# Patient Record
Sex: MALE | Race: WHITE | Employment: OTHER | URBAN - NONMETROPOLITAN AREA
[De-identification: names, ages, dates, MRNs, and addresses within clinical notes are randomized per-mention and may not be internally consistent; named-entity substitution may affect disease eponyms.]

---

## 2017-07-21 PROBLEM — S32.401A RIGHT ACETABULAR FRACTURE (HCC): Status: ACTIVE | Noted: 2017-07-21

## 2017-09-19 PROBLEM — D62 ACUTE BLOOD LOSS ANEMIA: Status: ACTIVE | Noted: 2017-09-19

## 2017-09-19 PROBLEM — K57.93 DIVERTICULITIS OF INTESTINE WITH HEMORRHAGE: Status: ACTIVE | Noted: 2017-09-19

## 2017-09-19 PROBLEM — S32.401A RIGHT ACETABULAR FRACTURE (HCC): Status: RESOLVED | Noted: 2017-07-21 | Resolved: 2017-09-19

## 2019-04-25 LAB
CHOLESTEROL, TOTAL: NORMAL MG/DL
CHOLESTEROL/HDL RATIO: NORMAL
CREATININE URINE: 119 MG/DL
HDLC SERPL-MCNC: NORMAL MG/DL (ref 35–70)
LDL CHOLESTEROL CALCULATED: NORMAL MG/DL (ref 0–160)
MICROALBUMIN/CREAT 24H UR: 1.1 MG/G{CREAT}
TRIGL SERPL-MCNC: NORMAL MG/DL
VLDLC SERPL CALC-MCNC: NORMAL MG/DL

## 2019-05-08 ENCOUNTER — TELEPHONE (OUTPATIENT)
Dept: FAMILY MEDICINE CLINIC | Age: 84
End: 2019-05-08

## 2019-05-08 RX ORDER — FUROSEMIDE 20 MG/1
TABLET ORAL
Refills: 0 | COMMUNITY
Start: 2019-04-13 | End: 2019-07-12 | Stop reason: SDUPTHER

## 2019-06-05 RX ORDER — POTASSIUM CHLORIDE 20 MEQ/1
TABLET, EXTENDED RELEASE ORAL
Qty: 30 TABLET | Refills: 1 | OUTPATIENT
Start: 2019-06-05

## 2019-06-06 RX ORDER — POTASSIUM CHLORIDE 20 MEQ/1
20 TABLET, EXTENDED RELEASE ORAL DAILY
Qty: 30 TABLET | Refills: 5 | Status: SHIPPED | OUTPATIENT
Start: 2019-06-06 | End: 2019-10-21 | Stop reason: SDUPTHER

## 2019-06-06 NOTE — TELEPHONE ENCOUNTER
Wife - Sim[place] calling to get a new script for K+ sent to [facility].

## 2019-07-12 RX ORDER — FUROSEMIDE 20 MG/1
20 TABLET ORAL DAILY
Qty: 30 TABLET | Refills: 0 | Status: SHIPPED | OUTPATIENT
Start: 2019-07-12 | End: 2019-08-13 | Stop reason: SDUPTHER

## 2019-08-13 RX ORDER — FUROSEMIDE 20 MG/1
20 TABLET ORAL DAILY
Qty: 30 TABLET | Refills: 2 | Status: SHIPPED | OUTPATIENT
Start: 2019-08-13 | End: 2019-10-21 | Stop reason: SDUPTHER

## 2019-08-14 DIAGNOSIS — I10 ESSENTIAL HYPERTENSION: Primary | ICD-10-CM

## 2019-08-14 DIAGNOSIS — G47.00 INSOMNIA, UNSPECIFIED TYPE: ICD-10-CM

## 2019-08-14 RX ORDER — LISINOPRIL AND HYDROCHLOROTHIAZIDE 25; 20 MG/1; MG/1
1 TABLET ORAL DAILY
Qty: 30 TABLET | Refills: 5 | Status: SHIPPED | OUTPATIENT
Start: 2019-08-14 | End: 2019-08-19

## 2019-08-14 RX ORDER — TRAZODONE HYDROCHLORIDE 50 MG/1
50 TABLET ORAL NIGHTLY
Qty: 30 TABLET | Refills: 5 | Status: SHIPPED
Start: 2019-08-14 | End: 2020-04-06 | Stop reason: SDUPTHER

## 2019-08-15 ENCOUNTER — HOSPITAL ENCOUNTER (OUTPATIENT)
Age: 84
Discharge: HOME | End: 2019-08-17
Payer: MEDICARE

## 2019-08-15 LAB
ALBUMIN SERPL-MCNC: 4.3 G/DL (ref 3.5–5.2)
ALP BLD-CCNC: 93 U/L (ref 40–129)
ALT SERPL-CCNC: 6 U/L (ref 0–40)
ANION GAP SERPL CALCULATED.3IONS-SCNC: 15 MMOL/L (ref 7–16)
AST SERPL-CCNC: 16 U/L (ref 0–39)
BASOPHILS ABSOLUTE: 0.08 E9/L (ref 0–0.2)
BASOPHILS RELATIVE PERCENT: 1 % (ref 0–2)
BILIRUB SERPL-MCNC: 0.4 MG/DL (ref 0–1.2)
BUN BLDV-MCNC: 32 MG/DL (ref 8–23)
CALCIUM SERPL-MCNC: 9.1 MG/DL (ref 8.6–10.2)
CHLORIDE BLD-SCNC: 103 MMOL/L (ref 98–107)
CO2: 23 MMOL/L (ref 22–29)
CREAT SERPL-MCNC: 1.3 MG/DL (ref 0.7–1.2)
EOSINOPHILS ABSOLUTE: 0.34 E9/L (ref 0.05–0.5)
EOSINOPHILS RELATIVE PERCENT: 4.1 % (ref 0–6)
GFR AFRICAN AMERICAN: >60
GFR NON-AFRICAN AMERICAN: 52 ML/MIN/1.73
GLUCOSE BLD-MCNC: 92 MG/DL (ref 74–99)
HCT VFR BLD CALC: 42.1 % (ref 37–54)
HEMOGLOBIN: 13.5 G/DL (ref 12.5–16.5)
IMMATURE GRANULOCYTES #: 0.03 E9/L
IMMATURE GRANULOCYTES %: 0.4 % (ref 0–5)
LYMPHOCYTES ABSOLUTE: 1.5 E9/L (ref 1.5–4)
LYMPHOCYTES RELATIVE PERCENT: 18.3 % (ref 20–42)
MAGNESIUM: 2.2 MG/DL (ref 1.6–2.6)
MCH RBC QN AUTO: 33.8 PG (ref 26–35)
MCHC RBC AUTO-ENTMCNC: 32.1 % (ref 32–34.5)
MCV RBC AUTO: 105.5 FL (ref 80–99.9)
MONOCYTES ABSOLUTE: 0.77 E9/L (ref 0.1–0.95)
MONOCYTES RELATIVE PERCENT: 9.4 % (ref 2–12)
NEUTROPHILS ABSOLUTE: 5.48 E9/L (ref 1.8–7.3)
NEUTROPHILS RELATIVE PERCENT: 66.8 % (ref 43–80)
PDW BLD-RTO: 14.5 FL (ref 11.5–15)
PHOSPHORUS: 2.9 MG/DL (ref 2.5–4.5)
PLATELET # BLD: 232 E9/L (ref 130–450)
PMV BLD AUTO: 11.3 FL (ref 7–12)
POTASSIUM SERPL-SCNC: 4.2 MMOL/L (ref 3.5–5)
RBC # BLD: 3.99 E12/L (ref 3.8–5.8)
SODIUM BLD-SCNC: 141 MMOL/L (ref 132–146)
TOTAL PROTEIN: 7.6 G/DL (ref 6.4–8.3)
URIC ACID, SERUM: 9 MG/DL (ref 3.4–7)
WBC # BLD: 8.2 E9/L (ref 4.5–11.5)

## 2019-08-15 PROCEDURE — 83735 ASSAY OF MAGNESIUM: CPT

## 2019-08-15 PROCEDURE — 36415 COLL VENOUS BLD VENIPUNCTURE: CPT

## 2019-08-15 PROCEDURE — 84550 ASSAY OF BLOOD/URIC ACID: CPT

## 2019-08-15 PROCEDURE — 85025 COMPLETE CBC W/AUTO DIFF WBC: CPT

## 2019-08-15 PROCEDURE — 80053 COMPREHEN METABOLIC PANEL: CPT

## 2019-08-15 PROCEDURE — 84100 ASSAY OF PHOSPHORUS: CPT

## 2019-08-19 ENCOUNTER — TELEPHONE (OUTPATIENT)
Dept: FAMILY MEDICINE CLINIC | Age: 84
End: 2019-08-19

## 2019-08-19 DIAGNOSIS — I10 ESSENTIAL HYPERTENSION: ICD-10-CM

## 2019-08-19 LAB
BILIRUBIN URINE: NEGATIVE
BLOOD, URINE: NEGATIVE
CLARITY: CLEAR
COLOR: YELLOW
CREATININE URINE: 90 MG/DL (ref 40–278)
GLUCOSE URINE: NEGATIVE MG/DL
KETONES, URINE: NEGATIVE MG/DL
LEUKOCYTE ESTERASE, URINE: NEGATIVE
NITRITE, URINE: NEGATIVE
PH UA: 6.5 (ref 5–9)
PROTEIN PROTEIN: 10 MG/DL (ref 0–12)
PROTEIN UA: NEGATIVE MG/DL
PROTEIN/CREAT RATIO: 0.1
PROTEIN/CREAT RATIO: 0.1 (ref 0–0.2)
SPECIFIC GRAVITY UA: 1.01 (ref 1–1.03)
UROBILINOGEN, URINE: 1 E.U./DL

## 2019-08-19 RX ORDER — LISINOPRIL AND HYDROCHLOROTHIAZIDE 12.5; 1 MG/1; MG/1
TABLET ORAL
COMMUNITY
Start: 2018-11-12 | End: 2020-01-06

## 2019-09-18 RX ORDER — TRIAMCINOLONE ACETONIDE 1 MG/G
CREAM TOPICAL 2 TIMES DAILY
COMMUNITY
End: 2019-10-21 | Stop reason: SDUPTHER

## 2019-10-18 DIAGNOSIS — E03.9 HYPOTHYROIDISM, UNSPECIFIED TYPE: Primary | ICD-10-CM

## 2019-10-18 DIAGNOSIS — E78.2 MIXED HYPERLIPIDEMIA: ICD-10-CM

## 2019-10-18 DIAGNOSIS — I10 ESSENTIAL HYPERTENSION: ICD-10-CM

## 2019-10-21 ENCOUNTER — OFFICE VISIT (OUTPATIENT)
Dept: FAMILY MEDICINE CLINIC | Age: 84
End: 2019-10-21
Payer: MEDICARE

## 2019-10-21 ENCOUNTER — HOSPITAL ENCOUNTER (OUTPATIENT)
Age: 84
Discharge: HOME | End: 2019-10-23
Payer: MEDICARE

## 2019-10-21 VITALS
DIASTOLIC BLOOD PRESSURE: 62 MMHG | OXYGEN SATURATION: 93 % | HEART RATE: 69 BPM | TEMPERATURE: 97.8 F | HEIGHT: 68 IN | SYSTOLIC BLOOD PRESSURE: 118 MMHG | BODY MASS INDEX: 27.89 KG/M2 | WEIGHT: 184 LBS

## 2019-10-21 DIAGNOSIS — E03.9 HYPOTHYROIDISM, UNSPECIFIED TYPE: ICD-10-CM

## 2019-10-21 DIAGNOSIS — I87.2 VENOUS STASIS DERMATITIS OF BOTH LOWER EXTREMITIES: ICD-10-CM

## 2019-10-21 DIAGNOSIS — G20 PARKINSON DISEASE (HCC): ICD-10-CM

## 2019-10-21 DIAGNOSIS — E78.2 MIXED HYPERLIPIDEMIA: ICD-10-CM

## 2019-10-21 DIAGNOSIS — N18.30 STAGE 3 CHRONIC KIDNEY DISEASE (HCC): ICD-10-CM

## 2019-10-21 DIAGNOSIS — I10 ESSENTIAL HYPERTENSION: ICD-10-CM

## 2019-10-21 DIAGNOSIS — R60.0 LOCALIZED EDEMA: ICD-10-CM

## 2019-10-21 DIAGNOSIS — I10 ESSENTIAL HYPERTENSION: Primary | ICD-10-CM

## 2019-10-21 LAB
ALBUMIN SERPL-MCNC: 4.1 G/DL (ref 3.5–5.2)
ALP BLD-CCNC: 100 U/L (ref 40–129)
ALT SERPL-CCNC: 5 U/L (ref 0–40)
ANION GAP SERPL CALCULATED.3IONS-SCNC: 16 MMOL/L (ref 7–16)
AST SERPL-CCNC: 17 U/L (ref 0–39)
BASOPHILS ABSOLUTE: 0.08 E9/L (ref 0–0.2)
BASOPHILS RELATIVE PERCENT: 1 % (ref 0–2)
BILIRUB SERPL-MCNC: 0.5 MG/DL (ref 0–1.2)
BUN BLDV-MCNC: 30 MG/DL (ref 8–23)
CALCIUM SERPL-MCNC: 9.2 MG/DL (ref 8.6–10.2)
CHLORIDE BLD-SCNC: 102 MMOL/L (ref 98–107)
CHOLESTEROL, TOTAL: 150 MG/DL (ref 0–199)
CO2: 22 MMOL/L (ref 22–29)
CREAT SERPL-MCNC: 1.3 MG/DL (ref 0.7–1.2)
EOSINOPHILS ABSOLUTE: 0.43 E9/L (ref 0.05–0.5)
EOSINOPHILS RELATIVE PERCENT: 5.6 % (ref 0–6)
GFR AFRICAN AMERICAN: >60
GFR NON-AFRICAN AMERICAN: 52 ML/MIN/1.73
GLUCOSE BLD-MCNC: 99 MG/DL (ref 74–99)
HCT VFR BLD CALC: 40.6 % (ref 37–54)
HDLC SERPL-MCNC: 39 MG/DL
HEMOGLOBIN: 12.8 G/DL (ref 12.5–16.5)
IMMATURE GRANULOCYTES #: 0.04 E9/L
IMMATURE GRANULOCYTES %: 0.5 % (ref 0–5)
LDL CHOLESTEROL CALCULATED: 88 MG/DL (ref 0–99)
LYMPHOCYTES ABSOLUTE: 1.69 E9/L (ref 1.5–4)
LYMPHOCYTES RELATIVE PERCENT: 21.9 % (ref 20–42)
MCH RBC QN AUTO: 33.3 PG (ref 26–35)
MCHC RBC AUTO-ENTMCNC: 31.5 % (ref 32–34.5)
MCV RBC AUTO: 105.7 FL (ref 80–99.9)
MONOCYTES ABSOLUTE: 0.91 E9/L (ref 0.1–0.95)
MONOCYTES RELATIVE PERCENT: 11.8 % (ref 2–12)
NEUTROPHILS ABSOLUTE: 4.57 E9/L (ref 1.8–7.3)
NEUTROPHILS RELATIVE PERCENT: 59.2 % (ref 43–80)
PDW BLD-RTO: 13.8 FL (ref 11.5–15)
PLATELET # BLD: 218 E9/L (ref 130–450)
PMV BLD AUTO: 11.3 FL (ref 7–12)
POTASSIUM SERPL-SCNC: 4 MMOL/L (ref 3.5–5)
RBC # BLD: 3.84 E12/L (ref 3.8–5.8)
SODIUM BLD-SCNC: 140 MMOL/L (ref 132–146)
T4 FREE: 1.53 NG/DL (ref 0.93–1.7)
TOTAL PROTEIN: 7.9 G/DL (ref 6.4–8.3)
TRIGL SERPL-MCNC: 113 MG/DL (ref 0–149)
TSH SERPL DL<=0.05 MIU/L-ACNC: 2.92 UIU/ML (ref 0.27–4.2)
VLDLC SERPL CALC-MCNC: 23 MG/DL
WBC # BLD: 7.7 E9/L (ref 4.5–11.5)

## 2019-10-21 PROCEDURE — 80053 COMPREHEN METABOLIC PANEL: CPT

## 2019-10-21 PROCEDURE — 84443 ASSAY THYROID STIM HORMONE: CPT

## 2019-10-21 PROCEDURE — 84439 ASSAY OF FREE THYROXINE: CPT

## 2019-10-21 PROCEDURE — 80061 LIPID PANEL: CPT

## 2019-10-21 PROCEDURE — 99214 OFFICE O/P EST MOD 30 MIN: CPT | Performed by: FAMILY MEDICINE

## 2019-10-21 PROCEDURE — 36415 COLL VENOUS BLD VENIPUNCTURE: CPT

## 2019-10-21 PROCEDURE — 85025 COMPLETE CBC W/AUTO DIFF WBC: CPT

## 2019-10-21 RX ORDER — FUROSEMIDE 20 MG/1
20 TABLET ORAL DAILY
Qty: 90 TABLET | Refills: 1 | Status: SHIPPED | OUTPATIENT
Start: 2019-10-21 | End: 2020-01-06

## 2019-10-21 RX ORDER — LEVOTHYROXINE SODIUM 0.03 MG/1
TABLET ORAL
Qty: 240 TABLET | Refills: 3 | Status: SHIPPED | OUTPATIENT
Start: 2019-10-21 | End: 2019-10-27 | Stop reason: SDUPTHER

## 2019-10-21 RX ORDER — TRIAMCINOLONE ACETONIDE 1 MG/G
CREAM TOPICAL 2 TIMES DAILY
Qty: 45 G | Refills: 3 | Status: ON HOLD | OUTPATIENT
Start: 2019-10-21 | End: 2021-02-01 | Stop reason: HOSPADM

## 2019-10-21 RX ORDER — POTASSIUM CHLORIDE 20 MEQ/1
20 TABLET, EXTENDED RELEASE ORAL DAILY
Qty: 90 TABLET | Refills: 1 | Status: SHIPPED
Start: 2019-10-21 | End: 2020-05-18 | Stop reason: SDUPTHER

## 2019-10-21 RX ORDER — DOXAZOSIN MESYLATE 4 MG/1
4 TABLET ORAL NIGHTLY
Qty: 90 TABLET | Refills: 1 | Status: SHIPPED
Start: 2019-10-21 | End: 2020-04-06 | Stop reason: SDUPTHER

## 2019-10-21 ASSESSMENT — ENCOUNTER SYMPTOMS
BLOOD IN STOOL: 0
SORE THROAT: 0
CHEST TIGHTNESS: 0
CONSTIPATION: 0
SHORTNESS OF BREATH: 0
BACK PAIN: 0
COLOR CHANGE: 0
SINUS PRESSURE: 0
SINUS PAIN: 0
COUGH: 0
EYE PAIN: 0
NAUSEA: 0
VOMITING: 0
PHOTOPHOBIA: 0
DIARRHEA: 0
ABDOMINAL PAIN: 0
WHEEZING: 0
EYE ITCHING: 0
EYE REDNESS: 0
ABDOMINAL DISTENTION: 0

## 2019-10-21 ASSESSMENT — PATIENT HEALTH QUESTIONNAIRE - PHQ9
2. FEELING DOWN, DEPRESSED OR HOPELESS: 0
SUM OF ALL RESPONSES TO PHQ QUESTIONS 1-9: 0
1. LITTLE INTEREST OR PLEASURE IN DOING THINGS: 0
SUM OF ALL RESPONSES TO PHQ QUESTIONS 1-9: 0
SUM OF ALL RESPONSES TO PHQ9 QUESTIONS 1 & 2: 0

## 2019-10-22 DIAGNOSIS — D75.89 MACROCYTOSIS: Primary | ICD-10-CM

## 2019-10-25 ENCOUNTER — HOSPITAL ENCOUNTER (OUTPATIENT)
Age: 84
Discharge: HOME | End: 2019-10-27
Payer: MEDICARE

## 2019-10-25 DIAGNOSIS — D75.89 MACROCYTOSIS: ICD-10-CM

## 2019-10-25 LAB
BILIRUBIN URINE: NEGATIVE
BLOOD, URINE: NEGATIVE
CLARITY: CLEAR
COLOR: YELLOW
FOLATE: 11.8 NG/ML (ref 4.8–24.2)
GLUCOSE URINE: NEGATIVE MG/DL
KETONES, URINE: NEGATIVE MG/DL
LEUKOCYTE ESTERASE, URINE: NEGATIVE
NITRITE, URINE: NEGATIVE
PH UA: 5.5 (ref 5–9)
PROTEIN UA: NEGATIVE MG/DL
SPECIFIC GRAVITY UA: 1.02 (ref 1–1.03)
UROBILINOGEN, URINE: 0.2 E.U./DL
VITAMIN B-12: 1410 PG/ML (ref 211–946)

## 2019-10-25 PROCEDURE — 36415 COLL VENOUS BLD VENIPUNCTURE: CPT

## 2019-10-25 PROCEDURE — 82746 ASSAY OF FOLIC ACID SERUM: CPT

## 2019-10-25 PROCEDURE — 82607 VITAMIN B-12: CPT

## 2019-10-27 DIAGNOSIS — E03.9 HYPOTHYROIDISM, UNSPECIFIED TYPE: ICD-10-CM

## 2019-10-28 RX ORDER — LEVOTHYROXINE SODIUM 0.03 MG/1
TABLET ORAL
Qty: 225 TABLET | Refills: 1 | Status: SHIPPED | OUTPATIENT
Start: 2019-10-28 | End: 2020-01-24

## 2019-11-23 ENCOUNTER — OFFICE VISIT (OUTPATIENT)
Dept: FAMILY MEDICINE CLINIC | Age: 84
End: 2019-11-23
Payer: MEDICARE

## 2019-11-23 VITALS
OXYGEN SATURATION: 95 % | DIASTOLIC BLOOD PRESSURE: 70 MMHG | SYSTOLIC BLOOD PRESSURE: 110 MMHG | BODY MASS INDEX: 28.36 KG/M2 | WEIGHT: 187.13 LBS | HEART RATE: 76 BPM | TEMPERATURE: 97.6 F | HEIGHT: 68 IN

## 2019-11-23 DIAGNOSIS — S51.811A SKIN TEAR OF RIGHT FOREARM WITHOUT COMPLICATION, INITIAL ENCOUNTER: Primary | ICD-10-CM

## 2019-11-23 PROCEDURE — 99214 OFFICE O/P EST MOD 30 MIN: CPT | Performed by: FAMILY MEDICINE

## 2019-11-23 ASSESSMENT — ENCOUNTER SYMPTOMS
WHEEZING: 0
ALLERGIC/IMMUNOLOGIC NEGATIVE: 1
TROUBLE SWALLOWING: 0
CHEST TIGHTNESS: 0
EYE PAIN: 0
BACK PAIN: 0
SINUS PAIN: 0
NAUSEA: 0
COUGH: 0
ABDOMINAL PAIN: 0
COLOR CHANGE: 0
SORE THROAT: 0
CONSTIPATION: 0
EYE DISCHARGE: 0
DIARRHEA: 0
SHORTNESS OF BREATH: 0
VOMITING: 0

## 2019-11-25 ENCOUNTER — OFFICE VISIT (OUTPATIENT)
Dept: FAMILY MEDICINE CLINIC | Age: 84
End: 2019-11-25
Payer: MEDICARE

## 2019-11-25 VITALS
SYSTOLIC BLOOD PRESSURE: 110 MMHG | HEIGHT: 68 IN | BODY MASS INDEX: 28.45 KG/M2 | HEART RATE: 68 BPM | TEMPERATURE: 98.2 F | DIASTOLIC BLOOD PRESSURE: 70 MMHG | OXYGEN SATURATION: 96 %

## 2019-11-25 DIAGNOSIS — S51.811A SKIN TEAR OF RIGHT FOREARM WITHOUT COMPLICATION, INITIAL ENCOUNTER: Primary | ICD-10-CM

## 2019-11-25 DIAGNOSIS — I10 ESSENTIAL HYPERTENSION: ICD-10-CM

## 2019-11-25 PROCEDURE — 99213 OFFICE O/P EST LOW 20 MIN: CPT | Performed by: FAMILY MEDICINE

## 2019-11-25 ASSESSMENT — ENCOUNTER SYMPTOMS
BACK PAIN: 0
SINUS PAIN: 0
COUGH: 0
TROUBLE SWALLOWING: 0
VOMITING: 0
CHEST TIGHTNESS: 0
ALLERGIC/IMMUNOLOGIC NEGATIVE: 1
WHEEZING: 0
EYE PAIN: 0
ABDOMINAL PAIN: 0
SHORTNESS OF BREATH: 0
DIARRHEA: 0
NAUSEA: 0
SORE THROAT: 0
COLOR CHANGE: 0
EYE DISCHARGE: 0
CONSTIPATION: 0

## 2019-11-27 ENCOUNTER — OFFICE VISIT (OUTPATIENT)
Dept: FAMILY MEDICINE CLINIC | Age: 84
End: 2019-11-27
Payer: MEDICARE

## 2019-11-27 ENCOUNTER — TELEPHONE (OUTPATIENT)
Dept: FAMILY MEDICINE CLINIC | Age: 84
End: 2019-11-27

## 2019-11-27 VITALS
DIASTOLIC BLOOD PRESSURE: 82 MMHG | HEART RATE: 61 BPM | WEIGHT: 187.3 LBS | OXYGEN SATURATION: 97 % | BODY MASS INDEX: 28.39 KG/M2 | TEMPERATURE: 98 F | HEIGHT: 68 IN | SYSTOLIC BLOOD PRESSURE: 126 MMHG

## 2019-11-27 DIAGNOSIS — S51.811D SKIN TEAR OF RIGHT FOREARM WITHOUT COMPLICATION, SUBSEQUENT ENCOUNTER: Primary | ICD-10-CM

## 2019-11-27 DIAGNOSIS — I10 ESSENTIAL HYPERTENSION: ICD-10-CM

## 2019-11-27 PROCEDURE — 99213 OFFICE O/P EST LOW 20 MIN: CPT | Performed by: FAMILY MEDICINE

## 2019-11-27 RX ORDER — MUPIROCIN CALCIUM 20 MG/G
CREAM TOPICAL
Qty: 30 G | Refills: 1 | Status: SHIPPED | OUTPATIENT
Start: 2019-11-27 | End: 2019-12-27

## 2019-11-27 ASSESSMENT — ENCOUNTER SYMPTOMS
DIARRHEA: 0
WHEEZING: 0
EYE DISCHARGE: 0
EYE PAIN: 0
SORE THROAT: 0
COUGH: 0
SINUS PAIN: 0
ABDOMINAL PAIN: 0
SHORTNESS OF BREATH: 0
CHEST TIGHTNESS: 0
COLOR CHANGE: 0
TROUBLE SWALLOWING: 0
VOMITING: 0
NAUSEA: 0
BACK PAIN: 0
ALLERGIC/IMMUNOLOGIC NEGATIVE: 1
CONSTIPATION: 0

## 2019-12-04 ENCOUNTER — OFFICE VISIT (OUTPATIENT)
Dept: FAMILY MEDICINE CLINIC | Age: 84
End: 2019-12-04
Payer: MEDICARE

## 2019-12-04 VITALS
DIASTOLIC BLOOD PRESSURE: 84 MMHG | WEIGHT: 189 LBS | HEIGHT: 68 IN | HEART RATE: 62 BPM | BODY MASS INDEX: 28.64 KG/M2 | TEMPERATURE: 97.7 F | SYSTOLIC BLOOD PRESSURE: 122 MMHG | OXYGEN SATURATION: 97 %

## 2019-12-04 DIAGNOSIS — S51.811D SKIN TEAR OF RIGHT FOREARM WITHOUT COMPLICATION, SUBSEQUENT ENCOUNTER: Primary | ICD-10-CM

## 2019-12-04 PROCEDURE — 99213 OFFICE O/P EST LOW 20 MIN: CPT | Performed by: FAMILY MEDICINE

## 2019-12-04 ASSESSMENT — ENCOUNTER SYMPTOMS
ABDOMINAL PAIN: 0
BACK PAIN: 0
WHEEZING: 0
SORE THROAT: 0
DIARRHEA: 0
CHEST TIGHTNESS: 0
ALLERGIC/IMMUNOLOGIC NEGATIVE: 1
NAUSEA: 0
SHORTNESS OF BREATH: 0
COUGH: 0
EYE PAIN: 0
TROUBLE SWALLOWING: 0
EYE DISCHARGE: 0
VOMITING: 0
SINUS PAIN: 0
COLOR CHANGE: 0
CONSTIPATION: 0

## 2019-12-30 ENCOUNTER — HOSPITAL ENCOUNTER (OUTPATIENT)
Age: 84
Discharge: HOME | End: 2020-01-01
Payer: MEDICARE

## 2019-12-30 ENCOUNTER — OFFICE VISIT (OUTPATIENT)
Dept: FAMILY MEDICINE CLINIC | Age: 84
End: 2019-12-30
Payer: MEDICARE

## 2019-12-30 VITALS
DIASTOLIC BLOOD PRESSURE: 70 MMHG | OXYGEN SATURATION: 91 % | HEIGHT: 68 IN | BODY MASS INDEX: 28.98 KG/M2 | WEIGHT: 191.2 LBS | TEMPERATURE: 98.4 F | SYSTOLIC BLOOD PRESSURE: 122 MMHG | HEART RATE: 80 BPM

## 2019-12-30 DIAGNOSIS — I87.2 VENOUS STASIS DERMATITIS OF BOTH LOWER EXTREMITIES: ICD-10-CM

## 2019-12-30 DIAGNOSIS — I10 ESSENTIAL HYPERTENSION: ICD-10-CM

## 2019-12-30 DIAGNOSIS — N18.30 STAGE 3 CHRONIC KIDNEY DISEASE (HCC): ICD-10-CM

## 2019-12-30 DIAGNOSIS — R60.0 LOCALIZED EDEMA: Primary | ICD-10-CM

## 2019-12-30 LAB
ANION GAP SERPL CALCULATED.3IONS-SCNC: 15 MMOL/L (ref 7–16)
BASOPHILS ABSOLUTE: 0.06 E9/L (ref 0–0.2)
BASOPHILS RELATIVE PERCENT: 0.8 % (ref 0–2)
BUN BLDV-MCNC: 30 MG/DL (ref 8–23)
CALCIUM SERPL-MCNC: 8.8 MG/DL (ref 8.6–10.2)
CHLORIDE BLD-SCNC: 100 MMOL/L (ref 98–107)
CO2: 25 MMOL/L (ref 22–29)
CREAT SERPL-MCNC: 1.3 MG/DL (ref 0.7–1.2)
EOSINOPHILS ABSOLUTE: 0.4 E9/L (ref 0.05–0.5)
EOSINOPHILS RELATIVE PERCENT: 5.2 % (ref 0–6)
GFR AFRICAN AMERICAN: >60
GFR NON-AFRICAN AMERICAN: 52 ML/MIN/1.73
GLUCOSE BLD-MCNC: 79 MG/DL (ref 74–99)
HCT VFR BLD CALC: 39.2 % (ref 37–54)
HEMOGLOBIN: 11.9 G/DL (ref 12.5–16.5)
IMMATURE GRANULOCYTES #: 0.03 E9/L
IMMATURE GRANULOCYTES %: 0.4 % (ref 0–5)
LYMPHOCYTES ABSOLUTE: 1.27 E9/L (ref 1.5–4)
LYMPHOCYTES RELATIVE PERCENT: 16.5 % (ref 20–42)
MCH RBC QN AUTO: 32.8 PG (ref 26–35)
MCHC RBC AUTO-ENTMCNC: 30.4 % (ref 32–34.5)
MCV RBC AUTO: 108 FL (ref 80–99.9)
MONOCYTES ABSOLUTE: 0.8 E9/L (ref 0.1–0.95)
MONOCYTES RELATIVE PERCENT: 10.4 % (ref 2–12)
NEUTROPHILS ABSOLUTE: 5.15 E9/L (ref 1.8–7.3)
NEUTROPHILS RELATIVE PERCENT: 66.7 % (ref 43–80)
PDW BLD-RTO: 14.2 FL (ref 11.5–15)
PLATELET # BLD: 212 E9/L (ref 130–450)
PMV BLD AUTO: 11.7 FL (ref 7–12)
POTASSIUM SERPL-SCNC: 3.9 MMOL/L (ref 3.5–5)
PRO-BNP: 976 PG/ML (ref 0–450)
RBC # BLD: 3.63 E12/L (ref 3.8–5.8)
SODIUM BLD-SCNC: 140 MMOL/L (ref 132–146)
WBC # BLD: 7.7 E9/L (ref 4.5–11.5)

## 2019-12-30 PROCEDURE — 93000 ELECTROCARDIOGRAM COMPLETE: CPT | Performed by: NURSE PRACTITIONER

## 2019-12-30 PROCEDURE — 83880 ASSAY OF NATRIURETIC PEPTIDE: CPT

## 2019-12-30 PROCEDURE — 36415 COLL VENOUS BLD VENIPUNCTURE: CPT

## 2019-12-30 PROCEDURE — 85025 COMPLETE CBC W/AUTO DIFF WBC: CPT

## 2019-12-30 PROCEDURE — 99214 OFFICE O/P EST MOD 30 MIN: CPT | Performed by: NURSE PRACTITIONER

## 2019-12-30 PROCEDURE — 80048 BASIC METABOLIC PNL TOTAL CA: CPT

## 2019-12-30 RX ORDER — LISINOPRIL 10 MG/1
10 TABLET ORAL DAILY
Qty: 30 TABLET | Refills: 0 | Status: SHIPPED | OUTPATIENT
Start: 2019-12-30 | End: 2020-01-06 | Stop reason: SDUPTHER

## 2019-12-30 RX ORDER — FUROSEMIDE 40 MG/1
40 TABLET ORAL DAILY
Qty: 30 TABLET | Refills: 0 | Status: SHIPPED | OUTPATIENT
Start: 2019-12-30 | End: 2020-01-06 | Stop reason: SDUPTHER

## 2019-12-30 RX ORDER — CEPHALEXIN 500 MG/1
500 CAPSULE ORAL 2 TIMES DAILY
Qty: 14 CAPSULE | Refills: 0 | Status: SHIPPED | OUTPATIENT
Start: 2019-12-30 | End: 2020-01-06 | Stop reason: SDUPTHER

## 2019-12-30 ASSESSMENT — ENCOUNTER SYMPTOMS
WHEEZING: 0
COLOR CHANGE: 0
EYE PAIN: 0
CHEST TIGHTNESS: 0
SINUS PRESSURE: 0
SINUS PAIN: 0
PHOTOPHOBIA: 0
EYE ITCHING: 0
SHORTNESS OF BREATH: 0
NAUSEA: 0
ABDOMINAL DISTENTION: 0
BLOOD IN STOOL: 0
BACK PAIN: 0
COUGH: 0
VOMITING: 0
EYE REDNESS: 0
ABDOMINAL PAIN: 0
CONSTIPATION: 0
DIARRHEA: 0
SORE THROAT: 0

## 2020-01-06 RX ORDER — LISINOPRIL 10 MG/1
10 TABLET ORAL DAILY
Qty: 30 TABLET | Refills: 5 | Status: SHIPPED
Start: 2020-01-06 | End: 2020-04-01 | Stop reason: SDUPTHER

## 2020-01-06 RX ORDER — FUROSEMIDE 40 MG/1
40 TABLET ORAL DAILY
Qty: 30 TABLET | Refills: 5 | Status: SHIPPED
Start: 2020-01-06 | End: 2020-04-06 | Stop reason: SDUPTHER

## 2020-01-06 RX ORDER — CEPHALEXIN 500 MG/1
500 CAPSULE ORAL 2 TIMES DAILY
Qty: 14 CAPSULE | Refills: 0 | Status: SHIPPED
Start: 2020-01-06 | End: 2020-04-20 | Stop reason: ALTCHOICE

## 2020-01-09 ENCOUNTER — HOSPITAL ENCOUNTER (OUTPATIENT)
Age: 85
Discharge: HOME | End: 2020-01-11
Payer: MEDICARE

## 2020-01-09 LAB
ALBUMIN SERPL-MCNC: 4.2 G/DL (ref 3.5–5.2)
ALP BLD-CCNC: 90 U/L (ref 40–129)
ALT SERPL-CCNC: 6 U/L (ref 0–40)
ANION GAP SERPL CALCULATED.3IONS-SCNC: 19 MMOL/L (ref 7–16)
AST SERPL-CCNC: 21 U/L (ref 0–39)
BASOPHILS ABSOLUTE: 0.07 E9/L (ref 0–0.2)
BASOPHILS RELATIVE PERCENT: 1 % (ref 0–2)
BILIRUB SERPL-MCNC: 0.7 MG/DL (ref 0–1.2)
BUN BLDV-MCNC: 24 MG/DL (ref 8–23)
CALCIUM SERPL-MCNC: 9.1 MG/DL (ref 8.6–10.2)
CHLORIDE BLD-SCNC: 100 MMOL/L (ref 98–107)
CO2: 21 MMOL/L (ref 22–29)
CREAT SERPL-MCNC: 1.1 MG/DL (ref 0.7–1.2)
EOSINOPHILS ABSOLUTE: 0.35 E9/L (ref 0.05–0.5)
EOSINOPHILS RELATIVE PERCENT: 4.8 % (ref 0–6)
GFR AFRICAN AMERICAN: >60
GFR NON-AFRICAN AMERICAN: >60 ML/MIN/1.73
GLUCOSE BLD-MCNC: 79 MG/DL (ref 74–99)
HCT VFR BLD CALC: 39.8 % (ref 37–54)
HEMOGLOBIN: 12.3 G/DL (ref 12.5–16.5)
IMMATURE GRANULOCYTES #: 0.02 E9/L
IMMATURE GRANULOCYTES %: 0.3 % (ref 0–5)
LYMPHOCYTES ABSOLUTE: 1.37 E9/L (ref 1.5–4)
LYMPHOCYTES RELATIVE PERCENT: 18.9 % (ref 20–42)
MAGNESIUM: 2.3 MG/DL (ref 1.6–2.6)
MCH RBC QN AUTO: 33 PG (ref 26–35)
MCHC RBC AUTO-ENTMCNC: 30.9 % (ref 32–34.5)
MCV RBC AUTO: 106.7 FL (ref 80–99.9)
MONOCYTES ABSOLUTE: 0.81 E9/L (ref 0.1–0.95)
MONOCYTES RELATIVE PERCENT: 11.2 % (ref 2–12)
NEUTROPHILS ABSOLUTE: 4.62 E9/L (ref 1.8–7.3)
NEUTROPHILS RELATIVE PERCENT: 63.8 % (ref 43–80)
PDW BLD-RTO: 14.5 FL (ref 11.5–15)
PHOSPHORUS: 2.6 MG/DL (ref 2.5–4.5)
PLATELET # BLD: 213 E9/L (ref 130–450)
PMV BLD AUTO: 11.5 FL (ref 7–12)
POTASSIUM SERPL-SCNC: 4.1 MMOL/L (ref 3.5–5)
RBC # BLD: 3.73 E12/L (ref 3.8–5.8)
SODIUM BLD-SCNC: 140 MMOL/L (ref 132–146)
TOTAL PROTEIN: 7.9 G/DL (ref 6.4–8.3)
URIC ACID, SERUM: 8.1 MG/DL (ref 3.4–7)
WBC # BLD: 7.2 E9/L (ref 4.5–11.5)

## 2020-01-09 PROCEDURE — 84550 ASSAY OF BLOOD/URIC ACID: CPT

## 2020-01-09 PROCEDURE — 84100 ASSAY OF PHOSPHORUS: CPT

## 2020-01-09 PROCEDURE — 85025 COMPLETE CBC W/AUTO DIFF WBC: CPT

## 2020-01-09 PROCEDURE — 36415 COLL VENOUS BLD VENIPUNCTURE: CPT

## 2020-01-09 PROCEDURE — 83735 ASSAY OF MAGNESIUM: CPT

## 2020-01-09 PROCEDURE — 80053 COMPREHEN METABOLIC PANEL: CPT

## 2020-01-13 LAB
BILIRUBIN URINE: NEGATIVE
BLOOD, URINE: NEGATIVE
CLARITY: CLEAR
COLOR: YELLOW
CREATININE URINE: 77 MG/DL (ref 40–278)
GLUCOSE URINE: NEGATIVE MG/DL
KETONES, URINE: NEGATIVE MG/DL
LEUKOCYTE ESTERASE, URINE: NEGATIVE
NITRITE, URINE: NEGATIVE
PH UA: 5.5 (ref 5–9)
PROTEIN PROTEIN: 14 MG/DL (ref 0–12)
PROTEIN UA: NEGATIVE MG/DL
PROTEIN/CREAT RATIO: 0.2
PROTEIN/CREAT RATIO: 0.2 (ref 0–0.2)
SPECIFIC GRAVITY UA: 1.02 (ref 1–1.03)
UROBILINOGEN, URINE: 0.2 E.U./DL

## 2020-01-24 RX ORDER — LEVOTHYROXINE SODIUM 0.03 MG/1
TABLET ORAL
Qty: 225 TABLET | Refills: 0 | Status: SHIPPED
Start: 2020-01-24 | End: 2020-04-06 | Stop reason: SDUPTHER

## 2020-02-04 RX ORDER — LISINOPRIL AND HYDROCHLOROTHIAZIDE 12.5; 1 MG/1; MG/1
1 TABLET ORAL DAILY
Qty: 30 TABLET | Refills: 5 | Status: SHIPPED
Start: 2020-02-04 | End: 2020-04-01 | Stop reason: SDUPTHER

## 2020-02-04 RX ORDER — LISINOPRIL AND HYDROCHLOROTHIAZIDE 12.5; 1 MG/1; MG/1
1 TABLET ORAL DAILY
COMMUNITY
Start: 2020-01-07 | End: 2020-02-04 | Stop reason: SDUPTHER

## 2020-02-19 ENCOUNTER — HOSPITAL ENCOUNTER (OUTPATIENT)
Age: 85
Discharge: HOME | End: 2020-02-21
Payer: MEDICARE

## 2020-02-19 LAB
ALBUMIN SERPL-MCNC: 4.1 G/DL (ref 3.5–5.2)
ALP BLD-CCNC: 108 U/L (ref 40–129)
ALT SERPL-CCNC: 5 U/L (ref 0–40)
ANION GAP SERPL CALCULATED.3IONS-SCNC: 16 MMOL/L (ref 7–16)
AST SERPL-CCNC: 17 U/L (ref 0–39)
BASOPHILS ABSOLUTE: 0.09 E9/L (ref 0–0.2)
BASOPHILS RELATIVE PERCENT: 1.1 % (ref 0–2)
BILIRUB SERPL-MCNC: 0.5 MG/DL (ref 0–1.2)
BUN BLDV-MCNC: 29 MG/DL (ref 8–23)
CALCIUM SERPL-MCNC: 9.1 MG/DL (ref 8.6–10.2)
CHLORIDE BLD-SCNC: 100 MMOL/L (ref 98–107)
CO2: 24 MMOL/L (ref 22–29)
CREAT SERPL-MCNC: 1.3 MG/DL (ref 0.7–1.2)
EOSINOPHILS ABSOLUTE: 0.36 E9/L (ref 0.05–0.5)
EOSINOPHILS RELATIVE PERCENT: 4.4 % (ref 0–6)
GFR AFRICAN AMERICAN: >60
GFR NON-AFRICAN AMERICAN: 52 ML/MIN/1.73
GLUCOSE BLD-MCNC: 106 MG/DL (ref 74–99)
HCT VFR BLD CALC: 39.9 % (ref 37–54)
HEMOGLOBIN: 12.2 G/DL (ref 12.5–16.5)
IMMATURE GRANULOCYTES #: 0.03 E9/L
IMMATURE GRANULOCYTES %: 0.4 % (ref 0–5)
LYMPHOCYTES ABSOLUTE: 1.45 E9/L (ref 1.5–4)
LYMPHOCYTES RELATIVE PERCENT: 17.9 % (ref 20–42)
MAGNESIUM: 2.1 MG/DL (ref 1.6–2.6)
MCH RBC QN AUTO: 32.4 PG (ref 26–35)
MCHC RBC AUTO-ENTMCNC: 30.6 % (ref 32–34.5)
MCV RBC AUTO: 106.1 FL (ref 80–99.9)
MONOCYTES ABSOLUTE: 0.68 E9/L (ref 0.1–0.95)
MONOCYTES RELATIVE PERCENT: 8.4 % (ref 2–12)
NEUTROPHILS ABSOLUTE: 5.5 E9/L (ref 1.8–7.3)
NEUTROPHILS RELATIVE PERCENT: 67.8 % (ref 43–80)
PDW BLD-RTO: 13.7 FL (ref 11.5–15)
PHOSPHORUS: 3.5 MG/DL (ref 2.5–4.5)
PLATELET # BLD: 214 E9/L (ref 130–450)
PMV BLD AUTO: 11.6 FL (ref 7–12)
POTASSIUM SERPL-SCNC: 4 MMOL/L (ref 3.5–5)
RBC # BLD: 3.76 E12/L (ref 3.8–5.8)
SODIUM BLD-SCNC: 140 MMOL/L (ref 132–146)
TOTAL PROTEIN: 7.7 G/DL (ref 6.4–8.3)
URIC ACID, SERUM: 9.1 MG/DL (ref 3.4–7)
WBC # BLD: 8.1 E9/L (ref 4.5–11.5)

## 2020-02-19 PROCEDURE — 83735 ASSAY OF MAGNESIUM: CPT

## 2020-02-19 PROCEDURE — 36415 COLL VENOUS BLD VENIPUNCTURE: CPT

## 2020-02-19 PROCEDURE — 84100 ASSAY OF PHOSPHORUS: CPT

## 2020-02-19 PROCEDURE — 84550 ASSAY OF BLOOD/URIC ACID: CPT

## 2020-02-19 PROCEDURE — 85025 COMPLETE CBC W/AUTO DIFF WBC: CPT

## 2020-02-19 PROCEDURE — 80053 COMPREHEN METABOLIC PANEL: CPT

## 2020-04-01 RX ORDER — LISINOPRIL AND HYDROCHLOROTHIAZIDE 12.5; 1 MG/1; MG/1
1 TABLET ORAL DAILY
Qty: 90 TABLET | Refills: 3 | Status: ON HOLD | OUTPATIENT
Start: 2020-04-01 | End: 2021-02-01 | Stop reason: HOSPADM

## 2020-04-01 RX ORDER — LISINOPRIL 10 MG/1
10 TABLET ORAL DAILY
Qty: 90 TABLET | Refills: 3 | Status: SHIPPED | OUTPATIENT
Start: 2020-04-01 | End: 2021-08-30 | Stop reason: ALTCHOICE

## 2020-04-01 NOTE — TELEPHONE ENCOUNTER
Last Appointment:  10/21/2019  Future Appointments   Date Time Provider Perry Plascencia   4/13/2020 11:30 AM Nano Archibald  [facility]      Please advise. Getting requests for both?

## 2020-04-06 RX ORDER — TRAZODONE HYDROCHLORIDE 50 MG/1
50 TABLET ORAL NIGHTLY
Qty: 30 TABLET | Refills: 5 | Status: SHIPPED | OUTPATIENT
Start: 2020-04-06 | End: 2021-08-30 | Stop reason: ALTCHOICE

## 2020-04-06 RX ORDER — LEVOTHYROXINE SODIUM 0.03 MG/1
25 TABLET ORAL DAILY
Qty: 30 TABLET | Refills: 5 | Status: SHIPPED
Start: 2020-04-06 | End: 2020-04-23

## 2020-04-06 RX ORDER — FUROSEMIDE 40 MG/1
40 TABLET ORAL DAILY
Qty: 30 TABLET | Refills: 5 | Status: SHIPPED
Start: 2020-04-06 | End: 2020-07-06 | Stop reason: SDUPTHER

## 2020-04-06 RX ORDER — DOXAZOSIN MESYLATE 4 MG/1
4 TABLET ORAL NIGHTLY
Qty: 90 TABLET | Refills: 1 | Status: SHIPPED | OUTPATIENT
Start: 2020-04-06 | End: 2021-08-30 | Stop reason: ALTCHOICE

## 2020-04-10 ENCOUNTER — TELEPHONE (OUTPATIENT)
Dept: FAMILY MEDICINE CLINIC | Age: 85
End: 2020-04-10

## 2020-04-10 NOTE — TELEPHONE ENCOUNTER
Pharmacy called in that there is a new manufacture for the levothyroxine and they need approval to have it filled - [employer]

## 2020-04-20 ENCOUNTER — VIRTUAL VISIT (OUTPATIENT)
Dept: PRIMARY CARE CLINIC | Age: 85
End: 2020-04-20
Payer: MEDICARE

## 2020-04-20 PROCEDURE — G2012 BRIEF CHECK IN BY MD/QHP: HCPCS | Performed by: NURSE PRACTITIONER

## 2020-04-20 ASSESSMENT — PATIENT HEALTH QUESTIONNAIRE - PHQ9
SUM OF ALL RESPONSES TO PHQ QUESTIONS 1-9: 0
2. FEELING DOWN, DEPRESSED OR HOPELESS: 0
1. LITTLE INTEREST OR PLEASURE IN DOING THINGS: 0
SUM OF ALL RESPONSES TO PHQ QUESTIONS 1-9: 0
SUM OF ALL RESPONSES TO PHQ9 QUESTIONS 1 & 2: 0

## 2020-04-20 NOTE — PROGRESS NOTES
within the next 24 hours.     Total Time: minutes: 5-10 minutes    Note: not billable if this call serves to triage the patient into an appointment for the relevant concern      Jeff Hoff

## 2020-04-23 RX ORDER — LEVOTHYROXINE SODIUM 0.03 MG/1
TABLET ORAL
Qty: 216 TABLET | Refills: 1 | Status: SHIPPED
Start: 2020-04-23 | End: 2021-08-30 | Stop reason: ALTCHOICE

## 2020-04-23 NOTE — TELEPHONE ENCOUNTER
Patient and wife called Levothyroxine 25 mg was sent to pharmacy for 1 tab PO daily. They report patient is to take 3 tabs PO daily Mon-Fri and 2 tabs PO on Sat - Sun. They also want a 90 day supply so they don't have to drive to pharmacy every month. Pended for 90 day supply with one refill.       Electronically signed by Uriel Cooper LPN on 6/00/9122 at 6:71 AM

## 2020-05-18 RX ORDER — POTASSIUM CHLORIDE 20 MEQ/1
20 TABLET, EXTENDED RELEASE ORAL DAILY
Qty: 90 TABLET | Refills: 1 | Status: ON HOLD | OUTPATIENT
Start: 2020-05-18 | End: 2021-02-01 | Stop reason: HOSPADM

## 2020-05-18 NOTE — TELEPHONE ENCOUNTER
Last Appointment:  10/21/2019  Future Appointments   Date Time Provider Perry Thais   6/16/2020 11:00 AM Nora Larose MD [facility]      Patient needs pended med refilled. Also asked about labs before next appointment.     Electronically signed by Eloise James LPN on 4/59/8421 at 51:48 AM

## 2020-06-03 ENCOUNTER — TELEPHONE (OUTPATIENT)
Dept: ADMINISTRATIVE | Age: 85
End: 2020-06-03

## 2020-06-03 NOTE — TELEPHONE ENCOUNTER
He has seen mati edgar in the past in [place]  If he needs seen in the interim she can probably take care of him and any issues he may have

## 2020-06-07 ENCOUNTER — APPOINTMENT (OUTPATIENT)
Dept: GENERAL RADIOLOGY | Age: 85
End: 2020-06-07
Payer: MEDICARE

## 2020-06-07 ENCOUNTER — HOSPITAL ENCOUNTER (EMERGENCY)
Age: 85
Discharge: HOME | End: 2020-06-07
Payer: MEDICARE

## 2020-06-07 VITALS
WEIGHT: 180 LBS | HEART RATE: 82 BPM | OXYGEN SATURATION: 98 % | SYSTOLIC BLOOD PRESSURE: 173 MMHG | HEIGHT: 69 IN | RESPIRATION RATE: 20 BRPM | TEMPERATURE: 98.4 F | BODY MASS INDEX: 26.66 KG/M2 | DIASTOLIC BLOOD PRESSURE: 73 MMHG

## 2020-06-07 PROCEDURE — 73090 X-RAY EXAM OF FOREARM: CPT

## 2020-06-07 PROCEDURE — 12001 RPR S/N/AX/GEN/TRNK 2.5CM/<: CPT

## 2020-06-07 PROCEDURE — 2500000003 HC RX 250 WO HCPCS

## 2020-06-07 PROCEDURE — 99283 EMERGENCY DEPT VISIT LOW MDM: CPT

## 2020-06-07 RX ORDER — LIDOCAINE HYDROCHLORIDE AND EPINEPHRINE 10; 10 MG/ML; UG/ML
20 INJECTION, SOLUTION INFILTRATION; PERINEURAL ONCE
Status: COMPLETED | OUTPATIENT
Start: 2020-06-07 | End: 2020-06-07

## 2020-06-07 RX ORDER — LIDOCAINE HYDROCHLORIDE AND EPINEPHRINE 10; 10 MG/ML; UG/ML
INJECTION, SOLUTION INFILTRATION; PERINEURAL
Status: COMPLETED
Start: 2020-06-07 | End: 2020-06-07

## 2020-06-07 RX ORDER — HYDROCODONE BITARTRATE AND ACETAMINOPHEN 5; 325 MG/1; MG/1
1 TABLET ORAL EVERY 6 HOURS PRN
Qty: 12 TABLET | Refills: 0 | Status: SHIPPED | OUTPATIENT
Start: 2020-06-07 | End: 2020-06-09

## 2020-06-07 RX ADMIN — LIDOCAINE HYDROCHLORIDE AND EPINEPHRINE 20 ML: 10; 10 INJECTION, SOLUTION INFILTRATION; PERINEURAL at 14:58

## 2020-06-07 RX ADMIN — LIDOCAINE HYDROCHLORIDE,EPINEPHRINE BITARTRATE 20 ML: 10; .01 INJECTION, SOLUTION INFILTRATION; PERINEURAL at 14:58

## 2020-06-07 ASSESSMENT — PAIN SCALES - GENERAL: PAINLEVEL_OUTOF10: 0

## 2020-06-07 NOTE — ED PROVIDER NOTES
[facility]     Department of Emergency Medicine   ED  Provider Note  Admit Date/RoomTime: 6/7/2020  1:44 PM  ED Room: 08/08     Chief Complaint   Fall (mechanical fall skin tear lt arm. ) and Laceration    History of Present Illness      SHEILA Harmon is a 80 y.o. old male presenting to the emergency department for a laceration to the left forearm caused by a fall. Patient states he tripped over the rug and fell, hitting his left forearm on the edge of a table. He states he did not fall completely to the ground and did not hit his head. He did not have any loss of consciousness. Patient is denying any headache, dizziness, vision changes, nausea, vomiting, neck pain, back pain, chest pain, shortness of breath, pain with breathing, or numbness/tingling. He is not on anticoagulation. Patient does take aspirin daily. He has two large skin tears with mild active bleeding to his left forearm. He has full range of motion of wrist and all fingers of affected extremity. Patient has limited range of motion of his left elbow and shoulder due to a past injury. He denies any worsening pain. He is alert and oriented x3 and in no apparent distress at this exam.  Patient is nontoxic-appearing. The patients tetanus status is up to date. ROS   Pertinent positives and negatives are stated within HPI, all other systems reviewed and are negative. Past Medical History:  has a past medical history of High cholesterol, Hypertension, Hypothyroidism, Parkinson disease ([facility] [address] and RBBB. Past Surgical History:  has a past surgical history that includes Foot surgery (1935); shoulder surgery (Right); hernia repair (Left); Tonsillectomy and Adenoidectomy; and Foot surgery. Social History:  reports that he quit smoking about 49 years ago. He quit after 10.00 years of use. He has never used smokeless tobacco. He reports that he does not drink alcohol or use drugs.     Family History: family history includes Breast Cancer in his sister; Cancer in his father; Heart Disease in his mother. Allergies: Sulfa antibiotics  Allergies reviewed with patient     Physical Exam   VS:  BP (!) 173/73   Pulse 82   Temp 98.4 °F (36.9 °C) (Oral)   Resp 20   Ht 5' 9\" (1.753 m)   Wt 180 lb (81.6 kg)   SpO2 98%   BMI 26.58 kg/m²      Oxygen Saturation Interpretation: Normal.    Constitutional:  Alert and oriented x3, development consistent with age. NAD  HEENT:  NC/NT. Airway patent. PERRLA, EOMI, no blood in nares or ears    Neck:  Normal ROM. Supple. Non-tender. CVS: Regular rate for age, normal rhythm  Resp: Clear and equal bilaterally with good airflow, no respiratory distress  Abdomen: Soft, non-tender, non-distended  Back: No tenderness on this exam   Extremities: Two large skin tears to left forearm with mild active bleeding, limited ROM of arm due to past shoulder injury, intact sensations distally, full ROM of all fingers of affected extremity   Lymphatics: No lymphangitis or adenopathy noted. Neurological: CN II-XII grossly intact, Motor functions intact. Lab / Imaging Results   (All laboratory and radiology results have been personally reviewed by myself)  Labs:  No results found for this visit on 06/07/20. Imaging: All Radiology results interpreted by Radiologist unless otherwise noted. XR RADIUS ULNA LEFT (2 VIEWS)   Final Result   1. No evidence of fracture. 2.  Soft tissue defect of the forearm. Patient was offered CAT scan of his head and neck to rule out any fracture or other findings. He politely refused this. He does agree to get an x-ray of his forearm. ED Course / Medical Decision Making     Medications   lidocaine-EPINEPHrine 1 percent-[id number] injection 20 mL (20 mLs Intradermal Given by Other 6/7/20 0516)     Consult(s):  None    Procedure(s):  PROCEDURE NOTE      LACERATION REPAIR  Risks, benefits and alternatives (for applicable procedures below) described.    Performed By: [facility]

## 2020-06-08 ENCOUNTER — TELEPHONE (OUTPATIENT)
Dept: ADMINISTRATIVE | Age: 85
End: 2020-06-08

## 2020-06-09 ENCOUNTER — OFFICE VISIT (OUTPATIENT)
Dept: FAMILY MEDICINE CLINIC | Age: 85
End: 2020-06-09
Payer: MEDICARE

## 2020-06-09 VITALS
WEIGHT: 186.6 LBS | TEMPERATURE: 98.4 F | DIASTOLIC BLOOD PRESSURE: 80 MMHG | OXYGEN SATURATION: 95 % | HEIGHT: 68 IN | HEART RATE: 82 BPM | BODY MASS INDEX: 28.28 KG/M2 | SYSTOLIC BLOOD PRESSURE: 122 MMHG

## 2020-06-09 PROCEDURE — 99213 OFFICE O/P EST LOW 20 MIN: CPT | Performed by: FAMILY MEDICINE

## 2020-06-09 RX ORDER — CEPHALEXIN 500 MG/1
500 CAPSULE ORAL 3 TIMES DAILY
Qty: 21 CAPSULE | Refills: 0 | Status: SHIPPED | OUTPATIENT
Start: 2020-06-09 | End: 2020-06-16

## 2020-06-09 ASSESSMENT — ENCOUNTER SYMPTOMS
SINUS PAIN: 0
EYE PAIN: 0
WHEEZING: 0
SHORTNESS OF BREATH: 0
BACK PAIN: 0
SORE THROAT: 0
ABDOMINAL PAIN: 0
VOMITING: 0
NAUSEA: 0
DIARRHEA: 0
COUGH: 0
CONSTIPATION: 0

## 2020-06-09 NOTE — PROGRESS NOTES
6/9/20    Name: Bola Cullen  AKK:[id number]   Sex:male   Age:89 y.o. Chief Complaint   Patient presents with   Kisha Morton Doctor    Wound Check    ED Follow-up     Patient presents to office for wound check. Patient fell at home Sunday 6/7/2020 and went to ED for skin tear laceration. 29 sutures total were placed in left lower forearm and wrapped with non stick dressings, cling gauze wrap, and then ACE wrap. Patient's wife was afraid to remove dressing to check wound. Left hand is swollen and purple. The dressing was very difficult to remove from the wound, normal saline was needed as dressings were soaked in dry blood and purulent drainage. The dressing does have an odor. The wound did start bleed after removal of dressing. Wife would like a referral to podiatry. Patient walks with slow shuffling gait using a cane. Wound dressing soaked in saline and gauze removed  Wound oozing as a result  Skin tears that were pretty deep on left forearm  Lateral side at elbow and midway on forearm  Both areas are sutured together  Oozing blood significantly right now  Dressed with silvadene and compression dressing  Wife to change dressing this evening  Left forearm and hand swollen  He has not been elevating it    Also trouble with lower legs  The get dry peel and flake and thenthe aquaphor makes them all slimy when she uses it so she really does not know what to do with them  Will place referral for podiatry    Review of Systems   Constitutional: Negative for appetite change, fatigue and fever. HENT: Negative for congestion, ear pain, hearing loss, sinus pain and sore throat. Eyes: Negative for pain. Respiratory: Negative for cough, shortness of breath and wheezing. Cardiovascular: Negative for chest pain and leg swelling. Gastrointestinal: Negative for abdominal pain, constipation, diarrhea, nausea and vomiting. Endocrine: Negative for cold intolerance and heat intolerance.    Genitourinary:

## 2020-06-12 ENCOUNTER — OFFICE VISIT (OUTPATIENT)
Dept: FAMILY MEDICINE CLINIC | Age: 85
End: 2020-06-12
Payer: MEDICARE

## 2020-06-12 VITALS — BODY MASS INDEX: 28.19 KG/M2 | HEIGHT: 68 IN | WEIGHT: 186 LBS

## 2020-06-12 PROCEDURE — 99213 OFFICE O/P EST LOW 20 MIN: CPT | Performed by: FAMILY MEDICINE

## 2020-06-12 ASSESSMENT — ENCOUNTER SYMPTOMS
WHEEZING: 0
SINUS PAIN: 0
VOMITING: 0
SORE THROAT: 0
SHORTNESS OF BREATH: 0
DIARRHEA: 0
BACK PAIN: 0
ABDOMINAL PAIN: 0
NAUSEA: 0
EYE PAIN: 0
CONSTIPATION: 0
COUGH: 0

## 2020-06-12 NOTE — PROGRESS NOTES
Hypothyroidism     Parkinson disease ([facility] [address]     Diagnosed in 2008 by Dr. Jose Elias Gorman due to resting tremor    RBBB      Patient Active Problem List    Diagnosis Date Noted    Diverticulitis of intestine with hemorrhage 09/19/2017     Priority: High    Acute blood loss anemia 09/19/2017     Priority: Medium    Stage 3 chronic kidney disease ([facility] [address] 10/21/2019    Localized edema 10/21/2019    Venous stasis dermatitis of both lower extremities 10/21/2019    Rectal bleeding     Hypertension     Parkinson disease ([facility]     Hypothyroidism       Past Surgical History:   Procedure Laterality Date    FOOT SURGERY  1935    FOOT SURGERY      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 Constitutional:       Appearance: He is well-developed. Comments: Chronically ill appearing   HENT:      Head: Normocephalic and atraumatic. Eyes:      Pupils: Pupils are equal, round, and reactive to light. Neck:      Musculoskeletal: Normal range of motion. Cardiovascular:      Rate and Rhythm: Normal rate and regular rhythm. Pulmonary:      Effort: Pulmonary effort is normal.      Breath sounds: Normal breath sounds. Skin:     General: Skin is warm and dry.       Comments: Left forearm 2 lacerations repaired with sutures and they do appear much better  Dressed as described above with silvadene non adherant gauze, ace wrap for some compression   Neurological:

## 2020-06-17 ENCOUNTER — TELEPHONE (OUTPATIENT)
Dept: ADMINISTRATIVE | Age: 85
End: 2020-06-17

## 2020-06-17 ENCOUNTER — OFFICE VISIT (OUTPATIENT)
Dept: FAMILY MEDICINE CLINIC | Age: 85
End: 2020-06-17
Payer: MEDICARE

## 2020-06-17 VITALS
OXYGEN SATURATION: 98 % | DIASTOLIC BLOOD PRESSURE: 70 MMHG | HEART RATE: 72 BPM | TEMPERATURE: 97.7 F | SYSTOLIC BLOOD PRESSURE: 110 MMHG

## 2020-06-17 PROCEDURE — 99213 OFFICE O/P EST LOW 20 MIN: CPT | Performed by: FAMILY MEDICINE

## 2020-06-17 ASSESSMENT — ENCOUNTER SYMPTOMS
ABDOMINAL PAIN: 0
SINUS PAIN: 0
SORE THROAT: 0
DIARRHEA: 0
WHEEZING: 0
BACK PAIN: 0
SHORTNESS OF BREATH: 0
EYE PAIN: 0
COUGH: 0
CONSTIPATION: 0
NAUSEA: 0
VOMITING: 0

## 2020-06-17 NOTE — TELEPHONE ENCOUNTER
Pt's wife called and stated she has not received a call for referral to Wound Care for Manjit Escobar. Liss stated this was to be made for tomorrow and was to be called this afternoon. She wants him to be seen in [facility] .  Please advise

## 2020-06-17 NOTE — PROGRESS NOTES
6/9/20    Name: Anson Souza  EKV:[birth year]   Sex:male   Age:89 y.o. Chief Complaint   Patient presents with    Wound Check    Suture / Staple Removal     Patient presents to office for wound check. Patient fell at home Sunday 6/7/2020 and went to ED for skin tear laceration. 29 sutures total were placed in left lower forearm and wrapped with non stick dressings, cling gauze wrap, and then ACE wrap. Patient's wife was afraid to remove dressing to check wound. Left hand is swollen and purple. The dressing was very difficult to remove from the wound, normal saline was needed as dressings were soaked in dry blood and purulent drainage. The dressing does have an odor. The wound did start bleed after removal of dressing. Wife would like a referral to podiatry. Patient walks with slow shuffling gait using a cane. Wound appears much better today  Oozing finally stopped   Wife has  Not washed it yet though    Sutures removed today at least 34 of them if not more  There is a part of the wound/skin tear on the mid forearm that is  Not healing though  There is a superficial hematoma  This needs debrided but wound care would be a more appropriate place for that to occur due to possible re bleeding    She has been doing silvadene dressings with gauze  Will now have her do dry dressings till they see wound care    The swelling in the hand and fore arm are much better today  He says it is a little sore but  Not like it was the first time I saw him              Wound Check     Suture / Staple Removal       Review of Systems   Constitutional: Negative for appetite change, fatigue and fever. HENT: Negative for congestion, ear pain, hearing loss, sinus pain and sore throat. Eyes: Negative for pain. Respiratory: Negative for cough, shortness of breath and wheezing. Cardiovascular: Negative for chest pain and leg swelling. Gastrointestinal: Negative for abdominal pain, constipation, diarrhea, nausea and vomiting. by mouth 2 times daily , Disp: , Rfl:   Allergies   Allergen Reactions    Sulfa Antibiotics      Hyperactivity       Past Medical History:   Diagnosis Date    High cholesterol     Hypertension     Hypothyroidism     Parkinson disease ([facility] [address]     Diagnosed in 2008 by Dr. Rei Santana due to resting tremor    RBBB      Patient Active Problem List    Diagnosis Date Noted    Diverticulitis of intestine with hemorrhage 09/19/2017     Priority: High    Acute blood loss anemia 09/19/2017     Priority: Medium    Stage 3 chronic kidney disease ([facility] [address] 10/21/2019    Localized edema 10/21/2019    Venous stasis dermatitis of both lower extremities 10/21/2019    Rectal bleeding     Hypertension     Parkinson disease ([facility] [address]     Hypothyroidism       Past Surgical History:   Procedure Laterality Date    FOOT SURGERY  1935    FOOT SURGERY      club foot repair-age 6     HERNIA REPAIR Left     groin repair     SHOULDER SURGERY Right     5-10 years ago    TONSILLECTOMY AND ADENOIDECTOMY        Social History     Tobacco History     Smoking Status  Former Smoker Quit date  12/31/1970 Smoking Frequency  For 10 years    Smokeless Tobacco Use  Never Used          Alcohol History     Alcohol Use Status  No Comment  Social          Drug Use     Drug Use Status  No          Sexual Activity     Sexually Active  Never            /70   Pulse 72   Temp 97.7 °F (36.5 °C)   SpO2 98%     EXAM:   Physical Exam  Vitals signs and nursing note reviewed. Constitutional:       Appearance: He is well-developed. Comments: Chronically ill appearing   HENT:      Head: Normocephalic and atraumatic. Eyes:      Pupils: Pupils are equal, round, and reactive to light. Neck:      Musculoskeletal: Normal range of motion. Cardiovascular:      Rate and Rhythm: Normal rate and regular rhythm. Pulmonary:      Effort: Pulmonary effort is normal.      Breath sounds: Normal breath sounds. Skin:     General: Skin is warm and dry.

## 2020-07-01 ENCOUNTER — OFFICE VISIT (OUTPATIENT)
Dept: FAMILY MEDICINE CLINIC | Age: 85
End: 2020-07-01
Payer: MEDICARE

## 2020-07-01 ENCOUNTER — OFFICE VISIT (OUTPATIENT)
Dept: PODIATRY | Age: 85
End: 2020-07-01
Payer: MEDICARE

## 2020-07-01 ENCOUNTER — HOSPITAL ENCOUNTER (OUTPATIENT)
Age: 85
Discharge: HOME | End: 2020-07-03
Payer: MEDICARE

## 2020-07-01 VITALS
DIASTOLIC BLOOD PRESSURE: 68 MMHG | TEMPERATURE: 98.3 F | HEIGHT: 67 IN | BODY MASS INDEX: 28.25 KG/M2 | WEIGHT: 180 LBS | SYSTOLIC BLOOD PRESSURE: 104 MMHG

## 2020-07-01 VITALS
OXYGEN SATURATION: 99 % | HEART RATE: 76 BPM | WEIGHT: 185.2 LBS | BODY MASS INDEX: 28.07 KG/M2 | DIASTOLIC BLOOD PRESSURE: 68 MMHG | SYSTOLIC BLOOD PRESSURE: 104 MMHG | TEMPERATURE: 98.3 F | HEIGHT: 68 IN

## 2020-07-01 VITALS
OXYGEN SATURATION: 98 % | HEIGHT: 68 IN | DIASTOLIC BLOOD PRESSURE: 68 MMHG | TEMPERATURE: 98.3 F | SYSTOLIC BLOOD PRESSURE: 104 MMHG | BODY MASS INDEX: 28.07 KG/M2 | HEART RATE: 76 BPM | WEIGHT: 185.2 LBS

## 2020-07-01 PROCEDURE — 29580 STRAPPING UNNA BOOT: CPT | Performed by: PODIATRIST

## 2020-07-01 PROCEDURE — 99213 OFFICE O/P EST LOW 20 MIN: CPT | Performed by: FAMILY MEDICINE

## 2020-07-01 PROCEDURE — 99203 OFFICE O/P NEW LOW 30 MIN: CPT | Performed by: PODIATRIST

## 2020-07-01 PROCEDURE — G0439 PPPS, SUBSEQ VISIT: HCPCS | Performed by: FAMILY MEDICINE

## 2020-07-01 PROCEDURE — 87186 SC STD MICRODIL/AGAR DIL: CPT

## 2020-07-01 PROCEDURE — 87070 CULTURE OTHR SPECIMN AEROBIC: CPT

## 2020-07-01 RX ORDER — CEPHALEXIN 500 MG/1
500 CAPSULE ORAL 2 TIMES DAILY
Qty: 28 CAPSULE | Refills: 0 | Status: SHIPPED
Start: 2020-07-01 | End: 2020-07-13

## 2020-07-01 ASSESSMENT — LIFESTYLE VARIABLES
AUDIT TOTAL SCORE: 1
HAS A RELATIVE, FRIEND, DOCTOR, OR ANOTHER HEALTH PROFESSIONAL EXPRESSED CONCERN ABOUT YOUR DRINKING OR SUGGESTED YOU CUT DOWN: 0
AUDIT-C TOTAL SCORE: 1
HOW OFTEN DURING THE LAST YEAR HAVE YOU NEEDED AN ALCOHOLIC DRINK FIRST THING IN THE MORNING TO GET YOURSELF GOING AFTER A NIGHT OF HEAVY DRINKING: 0
HOW OFTEN DURING THE LAST YEAR HAVE YOU HAD A FEELING OF GUILT OR REMORSE AFTER DRINKING: 0
HOW OFTEN DURING THE LAST YEAR HAVE YOU BEEN UNABLE TO REMEMBER WHAT HAPPENED THE NIGHT BEFORE BECAUSE YOU HAD BEEN DRINKING: 0
HOW OFTEN DO YOU HAVE A DRINK CONTAINING ALCOHOL: 1
HAVE YOU OR SOMEONE ELSE BEEN INJURED AS A RESULT OF YOUR DRINKING: 0
HOW OFTEN DURING THE LAST YEAR HAVE YOU FAILED TO DO WHAT WAS NORMALLY EXPECTED FROM YOU BECAUSE OF DRINKING: 0
HOW OFTEN DO YOU HAVE SIX OR MORE DRINKS ON ONE OCCASION: 0
HOW OFTEN DURING THE LAST YEAR HAVE YOU FOUND THAT YOU WERE NOT ABLE TO STOP DRINKING ONCE YOU HAD STARTED: 0
HOW MANY STANDARD DRINKS CONTAINING ALCOHOL DO YOU HAVE ON A TYPICAL DAY: 0

## 2020-07-01 ASSESSMENT — PATIENT HEALTH QUESTIONNAIRE - PHQ9
2. FEELING DOWN, DEPRESSED OR HOPELESS: 0
1. LITTLE INTEREST OR PLEASURE IN DOING THINGS: 2
SUM OF ALL RESPONSES TO PHQ9 QUESTIONS 1 & 2: 2
SUM OF ALL RESPONSES TO PHQ QUESTIONS 1-9: 2
SUM OF ALL RESPONSES TO PHQ QUESTIONS 1-9: 2

## 2020-07-01 ASSESSMENT — ENCOUNTER SYMPTOMS
SHORTNESS OF BREATH: 0
VOMITING: 0
SORE THROAT: 0
DIARRHEA: 0
WHEEZING: 0
BACK PAIN: 0
SINUS PAIN: 0
COUGH: 0
NAUSEA: 0
CONSTIPATION: 0
EYE PAIN: 0
CHEST TIGHTNESS: 0
ABDOMINAL PAIN: 0

## 2020-07-01 NOTE — PROGRESS NOTES
2 tablets by mouth 4 times daily, Disp: , Rfl:     silver sulfADIAZINE (SILVADENE) 1 % cream, Apply topically daily. , Disp: 400 g, Rfl: 1    potassium chloride (KLOR-CON M) 20 MEQ extended release tablet, Take 1 tablet by mouth daily, Disp: 90 tablet, Rfl: 1    levothyroxine (SYNTHROID) 25 MCG tablet, Take 3 tabs PO Monday - Friday and 2 tabs PO Saturday - Sunday, Disp: 216 tablet, Rfl: 1    furosemide (LASIX) 40 MG tablet, Take 1 tablet by mouth daily, Disp: 30 tablet, Rfl: 5    traZODone (DESYREL) 50 MG tablet, Take 1 tablet by mouth nightly, Disp: 30 tablet, Rfl: 5    doxazosin (CARDURA) 4 MG tablet, Take 1 tablet by mouth nightly, Disp: 90 tablet, Rfl: 1    lisinopril-hydroCHLOROthiazide (PRINZIDE;ZESTORETIC) 10-12.5 MG per tablet, Take 1 tablet by mouth daily, Disp: 90 tablet, Rfl: 3    lisinopril (PRINIVIL;ZESTRIL) 10 MG tablet, Take 1 tablet by mouth daily, Disp: 90 tablet, Rfl: 3    triamcinolone (KENALOG) 0.1 % cream, Apply topically 2 times daily Apply topically 2 times daily. , Disp: 45 g, Rfl: 3    aspirin 81 MG tablet, Take 81 mg by mouth daily, Disp: , Rfl:     Misc.  Devices MISC, Dispense #2 compression stalkings Dx: leg edema, Disp: 1 Device, Rfl: 0    Red Yeast Rice 600 MG TABS, Take by mouth 2 times daily , Disp: , Rfl:   Allergies   Allergen Reactions    Sulfa Antibiotics      Hyperactivity       Past Medical History:   Diagnosis Date    High cholesterol     Hypertension     Hypothyroidism     Parkinson disease ([facility] [address]     Diagnosed in 2008 by Dr. Sophia Villavicencio due to resting tremor    RBBB      Patient Active Problem List    Diagnosis Date Noted    Diverticulitis of intestine with hemorrhage 09/19/2017     Priority: High    Acute blood loss anemia 09/19/2017     Priority: Medium    Stage 3 chronic kidney disease ([facility] [address] 10/21/2019    Localized edema 10/21/2019    Venous stasis dermatitis of both lower extremities 10/21/2019    Rectal bleeding     Hypertension     Parkinson disease

## 2020-07-01 NOTE — LETTER
[address] [address]  Phone: [phone]  Fax: Mynor Buchanan DPM        July 1, 2020     Georgette Record, DO  [address] [facility]    Patient: Tyler Richard  MR Number: [id number]  YOB: 1930  Date of Visit: 7/1/2020    Dear Dr. Palomino Record:    Thank you for the request for consultation for Kayode Antonio to me for the evaluation of wounds lower leg. Below are the relevant portions of my assessment and plan of care. If you have questions, please do not hesitate to call me. I look forward to following M along with you.     Sincerely,        Jaymes Rinne, DPM

## 2020-07-01 NOTE — PATIENT INSTRUCTIONS
Personalized Preventive Plan for Arcelia Kirkpatrick - 7/1/2020  Medicare offers a range of preventive health benefits. Some of the tests and screenings are paid in full while other may be subject to a deductible, co-insurance, and/or copay. Some of these benefits include a comprehensive review of your medical history including lifestyle, illnesses that may run in your family, and various assessments and screenings as appropriate. After reviewing your medical record and screening and assessments performed today your provider may have ordered immunizations, labs, imaging, and/or referrals for you. A list of these orders (if applicable) as well as your Preventive Care list are included within your After Visit Summary for your review. Other Preventive Recommendations:    · A preventive eye exam performed by an eye specialist is recommended every 1-2 years to screen for glaucoma; cataracts, macular degeneration, and other eye disorders. · A preventive dental visit is recommended every 6 months. · Try to get at least 150 minutes of exercise per week or 10,000 steps per day on a pedometer . · Order or download the FREE \"Exercise & Physical Activity: Your Everyday Guide\" from [employer]. Call 9-810.542.6943 or search [employer] online. · You need 2193-8828 mg of calcium and 5280-9472 IU of vitamin D per day. It is possible to meet your calcium requirement with diet alone, but a vitamin D supplement is usually necessary to meet this goal.  · When exposed to the sun, use a sunscreen that protects against both UVA and UVB radiation with an SPF of 30 or greater. Reapply every 2 to 3 hours or after sweating, drying off with a towel, or swimming. · Always wear a seat belt when traveling in a car. Always wear a helmet when riding a bicycle or motorcycle.

## 2020-07-01 NOTE — PROGRESS NOTES
Medicare Annual Wellness Visit  Name: London Ours Date: 2020   MRN: [id number] Sex: Male   Age: 80 y.o. Ethnicity: Non-/Non    : 1930 Race: Derrick Reason is here for Medicare AWV    Screenings for behavioral, psychosocial and functional/safety risks, and cognitive dysfunction are all negative except as indicated below. These results, as well as other patient data from the [address] form, are documented in Flowsheets linked to this Encounter. Allergies   Allergen Reactions    Sulfa Antibiotics      Hyperactivity          Prior to Visit Medications    Medication Sig Taking? Authorizing Provider   cephALEXin (KEFLEX) 500 MG capsule Take 1 capsule by mouth 2 times daily for 14 days  Chantel Prado DPM   carbidopa-levodopa (SINEMET)  MG per tablet Take 2 tablets by mouth 4 times daily  Historical Provider, MD   silver sulfADIAZINE (SILVADENE) 1 % cream Apply topically daily. Bryn Merrill DO   potassium chloride (KLOR-CON M) 20 MEQ extended release tablet Take 1 tablet by mouth daily  Judge Momo MD   levothyroxine (SYNTHROID) 25 MCG tablet Take 3 tabs PO Monday - Friday and 2 tabs PO Saturday -   LORIE Burton - CNP   furosemide (LASIX) 40 MG tablet Take 1 tablet by mouth daily  LORIE Ernandez - CNP   traZODone (DESYREL) 50 MG tablet Take 1 tablet by mouth nightly  LORIE Ernandez - CNP   doxazosin (CARDURA) 4 MG tablet Take 1 tablet by mouth nightly  LORIE Ernandez - CNP   lisinopril-hydroCHLOROthiazide (PRINZIDE;ZESTORETIC) 10-12.5 MG per tablet Take 1 tablet by mouth daily  Judge Momo MD   lisinopril (PRINIVIL;ZESTRIL) 10 MG tablet Take 1 tablet by mouth daily  Judge Momo MD   triamcinolone (KENALOG) 0.1 % cream Apply topically 2 times daily Apply topically 2 times daily. Judge Momo MD   aspirin 81 MG tablet Take 81 mg by mouth daily  Historical Provider, MD   Misc.  Devices MISC Dispense #2 compression stalkings  Dx: leg edema  Eitan Fleming DO   Red Yeast Rice 600 MG TABS Take by mouth 2 times daily   Historical Provider, MD         Past Medical History:   Diagnosis Date    High cholesterol     Hypertension     Hypothyroidism     Parkinson disease ([address]     Diagnosed in 2008 by Dr. Sophia Villavicencio due to resting tremor    RBBB        Past Surgical History:   Procedure Laterality Date    FOOT SURGERY  1935    FOOT SURGERY      club foot repair-age 6     HERNIA REPAIR Left     groin repair     SHOULDER SURGERY Right     5-10 years ago    TONSILLECTOMY AND ADENOIDECTOMY           Family History   Problem Relation Age of Onset    Heart Disease Mother     Cancer Father         lung    Breast Cancer Sister        CareTeam (Including outside providers/suppliers regularly involved in providing care):   Patient Care Team:  Rio Alvarez DO as PCP - General (Family Medicine)  Rio Alvarez DO as PCP - [facility] Empaneled Provider    Wt Readings from Last 3 Encounters:   07/01/20 185 lb 3.2 oz (84 kg)   07/01/20 185 lb 3.2 oz (84 kg)   07/01/20 180 lb (81.6 kg)     Vitals:    07/01/20 1136   BP: 104/68   Pulse: 76   Temp: 98.3 °F (36.8 °C)   SpO2: 98%   Weight: 185 lb 3.2 oz (84 kg)   Height: 5' 8\" (1.727 m)     Body mass index is 28.16 kg/m². Based upon direct observation of the patient, evaluation of cognition reveals remote memory intact, recent memory impaired.     General Appearance: alert and oriented to person, place, well developed and well- nourished, in no acute distress,  Skin: warm and dry, no rash or erythema  Head: normocephalic and atraumatic  Eyes: pupils equal, round, and reactive to light, extraocular eye movements intact, conjunctivae normal  ENT: tympanic membrane, external ear and ear canal normal bilaterally, nose without deformity, nasal mucosa and turbinates normal without polyps  Neck: supple and non-tender without mass, no thyromegaly or thyroid nodules, no cervical lymphadenopathy  Pulmonary/Chest: clear to auscultation bilaterally- no wheezes, rales or rhonchi, normal air movement, no respiratory distress  Cardiovascular: normal rate, regular rhythm, normal S1 and S2, no murmurs, rubs, clicks, or gallops, distal pulses intact, no carotid bruits  Abdomen: soft, non-tender, non-distended, normal bowel sounds, no masses or organomegaly  Extremities: edema both lower extremities, left forearm healing skin tears  Musculoskeletal: walks with walker, arthritis changes in hands and knees, gait wide based  Neurologic:  speech normal,    Patient's complete Health Risk Assessment and screening values have been reviewed and are found in Flowsheets. The following problems were reviewed today and where indicated follow up appointments were made and/or referrals ordered. Positive Risk Factor Screenings with Interventions:     Fall Risk:  2 or more falls in past year?: (!) yes  Fall with injury in past year?: (!) yes  Fall Risk Interventions:    · Home safety tips provided    Health Habits/Nutrition:  Health Habits/Nutrition  Do you exercise for at least 20 minutes 2-3 times per week?: (!) No  Have you lost any weight without trying in the past 3 months?: No  Do you eat fewer than 2 meals per day?: No  Have you seen a dentist within the past year?: Yes  Body mass index is 28.16 kg/m².   Health Habits/Nutrition Interventions:  · Inadequate physical activity:  educational materials provided to promote increased physical activity    Hearing/Vision:  No exam data present  Hearing/Vision  Do you or your family notice any trouble with your hearing?: (!) Yes  Do you have difficulty driving, watching TV, or doing any of your daily activities because of your eyesight?: No  Have you had an eye exam within the past year?: Yes  Hearing/Vision Interventions:  · Hearing concerns:  patient declines any further evaluation/treatment for hearing issues    ADL:  ADLs  In the past 7 days, did you need help from others to perform any of the following everyday activities? Eating, dressing, grooming, bathing, toileting, or walking/balance?: (!) Dressing, Grooming, Bathing  In the past 7 days, did you need help from others to take care of any of the following? Laundry, housekeeping, banking/finances, shopping, telephone use, food preparation, transportation, or taking medications?: (!) Laundry, Housekeeping, [employer], Shopping, Food Preparation, Transportation  ADL Interventions:  · Patient declines any further evaluation/treatment for this issue    Personalized Preventive Plan   Current Health Maintenance Status  Immunization History   Administered Date(s) Administered    Influenza Virus Vaccine 10/19/2010, 11/14/2011, 10/29/2012, 10/17/2013, 09/18/2014, 10/09/2015, 09/23/2016    Influenza, MDCK Quadv, IM, PF (Flucelvax 4 yrs and older) 09/20/2017    Influenza, Quadv, IM, PF (6 mo and older Fluzone, Flulaval, Fluarix, and 3 yrs and older Afluria) 10/24/2018    Influenza, Triv, inactivated, subunit, adjuvanted, IM (Fluad 65 yrs and older) 09/30/2019    Pneumococcal Conjugate 13-valent ([id number]) 06/19/2015    Pneumococcal Polysaccharide ([id number]) 10/29/2012    Zoster Live (Zostavax) 11/14/2014    Zoster Recombinant (Shingrix) 08/15/2019, 02/06/2020        Health Maintenance   Topic Date Due    DTaP/Tdap/Td vaccine (1 - Tdap) [birth year]/1949    Annual Wellness Visit (AWV)  06/20/2019    Flu vaccine (1) 09/01/2020    TSH testing  10/21/2020    Potassium monitoring  02/19/2021    Creatinine monitoring  02/19/2021    Shingles Vaccine  Completed    Pneumococcal 65+ years Vaccine  Completed    Hepatitis A vaccine  Aged Out    Hepatitis B vaccine  Aged Out    Hib vaccine  Aged Out    Meningococcal (ACWY) vaccine  Aged Out     Recommendations for [employer] Due: see orders and patient instructions/AVS.  .   Recommended screening schedule for the next 5-10 years is provided to the patient in written form: see Patient Instructions/AVS.    M was seen today for medicare awv.     Diagnoses and all orders for this visit:    Encounter for well adult exam without abnormal findings  Comments:  f/u 1 year for [facility]    Parkinson disease ([facility] [address]  Comments:  stable with meds  refuses PT  gait unsteady even with walker    Essential hypertension  Comments:  stable no chnages

## 2020-07-01 NOTE — PROGRESS NOTES
Apply topically daily. 20  Yes Carol Torres,    potassium chloride (KLOR-CON M) 20 MEQ extended release tablet Take 1 tablet by mouth daily 20  Yes Sanjiv Lucas MD   levothyroxine (SYNTHROID) 25 MCG tablet Take 3 tabs PO Monday - Friday and 2 tabs PO Saturday - 20 Yes LORIE Persaud CNP   furosemide (LASIX) 40 MG tablet Take 1 tablet by mouth daily 20  Yes LORIE Rodriguez CNP   doxazosin (CARDURA) 4 MG tablet Take 1 tablet by mouth nightly 20  Yes LORIE Rodriguez CNP   lisinopril-hydroCHLOROthiazide (PRINZIDE;ZESTORETIC) 10-12.5 MG per tablet Take 1 tablet by mouth daily 4/1/20 3/27/21 Yes Sanjiv Lucas MD   lisinopril (PRINIVIL;ZESTRIL) 10 MG tablet Take 1 tablet by mouth daily 20  Yes Sanjiv Lucas MD   triamcinolone (KENALOG) 0.1 % cream Apply topically 2 times daily Apply topically 2 times daily. 10/21/19  Yes Sanjiv Lucas MD   aspirin 81 MG tablet Take 81 mg by mouth daily   Yes Historical Provider, MD   Misc.  Devices MISC Dispense #2 compression stalkings  Dx: leg edema 17  Yes Eitan Fleming,    Red Yeast Rice 600 MG TABS Take by mouth 2 times daily    Yes Historical Provider, MD   traZODone (DESYREL) 50 MG tablet Take 1 tablet by mouth nightly 20  LORIE Rodriguez CNP       Past Surgical History:   Procedure Laterality Date    FOOT SURGERY  1935    FOOT SURGERY      club foot repair-age 6     HERNIA REPAIR Left     groin repair     SHOULDER SURGERY Right     5-10 years ago    TONSILLECTOMY AND ADENOIDECTOMY         Family History   Problem Relation Age of Onset    Heart Disease Mother     Cancer Father         lung    Breast Cancer Sister        Social History     Tobacco Use    Smoking status: Former Smoker     Years: 10.00     Last attempt to quit: 1970     Years since quittin.5    Smokeless tobacco: Never Used   Substance Use Topics    Alcohol use: No     Comment: Social Review of Systems    Review of Systems:   History obtained from chart review and the patient  General ROS: negative for - chills, fatigue, fever, night sweats or weight gain  Constitutional: Negative for chills, diaphoresis, fatigue, fever and unexpected weight change. Musculoskeletal: Positive for . Neurological ROS: negative for - behavioral changes, confusion, headaches or seizures. Negative for weakness and numbness. Dermatological ROS: negative for - mole changes, rash  Cardiovascular: Negative for leg swelling. Gastrointestinal: Negative for constipation, diarrhea, nausea and vomiting.                Lower Extremity Physical Examination:   Vitals:   Vitals:    07/01/20 1042   BP: 104/68   Temp: 98.3 °F (36.8 °C)        Foot Exam    General  General Appearance: appears stated age and healthy   Orientation: alert and oriented to person, place, and time   Assistance: walker use       Right Foot/Ankle     Inspection and Palpation  Ecchymosis: none  Swelling: (Swelling noted distal to the tibial crest bilateral)  Skin Exam: cellulitis, skin changes, abnormal color, ulcer and erythema;     Neurovascular  Posterior tibial: absent  Saphenous nerve sensation: normal  Tibial nerve sensation: normal  Superficial peroneal nerve sensation: normal  Deep peroneal nerve sensation: normal  Sural nerve sensation: normal  Achilles reflex: 2+  Babinski reflex: 2+    Muscle Strength  Ankle dorsiflexion: 5  Ankle plantar flexion: 5  Ankle inversion: 5  Ankle eversion: 5  Great toe extension: 5  Great toe flexion: 5      Left Foot/Ankle      Inspection and Palpation  Ecchymosis: none  Skin Exam: cellulitis, skin changes, abnormal color and erythema;     Neurovascular  Dorsalis pedis: 1+  Posterior tibial: absent  Saphenous nerve sensation: normal  Tibial nerve sensation: normal  Superficial peroneal nerve sensation: normal  Deep peroneal nerve sensation: normal  Sural nerve sensation: normal  Achilles reflex: 2+  Babinski reflex: 2+    Muscle Strength  Ankle dorsiflexion: 5  Ankle plantar flexion: 5  Ankle inversion: 5  Ankle eversion: 5  Great toe extension: 5  Great toe flexion: 5          Right Ankle Exam     Muscle Strength   Dorsiflexion:  5/5  Plantar flexion:  5/5      Left Ankle Exam     Muscle Strength   The patient has normal left ankle strength. Dorsiflexion:  5/5   Plantar flexion:  5/5             General: AAO x 3 in NAD. Dermatologic Exam:  Skin lesion/ulceration Present. Skin lichenification - lower legs. Musculoskeletal: Examination of the lower extremity revealed edematous lower extremity including anterior posterior medial and lateral aspects of the tibia fibula. Area the lower extremity had multiple scaly tissue that was erythematous mild drainage was noted from multiple areas. Edematous noted ankle and foot bilaterally. Negative pain to the ankle. Mild pain with palpation to the posterior calves bilaterally. Radiographs:  3 views Xr Radius Ulna Left (2 Views)    Result Date: 2020  Patient MRN:  [id number] : 1930 Age: 80 years Gender: Male Order Date:  2020 3:00 PM EXAM: XR RADIUS ULNA LEFT (2 VIEWS) NUMBER OF IMAGES:  2 views INDICATION:  fall, two large skin tears to left forearm fall, two large skin tears to left forearm COMPARISON: None . The bones appear to be in anatomic alignment. No fracture is identified. Degenerative changes of the wrist. Punctate radiodensity overlying the mid forearm may represent a small foreign body. Soft tissue defect of the forearm. 1.  No evidence of fracture. 2.  Soft tissue defect of the forearm. \ foot/ankle:     Asessment: Patient is a 80 y.o. male with:    Diagnosis Orders   1. Cellulitis of left leg  Culture, Wound   2. Chronic deep vein thrombosis (DVT) of other vein of both lower extremities (HCC)  US DUP LOWER EXTREMITIES BILATERAL VENOUS   3.  Pain in both lower legs  US DUP LOWER EXTREMITIES BILATERAL VENOUS   4. Localized edema  US DUP LOWER EXTREMITIES BILATERAL VENOUS   5. Cellulitis of lower extremity, unspecified laterality         Plan: Patient examined and evaluated. Current treatment plan. Today I sent the patient for a stat ultrasound bilateral to rule out any DVT or blood clot. This was negative. I did a bilateral Unna boot dressing on the left and right leg starting at the tibial crest distal to the digits 1 through 5 metatarsal joint. Followed by an Ace bandage bilaterally this patient is to go home elevate stay off for 5 days. I will change the dressing on Monday. I did do a culture of the wounds. Placed the patient back on antibiotic Keflex 500 mg twice daily for 21 days current condition and treatment options discussed in detail. Discussed conservative and surgical options with the patient. Treatment options today consisted of verbal and written instructions given to patient. Contact office with any questions/problems/concerns. RTC in 1week(s). 7/1/2020    Electronically signed by Buster Medina DPM on 7/1/2020 at 5:54 PM  7/1/2020  An unna boot  compressive wrap was applied to the bilateral lower extremity. It's purpose is to  decrease  the amount of edema present, and to allow proper healing of the soft tissues. The patient was instructed to keep the unna boot clean, dry and intact remove after 3 daysThe patient was instructed to look for signs of redness, irritation, blistering and pain. If these or any other symptoms were to develop, they were advised to contact the office immediately for reevaluation.

## 2020-07-03 RX ORDER — CLINDAMYCIN HYDROCHLORIDE 300 MG/1
300 CAPSULE ORAL 2 TIMES DAILY
Qty: 28 CAPSULE | Refills: 0 | Status: SHIPPED | OUTPATIENT
Start: 2020-07-03 | End: 2020-07-17

## 2020-07-04 LAB
ORGANISM: ABNORMAL
WOUND/ABSCESS: ABNORMAL

## 2020-07-06 ENCOUNTER — OFFICE VISIT (OUTPATIENT)
Dept: PODIATRY | Age: 85
End: 2020-07-06
Payer: MEDICARE

## 2020-07-06 VITALS — DIASTOLIC BLOOD PRESSURE: 78 MMHG | SYSTOLIC BLOOD PRESSURE: 123 MMHG | TEMPERATURE: 98.2 F

## 2020-07-06 PROCEDURE — 99213 OFFICE O/P EST LOW 20 MIN: CPT | Performed by: PODIATRIST

## 2020-07-06 PROCEDURE — 29580 STRAPPING UNNA BOOT: CPT | Performed by: PODIATRIST

## 2020-07-06 RX ORDER — CLOBETASOL PROPIONATE 0.5 MG/G
OINTMENT TOPICAL
Qty: 60 G | Refills: 1 | Status: SHIPPED
Start: 2020-07-06 | End: 2021-08-30 | Stop reason: ALTCHOICE

## 2020-07-06 RX ORDER — FUROSEMIDE 40 MG/1
40 TABLET ORAL DAILY
Qty: 90 TABLET | Refills: 0 | Status: SHIPPED
Start: 2020-07-06 | End: 2021-01-11

## 2020-07-06 NOTE — PROGRESS NOTES
81 mg by mouth daily, Disp: , Rfl:     Misc.  Devices MISC, Dispense #2 compression stalkings Dx: leg edema, Disp: 1 Device, Rfl: 0    Red Yeast Rice 600 MG TABS, Take by mouth 2 times daily , Disp: , Rfl:     traZODone (DESYREL) 50 MG tablet, Take 1 tablet by mouth nightly, Disp: 30 tablet, Rfl: 5  Allergies   Allergen Reactions    Sulfa Antibiotics      Hyperactivity        Past Medical History:   Diagnosis Date    High cholesterol     Hypertension     Hypothyroidism     Parkinson [address] ([facility] [address]     Diagnosed in  by Dr. Jose Sifuentes due to resting tremor    RBBB      Past Surgical History:   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 6     HERNIA REPAIR Left     groin repair     SHOULDER SURGERY Right     5-10 years ago    TONSILLECTOMY AND ADENOIDECTOMY       Family History   Problem Relation Age of Onset    Heart Disease Mother     Cancer Father         lung    Breast Cancer Sister      Social History     Socioeconomic History    Marital status:      Spouse name: Not on file    Number of children: Not on file    Years of education: Not on file    Highest education level: Not on file   Occupational History    Not on file   Social Needs    Financial resource strain: Not on file    Food insecurity     Worry: Not on file     Inability: Not on file    Transportation needs     Medical: Not on file     Non-medical: Not on file   Tobacco Use    Smoking status: Former Smoker     Years: 10.00     Last attempt to quit: 1970     Years since quittin.5    Smokeless tobacco: Never Used   Substance and Sexual Activity    Alcohol use: No     Comment: Social    Drug use: No    Sexual activity: Never     Partners: Female   Lifestyle    Physical activity     Days per week: Not on file     Minutes per session: Not on file    Stress: Not on file   Relationships    Social connections     Talks on phone: Not on file     Gets together: Not on file     Attends [employer] service: Not on file     Active member of club or organization: Not on file     Attends meetings of clubs or organizations: Not on file     Relationship status: Not on file    Intimate partner violence     Fear of current or ex partner: Not on file     Emotionally abused: Not on file     Physically abused: Not on file     Forced sexual activity: Not on file   Other Topics Concern    Not on file   Social History Narrative    Not on file       Vitals:    07/06/20 1211   BP: 123/78   Temp: 98.2 °F (36.8 °C)   TempSrc: Temporal       Focused Lower Extremity Physical Exam:  Vitals:    07/06/20 1211   BP: 123/78   Temp: 98.2 °F (36.8 °C)        Foot Exam    General  General Appearance: appears stated age and healthy   Orientation: alert and oriented to person, place, and time   Assistance: walker use       Right Foot/Ankle     Inspection and Palpation  Skin Exam: blister, fissure, skin changes, abnormal color and erythema; Left Foot/Ankle      Inspection and Palpation  Skin Exam: drainage, skin changes, abnormal color and erythema;              Ortho Exam    Vascular: pulses  dp  pt fully palpable  Capillary Refill Time:   Hair growth  Skin:    Edema:    Neurologic:      Musculoskeletal/ Orthopedic examination:    NAIL   Web space   Derm: Bilateral lower extremity removal of the Unna boot revealed edema is reduced erythematous reduced mild scaly plaque tissue still noted no open sores noted. Assessment and Plan:An unna boot  compressive wrap was applied to the lateral lower extremity. It's purpose is to  decrease  the amount of edema present, and to allow proper healing of the soft tissues. The patient was instructed to keep the unna boot clean, dry and intact remove after 5 daysThe patient was instructed to look for signs of redness, irritation, blistering and pain.   If these or any other symptoms were to develop, they were advised to contact the office immediately for reevaluation. They I reviewed the cultures with the patient I switched his antibiotics we are going with clindamycin twice a day at this time new Unna boots were applied the patient is to remove these Unna boots in 5 days at that time we are going to start clobetasol cream with a stockinette  M was seen today for cellulitis. Diagnoses and all orders for this visit:    Venous stasis dermatitis of both lower extremities  -     furosemide (LASIX) 40 MG tablet; Take 1 tablet by mouth daily    Localized edema  -     furosemide (LASIX) 40 MG tablet; Take 1 tablet by mouth daily    Other orders  -     clobetasol (TEMOVATE) 0.05 % ointment; Apply topically 2 times daily. Return in about 1 week (around 7/13/2020). Seen By:  Mika Ren DPM      Document was created using voice recognition software. Note was reviewed, however may contain grammatical errors.

## 2020-07-13 ENCOUNTER — OFFICE VISIT (OUTPATIENT)
Dept: PODIATRY | Age: 85
End: 2020-07-13
Payer: MEDICARE

## 2020-07-13 VITALS — DIASTOLIC BLOOD PRESSURE: 78 MMHG | SYSTOLIC BLOOD PRESSURE: 132 MMHG | TEMPERATURE: 97.8 F

## 2020-07-13 PROCEDURE — 99213 OFFICE O/P EST LOW 20 MIN: CPT | Performed by: PODIATRIST

## 2020-07-13 NOTE — PROGRESS NOTES
20   Cesar Quigley : 1930 Sex: male  Age: 80 y.o. Patient was referred by Yudith Mullins DO    Chief Complaint   Patient presents with    Cellulitis     b/l legs got cream it was over $40.00 still on Clindamyacin for another week or so       SUBJECTIVE patient is seen for follow-up of cellulitis and edema bilateral extremities patient is doing very well today denies fever chills or night sweats. HPI  Review of Systems  Const: Denies constitutional symptoms  Musculo: Denies symptoms other than stated above  Skin: Denies symptoms other than stated above       Current Outpatient Medications:     furosemide (LASIX) 40 MG tablet, Take 1 tablet by mouth daily, Disp: 90 tablet, Rfl: 0    clobetasol (TEMOVATE) 0.05 % ointment, Apply topically 2 times daily. , Disp: 60 g, Rfl: 1    clindamycin (CLEOCIN) 300 MG capsule, Take 1 capsule by mouth 2 times daily for 14 days, Disp: 28 capsule, Rfl: 0    carbidopa-levodopa (SINEMET)  MG per tablet, Take 2 tablets by mouth 4 times daily, Disp: , Rfl:     silver sulfADIAZINE (SILVADENE) 1 % cream, Apply topically daily. , Disp: 400 g, Rfl: 1    potassium chloride (KLOR-CON M) 20 MEQ extended release tablet, Take 1 tablet by mouth daily, Disp: 90 tablet, Rfl: 1    levothyroxine (SYNTHROID) 25 MCG tablet, Take 3 tabs PO Monday - Friday and 2 tabs PO Saturday - , Disp: 216 tablet, Rfl: 1    doxazosin (CARDURA) 4 MG tablet, Take 1 tablet by mouth nightly, Disp: 90 tablet, Rfl: 1    lisinopril-hydroCHLOROthiazide (PRINZIDE;ZESTORETIC) 10-12.5 MG per tablet, Take 1 tablet by mouth daily, Disp: 90 tablet, Rfl: 3    lisinopril (PRINIVIL;ZESTRIL) 10 MG tablet, Take 1 tablet by mouth daily, Disp: 90 tablet, Rfl: 3    triamcinolone (KENALOG) 0.1 % cream, Apply topically 2 times daily Apply topically 2 times daily. , Disp: 45 g, Rfl: 3    aspirin 81 MG tablet, Take 81 mg by mouth daily, Disp: , Rfl:     Misc.  Devices MISC, Dispense #2 compression organization: Not on file     Attends meetings of clubs or organizations: Not on file     Relationship status: Not on file    Intimate partner violence     Fear of current or ex partner: Not on file     Emotionally abused: Not on file     Physically abused: Not on file     Forced sexual activity: Not on file   Other Topics Concern    Not on file   Social History Narrative    Not on file       Vitals:    07/13/20 1328   BP: 132/78   Temp: 97.8 °F (36.6 °C)   TempSrc: Temporal       Focused Lower Extremity Physical Exam:  Vitals:    07/13/20 1328   BP: 132/78   Temp: 97.8 °F (36.6 °C)        Foot Exam    General  General Appearance: appears stated age and healthy   Orientation: alert and oriented to person, place, and time   Assistance: walker use       Right Foot/Ankle     Inspection and Palpation  Skin Exam: no blister, no fissure, no skin changes, no abnormal color and no erythema     Neurovascular  Dorsalis pedis: 1+  Posterior tibial: absent      Left Foot/Ankle      Inspection and Palpation  Skin Exam: no drainage, no skin changes, no abnormal color and no erythema     Neurovascular  Dorsalis pedis: 1+  Posterior tibial: absent             Ortho Exam    Vascular: pulses  dp  pt fully palpable  Capillary Refill Time:   Hair growth  Skin:    Edema:    Neurologic:      Musculoskeletal/ Orthopedic examination:    NAIL   Web space   Derm:     Assessment and Plan: Patient is doing excellent work in the key continue with clobetasol for another month then Aquaphor. M was seen today for cellulitis. Diagnoses and all orders for this visit:    Venous stasis dermatitis of both lower extremities    Localized edema        Return if symptoms worsen or fail to improve. Seen By:  Alli Tripp DPM      Document was created using voice recognition software. Note was reviewed, however may contain grammatical errors.

## 2020-09-16 ENCOUNTER — OFFICE VISIT (OUTPATIENT)
Dept: PODIATRY | Age: 85
End: 2020-09-16
Payer: MEDICARE

## 2020-09-16 VITALS — SYSTOLIC BLOOD PRESSURE: 123 MMHG | TEMPERATURE: 97.9 F | DIASTOLIC BLOOD PRESSURE: 82 MMHG

## 2020-09-16 PROCEDURE — 99213 OFFICE O/P EST LOW 20 MIN: CPT | Performed by: PODIATRIST

## 2020-09-16 PROCEDURE — 29580 STRAPPING UNNA BOOT: CPT | Performed by: PODIATRIST

## 2020-09-16 RX ORDER — CEPHALEXIN 500 MG/1
500 CAPSULE ORAL 2 TIMES DAILY
Qty: 28 CAPSULE | Refills: 0 | Status: SHIPPED | OUTPATIENT
Start: 2020-09-16 | End: 2020-09-30

## 2020-09-16 NOTE — PROGRESS NOTES
Disp: 90 tablet, Rfl: 1    lisinopril-hydroCHLOROthiazide (PRINZIDE;ZESTORETIC) 10-12.5 MG per tablet, Take 1 tablet by mouth daily, Disp: 90 tablet, Rfl: 3    lisinopril (PRINIVIL;ZESTRIL) 10 MG tablet, Take 1 tablet by mouth daily, Disp: 90 tablet, Rfl: 3    triamcinolone (KENALOG) 0.1 % cream, Apply topically 2 times daily Apply topically 2 times daily. , Disp: 45 g, Rfl: 3    aspirin 81 MG tablet, Take 81 mg by mouth daily, Disp: , Rfl:     Misc. Devices MISC, Dispense #2 compression stalkings Dx: leg edema, Disp: 1 Device, Rfl: 0    Red Yeast Rice 600 MG TABS, Take by mouth 2 times daily , Disp: , Rfl:     traZODone (DESYREL) 50 MG tablet, Take 1 tablet by mouth nightly, Disp: 30 tablet, Rfl: 5    Review of Systems  Review of Systems - History obtained from chart review and the patient  General ROS: negative for - chills, fatigue, fever, night sweats or weight gain  Constitutional: Negative for chills, diaphoresis, fatigue, fever and unexpected weight change. Musculoskeletal: Positive for arthralgias, gait problem and joint swelling. Neurological ROS: negative for - behavioral changes, confusion, headaches or seizures. Negative for weakness and numbness. Dermatological ROS: negative for - mole changes, rash  Cardiovascular: Negative for leg swelling. Gastrointestinal: Negative for constipation, diarrhea, nausea and vomiting. Objective:       Physical Exam:  /82   Temp 97.9 °F (36.6 °C) (Temporal)     Pulses absent      Wound #:     Other: Venous stasis ulcers   leg left, right and leg    Wound measurements: Wounds are on the posterior aspect of bilateral legs non-measurable seeping clear fluid     Wound Depth: subcutaneous.      Drainage:    clear Type: clear    Wound Bed status:   Granulation Tissue: 100%   Necrotic 0%  Type:   Epithelialization 100%   Hypergranular 100%   Periwound hyperkeratosis:  absent  Erythema present  Odor:no  Maceration:no  Undermining/tract/tunneling:no  Culture taken:   no             Assessment:     Assessment: Patient is a 80 y.o. male with:  1. Venous stasis dermatitis of both lower extremities    2. Localized edema    3. Cellulitis of left leg        Overall Wound Assessment  Wound is is unchanged. Size has unchanged  Appearance has not changed      Plan:     Pt examined and evaluated. Current condition and treatment options discussed in detail. Arcelia Kirkpatrick Age:  80 y.o. Gender:  male   :  1930  Today's Date:  2020      Procedure: An unna boot  compressive wrap was applied to the bilateral lower extremity lower extremity. It's purpose is to  decrease  the amount of edema present, and to allow proper healing of the soft tissues. The patient was instructed to keep the unna boot clean, dry and intact remove after 3 daysThe patient was instructed to look for signs of redness, irritation, blistering and pain. If these or any other symptoms were to develop, they were advised to contact the office immediately for reevaluation.   Patient was prescribed Keflex 1 tab twice daily for 10 days patient will be seen in 4 days for dressing change            Electronically signed by Mika Ren DPM on 2020 at 10:42 AM  2020

## 2020-09-19 ENCOUNTER — OFFICE VISIT (OUTPATIENT)
Dept: PODIATRY | Age: 85
End: 2020-09-19
Payer: MEDICARE

## 2020-09-19 VITALS — SYSTOLIC BLOOD PRESSURE: 126 MMHG | DIASTOLIC BLOOD PRESSURE: 78 MMHG | TEMPERATURE: 98 F

## 2020-09-19 PROCEDURE — 99213 OFFICE O/P EST LOW 20 MIN: CPT | Performed by: PODIATRIST

## 2020-09-19 NOTE — PROGRESS NOTES
20   Lissette Mccollum : 1930 Sex: male  Age: 80 y.o. Patient was referred by Karen Romero DO    Chief Complaint   Patient presents with    Cellulitis     b/l lower ext we are doing unna boots        SUBJECTIVE patient is seen for follow-up of cellulitis and swelling bilateral lower extremity. Patient is doing excellent today Unna boots are still intact. There is no new issues. Patient denies fever chills or night sweats  HPI  Review of Systems  Const: Denies constitutional symptoms  Musculo: Denies symptoms other than stated above  Skin: Denies symptoms other than stated above       Current Outpatient Medications:     cephALEXin (KEFLEX) 500 MG capsule, Take 1 capsule by mouth 2 times daily for 14 days, Disp: 28 capsule, Rfl: 0    furosemide (LASIX) 40 MG tablet, Take 1 tablet by mouth daily, Disp: 90 tablet, Rfl: 0    clobetasol (TEMOVATE) 0.05 % ointment, Apply topically 2 times daily. , Disp: 60 g, Rfl: 1    carbidopa-levodopa (SINEMET)  MG per tablet, Take 2 tablets by mouth 4 times daily, Disp: , Rfl:     silver sulfADIAZINE (SILVADENE) 1 % cream, Apply topically daily. , Disp: 400 g, Rfl: 1    potassium chloride (KLOR-CON M) 20 MEQ extended release tablet, Take 1 tablet by mouth daily, Disp: 90 tablet, Rfl: 1    levothyroxine (SYNTHROID) 25 MCG tablet, Take 3 tabs PO Monday - Friday and 2 tabs PO Saturday - , Disp: 216 tablet, Rfl: 1    doxazosin (CARDURA) 4 MG tablet, Take 1 tablet by mouth nightly, Disp: 90 tablet, Rfl: 1    lisinopril-hydroCHLOROthiazide (PRINZIDE;ZESTORETIC) 10-12.5 MG per tablet, Take 1 tablet by mouth daily, Disp: 90 tablet, Rfl: 3    lisinopril (PRINIVIL;ZESTRIL) 10 MG tablet, Take 1 tablet by mouth daily, Disp: 90 tablet, Rfl: 3    triamcinolone (KENALOG) 0.1 % cream, Apply topically 2 times daily Apply topically 2 times daily. , Disp: 45 g, Rfl: 3    aspirin 81 MG tablet, Take 81 mg by mouth daily, Disp: , Rfl:     Misc.  Devices MISC, Dispense #2 compression stalkings Dx: leg edema, Disp: 1 Device, Rfl: 0    Red Yeast Rice 600 MG TABS, Take by mouth 2 times daily , Disp: , Rfl:     traZODone (DESYREL) 50 MG tablet, Take 1 tablet by mouth nightly, Disp: 30 tablet, Rfl: 5  Allergies   Allergen Reactions    Sulfa Antibiotics      Hyperactivity        Past Medical History:   Diagnosis Date    High cholesterol     Hypertension     Hypothyroidism     Parkinson disease ([facility] [address]     Diagnosed in  by Dr. Marlen Chavez due to resting tremor    RBBB      Past Surgical History:   Procedure Laterality Date    FOOT SURGERY  89    FOOT SURGERY      club foot repair-age 6     HERNIA REPAIR Left     groin repair     SHOULDER SURGERY Right     5-10 years ago    TONSILLECTOMY AND ADENOIDECTOMY       Family History   Problem Relation Age of Onset    Heart Disease Mother     Cancer Father         lung    Breast Cancer Sister      Social History     Socioeconomic History    Marital status:      Spouse name: Not on file    Number of children: Not on file    Years of education: Not on file    Highest education level: Not on file   Occupational History    Not on file   Social Needs    Financial resource strain: Not on file    Food insecurity     Worry: Not on file     Inability: Not on file    Transportation needs     Medical: Not on file     Non-medical: Not on file   Tobacco Use    Smoking status: Former Smoker     Years: 10.00     Last attempt to quit: 1970     Years since quittin.7    Smokeless tobacco: Never Used   Substance and Sexual Activity    Alcohol use: No     Comment: Social    Drug use: No    Sexual activity: Never     Partners: Female   Lifestyle    Physical activity     Days per week: Not on file     Minutes per session: Not on file    Stress: Not on file   Relationships    Social connections     Talks on phone: Not on file     Gets together: Not on file     Attends [employer] service: Not on file     Active member of club or organization: Not on file     Attends meetings of clubs or organizations: Not on file     Relationship status: Not on file    Intimate partner violence     Fear of current or ex partner: Not on file     Emotionally abused: Not on file     Physically abused: Not on file     Forced sexual activity: Not on file   Other Topics Concern    Not on file   Social History Narrative    Not on file       Vitals:    09/19/20 1157   BP: 126/78   Temp: 98 °F (36.7 °C)   TempSrc: Temporal       Focused Lower Extremity Physical Exam:  Vitals:    09/19/20 1157   BP: 126/78   Temp: 98 °F (36.7 °C)        Foot Exam    General  General Appearance: appears stated age and healthy   Orientation: alert and oriented to person, place, and time       Right Foot/Ankle     Inspection and Palpation  Ecchymosis: none  Swelling: dorsum   Skin Exam: dry skin, skin changes and abnormal color; Neurovascular  Dorsalis pedis: absent      Left Foot/Ankle      Inspection and Palpation  Swelling: dorsum   Skin Exam: dry skin, skin changes, abnormal color and erythema;     Neurovascular  Dorsalis pedis: absent  Posterior tibial: absent             Ortho Exam    Vascular: pulses  dp  pt nonpalpable pulses DP and PT bilateral  Capillary Refill Time:   Hair growth  Skin:    Edema:    Neurologic:      Musculoskeletal/ Orthopedic examination:    NAIL   Web space   Derm: Bilateral lower legs edema is decreased the erythematous is resolving there is mild plaquing of tissue from the tibial crest to the digits 1 through 5 bilaterally. There are no new ulcers no new drainage no new blisters noted        Assessment and Plan: Today we are going to DC the Unna boots and start a Aquaphor Curlex and an Ace bandage change every other day bilaterally patient will be seen in 3 weeks for follow-up  M was seen today for cellulitis.     Diagnoses and all orders for this visit:    Venous stasis dermatitis of both lower extremities    Localized edema    Cellulitis of left leg        Return in about 3 weeks (around 10/10/2020). Seen By:  Keri Joe DPM      Document was created using voice recognition software. Note was reviewed, however may contain grammatical errors.

## 2020-09-24 ENCOUNTER — HOSPITAL ENCOUNTER (OUTPATIENT)
Age: 85
Discharge: HOME | End: 2020-09-26
Payer: MEDICARE

## 2020-09-24 LAB
ALBUMIN SERPL-MCNC: 3.9 G/DL (ref 3.5–5.2)
ALP BLD-CCNC: 92 U/L (ref 40–129)
ALT SERPL-CCNC: <5 U/L (ref 0–40)
ANION GAP SERPL CALCULATED.3IONS-SCNC: 16 MMOL/L (ref 7–16)
AST SERPL-CCNC: 16 U/L (ref 0–39)
BASOPHILS ABSOLUTE: 0.08 E9/L (ref 0–0.2)
BASOPHILS RELATIVE PERCENT: 1 % (ref 0–2)
BILIRUB SERPL-MCNC: 0.8 MG/DL (ref 0–1.2)
BILIRUBIN URINE: NEGATIVE
BLOOD, URINE: NEGATIVE
BUN BLDV-MCNC: 28 MG/DL (ref 8–23)
CALCIUM SERPL-MCNC: 9.2 MG/DL (ref 8.6–10.2)
CHLORIDE BLD-SCNC: 105 MMOL/L (ref 98–107)
CHOLESTEROL, TOTAL: 134 MG/DL (ref 0–199)
CLARITY: CLEAR
CO2: 22 MMOL/L (ref 22–29)
COLOR: YELLOW
CREAT SERPL-MCNC: 1.2 MG/DL (ref 0.7–1.2)
EOSINOPHILS ABSOLUTE: 0.48 E9/L (ref 0.05–0.5)
EOSINOPHILS RELATIVE PERCENT: 5.8 % (ref 0–6)
GFR AFRICAN AMERICAN: >60
GFR NON-AFRICAN AMERICAN: 57 ML/MIN/1.73
GLUCOSE BLD-MCNC: 92 MG/DL (ref 74–99)
GLUCOSE URINE: NEGATIVE MG/DL
HCT VFR BLD CALC: 40.4 % (ref 37–54)
HDLC SERPL-MCNC: 36 MG/DL
HEMOGLOBIN: 12.8 G/DL (ref 12.5–16.5)
IMMATURE GRANULOCYTES #: 0.04 E9/L
IMMATURE GRANULOCYTES %: 0.5 % (ref 0–5)
KETONES, URINE: NEGATIVE MG/DL
LDL CHOLESTEROL CALCULATED: 84 MG/DL (ref 0–99)
LEUKOCYTE ESTERASE, URINE: NEGATIVE
LYMPHOCYTES ABSOLUTE: 1.26 E9/L (ref 1.5–4)
LYMPHOCYTES RELATIVE PERCENT: 15.3 % (ref 20–42)
MCH RBC QN AUTO: 32.8 PG (ref 26–35)
MCHC RBC AUTO-ENTMCNC: 31.7 % (ref 32–34.5)
MCV RBC AUTO: 103.6 FL (ref 80–99.9)
MONOCYTES ABSOLUTE: 0.65 E9/L (ref 0.1–0.95)
MONOCYTES RELATIVE PERCENT: 7.9 % (ref 2–12)
NEUTROPHILS ABSOLUTE: 5.7 E9/L (ref 1.8–7.3)
NEUTROPHILS RELATIVE PERCENT: 69.5 % (ref 43–80)
NITRITE, URINE: NEGATIVE
PDW BLD-RTO: 14.2 FL (ref 11.5–15)
PH UA: 6 (ref 5–9)
PLATELET # BLD: 197 E9/L (ref 130–450)
PMV BLD AUTO: 11.8 FL (ref 7–12)
POTASSIUM SERPL-SCNC: 4.2 MMOL/L (ref 3.5–5)
PROTEIN UA: NEGATIVE MG/DL
RBC # BLD: 3.9 E12/L (ref 3.8–5.8)
SODIUM BLD-SCNC: 143 MMOL/L (ref 132–146)
SPECIFIC GRAVITY UA: 1.02 (ref 1–1.03)
T4 TOTAL: 7.2 MCG/DL (ref 4.5–11.7)
TOTAL PROTEIN: 7.4 G/DL (ref 6.4–8.3)
TRIGL SERPL-MCNC: 72 MG/DL (ref 0–149)
TSH SERPL DL<=0.05 MIU/L-ACNC: 5.81 UIU/ML (ref 0.27–4.2)
UROBILINOGEN, URINE: 0.2 E.U./DL
VLDLC SERPL CALC-MCNC: 14 MG/DL
WBC # BLD: 8.2 E9/L (ref 4.5–11.5)

## 2020-09-24 PROCEDURE — 81003 URINALYSIS AUTO W/O SCOPE: CPT

## 2020-09-24 PROCEDURE — 85025 COMPLETE CBC W/AUTO DIFF WBC: CPT

## 2020-09-24 PROCEDURE — 80053 COMPREHEN METABOLIC PANEL: CPT

## 2020-09-24 PROCEDURE — 84436 ASSAY OF TOTAL THYROXINE: CPT

## 2020-09-24 PROCEDURE — 84443 ASSAY THYROID STIM HORMONE: CPT

## 2020-09-24 PROCEDURE — 80061 LIPID PANEL: CPT

## 2020-09-24 PROCEDURE — 36415 COLL VENOUS BLD VENIPUNCTURE: CPT

## 2020-10-12 ENCOUNTER — OFFICE VISIT (OUTPATIENT)
Dept: PODIATRY | Age: 85
End: 2020-10-12
Payer: MEDICARE

## 2020-10-12 VITALS — TEMPERATURE: 97.2 F | SYSTOLIC BLOOD PRESSURE: 123 MMHG | DIASTOLIC BLOOD PRESSURE: 79 MMHG

## 2020-10-12 PROCEDURE — 99213 OFFICE O/P EST LOW 20 MIN: CPT | Performed by: PODIATRIST

## 2020-10-12 NOTE — PROGRESS NOTES
20  M Matteo Said : 1930 Sex: male  Age: 80 y.o. Patient was referred by Carie Main MD    Chief Complaint   Patient presents with    Cellulitis     b/l legs       SUBJECTIVE patient is seen for follow-up of cellulitis and swelling bilateral lower extremity. There is no new issues. Patient denies fever chills or night sweats  HPI  Review of Systems  Const: Denies constitutional symptoms  Musculo: Denies symptoms other than stated above  Skin: Denies symptoms other than stated above       Current Outpatient Medications:     furosemide (LASIX) 40 MG tablet, Take 1 tablet by mouth daily, Disp: 90 tablet, Rfl: 0    clobetasol (TEMOVATE) 0.05 % ointment, Apply topically 2 times daily. , Disp: 60 g, Rfl: 1    carbidopa-levodopa (SINEMET)  MG per tablet, Take 2 tablets by mouth 4 times daily, Disp: , Rfl:     silver sulfADIAZINE (SILVADENE) 1 % cream, Apply topically daily. , Disp: 400 g, Rfl: 1    potassium chloride (KLOR-CON M) 20 MEQ extended release tablet, Take 1 tablet by mouth daily, Disp: 90 tablet, Rfl: 1    levothyroxine (SYNTHROID) 25 MCG tablet, Take 3 tabs PO Monday - Friday and 2 tabs PO Saturday - , Disp: 216 tablet, Rfl: 1    doxazosin (CARDURA) 4 MG tablet, Take 1 tablet by mouth nightly, Disp: 90 tablet, Rfl: 1    lisinopril-hydroCHLOROthiazide (PRINZIDE;ZESTORETIC) 10-12.5 MG per tablet, Take 1 tablet by mouth daily, Disp: 90 tablet, Rfl: 3    lisinopril (PRINIVIL;ZESTRIL) 10 MG tablet, Take 1 tablet by mouth daily, Disp: 90 tablet, Rfl: 3    triamcinolone (KENALOG) 0.1 % cream, Apply topically 2 times daily Apply topically 2 times daily. , Disp: 45 g, Rfl: 3    aspirin 81 MG tablet, Take 81 mg by mouth daily, Disp: , Rfl:     Misc.  Devices MISC, Dispense #2 compression stalkings Dx: leg edema, Disp: 1 Device, Rfl: 0    Red Yeast Rice 600 MG TABS, Take by mouth 2 times daily , Disp: , Rfl:     traZODone (DESYREL) 50 MG tablet, Take 1 tablet by mouth on file     Emotionally abused: Not on file     Physically abused: Not on file     Forced sexual activity: Not on file   Other Topics Concern    Not on file   Social History Narrative    Not on file       Vitals:    10/12/20 1224   BP: 123/79   Temp: 97.2 °F (36.2 °C)   TempSrc: Temporal       Focused Lower Extremity Physical Exam:  Vitals:    10/12/20 1224   BP: 123/79   Temp: 97.2 °F (36.2 °C)        Foot Exam    General  General Appearance: appears stated age and healthy   Orientation: alert and oriented to person, place, and time       Right Foot/Ankle     Inspection and Palpation  Ecchymosis: none  Swelling: dorsum   Skin Exam: dry skin, skin changes and abnormal color; Neurovascular  Dorsalis pedis: absent      Left Foot/Ankle      Inspection and Palpation  Swelling: dorsum   Skin Exam: dry skin, skin changes, abnormal color and erythema;     Neurovascular  Dorsalis pedis: absent  Posterior tibial: absent             Ortho Exam    Vascular: pulses  dp  pt nonpalpable pulses DP and PT bilateral  Capillary Refill Time:   Hair growth  Skin:    Edema:    Neurologic:      Musculoskeletal/ Orthopedic examination:    NAIL   Web space   Derm: Bilateral lower legs edema is decreased the erythematous is resolving there is mild plaquing of tissue from the tibial crest to the digits 1 through 5 bilaterally. There are no new ulcers no new drainage no new blisters noted        Assessment and Plan: Continue treatment plan we can hold off on Aquaphor for now we will get a do a stockinette and Ace bandage change daily leave open to air at night elevate M was seen today for cellulitis. Diagnoses and all orders for this visit:    Venous stasis dermatitis of both lower extremities    Localized edema        Return in about 1 month (around 11/12/2020). Seen By:  Helen Douglas DPM      Document was created using voice recognition software. Note was reviewed, however may contain grammatical errors.

## 2020-11-11 ENCOUNTER — OFFICE VISIT (OUTPATIENT)
Dept: PODIATRY | Age: 85
End: 2020-11-11
Payer: MEDICARE

## 2020-11-11 VITALS — TEMPERATURE: 97.5 F | DIASTOLIC BLOOD PRESSURE: 60 MMHG | SYSTOLIC BLOOD PRESSURE: 120 MMHG

## 2020-11-11 PROCEDURE — 29580 STRAPPING UNNA BOOT: CPT | Performed by: PODIATRIST

## 2020-11-11 PROCEDURE — 99213 OFFICE O/P EST LOW 20 MIN: CPT | Performed by: PODIATRIST

## 2020-11-11 RX ORDER — CEPHALEXIN 500 MG/1
500 CAPSULE ORAL 2 TIMES DAILY
Qty: 28 CAPSULE | Refills: 0 | Status: SHIPPED | OUTPATIENT
Start: 2020-11-11 | End: 2020-11-25

## 2020-11-11 RX ORDER — DOXYCYCLINE HYCLATE 100 MG
100 TABLET ORAL 2 TIMES DAILY
Qty: 28 TABLET | Refills: 0 | Status: SHIPPED | OUTPATIENT
Start: 2020-11-11 | End: 2020-11-25

## 2020-11-11 NOTE — PROGRESS NOTES
20   Maurizio Cunha : 1930 Sex: male  Age: 80 y.o. Patient was referred by Rebel Garcia MD    Chief Complaint   Patient presents with    Cellulitis     b/l legs stopped doing all tx a couple days ago nothing was helping him even double up on lasix        SUBJECTIVE patient is seen for follow-up of cellulitis and swelling bilateral lower extremity. Patient denies fever chills or night sweats patient and wife states that he is getting the swelling back in the legs they have tried several different creams clobetasol was too expensive 1 creams makes the legs moist the other dries it out too much. HPI  Review of Systems  Const: Denies constitutional symptoms  Musculo: Denies symptoms other than stated above  Skin: Denies symptoms other than stated above       Current Outpatient Medications:     doxycycline hyclate (VIBRA-TABS) 100 MG tablet, Take 1 tablet by mouth 2 times daily for 14 days, Disp: 28 tablet, Rfl: 0    furosemide (LASIX) 40 MG tablet, Take 1 tablet by mouth daily, Disp: 90 tablet, Rfl: 0    clobetasol (TEMOVATE) 0.05 % ointment, Apply topically 2 times daily. , Disp: 60 g, Rfl: 1    carbidopa-levodopa (SINEMET)  MG per tablet, Take 2 tablets by mouth 4 times daily, Disp: , Rfl:     silver sulfADIAZINE (SILVADENE) 1 % cream, Apply topically daily. , Disp: 400 g, Rfl: 1    potassium chloride (KLOR-CON M) 20 MEQ extended release tablet, Take 1 tablet by mouth daily, Disp: 90 tablet, Rfl: 1    levothyroxine (SYNTHROID) 25 MCG tablet, Take 3 tabs PO Monday - Friday and 2 tabs PO Saturday - , Disp: 216 tablet, Rfl: 1    doxazosin (CARDURA) 4 MG tablet, Take 1 tablet by mouth nightly, Disp: 90 tablet, Rfl: 1    lisinopril-hydroCHLOROthiazide (PRINZIDE;ZESTORETIC) 10-12.5 MG per tablet, Take 1 tablet by mouth daily, Disp: 90 tablet, Rfl: 3    lisinopril (PRINIVIL;ZESTRIL) 10 MG tablet, Take 1 tablet by mouth daily, Disp: 90 tablet, Rfl: 3    triamcinolone (KENALOG) 0.1  Stress: Not on file   Relationships    Social connections     Talks on phone: Not on file     Gets together: Not on file     Attends Hol[employer] service: Not on file     Active member of club or organization: Not on file     Attends meetings of clubs or organizations: Not on file     Relationship status: Not on file    Intimate partner violence     Fear of current or ex partner: Not on file     Emotionally abused: Not on file     Physically abused: Not on file     Forced sexual activity: Not on file   Other Topics Concern    Not on file   Social History Narrative    Not on file       Vitals:    11/11/20 1149   BP: 120/60   Temp: 97.5 °F (36.4 °C)   TempSrc: Temporal   Weight: Comment: wheelchair       Focused Lower Extremity Physical Exam:  Vitals:    11/11/20 1149   BP: 120/60   Temp: 97.5 °F (36.4 °C)        Foot Exam    General  General Appearance: appears stated age and healthy   Orientation: alert and oriented to person, place, and time       Right Foot/Ankle     Inspection and Palpation  Ecchymosis: none  Swelling: dorsum   Skin Exam: dry skin, skin changes and abnormal color; Neurovascular  Dorsalis pedis: absent      Left Foot/Ankle      Inspection and Palpation  Swelling: dorsum   Skin Exam: dry skin, skin changes, abnormal color and erythema;     Neurovascular  Dorsalis pedis: absent  Posterior tibial: absent             Ortho Exam    Vascular: pulses  dp  pt nonpalpable pulses DP and PT bilateral  Capillary Refill Time:   Hair growth  Skin:    Edema:    Neurologic:      Musculoskeletal/ Orthopedic examination:    NAIL   Web space   Derm: Bilateral lower legs edema there is erythematous with plaque scaly tissue lower extremity bilateral.  There are no new ulcers but tissue is sensitive with palpation. Assessment and Plan today we are again to start back with Unna boot Curlex and an Ace bandage compression dressing changed every other day we will have visiting nurses start on Monday.   I am going to start him on doxycycline again for 2 weeks  An unna boot  compressive wrap was applied to the bilateral lower extremity lower extremity. It's purpose is to  decrease  the amount of edema present, and to allow proper healing of the soft tissues. The patient was instructed to keep the unna boot clean, dry and intact remove after 3 daysThe patient was instructed to look for signs of redness, irritation, blistering and pain. If these or any other symptoms were to develop, they were advised to contact the office immediately for reevaluation.               :M was seen today for cellulitis. Diagnoses and all orders for this visit:    Venous stasis dermatitis of both lower extremities  -     External Referral To Home Health    Localized edema  -     External Referral To Home Health    Cellulitis of lower extremity, unspecified laterality  -     External Referral To Home Health    Other orders  -     doxycycline hyclate (VIBRA-TABS) 100 MG tablet; Take 1 tablet by mouth 2 times daily for 14 days        Return in about 2 weeks (around 11/25/2020). Seen By:  Sharon Canela DPM      Document was created using voice recognition software. Note was reviewed, however may contain grammatical errors.

## 2020-11-25 ENCOUNTER — OFFICE VISIT (OUTPATIENT)
Dept: PODIATRY | Age: 85
End: 2020-11-25
Payer: MEDICARE

## 2020-11-25 VITALS — SYSTOLIC BLOOD PRESSURE: 118 MMHG | DIASTOLIC BLOOD PRESSURE: 70 MMHG | TEMPERATURE: 97.1 F

## 2020-11-25 PROCEDURE — 99213 OFFICE O/P EST LOW 20 MIN: CPT | Performed by: PODIATRIST

## 2020-11-25 PROCEDURE — 29580 STRAPPING UNNA BOOT: CPT | Performed by: PODIATRIST

## 2020-11-25 NOTE — PROGRESS NOTES
20  M Riya Gray : 1930 Sex: male  Age: 80 y.o. Patient was referred by Raina Zavala MD    Chief Complaint   Patient presents with    Cellulitis     [facility] coming [facility] house they stopped the unna boots but today they are draining again        SUBJECTIVE patient is seen for follow-up of cellulitis and swelling bilateral lower extremity. Patient denies fever chills or night sweats patient and wife states that he is getting the swelling back in the legs they have tried several different creams clobetasol was too expensive 1 creams makes the legs moist the other dries it out too much. HPI  Review of Systems  Const: Denies constitutional symptoms  Musculo: Denies symptoms other than stated above  Skin: Denies symptoms other than stated above       Current Outpatient Medications:     doxycycline hyclate (VIBRA-TABS) 100 MG tablet, Take 1 tablet by mouth 2 times daily for 14 days, Disp: 28 tablet, Rfl: 0    cephALEXin (KEFLEX) 500 MG capsule, Take 1 capsule by mouth 2 times daily for 14 days, Disp: 28 capsule, Rfl: 0    clobetasol (TEMOVATE) 0.05 % ointment, Apply topically 2 times daily. , Disp: 60 g, Rfl: 1    carbidopa-levodopa (SINEMET)  MG per tablet, Take 2 tablets by mouth 4 times daily, Disp: , Rfl:     silver sulfADIAZINE (SILVADENE) 1 % cream, Apply topically daily. , Disp: 400 g, Rfl: 1    potassium chloride (KLOR-CON M) 20 MEQ extended release tablet, Take 1 tablet by mouth daily, Disp: 90 tablet, Rfl: 1    doxazosin (CARDURA) 4 MG tablet, Take 1 tablet by mouth nightly, Disp: 90 tablet, Rfl: 1    lisinopril-hydroCHLOROthiazide (PRINZIDE;ZESTORETIC) 10-12.5 MG per tablet, Take 1 tablet by mouth daily, Disp: 90 tablet, Rfl: 3    lisinopril (PRINIVIL;ZESTRIL) 10 MG tablet, Take 1 tablet by mouth daily, Disp: 90 tablet, Rfl: 3    triamcinolone (KENALOG) 0.1 % cream, Apply topically 2 times daily Apply topically 2 times daily. , Disp: 45 g, Rfl: 3   aspirin 81 MG tablet, Take 81 mg by mouth daily, Disp: , Rfl:     Misc.  Devices MISC, Dispense #2 compression stalkings Dx: leg edema, Disp: 1 Device, Rfl: 0    Red Yeast Rice 600 MG TABS, Take by mouth 2 times daily , Disp: , Rfl:     furosemide (LASIX) 40 MG tablet, Take 1 tablet by mouth daily, Disp: 90 tablet, Rfl: 0    levothyroxine (SYNTHROID) 25 MCG tablet, Take 3 tabs PO Monday - Friday and 2 tabs PO Saturday - , Disp: 216 tablet, Rfl: 1    traZODone (DESYREL) 50 MG tablet, Take 1 tablet by mouth nightly, Disp: 30 tablet, Rfl: 5  Allergies   Allergen Reactions    Sulfa Antibiotics      Hyperactivity        Past Medical History:   Diagnosis Date    High cholesterol     Hypertension     Hypothyroidism     Parkinson disease ([facility] [address]     Diagnosed in  by Dr. Andriy Camacho due to resting tremor    RBBB      Past Surgical History:   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 6     HERNIA REPAIR Left     groin repair     SHOULDER SURGERY Right     5-10 years ago    TONSILLECTOMY AND ADENOIDECTOMY       Family History   Problem Relation Age of Onset    Heart Disease Mother     Cancer Father         lung    Breast Cancer Sister      Social History     Socioeconomic History    Marital status:      Spouse name: Not on file    Number of children: Not on file    Years of education: Not on file    Highest education level: Not on file   Occupational History    Not on file   Social Needs    Financial resource strain: Not on file    Food insecurity     Worry: Not on file     Inability: Not on file    Transportation needs     Medical: Not on file     Non-medical: Not on file   Tobacco Use    Smoking status: Former Smoker     Years: 10.00     Last attempt to quit: 1970     Years since quittin.9    Smokeless tobacco: Never Used   Substance and Sexual Activity    Alcohol use: No     Comment: Social    Drug use: No    Sexual activity: Never     Partners: Female   Lifestyle    Physical activity     Days per week: Not on file     Minutes per session: Not on file    Stress: Not on file   Relationships    Social connections     Talks on phone: Not on file     Gets together: Not on file     Attends Roman Catholic service: Not on file     Active member of club or organization: Not on file     Attends meetings of clubs or organizations: Not on file     Relationship status: Not on file    Intimate partner violence     Fear of current or ex partner: Not on file     Emotionally abused: Not on file     Physically abused: Not on file     Forced sexual activity: Not on file   Other Topics Concern    Not on file   Social History Narrative    Not on file       Vitals:    11/25/20 1024   BP: 118/70   Temp: 97.1 °F (36.2 °C)   TempSrc: Temporal       Focused Lower Extremity Physical Exam:  Vitals:    11/25/20 1024   BP: 118/70   Temp: 97.1 °F (36.2 °C)        Foot Exam    General  General Appearance: appears stated age and healthy   Orientation: alert and oriented to person, place, and time       Right Foot/Ankle     Inspection and Palpation  Ecchymosis: none  Swelling: dorsum   Skin Exam: dry skin, skin changes and abnormal color; Neurovascular  Dorsalis pedis: absent      Left Foot/Ankle      Inspection and Palpation  Swelling: dorsum   Skin Exam: dry skin, skin changes, abnormal color and erythema;     Neurovascular  Dorsalis pedis: absent  Posterior tibial: absent             Ortho Exam    Vascular: pulses  dp  pt nonpalpable pulses DP and PT bilateral  Capillary Refill Time:   Hair growth  Skin:    Edema:    Neurologic:      Musculoskeletal/ Orthopedic examination:    NAIL   Web space   Derm: Bilateral lower legs edema there is erythematous with plaque scaly tissue lower extremity bilateral.  There are no new ulcers but tissue is sensitive with palpation.     Assessment and Plan today we are again to start back with Unna boot Curlex and an Ace bandage compression dressing changed every other day we will have visiting nurses start on Monday. I am going to start him on doxycycline again for 2 weeks  An unna boot  compressive wrap was applied to the bilateral lower extremity lower extremity. It's purpose is to  decrease  the amount of edema present, and to allow proper healing of the soft tissues. The patient was instructed to keep the unna boot clean, dry and intact remove after 3 daysThe patient was instructed to look for signs of redness, irritation, blistering and pain. If these or any other symptoms were to develop, they were advised to contact the office immediately for reevaluation.               :M was seen today for cellulitis. Diagnoses and all orders for this visit:    Venous stasis dermatitis of both lower extremities    Localized edema    Cellulitis of lower extremity, unspecified laterality    Cellulitis of left leg        Return in about 3 weeks (around 12/16/2020). Seen By:  Juan Parkinson DPM      Document was created using voice recognition software. Note was reviewed, however may contain grammatical errors.

## 2020-12-07 ENCOUNTER — OFFICE VISIT (OUTPATIENT)
Dept: PODIATRY | Age: 85
End: 2020-12-07
Payer: MEDICARE

## 2020-12-07 VITALS — TEMPERATURE: 97.8 F | DIASTOLIC BLOOD PRESSURE: 78 MMHG | SYSTOLIC BLOOD PRESSURE: 123 MMHG

## 2020-12-07 PROCEDURE — 99213 OFFICE O/P EST LOW 20 MIN: CPT | Performed by: PODIATRIST

## 2020-12-07 NOTE — PROGRESS NOTES
20  M Hugo Valle : 1930 Sex: male  Age: 80 y.o. Patient was referred by Starling Ormond, MD    Chief Complaint   Patient presents with    Cellulitis     [facility] he developed a rash all over his body saw Dr. Edil Salas she put him on Prednisone he is retaining alot of water she Nguyen changed his water        SUBJECTIVE patient is seen for follow-up of cellulitis and swelling bilateral lower extremity. Patient denies fever chills or night sweats patient and wife states that he is getting the swelling back in the legs they have tried several different creams clobetasol was too expensive 1 creams makes the legs moist the other dries it out too much. HPI  Review of Systems  Const: Denies constitutional symptoms  Musculo: Denies symptoms other than stated above  Skin: Denies symptoms other than stated above       Current Outpatient Medications:     clobetasol (TEMOVATE) 0.05 % ointment, Apply topically 2 times daily. , Disp: 60 g, Rfl: 1    carbidopa-levodopa (SINEMET)  MG per tablet, Take 2 tablets by mouth 4 times daily, Disp: , Rfl:     silver sulfADIAZINE (SILVADENE) 1 % cream, Apply topically daily. , Disp: 400 g, Rfl: 1    potassium chloride (KLOR-CON M) 20 MEQ extended release tablet, Take 1 tablet by mouth daily, Disp: 90 tablet, Rfl: 1    levothyroxine (SYNTHROID) 25 MCG tablet, Take 3 tabs PO Monday - Friday and 2 tabs PO Saturday - , Disp: 216 tablet, Rfl: 1    doxazosin (CARDURA) 4 MG tablet, Take 1 tablet by mouth nightly, Disp: 90 tablet, Rfl: 1    lisinopril-hydroCHLOROthiazide (PRINZIDE;ZESTORETIC) 10-12.5 MG per tablet, Take 1 tablet by mouth daily, Disp: 90 tablet, Rfl: 3    lisinopril (PRINIVIL;ZESTRIL) 10 MG tablet, Take 1 tablet by mouth daily, Disp: 90 tablet, Rfl: 3    triamcinolone (KENALOG) 0.1 % cream, Apply topically 2 times daily Apply topically 2 times daily. , Disp: 45 g, Rfl: 3    aspirin 81 MG tablet, Take 81 mg by mouth daily, Disp: , Rfl:     Misc.  Devices MISC, Dispense #2 compression stalkings Dx: leg edema, Disp: 1 Device, Rfl: 0    Red Yeast Rice 600 MG TABS, Take by mouth 2 times daily , Disp: , Rfl:     furosemide (LASIX) 40 MG tablet, Take 1 tablet by mouth daily (Patient not taking: Reported on 2020), Disp: 90 tablet, Rfl: 0    traZODone (DESYREL) 50 MG tablet, Take 1 tablet by mouth nightly, Disp: 30 tablet, Rfl: 5  Allergies   Allergen Reactions    Sulfa Antibiotics      Hyperactivity        Past Medical History:   Diagnosis Date    High cholesterol     Hypertension     Hypothyroidism     Parkinson disease ([facility] [address]     Diagnosed in  by Dr. Antwon Hackett due to resting tremor    RBBB      Past Surgical History:   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 6     HERNIA REPAIR Left     groin repair     SHOULDER SURGERY Right     5-10 years ago    TONSILLECTOMY AND ADENOIDECTOMY       Family History   Problem Relation Age of Onset    Heart Disease Mother     Cancer Father         lung    Breast Cancer Sister      Social History     Socioeconomic History    Marital status:      Spouse name: Not on file    Number of children: Not on file    Years of education: Not on file    Highest education level: Not on file   Occupational History    Not on file   Social Needs    Financial resource strain: Not on file    Food insecurity     Worry: Not on file     Inability: Not on file    Transportation needs     Medical: Not on file     Non-medical: Not on file   Tobacco Use    Smoking status: Former Smoker     Years: 10.00     Last attempt to quit: 1970     Years since quittin.9    Smokeless tobacco: Never Used   Substance and Sexual Activity    Alcohol use: No     Comment: Social    Drug use: No    Sexual activity: Never     Partners: Female   Lifestyle    Physical activity     Days per week: Not on file     Minutes per session: Not on file    Stress: Not on file   Relationships    Social connections     Talks on phone: Not on file     Gets together: Not on file     Attends Scientology service: Not on file     Active member of club or organization: Not on file     Attends meetings of clubs or organizations: Not on file     Relationship status: Not on file    Intimate partner violence     Fear of current or ex partner: Not on file     Emotionally abused: Not on file     Physically abused: Not on file     Forced sexual activity: Not on file   Other Topics Concern    Not on file   Social History Narrative    Not on file       Vitals:    12/07/20 1206   BP: 123/78   Temp: 97.8 °F (36.6 °C)   TempSrc: Temporal       Focused Lower Extremity Physical Exam:  Vitals:    12/07/20 1206   BP: 123/78   Temp: 97.8 °F (36.6 °C)        Foot Exam    General  General Appearance: appears stated age and healthy   Orientation: alert and oriented to person, place, and time       Right Foot/Ankle     Inspection and Palpation  Ecchymosis: none  Swelling: dorsum   Skin Exam: dry skin, skin changes and abnormal color; Neurovascular  Dorsalis pedis: absent      Left Foot/Ankle      Inspection and Palpation  Swelling: dorsum   Skin Exam: dry skin, skin changes, abnormal color and erythema;     Neurovascular  Dorsalis pedis: absent  Posterior tibial: absent             Ortho Exam    Vascular: pulses  dp  pt nonpalpable pulses DP and PT bilateral  Capillary Refill Time:   Hair growth  Skin:    Edema:    Neurologic:      Musculoskeletal/ Orthopedic examination:    NAIL   Web space   Derm: Bilateral lower legs edema there is erythematous with plaque scaly tissue lower extremity bilateral.  There are no new ulcers but tissue is sensitive with palpation.     Assessment and Plan today we are changing the dressing changes were going to do a stocking that followed by a Coban and an Ace bandage change every other day clobetasol or Aquaphor is to be applied every

## 2020-12-18 ENCOUNTER — TELEPHONE (OUTPATIENT)
Dept: PODIATRY | Age: 85
End: 2020-12-18

## 2020-12-18 NOTE — TELEPHONE ENCOUNTER
Myla Pendleton from [facility] called and left a message in regards to pt. She stated pts legs are swelling and seeping. She wanted the office to know that they are reapplying Unna Wraps to both legs at the facility today. She states they will wait for additional orders to be given at his f/u appt on 12/22/20. If any questions or issues; Myla Pendleton can be reached at [phone].

## 2020-12-22 ENCOUNTER — OFFICE VISIT (OUTPATIENT)
Dept: PODIATRY | Age: 85
End: 2020-12-22
Payer: MEDICARE

## 2020-12-22 VITALS — TEMPERATURE: 97.6 F | DIASTOLIC BLOOD PRESSURE: 78 MMHG | SYSTOLIC BLOOD PRESSURE: 123 MMHG

## 2020-12-22 PROCEDURE — 11721 DEBRIDE NAIL 6 OR MORE: CPT | Performed by: PODIATRIST

## 2020-12-22 PROCEDURE — 99213 OFFICE O/P EST LOW 20 MIN: CPT | Performed by: PODIATRIST

## 2020-12-22 NOTE — PROGRESS NOTES
20  M Martin Martin : 1930 Sex: male  Age: 80 y.o. Patient was referred by Pepe Connor MD    Chief Complaint   Patient presents with    Cellulitis     [facility] is coming out twice a week doing unna boot dressing one week on on week off        SUBJECTIVE patient is seen for follow-up of cellulitis and swelling bilateral lower extremity. Patient denies fever chills or night sweats patient and wife states that he is getting the swelling back in the legs they have tried several different creams clobetasol was too expensive 1 creams makes the legs moist the other dries it out too much. HPI  Review of Systems  Const: Denies constitutional symptoms  Musculo: Denies symptoms other than stated above  Skin: Denies symptoms other than stated above       Current Outpatient Medications:     clobetasol (TEMOVATE) 0.05 % ointment, Apply topically 2 times daily. , Disp: 60 g, Rfl: 1    carbidopa-levodopa (SINEMET)  MG per tablet, Take 2 tablets by mouth 4 times daily, Disp: , Rfl:     silver sulfADIAZINE (SILVADENE) 1 % cream, Apply topically daily. , Disp: 400 g, Rfl: 1    potassium chloride (KLOR-CON M) 20 MEQ extended release tablet, Take 1 tablet by mouth daily, Disp: 90 tablet, Rfl: 1    doxazosin (CARDURA) 4 MG tablet, Take 1 tablet by mouth nightly, Disp: 90 tablet, Rfl: 1    lisinopril-hydroCHLOROthiazide (PRINZIDE;ZESTORETIC) 10-12.5 MG per tablet, Take 1 tablet by mouth daily, Disp: 90 tablet, Rfl: 3    lisinopril (PRINIVIL;ZESTRIL) 10 MG tablet, Take 1 tablet by mouth daily, Disp: 90 tablet, Rfl: 3    triamcinolone (KENALOG) 0.1 % cream, Apply topically 2 times daily Apply topically 2 times daily. , Disp: 45 g, Rfl: 3    aspirin 81 MG tablet, Take 81 mg by mouth daily, Disp: , Rfl:     Misc.  Devices MISC, Dispense #2 compression stalkings Dx: leg edema, Disp: 1 Device, Rfl: 0    Red Yeast Rice 600 MG TABS, Take by mouth 2 times daily , Disp: , Rfl:   furosemide (LASIX) 40 MG tablet, Take 1 tablet by mouth daily (Patient not taking: Reported on 2020), Disp: 90 tablet, Rfl: 0    levothyroxine (SYNTHROID) 25 MCG tablet, Take 3 tabs PO Monday - Friday and 2 tabs PO Saturday - , Disp: 216 tablet, Rfl: 1    traZODone (DESYREL) 50 MG tablet, Take 1 tablet by mouth nightly, Disp: 30 tablet, Rfl: 5  Allergies   Allergen Reactions    Sulfa Antibiotics      Hyperactivity        Past Medical History:   Diagnosis Date    High cholesterol     Hypertension     Hypothyroidism     Parkinson disease ([facility] [address]     Diagnosed in  by Dr. Antwon Hackett due to resting tremor    RBBB      Past Surgical History:   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 6     HERNIA REPAIR Left     groin repair     SHOULDER SURGERY Right     5-10 years ago    TONSILLECTOMY AND ADENOIDECTOMY       Family History   Problem Relation Age of Onset    Heart Disease Mother     Cancer Father         lung    Breast Cancer Sister      Social History     Socioeconomic History    Marital status:      Spouse name: Not on file    Number of children: Not on file    Years of education: Not on file    Highest education level: Not on file   Occupational History    Not on file   Social Needs    Financial resource strain: Not on file    Food insecurity     Worry: Not on file     Inability: Not on file    Transportation needs     Medical: Not on file     Non-medical: Not on file   Tobacco Use    Smoking status: Former Smoker     Years: 10.00     Quit date: 1970     Years since quittin.0    Smokeless tobacco: Never Used   Substance and Sexual Activity    Alcohol use: No     Comment: Social    Drug use: No    Sexual activity: Never     Partners: Female   Lifestyle    Physical activity     Days per week: Not on file     Minutes per session: Not on file    Stress: Not on file   Relationships    Social connections Talks on phone: Not on file     Gets together: Not on file     Attends Episcopal service: Not on file     Active member of club or organization: Not on file     Attends meetings of clubs or organizations: Not on file     Relationship status: Not on file    Intimate partner violence     Fear of current or ex partner: Not on file     Emotionally abused: Not on file     Physically abused: Not on file     Forced sexual activity: Not on file   Other Topics Concern    Not on file   Social History Narrative    Not on file       Vitals:    12/22/20 1612   BP: 123/78   Temp: 97.6 °F (36.4 °C)   TempSrc: Temporal   Weight: Comment: wheelchair       Focused Lower Extremity Physical Exam:  Vitals:    12/22/20 1612   BP: 123/78   Temp: 97.6 °F (36.4 °C)        Foot Exam    General  General Appearance: appears stated age and healthy   Orientation: alert and oriented to person, place, and time       Right Foot/Ankle     Inspection and Palpation  Ecchymosis: none  Swelling: dorsum   Skin Exam: dry skin, skin changes and abnormal color;      Neurovascular  Dorsalis pedis: absent  Posterior tibial: absent      Left Foot/Ankle      Inspection and Palpation  Swelling: dorsum   Skin Exam: dry skin, skin changes, abnormal color and erythema;     Neurovascular  Dorsalis pedis: absent  Posterior tibial: absent           Toenail Description  Sites of Onychomycosis Involvement (Check affected area)  [x] [x] [x] [x] [x] [x] [x] [x] [x] [x]  5 4 3 2 1 1 2 3 4 5                          Right                                        Left    Thickness  [x] [x] [x] [x] [x] [x] [x] [x] [x] [x]  5 4 3 2 1 1 2 3 4 5                         Right                                        Left    Dystrophic Changes   [x] [x] [x] [x] [x] [x] [x] [x] [x] [x]  5 4 3 2 1 1 2 3 4 5                         Right                                        Left    discolored   [x] [x] [x] [x] [x] [x] [x] [x] [x] [x]  5 4 3 2 1 1 2 3 4 5 Right                                        Left    Incurvation/Ingrowin   [] [] [] [] [x] [x] [] [] [] []  5 4 3 2 1 1 2 3 4 5                         Right                                        Left    Inflammation/Pain   [x] [x] [x] [x] [x] [x] [x] [x] [x] [x]  5 4 3 2 1 1 2 3 4 5                         Right                                        Left    Ortho Exam    Vascular: pulses  dp  pt nonpalpable pulses DP and PT bilateral  Capillary Refill Time:   Hair growth  Skin:    Edema:    Neurologic:      Musculoskeletal/ Orthopedic examination:    NAIL   Web space   Derm: Bilateral lower legs edema there is erythematous with plaque scaly tissue lower extremity bilateral.  There are no new ulcers but tissue is sensitive with palpation. Assessment and Plan today we are changing the dressing changes were going to do a stocking that followed by a Coban and an Ace bandage change every other day clobetasol or Aquaphor is to be applied every other day by nursing visiting nurses will change dressing every other day. Debridement of all painful nails was performed with both manual and electrical debridement to prevent infection and/or ulceration. Patient tolerated the debridement well, without any complaints. Patient was asymptomatic after debridement    :M was seen today for cellulitis. Diagnoses and all orders for this visit:    Venous stasis dermatitis of both lower extremities    Localized edema    Cellulitis of lower extremity, unspecified laterality    Cellulitis of left leg    Chronic deep vein thrombosis (DVT) of other vein of both lower extremities (HCC)    Pain in both lower legs    Tinea unguium    Pain in left toe(s)    Pain in toe of right foot    Peripheral vascular disease, unspecified (HCC)    Difficulty walking        Return in about 4 weeks (around 1/19/2021).       Seen By:  Tor Wiley DPM Document was created using voice recognition software. Note was reviewed, however may contain grammatical errors.

## 2021-01-01 DIAGNOSIS — E78.5 HYPERLIPIDEMIA, UNSPECIFIED HYPERLIPIDEMIA TYPE: ICD-10-CM

## 2021-01-01 DIAGNOSIS — D64.9 ANEMIA, UNSPECIFIED TYPE: ICD-10-CM

## 2021-01-01 DIAGNOSIS — E03.9 HYPOTHYROIDISM, UNSPECIFIED TYPE: ICD-10-CM

## 2021-01-01 DIAGNOSIS — N18.30 STAGE 3 CHRONIC KIDNEY DISEASE, UNSPECIFIED WHETHER STAGE 3A OR 3B CKD (HCC): ICD-10-CM

## 2021-01-01 DIAGNOSIS — G20.A1 PARKINSON DISEASE: ICD-10-CM

## 2021-01-01 LAB
ALBUMIN SERPL-MCNC: 4 G/DL (ref 3.5–5.2)
ALP BLD-CCNC: 139 U/L (ref 40–129)
ALT SERPL-CCNC: 6 U/L (ref 0–40)
ANION GAP SERPL CALCULATED.3IONS-SCNC: 20 MMOL/L (ref 7–16)
AST SERPL-CCNC: 20 U/L (ref 0–39)
BASOPHILS ABSOLUTE: 0.1 E9/L (ref 0–0.2)
BASOPHILS RELATIVE PERCENT: 0.9 % (ref 0–2)
BILIRUB SERPL-MCNC: 0.5 MG/DL (ref 0–1.2)
BUN BLDV-MCNC: 23 MG/DL (ref 6–23)
CALCIUM SERPL-MCNC: 9.8 MG/DL (ref 8.6–10.2)
CHLORIDE BLD-SCNC: 102 MMOL/L (ref 98–107)
CHOLESTEROL, TOTAL: 176 MG/DL (ref 0–199)
CO2: 23 MMOL/L (ref 22–29)
CREAT SERPL-MCNC: 1.5 MG/DL (ref 0.7–1.2)
EOSINOPHILS ABSOLUTE: 0.45 E9/L (ref 0.05–0.5)
EOSINOPHILS RELATIVE PERCENT: 4.1 % (ref 0–6)
GFR AFRICAN AMERICAN: 53
GFR NON-AFRICAN AMERICAN: 44 ML/MIN/1.73
GLUCOSE BLD-MCNC: 81 MG/DL (ref 74–99)
HCT VFR BLD CALC: 36.5 % (ref 37–54)
HDLC SERPL-MCNC: 48 MG/DL
HEMOGLOBIN: 11.7 G/DL (ref 12.5–16.5)
IMMATURE GRANULOCYTES #: 0.04 E9/L
IMMATURE GRANULOCYTES %: 0.4 % (ref 0–5)
LDL CHOLESTEROL CALCULATED: 111 MG/DL (ref 0–99)
LYMPHOCYTES ABSOLUTE: 1.45 E9/L (ref 1.5–4)
LYMPHOCYTES RELATIVE PERCENT: 13.3 % (ref 20–42)
MCH RBC QN AUTO: 32.6 PG (ref 26–35)
MCHC RBC AUTO-ENTMCNC: 32.1 % (ref 32–34.5)
MCV RBC AUTO: 101.7 FL (ref 80–99.9)
MONOCYTES ABSOLUTE: 0.88 E9/L (ref 0.1–0.95)
MONOCYTES RELATIVE PERCENT: 8.1 % (ref 2–12)
NEUTROPHILS ABSOLUTE: 7.99 E9/L (ref 1.8–7.3)
NEUTROPHILS RELATIVE PERCENT: 73.2 % (ref 43–80)
PDW BLD-RTO: 13.5 FL (ref 11.5–15)
PLATELET # BLD: 297 E9/L (ref 130–450)
PMV BLD AUTO: 10.7 FL (ref 7–12)
POTASSIUM SERPL-SCNC: 4.1 MMOL/L (ref 3.5–5)
RBC # BLD: 3.59 E12/L (ref 3.8–5.8)
SODIUM BLD-SCNC: 145 MMOL/L (ref 132–146)
TOTAL PROTEIN: 7.9 G/DL (ref 6.4–8.3)
TRIGL SERPL-MCNC: 83 MG/DL (ref 0–149)
TSH SERPL DL<=0.05 MIU/L-ACNC: 12.06 UIU/ML (ref 0.27–4.2)
VLDLC SERPL CALC-MCNC: 17 MG/DL
WBC # BLD: 10.9 E9/L (ref 4.5–11.5)

## 2021-01-04 ENCOUNTER — TELEPHONE (OUTPATIENT)
Dept: PODIATRY | Age: 86
End: 2021-01-04

## 2021-01-04 NOTE — TELEPHONE ENCOUNTER
Kelsie Le from [place] called with an update on Ranjanatymatthew Potter  He is retaining a lot of fluid he gained weight his legs are draining again they went back to the unna boots until they dry up  He is seeing Dr. Pineda Dario they believe he is in CHF she is working with him on that  Lanie Rochesterpancho Best

## 2021-01-11 ENCOUNTER — OFFICE VISIT (OUTPATIENT)
Dept: PODIATRY | Age: 86
End: 2021-01-11
Payer: MEDICARE

## 2021-01-11 VITALS — DIASTOLIC BLOOD PRESSURE: 70 MMHG | TEMPERATURE: 97.4 F | SYSTOLIC BLOOD PRESSURE: 110 MMHG

## 2021-01-11 DIAGNOSIS — L03.116 CELLULITIS OF LEFT LEG: ICD-10-CM

## 2021-01-11 DIAGNOSIS — I87.2 VENOUS STASIS DERMATITIS OF BOTH LOWER EXTREMITIES: Primary | ICD-10-CM

## 2021-01-11 DIAGNOSIS — L03.119 CELLULITIS OF LOWER EXTREMITY, UNSPECIFIED LATERALITY: ICD-10-CM

## 2021-01-11 DIAGNOSIS — R60.0 LOCALIZED EDEMA: ICD-10-CM

## 2021-01-11 PROCEDURE — 99213 OFFICE O/P EST LOW 20 MIN: CPT | Performed by: PODIATRIST

## 2021-01-11 RX ORDER — BUMETANIDE 1 MG/1
1 TABLET ORAL DAILY
COMMUNITY

## 2021-01-11 NOTE — PROGRESS NOTES
20  M Brenden Lat : 1930 Sex: male  Age: 80 y.o. Patient was referred by Marsha Tilley MD    Chief Complaint   Patient presents with    Cellulitis     b/l legs [address]  home care is coming out twice a week he is gaining weight with water he is in CHF his PCP changed his water pill        SUBJECTIVE patient is seen for follow-up of cellulitis and swelling bilateral lower extremity. Patient denies fever chills or night sweats patient and wife states that he is getting the swelling back in the legs they have tried several different creams clobetasol was too expensive 1 creams makes the legs moist the other dries it out too much. HPI  Review of Systems  Const: Denies constitutional symptoms  Musculo: Denies symptoms other than stated above  Skin: Denies symptoms other than stated above       Current Outpatient Medications:     bumetanide (BUMEX) 1 MG tablet, Take 1 mg by mouth daily, Disp: , Rfl:     clobetasol (TEMOVATE) 0.05 % ointment, Apply topically 2 times daily. , Disp: 60 g, Rfl: 1    carbidopa-levodopa (SINEMET)  MG per tablet, Take 2 tablets by mouth 4 times daily, Disp: , Rfl:     silver sulfADIAZINE (SILVADENE) 1 % cream, Apply topically daily. , Disp: 400 g, Rfl: 1    potassium chloride (KLOR-CON M) 20 MEQ extended release tablet, Take 1 tablet by mouth daily, Disp: 90 tablet, Rfl: 1    levothyroxine (SYNTHROID) 25 MCG tablet, Take 3 tabs PO Monday - Friday and 2 tabs PO Saturday - , Disp: 216 tablet, Rfl: 1    traZODone (DESYREL) 50 MG tablet, Take 1 tablet by mouth nightly, Disp: 30 tablet, Rfl: 5    doxazosin (CARDURA) 4 MG tablet, Take 1 tablet by mouth nightly, Disp: 90 tablet, Rfl: 1    lisinopril-hydroCHLOROthiazide (PRINZIDE;ZESTORETIC) 10-12.5 MG per tablet, Take 1 tablet by mouth daily, Disp: 90 tablet, Rfl: 3    lisinopril (PRINIVIL;ZESTRIL) 10 MG tablet, Take 1 tablet by mouth daily, Disp: 90 tablet, Rfl: 3    triamcinolone (KENALOG) 0.1 % cream, Apply topically 2 times daily Apply topically 2 times daily. , Disp: 45 g, Rfl: 3    aspirin 81 MG tablet, Take 81 mg by mouth daily, Disp: , Rfl:     Misc.  Devices MISC, Dispense #2 compression stalkings Dx: leg edema, Disp: 1 Device, Rfl: 0    Red Yeast Rice 600 MG TABS, Take by mouth 2 times daily , Disp: , Rfl:   Allergies   Allergen Reactions    Sulfa Antibiotics      Hyperactivity        Past Medical History:   Diagnosis Date    High cholesterol     Hypertension     Hypothyroidism     Parkinson disease ([facility] [address]     Diagnosed in  by Dr. Torie Sarkar due to resting tremor    RBBB      Past Surgical History:   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 6     HERNIA REPAIR Left     groin repair     SHOULDER SURGERY Right     5-10 years ago    TONSILLECTOMY AND ADENOIDECTOMY       Family History   Problem Relation Age of Onset    Heart Disease Mother     Cancer Father         lung    Breast Cancer Sister      Social History     Socioeconomic History    Marital status:      Spouse name: Not on file    Number of children: Not on file    Years of education: Not on file    Highest education level: Not on file   Occupational History    Not on file   Social Needs    Financial resource strain: Not on file    Food insecurity     Worry: Not on file     Inability: Not on file    Transportation needs     Medical: Not on file     Non-medical: Not on file   Tobacco Use    Smoking status: Former Smoker     Years: 10.00     Quit date: 1970     Years since quittin.0    Smokeless tobacco: Never Used   Substance and Sexual Activity    Alcohol use: No     Comment: Social    Drug use: No    Sexual activity: Never     Partners: Female   Lifestyle    Physical activity     Days per week: Not on file     Minutes per session: Not on file    Stress: Not on file   Relationships    Social connections     Talks on phone: Not on file     Gets together: Not on file     Attends Sikh service: Not on file     Active member of club or organization: Not on file     Attends meetings of clubs or organizations: Not on file     Relationship status: Not on file    Intimate partner violence     Fear of current or ex partner: Not on file     Emotionally abused: Not on file     Physically abused: Not on file     Forced sexual activity: Not on file   Other Topics Concern    Not on file   Social History Narrative    Not on file       Vitals:    01/11/21 1210   BP: 110/70   Temp: 97.4 °F (36.3 °C)   TempSrc: Temporal   Weight: Comment: refused       Focused Lower Extremity Physical Exam:  Vitals:    01/11/21 1210   BP: 110/70   Temp: 97.4 °F (36.3 °C)        Foot Exam    General  General Appearance: appears stated age and healthy   Orientation: alert and oriented to person, place, and time       Right Foot/Ankle     Inspection and Palpation  Ecchymosis: none  Swelling: dorsum   Skin Exam: dry skin, skin changes and abnormal color; Neurovascular  Dorsalis pedis: absent      Left Foot/Ankle      Inspection and Palpation  Swelling: dorsum   Skin Exam: dry skin, skin changes, abnormal color and erythema;     Neurovascular  Dorsalis pedis: absent  Posterior tibial: absent             Ortho Exam    Vascular: pulses  dp  pt nonpalpable pulses DP and PT bilateral  Capillary Refill Time:   Hair growth  Skin:    Edema:    Neurologic:      Musculoskeletal/ Orthopedic examination:    NAIL   Web space   Derm: Bilateral lower legs edema there is erythematous with plaque scaly tissue lower extremity bilateral.  There are no new ulcers but tissue is sensitive with palpation. Assessment and Plan today we are changing the dressing changes were going to do a stocking that followed by a Coban and an Ace bandage change every other day clobetasol or Aquaphor is to be applied every other day by nursing visiting nurses will change dressing every other day.  Patient did see his family doctor she is put her on a stronger Lasix as well as prednisone for rash.           :M was seen today for cellulitis. Diagnoses and all orders for this visit:    Venous stasis dermatitis of both lower extremities    Localized edema    Cellulitis of lower extremity, unspecified laterality    Cellulitis of left leg    Other orders  -     Gradual Compression Stockings (SAMPSON)        Return in about 1 month (around 2/11/2021). Seen By:  Trini De Jesus DPM      Document was created using voice recognition software. Note was reviewed, however may contain grammatical errors.

## 2021-01-14 LAB
BUN BLDV-MCNC: 36 MG/DL (ref 7–24)
CALCIUM SERPL-MCNC: 8.8 MG/DL (ref 8.5–10.5)
CHLORIDE BLD-SCNC: 110 MMOL/L (ref 98–107)
CO2: 29 MMOL/L (ref 21–32)
CREAT SERPL-MCNC: 1.65 MG/DL (ref 0.7–1.3)
GFR AFRICAN AMERICAN: 47 ML/MIN
GFR SERPL CREATININE-BSD FRML MDRD: 39 ML/MIN/
GLUCOSE: 123 MG/DL (ref 65–99)
POTASSIUM SERPL-SCNC: 4.2 MMOL/L (ref 3.5–5.1)
SODIUM BLD-SCNC: 144 MMOL/L (ref 136–145)

## 2021-01-25 LAB
ANION GAP SERPL CALCULATED.3IONS-SCNC: 11 MEQ/L (ref 3–11)
BUN BLDV-MCNC: 35 MG/DL (ref 6–20)
CALCIUM SERPL-MCNC: 8.9 MG/DL (ref 8.5–10.5)
CHLORIDE BLD-SCNC: 101 MEQ/L (ref 98–107)
CO2: 26 MEQ/L (ref 21–31)
CREAT SERPL-MCNC: 1.4 MG/DL (ref 0.6–1.3)
CREATININE + EGFR PANEL: 58 ML/MIN
GFR NON-AFRICAN AMERICAN: 48 ML/MIN
GLUCOSE BLD-MCNC: 85 MG/DL (ref 70–99)
POTASSIUM SERPL-SCNC: 4.1 MEQ/L (ref 3.6–5)
SODIUM BLD-SCNC: 138 MEQ/L (ref 135–145)

## 2021-01-27 ENCOUNTER — APPOINTMENT (OUTPATIENT)
Dept: CT IMAGING | Age: 86
DRG: 521 | End: 2021-01-27
Payer: MEDICARE

## 2021-01-27 ENCOUNTER — APPOINTMENT (OUTPATIENT)
Dept: GENERAL RADIOLOGY | Age: 86
DRG: 521 | End: 2021-01-27
Payer: MEDICARE

## 2021-01-27 ENCOUNTER — HOSPITAL ENCOUNTER (INPATIENT)
Age: 86
LOS: 5 days | Discharge: NURSING, REHAB OR LONG-TERM CARE | DRG: 521 | End: 2021-02-01
Attending: EMERGENCY MEDICINE | Admitting: INTERNAL MEDICINE
Payer: MEDICARE
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
## 2021-01-27 DIAGNOSIS — S72.002A CLOSED FRACTURE OF NECK OF LEFT FEMUR, INITIAL ENCOUNTER (HCC): Primary | ICD-10-CM

## 2021-01-27 DIAGNOSIS — J96.01 ACUTE RESPIRATORY FAILURE WITH HYPOXIA (HCC): ICD-10-CM

## 2021-01-27 DIAGNOSIS — J18.9 PNEUMONIA DUE TO ORGANISM: ICD-10-CM

## 2021-01-27 PROBLEM — R60.0 LOCALIZED EDEMA: Status: RESOLVED | Noted: 2019-10-21 | Resolved: 2021-01-27

## 2021-01-27 PROBLEM — R09.02 HYPOXIA: Status: ACTIVE | Noted: 2021-01-27

## 2021-01-27 PROBLEM — K57.93 DIVERTICULITIS OF INTESTINE WITH HEMORRHAGE: Status: RESOLVED | Noted: 2017-09-19 | Resolved: 2021-01-27

## 2021-01-27 PROBLEM — D62 ACUTE BLOOD LOSS ANEMIA: Status: RESOLVED | Noted: 2017-09-19 | Resolved: 2021-01-27

## 2021-01-27 LAB
ALBUMIN SERPL-MCNC: 3.9 G/DL (ref 3.5–5.2)
ALP BLD-CCNC: 122 U/L (ref 40–129)
ALT SERPL-CCNC: <5 U/L (ref 0–40)
ANION GAP SERPL CALCULATED.3IONS-SCNC: 9 MMOL/L (ref 7–16)
AST SERPL-CCNC: 21 U/L (ref 0–39)
BASOPHILS ABSOLUTE: 0.04 E9/L (ref 0–0.2)
BASOPHILS RELATIVE PERCENT: 0.3 % (ref 0–2)
BILIRUB SERPL-MCNC: 0.8 MG/DL (ref 0–1.2)
BUN BLDV-MCNC: 33 MG/DL (ref 8–23)
CALCIUM SERPL-MCNC: 8.2 MG/DL (ref 8.6–10.2)
CHLORIDE BLD-SCNC: 108 MMOL/L (ref 98–107)
CO2: 25 MMOL/L (ref 22–29)
CREAT SERPL-MCNC: 1.5 MG/DL (ref 0.7–1.2)
EKG ATRIAL RATE: 79 BPM
EKG P AXIS: 74 DEGREES
EKG P-R INTERVAL: 238 MS
EKG Q-T INTERVAL: 432 MS
EKG QRS DURATION: 144 MS
EKG QTC CALCULATION (BAZETT): 495 MS
EKG R AXIS: -83 DEGREES
EKG T AXIS: 35 DEGREES
EKG VENTRICULAR RATE: 79 BPM
EOSINOPHILS ABSOLUTE: 0.44 E9/L (ref 0.05–0.5)
EOSINOPHILS RELATIVE PERCENT: 3.8 % (ref 0–6)
GFR AFRICAN AMERICAN: 53
GFR NON-AFRICAN AMERICAN: 44 ML/MIN/1.73
GLUCOSE BLD-MCNC: 101 MG/DL (ref 74–99)
HCT VFR BLD CALC: 35.3 % (ref 37–54)
HEMOGLOBIN: 11.6 G/DL (ref 12.5–16.5)
IMMATURE GRANULOCYTES #: 0.14 E9/L
IMMATURE GRANULOCYTES %: 1.2 % (ref 0–5)
LV EF: 60 %
LVEF MODALITY: NORMAL
LYMPHOCYTES ABSOLUTE: 0.94 E9/L (ref 1.5–4)
LYMPHOCYTES RELATIVE PERCENT: 8.2 % (ref 20–42)
MCH RBC QN AUTO: 34.4 PG (ref 26–35)
MCHC RBC AUTO-ENTMCNC: 32.9 % (ref 32–34.5)
MCV RBC AUTO: 104.7 FL (ref 80–99.9)
MONOCYTES ABSOLUTE: 0.61 E9/L (ref 0.1–0.95)
MONOCYTES RELATIVE PERCENT: 5.3 % (ref 2–12)
NEUTROPHILS ABSOLUTE: 9.27 E9/L (ref 1.8–7.3)
NEUTROPHILS RELATIVE PERCENT: 81.2 % (ref 43–80)
PDW BLD-RTO: 15.3 FL (ref 11.5–15)
PLATELET # BLD: 140 E9/L (ref 130–450)
PMV BLD AUTO: 11.7 FL (ref 7–12)
POTASSIUM REFLEX MAGNESIUM: 3.8 MMOL/L (ref 3.5–5)
PRO-BNP: 1344 PG/ML (ref 0–450)
PROCALCITONIN: 0.04 NG/ML (ref 0–0.08)
RBC # BLD: 3.37 E12/L (ref 3.8–5.8)
SARS-COV-2, NAAT: NOT DETECTED
SODIUM BLD-SCNC: 142 MMOL/L (ref 132–146)
TOTAL PROTEIN: 6.9 G/DL (ref 6.4–8.3)
TROPONIN: 0.02 NG/ML (ref 0–0.03)
WBC # BLD: 11.4 E9/L (ref 4.5–11.5)

## 2021-01-27 PROCEDURE — 71045 X-RAY EXAM CHEST 1 VIEW: CPT

## 2021-01-27 PROCEDURE — 96374 THER/PROPH/DIAG INJ IV PUSH: CPT

## 2021-01-27 PROCEDURE — 93005 ELECTROCARDIOGRAM TRACING: CPT | Performed by: STUDENT IN AN ORGANIZED HEALTH CARE EDUCATION/TRAINING PROGRAM

## 2021-01-27 PROCEDURE — 6360000002 HC RX W HCPCS: Performed by: INTERNAL MEDICINE

## 2021-01-27 PROCEDURE — 94664 DEMO&/EVAL PT USE INHALER: CPT

## 2021-01-27 PROCEDURE — 73502 X-RAY EXAM HIP UNI 2-3 VIEWS: CPT

## 2021-01-27 PROCEDURE — 6370000000 HC RX 637 (ALT 250 FOR IP): Performed by: INTERNAL MEDICINE

## 2021-01-27 PROCEDURE — 2500000003 HC RX 250 WO HCPCS: Performed by: INTERNAL MEDICINE

## 2021-01-27 PROCEDURE — 99284 EMERGENCY DEPT VISIT MOD MDM: CPT

## 2021-01-27 PROCEDURE — 71275 CT ANGIOGRAPHY CHEST: CPT

## 2021-01-27 PROCEDURE — 6360000004 HC RX CONTRAST MEDICATION: Performed by: RADIOLOGY

## 2021-01-27 PROCEDURE — 6360000002 HC RX W HCPCS: Performed by: STUDENT IN AN ORGANIZED HEALTH CARE EDUCATION/TRAINING PROGRAM

## 2021-01-27 PROCEDURE — 83880 ASSAY OF NATRIURETIC PEPTIDE: CPT

## 2021-01-27 PROCEDURE — 93306 TTE W/DOPPLER COMPLETE: CPT

## 2021-01-27 PROCEDURE — 2500000003 HC RX 250 WO HCPCS: Performed by: STUDENT IN AN ORGANIZED HEALTH CARE EDUCATION/TRAINING PROGRAM

## 2021-01-27 PROCEDURE — 93010 ELECTROCARDIOGRAM REPORT: CPT | Performed by: INTERNAL MEDICINE

## 2021-01-27 PROCEDURE — 87449 NOS EACH ORGANISM AG IA: CPT

## 2021-01-27 PROCEDURE — 1200000000 HC SEMI PRIVATE

## 2021-01-27 PROCEDURE — 6360000002 HC RX W HCPCS: Performed by: EMERGENCY MEDICINE

## 2021-01-27 PROCEDURE — 2580000003 HC RX 258: Performed by: INTERNAL MEDICINE

## 2021-01-27 PROCEDURE — 2580000003 HC RX 258: Performed by: STUDENT IN AN ORGANIZED HEALTH CARE EDUCATION/TRAINING PROGRAM

## 2021-01-27 PROCEDURE — 94640 AIRWAY INHALATION TREATMENT: CPT

## 2021-01-27 PROCEDURE — 80053 COMPREHEN METABOLIC PANEL: CPT

## 2021-01-27 PROCEDURE — 84484 ASSAY OF TROPONIN QUANT: CPT

## 2021-01-27 PROCEDURE — U0002 COVID-19 LAB TEST NON-CDC: HCPCS

## 2021-01-27 PROCEDURE — 2580000003 HC RX 258: Performed by: EMERGENCY MEDICINE

## 2021-01-27 PROCEDURE — 85025 COMPLETE CBC W/AUTO DIFF WBC: CPT

## 2021-01-27 PROCEDURE — 84145 PROCALCITONIN (PCT): CPT

## 2021-01-27 RX ORDER — POTASSIUM CHLORIDE 7.45 MG/ML
10 INJECTION INTRAVENOUS PRN
Status: DISCONTINUED | OUTPATIENT
Start: 2021-01-27 | End: 2021-02-01 | Stop reason: HOSPADM

## 2021-01-27 RX ORDER — CLOBETASOL PROPIONATE 0.5 MG/G
OINTMENT TOPICAL 2 TIMES DAILY
Status: DISCONTINUED | OUTPATIENT
Start: 2021-01-27 | End: 2021-02-01 | Stop reason: HOSPADM

## 2021-01-27 RX ORDER — DOXAZOSIN MESYLATE 4 MG/1
4 TABLET ORAL NIGHTLY
Status: DISCONTINUED | OUTPATIENT
Start: 2021-01-27 | End: 2021-02-01 | Stop reason: HOSPADM

## 2021-01-27 RX ORDER — TRAZODONE HYDROCHLORIDE 50 MG/1
50 TABLET ORAL NIGHTLY
Status: DISCONTINUED | OUTPATIENT
Start: 2021-01-27 | End: 2021-02-01 | Stop reason: HOSPADM

## 2021-01-27 RX ORDER — MONTELUKAST SODIUM 10 MG/1
10 TABLET ORAL NIGHTLY
Status: DISCONTINUED | OUTPATIENT
Start: 2021-01-27 | End: 2021-02-01 | Stop reason: HOSPADM

## 2021-01-27 RX ORDER — POTASSIUM CHLORIDE 20 MEQ/1
40 TABLET, EXTENDED RELEASE ORAL PRN
Status: DISCONTINUED | OUTPATIENT
Start: 2021-01-27 | End: 2021-02-01 | Stop reason: HOSPADM

## 2021-01-27 RX ORDER — MORPHINE SULFATE 2 MG/ML
2 INJECTION, SOLUTION INTRAMUSCULAR; INTRAVENOUS EVERY 4 HOURS PRN
Status: DISCONTINUED | OUTPATIENT
Start: 2021-01-27 | End: 2021-01-29

## 2021-01-27 RX ORDER — SODIUM CHLORIDE 0.9 % (FLUSH) 0.9 %
10 SYRINGE (ML) INJECTION PRN
Status: DISCONTINUED | OUTPATIENT
Start: 2021-01-27 | End: 2021-01-29

## 2021-01-27 RX ORDER — MORPHINE SULFATE 4 MG/ML
4 INJECTION, SOLUTION INTRAMUSCULAR; INTRAVENOUS ONCE
Status: COMPLETED | OUTPATIENT
Start: 2021-01-27 | End: 2021-01-27

## 2021-01-27 RX ORDER — ACETAMINOPHEN 650 MG/1
650 SUPPOSITORY RECTAL EVERY 6 HOURS PRN
Status: DISCONTINUED | OUTPATIENT
Start: 2021-01-27 | End: 2021-01-29

## 2021-01-27 RX ORDER — ONDANSETRON 2 MG/ML
4 INJECTION INTRAMUSCULAR; INTRAVENOUS EVERY 6 HOURS PRN
Status: DISCONTINUED | OUTPATIENT
Start: 2021-01-27 | End: 2021-01-27

## 2021-01-27 RX ORDER — METHYLPREDNISOLONE SODIUM SUCCINATE 40 MG/ML
40 INJECTION, POWDER, LYOPHILIZED, FOR SOLUTION INTRAMUSCULAR; INTRAVENOUS EVERY 8 HOURS
Status: DISCONTINUED | OUTPATIENT
Start: 2021-01-27 | End: 2021-01-28

## 2021-01-27 RX ORDER — SODIUM CHLORIDE 0.9 % (FLUSH) 0.9 %
10 SYRINGE (ML) INJECTION EVERY 12 HOURS SCHEDULED
Status: DISCONTINUED | OUTPATIENT
Start: 2021-01-27 | End: 2021-01-29

## 2021-01-27 RX ORDER — BUMETANIDE 1 MG/1
1 TABLET ORAL DAILY
Status: DISCONTINUED | OUTPATIENT
Start: 2021-01-27 | End: 2021-02-01 | Stop reason: HOSPADM

## 2021-01-27 RX ORDER — 0.9 % SODIUM CHLORIDE 0.9 %
500 INTRAVENOUS SOLUTION INTRAVENOUS ONCE
Status: COMPLETED | OUTPATIENT
Start: 2021-01-27 | End: 2021-01-27

## 2021-01-27 RX ORDER — SENNA PLUS 8.6 MG/1
1 TABLET ORAL DAILY PRN
Status: DISCONTINUED | OUTPATIENT
Start: 2021-01-27 | End: 2021-01-29

## 2021-01-27 RX ORDER — PROMETHAZINE HYDROCHLORIDE 25 MG/1
12.5 TABLET ORAL EVERY 6 HOURS PRN
Status: DISCONTINUED | OUTPATIENT
Start: 2021-01-27 | End: 2021-01-27

## 2021-01-27 RX ORDER — ACETAMINOPHEN 325 MG/1
650 TABLET ORAL EVERY 6 HOURS PRN
Status: DISCONTINUED | OUTPATIENT
Start: 2021-01-27 | End: 2021-01-29

## 2021-01-27 RX ORDER — IPRATROPIUM BROMIDE AND ALBUTEROL SULFATE 2.5; .5 MG/3ML; MG/3ML
1 SOLUTION RESPIRATORY (INHALATION)
Status: DISCONTINUED | OUTPATIENT
Start: 2021-01-27 | End: 2021-02-01 | Stop reason: HOSPADM

## 2021-01-27 RX ORDER — FLUTICASONE PROPIONATE 50 MCG
2 SPRAY, SUSPENSION (ML) NASAL DAILY
Status: DISCONTINUED | OUTPATIENT
Start: 2021-01-27 | End: 2021-02-01 | Stop reason: HOSPADM

## 2021-01-27 RX ORDER — FENTANYL CITRATE 50 UG/ML
25 INJECTION, SOLUTION INTRAMUSCULAR; INTRAVENOUS ONCE
Status: COMPLETED | OUTPATIENT
Start: 2021-01-27 | End: 2021-01-27

## 2021-01-27 RX ADMIN — IOPAMIDOL 75 ML: 755 INJECTION, SOLUTION INTRAVENOUS at 05:33

## 2021-01-27 RX ADMIN — CLOBETASOL PROPIONATE: 0.5 OINTMENT TOPICAL at 08:55

## 2021-01-27 RX ADMIN — DOXYCYCLINE 100 MG: 100 INJECTION, POWDER, LYOPHILIZED, FOR SOLUTION INTRAVENOUS at 18:41

## 2021-01-27 RX ADMIN — MORPHINE SULFATE 2 MG: 2 INJECTION, SOLUTION INTRAMUSCULAR; INTRAVENOUS at 22:36

## 2021-01-27 RX ADMIN — CLOBETASOL PROPIONATE: 0.5 OINTMENT TOPICAL at 22:38

## 2021-01-27 RX ADMIN — SODIUM CHLORIDE, PRESERVATIVE FREE 10 ML: 5 INJECTION INTRAVENOUS at 22:36

## 2021-01-27 RX ADMIN — SODIUM CHLORIDE 500 ML: 9 INJECTION, SOLUTION INTRAVENOUS at 06:07

## 2021-01-27 RX ADMIN — METHYLPREDNISOLONE SODIUM SUCCINATE 40 MG: 40 INJECTION, POWDER, LYOPHILIZED, FOR SOLUTION INTRAMUSCULAR; INTRAVENOUS at 14:10

## 2021-01-27 RX ADMIN — CARBIDOPA AND LEVODOPA 2 TABLET: 25; 100 TABLET ORAL at 08:55

## 2021-01-27 RX ADMIN — CEFTRIAXONE 1000 MG: 1 INJECTION, POWDER, FOR SOLUTION INTRAMUSCULAR; INTRAVENOUS at 06:08

## 2021-01-27 RX ADMIN — IPRATROPIUM BROMIDE AND ALBUTEROL SULFATE 1 AMPULE: .5; 3 SOLUTION RESPIRATORY (INHALATION) at 19:40

## 2021-01-27 RX ADMIN — CARBIDOPA AND LEVODOPA 2 TABLET: 25; 100 TABLET ORAL at 18:37

## 2021-01-27 RX ADMIN — BUMETANIDE 1 MG: 1 TABLET ORAL at 08:55

## 2021-01-27 RX ADMIN — TRAZODONE HYDROCHLORIDE 50 MG: 50 TABLET ORAL at 22:35

## 2021-01-27 RX ADMIN — FLUTICASONE PROPIONATE 2 SPRAY: 50 SPRAY, METERED NASAL at 18:36

## 2021-01-27 RX ADMIN — MORPHINE SULFATE 2 MG: 2 INJECTION, SOLUTION INTRAMUSCULAR; INTRAVENOUS at 16:42

## 2021-01-27 RX ADMIN — CARBIDOPA AND LEVODOPA 2 TABLET: 25; 100 TABLET ORAL at 22:35

## 2021-01-27 RX ADMIN — IPRATROPIUM BROMIDE AND ALBUTEROL SULFATE 1 AMPULE: .5; 3 SOLUTION RESPIRATORY (INHALATION) at 14:30

## 2021-01-27 RX ADMIN — DOXYCYCLINE 100 MG: 100 INJECTION, POWDER, LYOPHILIZED, FOR SOLUTION INTRAVENOUS at 06:08

## 2021-01-27 RX ADMIN — DOXAZOSIN 4 MG: 4 TABLET ORAL at 22:35

## 2021-01-27 RX ADMIN — SODIUM CHLORIDE, PRESERVATIVE FREE 10 ML: 5 INJECTION INTRAVENOUS at 08:59

## 2021-01-27 RX ADMIN — FENTANYL CITRATE 25 MCG: 50 INJECTION, SOLUTION INTRAMUSCULAR; INTRAVENOUS at 04:08

## 2021-01-27 RX ADMIN — ACETAMINOPHEN 650 MG: 325 TABLET ORAL at 14:16

## 2021-01-27 RX ADMIN — ENOXAPARIN SODIUM 40 MG: 40 INJECTION SUBCUTANEOUS at 08:55

## 2021-01-27 RX ADMIN — METHYLPREDNISOLONE SODIUM SUCCINATE 40 MG: 40 INJECTION, POWDER, LYOPHILIZED, FOR SOLUTION INTRAMUSCULAR; INTRAVENOUS at 22:36

## 2021-01-27 RX ADMIN — CARBIDOPA AND LEVODOPA 2 TABLET: 25; 100 TABLET ORAL at 14:10

## 2021-01-27 RX ADMIN — MORPHINE SULFATE 4 MG: 4 INJECTION, SOLUTION INTRAMUSCULAR; INTRAVENOUS at 06:07

## 2021-01-27 RX ADMIN — MONTELUKAST SODIUM 10 MG: 10 TABLET, FILM COATED ORAL at 22:36

## 2021-01-27 ASSESSMENT — PAIN SCALES - GENERAL
PAINLEVEL_OUTOF10: 8
PAINLEVEL_OUTOF10: 8
PAINLEVEL_OUTOF10: 5
PAINLEVEL_OUTOF10: 7
PAINLEVEL_OUTOF10: 3
PAINLEVEL_OUTOF10: 8

## 2021-01-27 ASSESSMENT — ENCOUNTER SYMPTOMS
VOMITING: 0
DIARRHEA: 0
ABDOMINAL PAIN: 0
WHEEZING: 1
PHOTOPHOBIA: 0
SHORTNESS OF BREATH: 1
COUGH: 1
SHORTNESS OF BREATH: 0
NAUSEA: 0
COUGH: 0
EYES NEGATIVE: 1
BACK PAIN: 0
CHEST TIGHTNESS: 0
COLOR CHANGE: 0
ABDOMINAL DISTENTION: 0

## 2021-01-27 ASSESSMENT — PAIN DESCRIPTION - PROGRESSION: CLINICAL_PROGRESSION: GRADUALLY WORSENING

## 2021-01-27 ASSESSMENT — PAIN DESCRIPTION - PAIN TYPE: TYPE: ACUTE PAIN

## 2021-01-27 ASSESSMENT — PAIN DESCRIPTION - ONSET: ONSET: ON-GOING

## 2021-01-27 ASSESSMENT — PAIN DESCRIPTION - DESCRIPTORS: DESCRIPTORS: ACHING;DISCOMFORT;DULL

## 2021-01-27 NOTE — ED NOTES
Bed: 20  Expected date:   Expected time:   Means of arrival:   Comments:  ben Rashid RN  01/27/21 0341

## 2021-01-27 NOTE — H&P
Internal Medicine History & Physical     Name: Paloma Quintana  : 1930  Chief Complaint: Hip Pain (left hip pain after falling onto left side tonight) and Shortness of Breath (onset after fall - CP)  Primary Care Physician: Carlos Euceda MD  Admission date: 2021  Date of service: 2021     History of Present Illness  M is a 80y.o. year old male. Past medical history of Parkinson's, hypertension, hyperlipidemia. Presents the ED due to complaint of a fall this morning around 2 AM.  Patient is seated up to go to the bathroom and felt uneasy on his feet, he usually uses a walker to walk around the house and did not use a walker tonight. Due to this patient fell onto his left side and had to call EMS to have brought into the emergency department. Patient otherwise also complained of recent shortness of breath for approximately the past 2 to 3 days as well. Here in the department imaging did show that patient have a left femoral neck fracture along with possible pneumonia on CT scan. Patient was started on Rocephin and doxycycline here in the emergency department for the pneumonia. Patient's pain is been well controlled here in the department. Otherwise patient has no other complaints at this time. The patient presents with left hip pain after a fall that has been going on for 1 day. These symptoms are moderate in severity. Symptoms are made better by nothing. Symptoms are made worse by movement. Associated symptoms include shortness of breath. There is his wife and daughter at bedside. The history is provided by the patient. He is felt to be a good historian. ED course:   Initial blood work and imaging studies performed. Admission recommended by ED physician. Case discussed with ED provider.  Meds in ED consisted of the following:  Medications   0.9 % sodium chloride bolus (has no administration in time range)   morphine sulfate (PF) injection 4 mg (has no administration in time range) W CONTRAST   Final Result   No evidence of pulmonary embolism. Dependent airspace disease in right upper and left lower lobes likely   represents atelectasis. Coronary artery calcification. XR HIP 2-3 VW W PELVIS LEFT   Final Result   Displaced left femoral neck fracture. XR CHEST PORTABLE   Final Result   There may be some subtle infiltrate in right upper lobe and left lower lobe. Assessment  Active Hospital Problems    Diagnosis    Left displaced femoral neck fracture ([facility] [address] [S72.002A]     Priority: High    Hypoxia [R09.02]     Priority: Medium    CAP (community acquired pneumonia) [J18.9]     Priority: Medium    Venous stasis dermatitis of both lower extremities [I87.2]    Stage 3 chronic kidney disease [N18.30]    Parkinson disease ([facility] [address] [G20]    Hypothyroidism [E03.9]    Hypertension [I10]       Patient Active Problem List    Diagnosis Date Noted    Left displaced femoral neck fracture ([facility] [address] 01/27/2021     Priority: High    Hypoxia 01/27/2021     Priority: Medium    CAP (community acquired pneumonia) 01/27/2021     Priority: Medium    Stage 3 chronic kidney disease 10/21/2019    Venous stasis dermatitis of both lower extremities 10/21/2019    Hypertension     [facility]     Hypothyroidism        Plan  Left femoral neck fracture: Ortho consultation. DVT prophylaxis per ortho. IV Morphine for pain- post-op pain control per ortho. PT/OT eventually. Hold ASA/ACEi. Cardio consult for vilma-op risk assessment. ? CAP vs CHF vs asthma w/ hypoxia: CXR and CTA chest noted. ATB's for now--pro-arline low. Pneumococcal/legionella urine ag's. COVID-19 neg in ED. Pulmonology consult for vilma-op risk assessment. O2 as needed. Bronchodilators. IV steroids. Check echo-- cardiac asthma? · Continue home medications  · Follow labs  · DVT prophylaxis. · Please see orders for further management and care.   ·  for discharge planning  · Discharge plan: TBD pending clinical improvement     He had the COVID-19 vaccine last week     The patient was seen and evaluated by myself and Arsenio King MD PGY-1  1/27/2021 7:35 AM    NOTE:  This report was transcribed using voice recognition software. Every effort was made to ensure accuracy; however, inadvertent computerized transcription errors may be present. Addendum: I have personally participated in a face-to-face history and physical exam on the date of service with the patient. I have discussed the case with the resident. I also participated in medical decision making with the resident on the date of service and I agree with all of the pertinent clinical information unless indicated in my editing of the note. I have reviewed and edited the note above based on my findings during my history, exam, and decision making on the same day of service. My additional thoughts:    Wife and daughter present   O2 sat 72% on RA when I went into room-- 6L O2 placed   Pulm and cardio consulted   He is not stable enough for surgery currently-- wheezing noted   Ortho consult pending   Check echo    Electronically signed by Sri Holley DO on 1/27/2021 at 3:02 PM    I can be reached through [employer].

## 2021-01-27 NOTE — CONSULTS
Shahana Valle M.D.,[facility]  Kourtney Hadley D.O., F.A.C.O.I., Sherif Harris M.D. Ashvin Calhoun M.D., Paolo Teresa M.D. Tarun Newby D.O. Patient:  Megan Feldman 80 y.o. male MRN: [id number]     Date of Service: 1/27/2021      PULMONARY CONSULTATION    Reason for Consultation: Preop clearance  Referring Physician: Rochelle Callejas MD    Chief Complaint   Patient presents with    Hip Pain     left hip pain after falling onto left side tonight    Shortness of Breath     onset after fall - CP         Code Status: Full Code        SUBJECTIVE:    HPI:  This is a 80-year-old male with history of Parkinson's, HTN, HLD that presents to emergency department after having a mechanical fall and falling onto his left hip. During transfer with EMS patient was hypoxic and placed on 3 L nasal cannula. Work-up in the ED showed that he has a displaced left femoral neck fracture. Pulmonary consulted for preop clearance and hypoxia. Patient had a CT angiogram of the chest did not show any PE, there was airspace/atelectasis of right upper lobe, and left lower lobe. He has been on 4 L with saturation of 98%. Spouse is present to help give further history. Reports having occasional cough for over a week. Cough is dry nonproductive. Does report having occasional wheeze from time to time, but not overly bothersome to him.     Past Medical History:   Diagnosis Date    High cholesterol     Hypertension     Hypothyroidism     Parkinson disease ([facility] [address]     Diagnosed in 2008 by Dr. Cherylene Press due to resting tremor    RBBB      Past Surgical History:   Procedure Laterality Date    FOOT SURGERY  1935    FOOT SURGERY      club foot repair-age 6     HERNIA REPAIR Left     groin repair     SHOULDER SURGERY Right     5-10 years ago    TONSILLECTOMY AND ADENOIDECTOMY       Family History   Problem Relation Age of Onset    Heart Disease Mother     Cancer Father         lung    Breast Cancer Sister          Social History:   Social History     Socioeconomic History    Marital status:      Spouse name: Not on file    Number of children: Not on file    Years of education: Not on file    Highest education level: Not on file   Occupational History    Not on file   Social Needs    Financial resource strain: Not on file    Food insecurity     Worry: Not on file     Inability: Not on file    Transportation needs     Medical: Not on file     Non-medical: Not on file   Tobacco Use    Smoking status: Former Smoker     Years: 10.00     Quit date: 1970     Years since quittin.1    Smokeless tobacco: Never Used   Substance and Sexual Activity    Alcohol use: No     Comment: Social    Drug use: No    Sexual activity: Never     Partners: Female   Lifestyle    Physical activity     Days per week: Not on file     Minutes per session: Not on file    Stress: Not on file   Relationships    Social connections     Talks on phone: Not on file     Gets together: Not on file     Attends Advent service: Not on file     Active member of club or organization: Not on file     Attends meetings of clubs or organizations: Not on file     Relationship status: Not on file    Intimate partner violence     Fear of current or ex partner: Not on file     Emotionally abused: Not on file     Physically abused: Not on file     Forced sexual activity: Not on file   Other Topics Concern    Not on file   Social History Narrative    Not on file       Vaccines:    Immunization History   Administered Date(s) Administered    Influenza Virus Vaccine 10/19/2010, 2011, 10/29/2012, 10/17/2013, 2014, 10/09/2015, 2016    Influenza, MDCK Quadv, IM, PF (Flucelvax 4 yrs and older) 2017    Influenza, Quadv, IM, PF (6 mo and older Fluzone, Flulaval, Fluarix, and 3 yrs and older Afluria) 10/24/2018    Influenza, Triv, inactivated, subunit, adjuvanted, IM (Fluad 72 yrs and older) 2019    I/O:    Intake/Output Summary (Last 24 hours) at 1/27/2021 1646  Last data filed at 1/27/2021 0837  Gross per 24 hour   Intake 500 ml   Output    Net 500 ml     Vent Information  SpO2: 97 %                PHYSICAL EXAM:  Physical Exam  Constitutional:       General: He is not in acute distress. HENT:      Head: Normocephalic and atraumatic. Mouth/Throat:      Pharynx: No oropharyngeal exudate. Eyes:      General: No scleral icterus. Conjunctiva/sclera: Conjunctivae normal.   Neck:      Musculoskeletal: Neck supple. Trachea: No tracheal deviation. Cardiovascular:      Rate and Rhythm: Normal rate. Heart sounds: Normal heart sounds. Pulmonary:      Effort: Pulmonary effort is normal.      Breath sounds: Wheezing (few scattered exp wheeze) present. Abdominal:      Palpations: Abdomen is soft. Tenderness: There is no abdominal tenderness. Musculoskeletal:         General: No swelling or deformity. Lymphadenopathy:      Cervical: No cervical adenopathy. Skin:     General: Skin is warm. Findings: No rash. Neurological:      General: No focal deficit present. Mental Status: He is alert and oriented to person, place, and time. Psychiatric:         Behavior: Behavior normal.         Pulmonary Function Testing personally reviewed and interpreted. PERTINENT LAB RESULTS: Labs reviewed. Pro-Abdiel: 0.04    DIAGNOSTICS: Pertinent imaging reviewed. ASSESSMENT:     1. Acute hypoxic respiratory failure secondary to atelectasis, may have component of aspiration given Parkinson's  2. Mechanical fall with left hip femoral neck fracture  3. Wheezing - mild asthma exacerbation  4. Chronic rhinitis  5.   Parkinson's disease        PLAN:      Duo nebs every 4 while awake and as needed   Incentive spirometry   Aspiration precautions   Start Flonase, Singulair   Wean IV steroids as durga   Repeat chest x-ray    There are no contraindications to proceeding with surgery. Patient is intermediate risk. In regards to his risk of pulmonary complications, according to the ARISCAT Risk index the patient has a moderte (13.2%) chance for pulmonary complications of any severity. Their chances of respiratory failure as predicted by the Arozullah respiratory failure index the patient has a 4.2 chance of respiratory failure perioperatively. Risk can be minimized by DVT prophylaxis (if not contraindicated), early mobilization, and incentive spirometry. Thank you for this interesting consult. Thank you for allowing me to participate in the care of Augusta Sr. Please feel free to call with questions.        Electronically signed by Holly Vanegas DO on 1/27/2021 at 4:46 PM

## 2021-01-27 NOTE — ED NOTES
Joellen Canada (grandson)- [phone], Julita Cr daughter [phone], requesting updates     Laura Martinez RN  01/27/21 3962

## 2021-01-27 NOTE — ED NOTES
Assumed care of pt, introduced self in room. Pt states still in pain after tylenol. Dr Amanda Martins contacted through [employer] to request further medication, message left, awaiting return call.      Sam Gaming RN  01/27/21 8496

## 2021-01-27 NOTE — ED NOTES
gen diet dinner tray ordered at this time. Pharmacy contacted to deliver missing doses to ED.      Adeline Sterling RN  01/27/21 9543

## 2021-01-27 NOTE — ED PROVIDER NOTES
Paul Phillips is a 80-year-old male who presented emergency department following a mechanical fall that occurred while at home around 2 AM this morning. Patient was ambulated to the bathroom when he tripped and fell and landed on his left hip. Patient was not able to stand or ambulate following the fall. EMS was called. Patient denies any back pain. Patient did not hit his head. Patient not lose consciousness. Patient did not fall on his chest. Patient does not have any other injuries. Patient does have a history of edema and has been on diuretics. Patient states his edema is improving. Patient was hypoxic for EMS and placed on 3 L nasal cannula. Patient denies chest pain, shortness of breath, nausea, vomiting, fever, chills. Patient has not had any sick contacts. Patient lives at home with his wife. Patient does have a visiting nurse once a week. The history is provided by the patient, medical records and the EMS personnel. Fall  The accident occurred 1 to 2 hours ago. The fall occurred while walking. Distance fallen: from standing. He landed on a hard floor. There was no blood loss. The point of impact was the left hip. The pain is at a severity of 5/10. The pain is moderate. He was not ambulatory at the scene. There was no entrapment after the fall. There was no drug use involved in the accident. There was no alcohol use involved in the accident. Pertinent negatives include no fever, no abdominal pain, no nausea, no vomiting, no headaches and no loss of consciousness. The symptoms are aggravated by activity. He has tried nothing for the symptoms. The treatment provided no relief. Review of Systems   Constitutional: Negative for chills, diaphoresis, fatigue and fever. Eyes: Negative for photophobia and visual disturbance. Respiratory: Negative for cough, chest tightness and shortness of breath. Cardiovascular: Negative for chest pain.    Gastrointestinal: Negative for abdominal distention, comments: William Can is a 80year old male who presented to ED following a mechanical fall. Patient was found to have a left femoral neck fracture. Patient not have any other tenderness other reported injuries or any pain on exam.  Patient denies any back pain and did not have any midline tenderness CT or L-spine. Patient denies any head injuries. Remainder of exam was unremarkable. Patient had good distal pulses. [facility] was consulted as patient previously followed with them for his right hip replacement that he reports was done possibly over 10 years ago. Patient was incidentally found to be hypoxic. Patient was 85% on room air placed on 3 L nasal cannula. Patient's Covid test was negative. CTA was concerning for possible pneumonia. Patient had a mildly elevated B NP and reported that his lower extremity edema had significantly improved. Patient does have a leukocytosis and findings concerning for possible pneumonia. Patient was started on Rocephin and doxy. Discussed results and plan with patient he is agreeable to admission. Patient given morphine and fentanyl for pain control with improvement of symptoms. Patient is afebrile with stable vitals and well appearing. Patient denies chest pain or shortness of breath. Ortho consult placed. Dr. Yovanny Valdez will admit patient. Amount and/or Complexity of Data Reviewed  Decide to obtain previous medical records or to obtain history from someone other than the patient: yes              ED Course as of Jan 27 0622 Wed Jan 27, 2021   0500 EKG: This EKG is signed and interpreted by me.     Rate: 79  Rhythm: Sinus  Interpretation: Sinus rhythm, 1st degree heart block, right bundle branch block, left anterior fascicular block, normal QT interval, no acute ST or T wave changes  Comparison: stable as compared to patient's most recent EKG        [JA]   0554 Patient accepted for admission by Dr. Yovanny Valdez    [SS]      ED Course User Index  [JA] Jodi Wyatt MD  [SS] Kristina Adkins MD       --------------------------------------------- PAST HISTORY ---------------------------------------------  Past Medical History:  has a past medical history of High cholesterol, Hypertension, Hypothyroidism, Parkinson disease ([facility] [address] and RBBB. Past Surgical History:  has a past surgical history that includes Foot surgery (1935); shoulder surgery (Right); hernia repair (Left); Tonsillectomy and Adenoidectomy; and Foot surgery. Social History:  reports that he quit smoking about 50 years ago. He quit after 10.00 years of use. He has never used smokeless tobacco. He reports that he does not drink alcohol or use drugs. Family History: family history includes Breast Cancer in his sister; Cancer in his father; Heart Disease in his mother. The patients home medications have been reviewed.     Allergies: Sulfa antibiotics    -------------------------------------------------- RESULTS -------------------------------------------------    LABS:  Results for orders placed or performed during the hospital encounter of 01/27/21   CBC auto differential   Result Value Ref Range    WBC 11.4 4.5 - 11.5 E9/L    RBC 3.37 (L) 3.80 - 5.80 E12/L    Hemoglobin 11.6 (L) 12.5 - 16.5 g/dL    Hematocrit 35.3 (L) 37.0 - 54.0 %    .7 (H) 80.0 - 99.9 fL    MCH 34.4 26.0 - 35.0 pg    MCHC 32.9 32.0 - 34.5 %    RDW 15.3 (H) 11.5 - 15.0 fL    Platelets 310 996 - 831 E9/L    MPV 11.7 7.0 - 12.0 fL    Neutrophils % 81.2 (H) 43.0 - 80.0 %    Immature Granulocytes % 1.2 0.0 - 5.0 %    Lymphocytes % 8.2 (L) 20.0 - 42.0 %    Monocytes % 5.3 2.0 - 12.0 %    Eosinophils % 3.8 0.0 - 6.0 %    Basophils % 0.3 0.0 - 2.0 %    Neutrophils Absolute 9.27 (H) 1.80 - 7.30 E9/L    Immature Granulocytes # 0.14 E9/L    Lymphocytes Absolute 0.94 (L) 1.50 - 4.00 E9/L    Monocytes Absolute 0.61 0.10 - 0.95 E9/L    Eosinophils Absolute 0.44 0.05 - 0.50 E9/L    Basophils Absolute 0.04 0.00 - 0.20 E9/L   Comprehensive Metabolic Panel w/ Reflex to MG   Result Value Ref Range    Sodium 142 132 - 146 mmol/L    Potassium reflex Magnesium 3.8 3.5 - 5.0 mmol/L    Chloride 108 (H) 98 - 107 mmol/L    CO2 25 22 - 29 mmol/L    Anion Gap 9 7 - 16 mmol/L    Glucose 101 (H) 74 - 99 mg/dL    BUN 33 (H) 8 - 23 mg/dL    CREATININE 1.5 (H) 0.7 - 1.2 mg/dL    GFR Non-African American 44 >=60 mL/min/1.73    GFR African American 53     Calcium 8.2 (L) 8.6 - 10.2 mg/dL    Total Protein 6.9 6.4 - 8.3 g/dL    Albumin 3.9 3.5 - 5.2 g/dL    Total Bilirubin 0.8 0.0 - 1.2 mg/dL    Alkaline Phosphatase 122 40 - 129 U/L    ALT <5 0 - 40 U/L    AST 21 0 - 39 U/L   Troponin   Result Value Ref Range    Troponin 0.02 0.00 - 0.03 ng/mL   COVID-19   Result Value Ref Range    SARS-CoV-2, NAAT Not Detected Not Detected   Brain Natriuretic Peptide   Result Value Ref Range    Pro-BNP 1,344 (H) 0 - 450 pg/mL   EKG 12 Lead   Result Value Ref Range    Ventricular Rate 79 BPM    Atrial Rate 79 BPM    P-R Interval 238 ms    QRS Duration 144 ms    Q-T Interval 432 ms    QTc Calculation (Bazett) 495 ms    P Axis 74 degrees    R Axis -83 degrees    T Axis 35 degrees       RADIOLOGY:  CTA CHEST W CONTRAST   Final Result   No evidence of pulmonary embolism. Dependent airspace disease in right upper and left lower lobes likely   represents atelectasis. Coronary artery calcification. XR HIP 2-3 VW W PELVIS LEFT   Final Result   Displaced left femoral neck fracture. XR CHEST PORTABLE   Final Result   There may be some subtle infiltrate in right upper lobe and left lower lobe.            ------------------------- NURSING NOTES AND VITALS REVIEWED ---------------------------  Date / Time Roomed:  1/27/2021  3:45 AM  ED Bed Assignment:  20/20    The nursing notes within the ED encounter and vital signs as below have been reviewed.      Patient Vitals for the past 24 hrs:   BP Temp Temp src Pulse Resp SpO2 Height Weight   01/27/21 0619 (!) 147/59   77 18 96 %     01/27/21 0507 (!) 161/66   79 16 98 %     01/27/21 0400 (!) 165/75   81  99 %     01/27/21 0359 (!) 165/75 97.7 °F (36.5 °C) Oral 85 18 (!) 85 % 5' 6\" (1.676 m) 190 lb (86.2 kg)       Oxygen Saturation Interpretation: Abnormal    ------------------------------------------ PROGRESS NOTES ------------------------------------------  Re-evaluation(s):  Time: 5am  Patients symptoms show no change  Repeat physical examination is not changed    Counseling:  I have spoken with the patient and discussed todays results, in addition to providing specific details for the plan of care and counseling regarding the diagnosis and prognosis. Their questions are answered at this time and they are agreeable with the plan of admission.    --------------------------------- ADDITIONAL PROVIDER NOTES ---------------------------------  Consultations:  Spoke with Dr. Jewel Persaud. Discussed case. They will admit the patient. This patient's ED course included: a personal history and physicial examination, re-evaluation prior to disposition, multiple bedside re-evaluations and IV medications    This patient has remained hemodynamically stable during their ED course. Diagnosis:  1. Closed fracture of neck of left femur, initial encounter ([facility] [address]    2. Acute respiratory failure with hypoxia (HCC)    3. Pneumonia due to organism    4. Fall, initial encounter        Disposition:  Patient's disposition: Admit to telemetry  Patient's condition is stable.             Sumeet Fountain MD  Resident  01/27/21 5667

## 2021-01-27 NOTE — CONSULTS
[facility] cardiology the [facility] of [address]    Name: Chandrakant Harrington    Age: 80 y.o. Date of Admission: 1/27/2021  3:45 AM    Date of Service: 1/27/2021    Reason for Consultation: Preoperative cardiovascular assessment prior to left femur surgery     Referring Physician:     History of Present Illness: Patient is evaluated in the emergency room in [facility]   The patient is a 80y.o. year old male who denies any known history of cardiovascular disease specifically no history of myocardial infarction, coronary artery disease, stroke or indication of congestive heart failure. He got up early this morning at 2 AM lost his balance and fell. There was no loss of consciousness, no exertional chest pain, shortness of breath or palpitations no indication of arrhythmia. He does have an underlying right bundle branch block on his EKG. Past Medical History: Chronic hypertension, hyperlipidemia and Parkinson's. Past surgical history, tonsillectomy, right shoulder surgery 10 years ago left groin hernia surgery. Review of Systems:     Constitutional: No fever, chills, sweats  Cardiac: As per HPI  Pulmonary: No cough, wheeze, hemoptysis  HEENT: No visual disturbances or difficult swallowing  GI: No nausea, vomiting, diarrhea, abdominal pain, rectal bleeding  : No dysuria or hematuria  Endocrine: No excessive thirst, heat or cold intolerance. Musculoskeletal: No joint pain or muscle aches.  No claudication  Skin: No skin breakdown or rashes  Neuro: No headache, confusion, or seizures  Psych: No depression, anxiety      Family History:  Family History   Problem Relation Age of Onset    Heart Disease Mother     Cancer Father         lung    Breast Cancer Sister        Social History:  Social History     Socioeconomic History    Marital status:      Spouse name: Not on file    Number of children: Not on file    Years of education: Not on file    Highest education level: Not on file   Occupational History    Not on file   Social Needs    Financial resource strain: Not on file    Food insecurity     Worry: Not on file     Inability: Not on file    Transportation needs     Medical: Not on file     Non-medical: Not on file   Tobacco Use    Smoking status: Former Smoker     Years: 10.00     Quit date: 1970     Years since quittin.1    Smokeless tobacco: Never Used   Substance and Sexual Activity    Alcohol use: No     Comment: Social    Drug use: No    Sexual activity: Never     Partners: Female   Lifestyle    Physical activity     Days per week: Not on file     Minutes per session: Not on file    Stress: Not on file   Relationships    Social connections     Talks on phone: Not on file     Gets together: Not on file     Attends Evangelical service: Not on file     Active member of club or organization: Not on file     Attends meetings of clubs or organizations: Not on file     Relationship status: Not on file    Intimate partner violence     Fear of current or ex partner: Not on file     Emotionally abused: Not on file     Physically abused: Not on file     Forced sexual activity: Not on file   Other Topics Concern    Not on file   Social History Narrative    Not on file       Allergies:   Allergies   Allergen Reactions    Sulfa Antibiotics      Hyperactivity        Current Medications:  Current Facility-Administered Medications   Medication Dose Route Frequency Provider Last Rate Last Admin    [START ON 2021] cefTRIAXone (ROCEPHIN) 1,000 mg in sterile water 10 mL IV syringe  1,000 mg Intravenous Q24H Hunter Quan DO        doxycycline (VIBRAMYCIN) 100 mg in dextrose 5 % 100 mL IVPB  100 mg Intravenous Q12H Hunter Quan DO        morphine (PF) injection 2 mg  2 mg Intravenous Q4H PRN Hunter Quan DO        sodium chloride flush 0.9 % injection 10 mL  10 mL Intravenous 2 times per day Hunter Quan DO   10 mL at 21 0859    sodium chloride flush 0.9 % injection 10 mL  10 mL Intravenous PRN Hunter E Volino, DO        potassium chloride (KLOR-CON M) extended release tablet 40 mEq  40 mEq Oral PRN Hunter E Volino, DO        Or    potassium bicarb-citric acid (EFFER-K) effervescent tablet 40 mEq  40 mEq Oral PRN Hunter E Volino, DO        Or    potassium chloride 10 mEq/100 mL IVPB (Peripheral Line)  10 mEq Intravenous PRN Hunter E Rodino, DO        enoxaparin (LOVENOX) injection 40 mg  40 mg Subcutaneous Daily Hunter E Volino, DO   40 mg at 01/27/21 0855    senna (SENOKOT) tablet 8.6 mg  1 tablet Oral Daily PRN Hunter Velascoino, DO        acetaminophen (TYLENOL) tablet 650 mg  650 mg Oral Q6H PRN Hunter Quan, DO        Or    acetaminophen (TYLENOL) suppository 650 mg  650 mg Rectal Q6H PRN Hunter Velascoino, DO        carbidopa-levodopa (SINEMET)  MG per tablet 2 tablet  2 tablet Oral 4x Daily Hunter E Volino, DO   2 tablet at 01/27/21 0855    clobetasol (TEMOVATE) 0.05 % ointment   Topical BID Hunter E Volino, DO   Given at 01/27/21 0855    doxazosin (CARDURA) tablet 4 mg  4 mg Oral Nightly Hunter Velascoino, DO        traZODone (DESYREL) tablet 50 mg  50 mg Oral Nightly Hunter Velascoino, DO        bumetanide (BUMEX) tablet 1 mg  1 mg Oral Daily Hunter E Volino, DO   1 mg at 01/27/21 9546     Current Outpatient Medications   Medication Sig Dispense Refill    bumetanide (BUMEX) 1 MG tablet Take 1 mg by mouth daily      clobetasol (TEMOVATE) 0.05 % ointment Apply topically 2 times daily. 60 g 1    carbidopa-levodopa (SINEMET)  MG per tablet Take 2 tablets by mouth 4 times daily      silver sulfADIAZINE (SILVADENE) 1 % cream Apply topically daily.  400 g 1    potassium chloride (KLOR-CON M) 20 MEQ extended release tablet Take 1 tablet by mouth daily 90 tablet 1    levothyroxine (SYNTHROID) 25 MCG tablet Take 3 tabs PO Monday - Friday and 2 tabs PO Saturday - [place] 216 tablet 1    traZODone (DESYREL) 50 MG tablet Take 1 tablet by mouth nightly 30 tablet 5    doxazosin (CARDURA) 4 MG tablet Take 1 tablet by mouth nightly 90 tablet 1    lisinopril-hydroCHLOROthiazide (PRINZIDE;ZESTORETIC) 10-12.5 MG per tablet Take 1 tablet by mouth daily 90 tablet 3    lisinopril (PRINIVIL;ZESTRIL) 10 MG tablet Take 1 tablet by mouth daily 90 tablet 3    triamcinolone (KENALOG) 0.1 % cream Apply topically 2 times daily Apply topically 2 times daily. 45 g 3    aspirin 81 MG tablet Take 81 mg by mouth daily      Misc. Devices MISC Dispense #2 compression stalkings  Dx: leg edema 1 Device 0    Red Yeast Rice 600 MG TABS Take by mouth 2 times daily            Physical Exam:  BP (!) 151/72   Pulse 75   Temp 97.4 °F (36.3 °C) (Oral)   Resp 16   Ht 5' 6\" (1.676 m)   Wt 190 lb (86.2 kg)   SpO2 95%   BMI 30.67 kg/m²   Weight change: Wt Readings from Last 3 Encounters:   01/27/21 190 lb (86.2 kg)   07/01/20 185 lb 3.2 oz (84 kg)   07/01/20 185 lb 3.2 oz (84 kg)         General: Awake, alert, oriented x3, no acute distress  HEENT: Unremarkable  Neck: No JVD or bruits. Cardiac: Regular rate and rhythm, normal S1 and S2, no extra heart sounds, murmurs, heaves, thrills  Resp: Lungs clear without wheezing or crackles. No accessory muscle use or retraction  Abdomen: soft, nontender, nondistended, no gross organomegaly or mass  Skin: Warm and dry, no cyanosis. Musculoskeletal: normal tone and strength in the upper and lower extremities bilaterally  Neuro: Grossly unremarkable  Psych: Cooperative, and normal affect    Intake/Output:    Intake/Output Summary (Last 24 hours) at 1/27/2021 1132  Last data filed at 1/27/2021 0837  Gross per 24 hour   Intake 500 ml   Output    Net 500 ml     I/O this shift:   In: 500 [IV Piggyback:500]  Out: -     Laboratory Tests:  Lab Results   Component Value Date    CREATININE 1.5 (H) 01/27/2021    BUN 33 (H) 01/27/2021     01/27/2021    K 3.8 01/27/2021     (H) 01/27/2021    CO2 25 01/27/2021     No results for input(s): CKTOTAL, CKMB in the last 72 hours. Invalid input(s): TROPONONI  No results found for: BNP  Lab Results   Component Value Date    WBC 11.4 01/27/2021    RBC 3.37 01/27/2021    HGB 11.6 01/27/2021    HCT 35.3 01/27/2021    .7 01/27/2021    MCH 34.4 01/27/2021    MCHC 32.9 01/27/2021    RDW 15.3 01/27/2021     01/27/2021    MPV 11.7 01/27/2021     Recent Labs     01/27/21  0405   ALKPHOS 122   ALT <5   AST 21   PROT 6.9   BILITOT 0.8   LABALBU 3.9     Lab Results   Component Value Date    MG 2.1 02/19/2020     Lab Results   Component Value Date    PROTIME 14.0 12/24/2017    INR 1.3 12/24/2017     Lab Results   Component Value Date    TSH 5.810 09/24/2020     No components found for: CHLPL  Lab Results   Component Value Date    TRIG 72 09/24/2020    TRIG 113 10/21/2019     Lab Results   Component Value Date    HDL 36 09/24/2020    HDL 39 10/21/2019     Lab Results   Component Value Date    LDLCALC 84 09/24/2020    [address] 88 10/21/2019     Lab Results   Component Value Date    INR 1.3 12/24/2017       Admission ECG done January 27, 2021 at 4:54 AM personally reviewed by me revealed normal sinus rhythm heart rate 79, , QTc 495, right bundle branch block without ischemic change. I personally reviewed his telemetry in the emergency room heart rate in the 80s. I thoroughly reviewed his old chart and echocardiogram November 2018 LVEF 60% with mild aortic valve and tricuspid valve regurgitation. ASSESSMENT / PLAN:    1. Preoperative cardiovascular assessment prior to left femur surgery and he lives at home and walks up to get the newspaper without  with his wife exertional limitation. He usually uses a cane to walk. No reported angina or exertional chest pain, no CHF. I would suggest he is low to intermediate risk for planned hip surgery based on his age, right bundle branch block. Would avoid giving him any AV blocking agents. No beta-blocker or diltiazem.     #2 abnormal EKG with right bundle branch block and first-degree AV block. Potentially increased risk of bradycardia perioperatively and would follow heart rate during the case on telemetry and if any indication of profound bradycardia or AV block with put him on telemetry floor postoperative. #3 hypertension and at home on diuretic. #4 Parkinson's and unsteady gait at times by his own admission. Of cautioned him about future fall risk.           Electronically signed by Starr Carlton MD on 1/27/2021 at 11:32 AM

## 2021-01-28 ENCOUNTER — ANESTHESIA EVENT (OUTPATIENT)
Dept: OPERATING ROOM | Age: 86
DRG: 521 | End: 2021-01-28
Payer: MEDICARE

## 2021-01-28 LAB
ALBUMIN SERPL-MCNC: 4.1 G/DL (ref 3.5–5.2)
ALP BLD-CCNC: 101 U/L (ref 40–129)
ALT SERPL-CCNC: <5 U/L (ref 0–40)
ANION GAP SERPL CALCULATED.3IONS-SCNC: 9 MMOL/L (ref 7–16)
ANISOCYTOSIS: ABNORMAL
AST SERPL-CCNC: 20 U/L (ref 0–39)
BASOPHILS ABSOLUTE: 0.02 E9/L (ref 0–0.2)
BASOPHILS RELATIVE PERCENT: 0.1 % (ref 0–2)
BILIRUB SERPL-MCNC: 0.9 MG/DL (ref 0–1.2)
BUN BLDV-MCNC: 38 MG/DL (ref 8–23)
CALCIUM SERPL-MCNC: 9.2 MG/DL (ref 8.6–10.2)
CHLORIDE BLD-SCNC: 102 MMOL/L (ref 98–107)
CO2: 27 MMOL/L (ref 22–29)
CREAT SERPL-MCNC: 1.3 MG/DL (ref 0.7–1.2)
EOSINOPHILS ABSOLUTE: 0 E9/L (ref 0.05–0.5)
EOSINOPHILS RELATIVE PERCENT: 0 % (ref 0–6)
FOLATE: 8.7 NG/ML (ref 4.8–24.2)
GFR AFRICAN AMERICAN: >60
GFR NON-AFRICAN AMERICAN: 52 ML/MIN/1.73
GLUCOSE BLD-MCNC: 152 MG/DL (ref 74–99)
HCT VFR BLD CALC: 36.1 % (ref 37–54)
HEMOGLOBIN: 11.7 G/DL (ref 12.5–16.5)
IMMATURE GRANULOCYTES #: 0.09 E9/L
IMMATURE GRANULOCYTES %: 0.7 % (ref 0–5)
L. PNEUMOPHILA SEROGP 1 UR AG: NORMAL
LYMPHOCYTES ABSOLUTE: 0.5 E9/L (ref 1.5–4)
LYMPHOCYTES RELATIVE PERCENT: 3.6 % (ref 20–42)
MCH RBC QN AUTO: 33.7 PG (ref 26–35)
MCHC RBC AUTO-ENTMCNC: 32.4 % (ref 32–34.5)
MCV RBC AUTO: 104 FL (ref 80–99.9)
MONOCYTES ABSOLUTE: 0.13 E9/L (ref 0.1–0.95)
MONOCYTES RELATIVE PERCENT: 0.9 % (ref 2–12)
NEUTROPHILS ABSOLUTE: 13.04 E9/L (ref 1.8–7.3)
NEUTROPHILS RELATIVE PERCENT: 94.7 % (ref 43–80)
OVALOCYTES: ABNORMAL
PDW BLD-RTO: 15.4 FL (ref 11.5–15)
PLATELET # BLD: 153 E9/L (ref 130–450)
PMV BLD AUTO: 12.1 FL (ref 7–12)
POIKILOCYTES: ABNORMAL
POTASSIUM REFLEX MAGNESIUM: 4.5 MMOL/L (ref 3.5–5)
RBC # BLD: 3.47 E12/L (ref 3.8–5.8)
SODIUM BLD-SCNC: 138 MMOL/L (ref 132–146)
STREP PNEUMONIAE ANTIGEN, URINE: NORMAL
TOTAL PROTEIN: 7.5 G/DL (ref 6.4–8.3)
VITAMIN B-12: 869 PG/ML (ref 211–946)
WBC # BLD: 13.8 E9/L (ref 4.5–11.5)

## 2021-01-28 PROCEDURE — 6360000002 HC RX W HCPCS: Performed by: INTERNAL MEDICINE

## 2021-01-28 PROCEDURE — 82607 VITAMIN B-12: CPT

## 2021-01-28 PROCEDURE — 1200000000 HC SEMI PRIVATE

## 2021-01-28 PROCEDURE — 36415 COLL VENOUS BLD VENIPUNCTURE: CPT

## 2021-01-28 PROCEDURE — 85025 COMPLETE CBC W/AUTO DIFF WBC: CPT

## 2021-01-28 PROCEDURE — 6370000000 HC RX 637 (ALT 250 FOR IP): Performed by: INTERNAL MEDICINE

## 2021-01-28 PROCEDURE — 92526 ORAL FUNCTION THERAPY: CPT | Performed by: SPEECH-LANGUAGE PATHOLOGIST

## 2021-01-28 PROCEDURE — 2580000003 HC RX 258: Performed by: INTERNAL MEDICINE

## 2021-01-28 PROCEDURE — 2500000003 HC RX 250 WO HCPCS: Performed by: INTERNAL MEDICINE

## 2021-01-28 PROCEDURE — 80053 COMPREHEN METABOLIC PANEL: CPT

## 2021-01-28 PROCEDURE — 92610 EVALUATE SWALLOWING FUNCTION: CPT | Performed by: SPEECH-LANGUAGE PATHOLOGIST

## 2021-01-28 PROCEDURE — 94640 AIRWAY INHALATION TREATMENT: CPT

## 2021-01-28 PROCEDURE — 82746 ASSAY OF FOLIC ACID SERUM: CPT

## 2021-01-28 PROCEDURE — 2700000000 HC OXYGEN THERAPY PER DAY

## 2021-01-28 RX ORDER — METHYLPREDNISOLONE SODIUM SUCCINATE 40 MG/ML
40 INJECTION, POWDER, LYOPHILIZED, FOR SOLUTION INTRAMUSCULAR; INTRAVENOUS EVERY 12 HOURS
Status: DISCONTINUED | OUTPATIENT
Start: 2021-01-28 | End: 2021-01-30

## 2021-01-28 RX ORDER — SODIUM CHLORIDE 9 MG/ML
INJECTION, SOLUTION INTRAVENOUS CONTINUOUS
Status: DISCONTINUED | OUTPATIENT
Start: 2021-01-28 | End: 2021-01-29

## 2021-01-28 RX ADMIN — BUMETANIDE 1 MG: 1 TABLET ORAL at 09:10

## 2021-01-28 RX ADMIN — IPRATROPIUM BROMIDE AND ALBUTEROL SULFATE 1 AMPULE: .5; 3 SOLUTION RESPIRATORY (INHALATION) at 20:15

## 2021-01-28 RX ADMIN — IPRATROPIUM BROMIDE AND ALBUTEROL SULFATE 1 AMPULE: .5; 3 SOLUTION RESPIRATORY (INHALATION) at 12:30

## 2021-01-28 RX ADMIN — METHYLPREDNISOLONE SODIUM SUCCINATE 40 MG: 40 INJECTION, POWDER, LYOPHILIZED, FOR SOLUTION INTRAMUSCULAR; INTRAVENOUS at 05:24

## 2021-01-28 RX ADMIN — IPRATROPIUM BROMIDE AND ALBUTEROL SULFATE 1 AMPULE: .5; 3 SOLUTION RESPIRATORY (INHALATION) at 09:03

## 2021-01-28 RX ADMIN — FLUTICASONE PROPIONATE 2 SPRAY: 50 SPRAY, METERED NASAL at 09:12

## 2021-01-28 RX ADMIN — TRAZODONE HYDROCHLORIDE 50 MG: 50 TABLET ORAL at 22:18

## 2021-01-28 RX ADMIN — CARBIDOPA AND LEVODOPA 2 TABLET: 25; 100 TABLET ORAL at 17:19

## 2021-01-28 RX ADMIN — MORPHINE SULFATE 2 MG: 2 INJECTION, SOLUTION INTRAMUSCULAR; INTRAVENOUS at 04:07

## 2021-01-28 RX ADMIN — CEFTRIAXONE 1000 MG: 1 INJECTION, POWDER, FOR SOLUTION INTRAMUSCULAR; INTRAVENOUS at 06:15

## 2021-01-28 RX ADMIN — SODIUM CHLORIDE, PRESERVATIVE FREE 10 ML: 5 INJECTION INTRAVENOUS at 09:13

## 2021-01-28 RX ADMIN — CARBIDOPA AND LEVODOPA 2 TABLET: 25; 100 TABLET ORAL at 09:11

## 2021-01-28 RX ADMIN — DOXYCYCLINE 100 MG: 100 INJECTION, POWDER, LYOPHILIZED, FOR SOLUTION INTRAVENOUS at 18:42

## 2021-01-28 RX ADMIN — METHYLPREDNISOLONE SODIUM SUCCINATE 40 MG: 40 INJECTION, POWDER, LYOPHILIZED, FOR SOLUTION INTRAMUSCULAR; INTRAVENOUS at 17:19

## 2021-01-28 RX ADMIN — CARBIDOPA AND LEVODOPA 2 TABLET: 25; 100 TABLET ORAL at 22:18

## 2021-01-28 RX ADMIN — DOXYCYCLINE 100 MG: 100 INJECTION, POWDER, LYOPHILIZED, FOR SOLUTION INTRAVENOUS at 06:15

## 2021-01-28 RX ADMIN — IPRATROPIUM BROMIDE AND ALBUTEROL SULFATE 1 AMPULE: .5; 3 SOLUTION RESPIRATORY (INHALATION) at 16:30

## 2021-01-28 RX ADMIN — CARBIDOPA AND LEVODOPA 2 TABLET: 25; 100 TABLET ORAL at 14:32

## 2021-01-28 RX ADMIN — CLOBETASOL PROPIONATE: 0.5 OINTMENT TOPICAL at 22:21

## 2021-01-28 RX ADMIN — CLOBETASOL PROPIONATE: 0.5 OINTMENT TOPICAL at 09:45

## 2021-01-28 RX ADMIN — Medication 10 ML: at 08:48

## 2021-01-28 ASSESSMENT — ENCOUNTER SYMPTOMS: SHORTNESS OF BREATH: 1

## 2021-01-28 ASSESSMENT — PAIN SCALES - GENERAL
PAINLEVEL_OUTOF10: 0
PAINLEVEL_OUTOF10: 0
PAINLEVEL_OUTOF10: 6

## 2021-01-28 ASSESSMENT — COPD QUESTIONNAIRES: CAT_SEVERITY: MODERATE

## 2021-01-28 NOTE — PROGRESS NOTES
SPEECH/LANGUAGE PATHOLOGY  CLINICAL ASSESSMENT OF SWALLOWING FUNCTION    PATIENT NAME:  Katherine Espana      :  1930      TODAY'S DATE:  2021  ROOM:  [address] [place] [address]    SUMMARY OF EVALUATION  Chart reviewed and discussed with RN. Video swallow study received and appreciated, however  cancelled by RN due to current fracture of left femur and pt unable to tolerate being transported and placed upright for completion. Clinical swallow eval completed bedside. DYSPHAGIA DIAGNOSIS:  No clinical s/s of aspiration, however silent aspiration cannot be ruled out during a bedside eval. If silent aspiration is suspected, a video swallow study is recommended when pt is able to participate. DIET RECOMMENDATIONS:  Regular consistency solids with  thin liquids as tolerated     FEEDING RECOMMENDATIONS:     Assistance level:  No assistance needed      Compensatory strategies recommended: Small bites/sips and Alternate solids and liquids    THERAPY RECOMMENDATIONS:      Meal endurance analysis 1-2 sessions to provide diet modification and compensatory strategy implementation due to need to ensure proper implementation of compensatory strategies during PO intake                    PROCEDURE     Consistencies Administered During the Evaluation   Liquids: thin liquid   Solids:  pureed foods and solid foods      Method of Intake:   cup, straw, spoon  Self fed, Fed by clinician      Position:   Seated, upright                  RESULTS     Oral Stage: The oral stage of swallowing was within functional limits      Pharyngeal Stage:      No signs of aspiration were noted during this evaluation however, silent aspiration cannot be ruled out at bedside. If silent aspiration is suspected, a Videofluoroscopic Study of Swallowing (MBS) is recommended and requires a physician order. The Speech Language Pathologist (SLP) completed education with the patient regarding results of evaluation.    Explained that Speech Pathology intervention is not warranted  at this time   Prognosis for improvements is fair +     This plan will be re-evaluated and revised in 1 week  if warranted. Patient stated goals: Agreed with above,   Treatment goals discussed with Patient   The Patient understand(s) the diagnosis, prognosis and plan of care         INTERVENTION/EDUCATION    Pt educated on above results and plan of care. Pt trained on compensatory strategies for safe swallow with good outcome. Pt encouraged to engage in question/answer session. All questions answered and pt verbalized understanding of above. CPT code:  21857  bedside swallow eval, 44127 dysphagia therapy 15 mins      [x]The admitting diagnosis and active problem list, as listed below have been reviewed prior to initiation of this evaluation.      ADMITTING DIAGNOSIS: Left displaced femoral neck fracture ([facility] [address] [S72.002A]     ACTIVE PROBLEM LIST:   Patient Active Problem List   Diagnosis    Hypertension    Parkinson disease ([address]    Hypothyroidism    Stage 3 chronic kidney disease    Venous stasis dermatitis of both lower extremities    Left displaced femoral neck fracture ([facility] [address]    Hypoxia    CAP (community acquired pneumonia)     Kye Noel   SLP Student

## 2021-01-28 NOTE — ED NOTES
Spoke to brandy at ext [phone], confirmed receipt of sbar. Pt prepared for transport via cart and monitor at this time.      Perry Hoover RN  01/27/21 2023

## 2021-01-28 NOTE — PLAN OF CARE
Problem: Skin Integrity:  Goal: Will show no infection signs and symptoms  Description: Will show no infection signs and symptoms  Outcome: Met This Shift     Problem: Skin Integrity:  Goal: Absence of new skin breakdown  Description: Absence of new skin breakdown  Outcome: Met This Shift     Problem: Falls - Risk of:  Goal: Will remain free from falls  Description: Will remain free from falls  Outcome: Met This Shift     Problem: Falls - Risk of:  Goal: Absence of physical injury  Description: Absence of physical injury  Outcome: Met This Shift     Problem: Pain:  Goal: Pain level will decrease  Description: Pain level will decrease  Outcome: Met This Shift     Problem: Pain:  Goal: Control of acute pain  Description: Control of acute pain  Outcome: Met This Shift

## 2021-01-28 NOTE — PROGRESS NOTES
Antonietta Walter M.D.,[facility]  Alma Levine D.O., F.A.C.O.I., Olive Ontiveros M.D. Woody Griggs M.D., Popeye Ruby M.D. Winston Pereira D.O. Daily Pulmonary Progress Note    Patient:  Dusty Marcano 80 y.o. male MRN: [id number]     Date of Service: 1/28/2021        Subjective      Patient was seen and examined. No shortness of breath or wheezing today. Objective   Vitals: BP (!) 144/61   Pulse 79   Temp 97.6 °F (36.4 °C) (Oral)   Resp 18   Ht 5' 7\" (1.702 m)   Wt 190 lb 8 oz (86.4 kg)   SpO2 95%   BMI 29.84 kg/m²     I/O:    Intake/Output Summary (Last 24 hours) at 1/28/2021 1126  Last data filed at 1/28/2021 0419  Gross per 24 hour   Intake    Output 650 ml   Net -650 ml       CURRENT MEDS :  Scheduled Meds:   cefTRIAXone (ROCEPHIN) IV  1,000 mg Intravenous Q24H    doxycycline (VIBRAMYCIN) IV  100 mg Intravenous Q12H    sodium chloride flush  10 mL Intravenous 2 times per day    enoxaparin  40 mg Subcutaneous Daily    carbidopa-levodopa  2 tablet Oral 4x Daily    clobetasol   Topical BID    doxazosin  4 mg Oral Nightly    traZODone  50 mg Oral Nightly    bumetanide  1 mg Oral Daily    methylPREDNISolone  40 mg Intravenous Q8H    ipratropium-albuterol  1 ampule Inhalation Q4H WA    fluticasone  2 spray Each Nostril Daily    montelukast  10 mg Oral Nightly       Continuous Infusions:      PRN Meds:  morphine, sodium chloride flush, potassium chloride **OR** potassium alternative oral replacement **OR** potassium chloride, senna, acetaminophen **OR** acetaminophen, perflutren lipid microspheres      Physical Exam:  Physical Exam  Constitutional:       General: He is not in acute distress. HENT:      Head: Normocephalic and atraumatic. Mouth/Throat:      Pharynx: No oropharyngeal exudate. Eyes:      General: No scleral icterus. Conjunctiva/sclera: Conjunctivae normal.   Neck:      Musculoskeletal: Neck supple. Trachea: No tracheal deviation. Cardiovascular:      Rate and Rhythm: Normal rate. Heart sounds: Normal heart sounds. Pulmonary:      Effort: Pulmonary effort is normal.      Breath sounds: No wheezing. Abdominal:      Palpations: Abdomen is soft. Tenderness: There is no abdominal tenderness. Musculoskeletal:         General: No swelling or deformity. Lymphadenopathy:      Cervical: No cervical adenopathy. Skin:     General: Skin is warm. Findings: No rash. Neurological:      General: No focal deficit present. Mental Status: He is alert and oriented to person, place, and time. Psychiatric:         Behavior: Behavior normal.         Pertinent/ New Labs and Imaging Studies     Pulmonary Function Testing personally reviewed and interpreted. PERTINENT LAB RESULTS: Labs reviewed. DIAGNOSTICS: Pertinent imaging reviewed. Assessment:      1. Acute hypoxic respiratory failure secondary to atelectasis, may have component of aspiration given Parkinson's  2. Mechanical fall with left hip femoral neck fracture  3. Wheezing - mild asthma exacerbation -improved  4. Chronic rhinitis  5. Parkinson's disease      Plan:     · Duo nebs every 4 while awake and as needed  · Incentive spirometry  · Aspiration precautions  · Start Flonase, Singulair  · Wean IV steroids as durga, decrease to 40 mg every 12 today      Patient's wheezing has significantly improved and he is optimized from a pulmonary standpoint. There are no contraindications to proceeding with surgery. Patient is low to intermediate risk for any pulmonary complications.     Risk can be minimized by DVT prophylaxis (if not contraindicated), early mobilization, and incentive spirometry. Thank you for allowing me to participate in the care of Katherine Espana. Please feel free to call with questions.      Electronically signed by Ana Laura Colbert DO on 1/28/2021 at 11:26 AM

## 2021-01-28 NOTE — CONSULTS
Consult Note    Chief complaint: Pain left hip    History: Patient is a 57-year-old male, living at home, ambulatory with a walker. Patient reportedly got up to go the bathroom early morning on 1/27/2021, without using his walker. He felt unstable and fell, resulting in pain at the left hip and inability to stand and walk. He came to the emergency room at [facility] around 4 AM on 1/27/2021 where he was evaluated and found to have evidence of a displaced left femoral neck fracture. He was seen by internal medicine, pulmonology and cardiology. For some reason, no effective communication was made with orthopedic surgery, and the consult was called in to my office this morning around 10 AM.    Patient currently complains of minimal pain at the left hip. The patient spouse is with him at the bedside. She states that they have had home health over the last several weeks because of his chronic lower extremity edema which has been managed intermittently with antibiotics though he is not currently on antibiotics for that. He is also been treated with Unna boots for the swelling. She states his legs currently look better than they have in a while. Past medical history: Hypertension, hypothyroidism, Parkinson's disease, stage III chronic kidney disease, venous stasis dermatitis bilateral lower extremities    Exam: Patient is awake, alert and oriented. He appears to have a bit of an anxious tremor in his hands. Left lower extremity is slightly shortened and externally rotated. Full range of motion left ankle and toes. Pain with any attempted motion at the left hip. Sensation intact to light touch. Significant lower extremity brawny changes. There is wrinkling of the skin in his legs though the skin is very thickened. Feet are warm to touch. Could not palpate pulses at posterior tib. X-ray: Displaced left femoral neck fracture    Impression: Displaced left femoral neck fracture.     Plan: Treatment for this problem is a left hip hemiarthroplasty. I reviewed with the patient and his spouse the indications for the surgery. We also reviewed risks and benefits of surgery which include risk of infection, blood loss, damage to nerves, arteries and veins, DVT/PE and death. Is been evaluated by pulmonology and cardiology as well as internal medicine. All feel that he is at some intermediate but acceptable risk for this procedure. Currently planning for surgery tomorrow at noon. Given his history of cellulitis in his legs fairly recently I believe we need to use an implant that could be removed if problems with infection were encountered. We will therefore use a noncemented implant. At the family's request I did speak with her grandson who is an internal medicine resident currently in his fourth year. I explained all the issues. He seems familiar with the situation. All questions were answered for all parties. We will plan to go to surgery tomorrow at noon as scheduled.

## 2021-01-28 NOTE — PROGRESS NOTES
Internal Medicine Progress Note    Patient's name: Toyin Arce  : 1930  Admission date: 2021  Date of service: 2021   Room: [facility] 7W ORTHO SURGERY  Primary care physician: Mukund Reed MD  Reason for visit: Follow-up for hip fx    Subjective  SHEILA was seen and examined at bedside. His wife as at the bedside. His son who is a resident physician was also on the phone and case was discussed with him. The patient and his wife are upset this morning as he will likely not been seen by ortho surgery until this evening. He has been allowed to eat throughout today, surgery likely to be tomorrow if warranted per ortho. Respiratory status is stable and slightly improved this morning. He is on 4L O2 with minimal shortness of breath. Admits to some sputum production, thick. Review of Systems  There are no new complaints of chest pain, shortness of breath, abdominal pain, nausea, vomiting, diarrhea, constipation.     Hospital Medications  Current Facility-Administered Medications   Medication Dose Route Frequency Provider Last Rate Last Admin    cefTRIAXone (ROCEPHIN) 1,000 mg in sterile water 10 mL IV syringe  1,000 mg Intravenous Q24H Hunter E Volino, DO   1,000 mg at 21 0615    doxycycline (VIBRAMYCIN) 100 mg in dextrose 5 % 100 mL IVPB  100 mg Intravenous Q12H Hunter E Volino,  mL/hr at 21 0615 100 mg at 21 0615    morphine (PF) injection 2 mg  2 mg Intravenous Q4H PRN Hunter E Volino, DO   2 mg at 21 0407    sodium chloride flush 0.9 % injection 10 mL  10 mL Intravenous 2 times per day Hunter E Volino, DO   10 mL at 21 2236    sodium chloride flush 0.9 % injection 10 mL  10 mL Intravenous PRN Hunter E Volino, DO        potassium chloride (KLOR-CON M) extended release tablet 40 mEq  40 mEq Oral PRN Hunter E Volino, DO        Or    potassium bicarb-citric acid (EFFER-K) effervescent tablet 40 mEq  40 mEq Oral PRN Hunter E Volino, DO        Or    potassium chloride 10 mEq/100 mL IVPB (Peripheral Line)  10 mEq Intravenous PRN Hunter Velascoino, DO        enoxaparin (LOVENOX) injection 40 mg  40 mg Subcutaneous Daily Hunter Quan, DO   40 mg at 01/27/21 0855    senna (SENOKOT) tablet 8.6 mg  1 tablet Oral Daily PRN Hunter Velascoino, DO        acetaminophen (TYLENOL) tablet 650 mg  650 mg Oral Q6H PRN Hunter Quan, DO   650 mg at 01/27/21 1416    Or    acetaminophen (TYLENOL) suppository 650 mg  650 mg Rectal Q6H PRN Hunter Velascoino, DO        carbidopa-levodopa (SINEMET)  MG per tablet 2 tablet  2 tablet Oral 4x Daily Hunter Quan, DO   2 tablet at 01/27/21 2235    clobetasol (TEMOVATE) 0.05 % ointment   Topical BID Hunter Quan, DO   Given at 01/27/21 2238    doxazosin (CARDURA) tablet 4 mg  4 mg Oral Nightly Hunter Quan, DO   4 mg at 01/27/21 2235    traZODone (DESYREL) tablet 50 mg  50 mg Oral Nightly Hunter Quan, DO   50 mg at 01/27/21 2235    bumetanide (BUMEX) tablet 1 mg  1 mg Oral Daily Hunter Quan, DO   1 mg at 01/27/21 0855    methylPREDNISolone sodium (SOLU-MEDROL) injection 40 mg  40 mg Intravenous Q8H Hunter Quan, DO   40 mg at 01/28/21 0524    ipratropium-albuterol (DUONEB) nebulizer solution 1 ampule  1 ampule Inhalation Q4H WA Hunter Quan, DO   1 ampule at 01/27/21 1940    perflutren lipid microspheres (DEFINITY) injection 1.65 mg  1.5 mL Intravenous ONCE PRN Hunter Velascoino, DO        fluticasone (FLONASE) 50 MCG/ACT nasal spray 2 spray  2 spray Each Nostril Daily Denis Gonzalez, DO   2 spray at 01/27/21 1836    montelukast (SINGULAIR) tablet 10 mg  10 mg Oral Nightly Josue Gonzalez, DO   10 mg at 01/27/21 2236       PRN Medications  morphine, sodium chloride flush, potassium chloride **OR** potassium alternative oral replacement **OR** potassium chloride, senna, acetaminophen **OR** acetaminophen, perflutren lipid microspheres    Objective  Most Recent Recorded Vitals  /69   Pulse 74   Temp 97.6 °F (36.4 °C) (Oral) Resp 18   Ht 5' 7\" (1.702 m)   Wt 190 lb 8 oz (86.4 kg)   SpO2 95%   BMI 29.84 kg/m²   I/O last 3 completed shifts: In: 500 [IV Piggyback:500]  Out: 650 [Urine:650]  No intake/output data recorded. Physical Exam:  General: AAO to person/place/time/purpose, NAD, no labored breathing, [place]  Eyes: conjunctivae/corneas clear, sclera non icteric  Skin: color/texture/turgor normal, no rashes or lesions  Lungs: overall diminished, 4LO2NC, no retractions/use of accessory muscles, no vocal fremitus, no rhonchi, no crackle, no rales  Heart: regular rate, regular rhythm, no murmur  Abdomen: soft, NT; bowel sounds normal; no masses,  no organomegaly  Extremities: atraumatic, no cyanosis, no edema  Neurologic: cranial nerves 2-12 grossly intact, no slurred speech. Most Recent Labs  Lab Results   Component Value Date    WBC 13.8 (H) 01/28/2021    HGB 11.7 (L) 01/28/2021    HCT 36.1 (L) 01/28/2021     01/28/2021     01/28/2021    K 4.5 01/28/2021     01/28/2021    CREATININE 1.3 (H) 01/28/2021    BUN 38 (H) 01/28/2021    CO2 27 01/28/2021    GLUCOSE 152 (H) 01/28/2021    ALT <5 01/28/2021    AST 20 01/28/2021    INR 1.3 12/24/2017    TSH 5.810 (H) 09/24/2020       CTA CHEST W CONTRAST   Final Result   No evidence of pulmonary embolism. Dependent airspace disease in right upper and left lower lobes likely   represents atelectasis. Coronary artery calcification. XR HIP 2-3 VW W PELVIS LEFT   Final Result   Displaced left femoral neck fracture. XR CHEST PORTABLE   Final Result   There may be some subtle infiltrate in right upper lobe and left lower lobe.           Last Echocardiogram 1/28/2021    The aortic valve appears mildly sclerotic.    Physiologic and/or trace aortic regurgitation is noted.    No hemodynamically significant aortic stenosis is present.    Mild tricuspid regurgitation.    The left atrium is mildly dilated.    Mild left ventricular concentric hypertrophy noted.    LV Ejection fraction is visually estimated at 60%. Assessment   Active Hospital Problems    Diagnosis    Left displaced femoral [facility] fracture ([facility] [S72.002A]     Priority: High    Hypoxia [R09.02]     Priority: Medium    CAP (community acquired pneumonia) [J18.9]     Priority: Medium    Venous stasis dermatitis of both lower extremities [I87.2]    Stage 3 chronic kidney disease [N18.30]    Parkinson disease ([facility] [G20]    Hypothyroidism [E03.9]    Hypertension [I10]         Plan  · Left femoral neck fracture: Ortho consultation - Dr. Emily Yao to see this evening. DVT prophylaxis per ortho. IV Morphine for pain- post-op pain control per ortho. PT/OT eventually. Hold ASA/ACEi. Cardio with low-intermediate risk. · ? CAP vs CHF vs asthma w/ hypoxia vs aspiration: CXR and CTA chest noted. ATB's for now--pro-arline low--stop soon. Pneumococcal/legionella urine ag's. COVID-19 neg in ED. Pulmonology reporting low-intermediate risk. O2 as needed. Bronchodilators. IV steroids per pulm. MBS/SLP eval.  · Follow labs  · DVT prophylaxis. · Please see orders for further management and care. ·  for discharge planning  · Discharge plan: TBD pending clinical improvement     I have seen and examined this patient independently. The case has been discussed and the patient will be physically seen by Dr. Darren Graves as well. All interventions have been agreed upon. LORIE Alaniz, FNP-C  1/28/2021  11:30 AM    I can be reached through [employer]. Addendum: I have personally participated in a face-to-face history and physical exam on the date of service with the patient. I have discussed the case with the nurse practitioner. I also participated in medical decision making on the date of service and I agree with all of the pertinent clinical information unless indicated in my editing of the note.  I have reviewed and edited the note above based on my findings during my history, exam, and decision making on the same day of service. My additional thoughts:    As above    For hip replacement tomorrow    Electronically signed by Alyssa Aguilar DO on 1/28/2021 at 6:17 PM    I can be reached through [employer].

## 2021-01-28 NOTE — CARE COORDINATION
Social Work:    Social service met with Mr. Yudelka Burns and spoke with his wife via phone. Mr. Yudelka Burns fell and fractured his left hip at home and is expected to go into surgery tomorrow. Mr. Dena Carvalho PCP is Dr. Krista Sneed and he uses [facility] in [place] or [facility] in [facility]. He resides with his wife in a ranch home and used a cane in the day and wheeled walker at night. Social service advised Mr. & Mrs. Yudelka Burns about post operative PT/OT evaluations to assist with discharge planning. Social work advised the couple about skilled rehab, [facility] [place] [facility], and DME. Mrs. Yudelka Burns advised that they would only consider skilled rehab if absolutely required and the preference is to return home with [facility] whom they have been active with. Social service to follow-up and confirm plans after therapy tomorrow.     Electronically signed by GERMÁN Carballo on 1/28/2021 at 3:13 PM

## 2021-01-28 NOTE — PROGRESS NOTES
[facility] Quality Flow/Interdisciplinary Rounds Progress Note        Quality Flow Rounds held on January 28, 2021    Disciplines Attending:  Bedside Nurse, ,  and Nursing Unit Leadership    Annette Powell was admitted on 1/27/2021  3:45 AM    Anticipated Discharge Date:  Expected Discharge Date: 01/30/21    Disposition:    Sriram Score:  Sriram Scale Score: 18    Readmission Risk              Risk of Unplanned Readmission:        14           Discussed patient goal for the day, patient clinical progression, and barriers to discharge.   The following Goal(s) of the Day/Commitment(s) have been identified:  Comfort and safety      Oleg Mccarty  January 28, 2021

## 2021-01-29 ENCOUNTER — APPOINTMENT (OUTPATIENT)
Dept: GENERAL RADIOLOGY | Age: 86
DRG: 521 | End: 2021-01-29
Payer: MEDICARE

## 2021-01-29 ENCOUNTER — ANESTHESIA (OUTPATIENT)
Dept: OPERATING ROOM | Age: 86
DRG: 521 | End: 2021-01-29
Payer: MEDICARE

## 2021-01-29 VITALS — SYSTOLIC BLOOD PRESSURE: 133 MMHG | DIASTOLIC BLOOD PRESSURE: 69 MMHG | OXYGEN SATURATION: 95 %

## 2021-01-29 PROCEDURE — 3600000014 HC SURGERY LEVEL 4 ADDTL 15MIN: Performed by: ORTHOPAEDIC SURGERY

## 2021-01-29 PROCEDURE — 94640 AIRWAY INHALATION TREATMENT: CPT

## 2021-01-29 PROCEDURE — 6360000002 HC RX W HCPCS: Performed by: ORTHOPAEDIC SURGERY

## 2021-01-29 PROCEDURE — 0SRS0JA REPLACEMENT OF LEFT HIP JOINT, FEMORAL SURFACE WITH SYNTHETIC SUBSTITUTE, UNCEMENTED, OPEN APPROACH: ICD-10-PCS | Performed by: ORTHOPAEDIC SURGERY

## 2021-01-29 PROCEDURE — 73501 X-RAY EXAM HIP UNI 1 VIEW: CPT

## 2021-01-29 PROCEDURE — 3700000000 HC ANESTHESIA ATTENDED CARE: Performed by: ORTHOPAEDIC SURGERY

## 2021-01-29 PROCEDURE — 88311 DECALCIFY TISSUE: CPT

## 2021-01-29 PROCEDURE — 6360000002 HC RX W HCPCS: Performed by: INTERNAL MEDICINE

## 2021-01-29 PROCEDURE — 1200000000 HC SEMI PRIVATE

## 2021-01-29 PROCEDURE — 7100000000 HC PACU RECOVERY - FIRST 15 MIN: Performed by: ORTHOPAEDIC SURGERY

## 2021-01-29 PROCEDURE — 6360000002 HC RX W HCPCS

## 2021-01-29 PROCEDURE — 2500000003 HC RX 250 WO HCPCS: Performed by: ORTHOPAEDIC SURGERY

## 2021-01-29 PROCEDURE — 2500000003 HC RX 250 WO HCPCS

## 2021-01-29 PROCEDURE — 2580000003 HC RX 258: Performed by: ORTHOPAEDIC SURGERY

## 2021-01-29 PROCEDURE — 3600000004 HC SURGERY LEVEL 4 BASE: Performed by: ORTHOPAEDIC SURGERY

## 2021-01-29 PROCEDURE — 2500000003 HC RX 250 WO HCPCS: Performed by: INTERNAL MEDICINE

## 2021-01-29 PROCEDURE — 6370000000 HC RX 637 (ALT 250 FOR IP): Performed by: ORTHOPAEDIC SURGERY

## 2021-01-29 PROCEDURE — 88305 TISSUE EXAM BY PATHOLOGIST: CPT

## 2021-01-29 PROCEDURE — 3700000001 HC ADD 15 MINUTES (ANESTHESIA): Performed by: ORTHOPAEDIC SURGERY

## 2021-01-29 PROCEDURE — 2700000000 HC OXYGEN THERAPY PER DAY

## 2021-01-29 PROCEDURE — 2709999900 HC NON-CHARGEABLE SUPPLY: Performed by: ORTHOPAEDIC SURGERY

## 2021-01-29 PROCEDURE — 6370000000 HC RX 637 (ALT 250 FOR IP): Performed by: INTERNAL MEDICINE

## 2021-01-29 PROCEDURE — 2580000003 HC RX 258

## 2021-01-29 PROCEDURE — 87081 CULTURE SCREEN ONLY: CPT

## 2021-01-29 PROCEDURE — 7100000001 HC PACU RECOVERY - ADDTL 15 MIN: Performed by: ORTHOPAEDIC SURGERY

## 2021-01-29 PROCEDURE — C1776 JOINT DEVICE (IMPLANTABLE): HCPCS | Performed by: ORTHOPAEDIC SURGERY

## 2021-01-29 PROCEDURE — 2580000003 HC RX 258: Performed by: INTERNAL MEDICINE

## 2021-01-29 DEVICE — HEAD FEM OD47MM ID26MM HIP CO CHROM POLYETH BPLR CEMENTLESS: Type: IMPLANTABLE DEVICE | Site: HIP | Status: FUNCTIONAL

## 2021-01-29 DEVICE — HEAD FEM DIA26MM +0MM OFFSET HIP CO CHROM V40 TAPR LO FRIC: Type: IMPLANTABLE DEVICE | Site: HIP | Status: FUNCTIONAL

## 2021-01-29 DEVICE — STEM FEM SZ 5 L130MM NK L37MM +48MM OFFSET 127DEG HIP CO: Type: IMPLANTABLE DEVICE | Site: HIP | Status: FUNCTIONAL

## 2021-01-29 RX ORDER — PROMETHAZINE HYDROCHLORIDE 25 MG/1
12.5 TABLET ORAL EVERY 6 HOURS PRN
Status: DISCONTINUED | OUTPATIENT
Start: 2021-01-29 | End: 2021-02-01 | Stop reason: HOSPADM

## 2021-01-29 RX ORDER — ONDANSETRON 2 MG/ML
INJECTION INTRAMUSCULAR; INTRAVENOUS PRN
Status: DISCONTINUED | OUTPATIENT
Start: 2021-01-29 | End: 2021-01-29 | Stop reason: SDUPTHER

## 2021-01-29 RX ORDER — PROMETHAZINE HYDROCHLORIDE 25 MG/ML
6.25 INJECTION, SOLUTION INTRAMUSCULAR; INTRAVENOUS
Status: DISCONTINUED | OUTPATIENT
Start: 2021-01-29 | End: 2021-01-29 | Stop reason: HOSPADM

## 2021-01-29 RX ORDER — SODIUM CHLORIDE 9 MG/ML
INJECTION, SOLUTION INTRAVENOUS CONTINUOUS
Status: ACTIVE | OUTPATIENT
Start: 2021-01-29 | End: 2021-01-30

## 2021-01-29 RX ORDER — PROPOFOL 10 MG/ML
INJECTION, EMULSION INTRAVENOUS CONTINUOUS PRN
Status: DISCONTINUED | OUTPATIENT
Start: 2021-01-29 | End: 2021-01-29 | Stop reason: SDUPTHER

## 2021-01-29 RX ORDER — ONDANSETRON 2 MG/ML
4 INJECTION INTRAMUSCULAR; INTRAVENOUS EVERY 6 HOURS PRN
Status: DISCONTINUED | OUTPATIENT
Start: 2021-01-29 | End: 2021-02-01 | Stop reason: HOSPADM

## 2021-01-29 RX ORDER — FENTANYL CITRATE 50 UG/ML
INJECTION, SOLUTION INTRAMUSCULAR; INTRAVENOUS PRN
Status: DISCONTINUED | OUTPATIENT
Start: 2021-01-29 | End: 2021-01-29 | Stop reason: SDUPTHER

## 2021-01-29 RX ORDER — TRAMADOL HYDROCHLORIDE 50 MG/1
50 TABLET ORAL EVERY 6 HOURS PRN
Status: DISCONTINUED | OUTPATIENT
Start: 2021-01-29 | End: 2021-02-01 | Stop reason: HOSPADM

## 2021-01-29 RX ORDER — SODIUM CHLORIDE 0.9 % (FLUSH) 0.9 %
10 SYRINGE (ML) INJECTION PRN
Status: DISCONTINUED | OUTPATIENT
Start: 2021-01-29 | End: 2021-02-01 | Stop reason: HOSPADM

## 2021-01-29 RX ORDER — DIPHENHYDRAMINE HYDROCHLORIDE 50 MG/ML
12.5 INJECTION INTRAMUSCULAR; INTRAVENOUS
Status: DISCONTINUED | OUTPATIENT
Start: 2021-01-29 | End: 2021-01-29 | Stop reason: HOSPADM

## 2021-01-29 RX ORDER — ACETAMINOPHEN 500 MG
1000 TABLET ORAL EVERY 6 HOURS
Status: DISCONTINUED | OUTPATIENT
Start: 2021-01-29 | End: 2021-02-01 | Stop reason: HOSPADM

## 2021-01-29 RX ORDER — SENNA AND DOCUSATE SODIUM 50; 8.6 MG/1; MG/1
1 TABLET, FILM COATED ORAL 2 TIMES DAILY
Status: DISCONTINUED | OUTPATIENT
Start: 2021-01-29 | End: 2021-02-01 | Stop reason: HOSPADM

## 2021-01-29 RX ORDER — TRANEXAMIC ACID 100 MG/ML
15 INJECTION, SOLUTION INTRAVENOUS ONCE
Status: DISCONTINUED | OUTPATIENT
Start: 2021-01-29 | End: 2021-01-29 | Stop reason: SDUPTHER

## 2021-01-29 RX ORDER — SODIUM CHLORIDE 0.9 % (FLUSH) 0.9 %
10 SYRINGE (ML) INJECTION EVERY 12 HOURS SCHEDULED
Status: DISCONTINUED | OUTPATIENT
Start: 2021-01-29 | End: 2021-02-01 | Stop reason: HOSPADM

## 2021-01-29 RX ORDER — BUPIVACAINE HYDROCHLORIDE 7.5 MG/ML
INJECTION, SOLUTION INTRASPINAL PRN
Status: DISCONTINUED | OUTPATIENT
Start: 2021-01-29 | End: 2021-01-29 | Stop reason: SDUPTHER

## 2021-01-29 RX ORDER — SODIUM CHLORIDE 9 MG/ML
INJECTION, SOLUTION INTRAVENOUS CONTINUOUS PRN
Status: DISCONTINUED | OUTPATIENT
Start: 2021-01-29 | End: 2021-01-29 | Stop reason: SDUPTHER

## 2021-01-29 RX ORDER — MEPERIDINE HYDROCHLORIDE 25 MG/ML
12.5 INJECTION INTRAMUSCULAR; INTRAVENOUS; SUBCUTANEOUS EVERY 10 MIN PRN
Status: DISCONTINUED | OUTPATIENT
Start: 2021-01-29 | End: 2021-01-29 | Stop reason: HOSPADM

## 2021-01-29 RX ORDER — HYDROCODONE BITARTRATE AND ACETAMINOPHEN 5; 325 MG/1; MG/1
1 TABLET ORAL
Status: DISCONTINUED | OUTPATIENT
Start: 2021-01-29 | End: 2021-01-29 | Stop reason: HOSPADM

## 2021-01-29 RX ORDER — FENTANYL CITRATE 50 UG/ML
50 INJECTION, SOLUTION INTRAMUSCULAR; INTRAVENOUS EVERY 5 MIN PRN
Status: DISCONTINUED | OUTPATIENT
Start: 2021-01-29 | End: 2021-01-29 | Stop reason: HOSPADM

## 2021-01-29 RX ORDER — PROPOFOL 10 MG/ML
INJECTION, EMULSION INTRAVENOUS PRN
Status: DISCONTINUED | OUTPATIENT
Start: 2021-01-29 | End: 2021-01-29 | Stop reason: SDUPTHER

## 2021-01-29 RX ORDER — DEXAMETHASONE SODIUM PHOSPHATE 4 MG/ML
INJECTION, SOLUTION INTRA-ARTICULAR; INTRALESIONAL; INTRAMUSCULAR; INTRAVENOUS; SOFT TISSUE PRN
Status: DISCONTINUED | OUTPATIENT
Start: 2021-01-29 | End: 2021-01-29 | Stop reason: SDUPTHER

## 2021-01-29 RX ORDER — PHENYLEPHRINE HYDROCHLORIDE 10 MG/ML
INJECTION INTRAVENOUS PRN
Status: DISCONTINUED | OUTPATIENT
Start: 2021-01-29 | End: 2021-01-29 | Stop reason: SDUPTHER

## 2021-01-29 RX ORDER — TRAMADOL HYDROCHLORIDE 50 MG/1
100 TABLET ORAL EVERY 6 HOURS PRN
Status: DISCONTINUED | OUTPATIENT
Start: 2021-01-29 | End: 2021-02-01 | Stop reason: HOSPADM

## 2021-01-29 RX ADMIN — DOXYCYCLINE 100 MG: 100 INJECTION, POWDER, LYOPHILIZED, FOR SOLUTION INTRAVENOUS at 20:08

## 2021-01-29 RX ADMIN — CEFTRIAXONE 1000 MG: 1 INJECTION, POWDER, FOR SOLUTION INTRAMUSCULAR; INTRAVENOUS at 06:44

## 2021-01-29 RX ADMIN — SODIUM CHLORIDE: 9 INJECTION, SOLUTION INTRAVENOUS at 15:20

## 2021-01-29 RX ADMIN — DOXAZOSIN 4 MG: 4 TABLET ORAL at 20:08

## 2021-01-29 RX ADMIN — METHYLPREDNISOLONE SODIUM SUCCINATE 40 MG: 40 INJECTION, POWDER, LYOPHILIZED, FOR SOLUTION INTRAMUSCULAR; INTRAVENOUS at 20:08

## 2021-01-29 RX ADMIN — PROPOFOL 25 MCG/KG/MIN: 10 INJECTION, EMULSION INTRAVENOUS at 12:20

## 2021-01-29 RX ADMIN — METHYLPREDNISOLONE SODIUM SUCCINATE 40 MG: 40 INJECTION, POWDER, LYOPHILIZED, FOR SOLUTION INTRAMUSCULAR; INTRAVENOUS at 06:44

## 2021-01-29 RX ADMIN — ACETAMINOPHEN 1000 MG: 500 TABLET ORAL at 20:09

## 2021-01-29 RX ADMIN — SODIUM CHLORIDE, PRESERVATIVE FREE 10 ML: 5 INJECTION INTRAVENOUS at 20:13

## 2021-01-29 RX ADMIN — Medication 2000 MG: at 20:08

## 2021-01-29 RX ADMIN — SODIUM CHLORIDE: 9 INJECTION, SOLUTION INTRAVENOUS at 01:18

## 2021-01-29 RX ADMIN — CLOBETASOL PROPIONATE: 0.5 OINTMENT TOPICAL at 20:12

## 2021-01-29 RX ADMIN — Medication 2 G: at 11:58

## 2021-01-29 RX ADMIN — TRAZODONE HYDROCHLORIDE 50 MG: 50 TABLET ORAL at 20:09

## 2021-01-29 RX ADMIN — PROPOFOL 20 MG: 10 INJECTION, EMULSION INTRAVENOUS at 12:12

## 2021-01-29 RX ADMIN — DOXYCYCLINE 100 MG: 100 INJECTION, POWDER, LYOPHILIZED, FOR SOLUTION INTRAVENOUS at 06:44

## 2021-01-29 RX ADMIN — SODIUM CHLORIDE: 9 INJECTION, SOLUTION INTRAVENOUS at 11:58

## 2021-01-29 RX ADMIN — PROPOFOL 20 MG: 10 INJECTION, EMULSION INTRAVENOUS at 12:05

## 2021-01-29 RX ADMIN — MORPHINE SULFATE 2 MG: 2 INJECTION, SOLUTION INTRAMUSCULAR; INTRAVENOUS at 02:45

## 2021-01-29 RX ADMIN — ONDANSETRON 4 MG: 2 INJECTION INTRAMUSCULAR; INTRAVENOUS at 13:25

## 2021-01-29 RX ADMIN — IPRATROPIUM BROMIDE AND ALBUTEROL SULFATE 1 AMPULE: .5; 3 SOLUTION RESPIRATORY (INHALATION) at 08:46

## 2021-01-29 RX ADMIN — PROPOFOL 30 MG: 10 INJECTION, EMULSION INTRAVENOUS at 12:08

## 2021-01-29 RX ADMIN — CARBIDOPA AND LEVODOPA 2 TABLET: 25; 100 TABLET ORAL at 20:08

## 2021-01-29 RX ADMIN — PHENYLEPHRINE HYDROCHLORIDE 100 MCG: 10 INJECTION INTRAVENOUS at 13:38

## 2021-01-29 RX ADMIN — IPRATROPIUM BROMIDE AND ALBUTEROL SULFATE 1 AMPULE: .5; 3 SOLUTION RESPIRATORY (INHALATION) at 20:55

## 2021-01-29 RX ADMIN — DOCUSATE SODIUM 50 MG AND SENNOSIDES 8.6 MG 1 TABLET: 8.6; 5 TABLET, FILM COATED ORAL at 20:09

## 2021-01-29 RX ADMIN — BUPIVACAINE HYDROCHLORIDE IN DEXTROSE 2 ML: 7.5 INJECTION, SOLUTION SUBARACHNOID at 12:12

## 2021-01-29 RX ADMIN — FENTANYL CITRATE 25 MCG: 50 INJECTION, SOLUTION INTRAMUSCULAR; INTRAVENOUS at 12:05

## 2021-01-29 RX ADMIN — MONTELUKAST SODIUM 10 MG: 10 TABLET, FILM COATED ORAL at 20:09

## 2021-01-29 RX ADMIN — IPRATROPIUM BROMIDE AND ALBUTEROL SULFATE 1 AMPULE: .5; 3 SOLUTION RESPIRATORY (INHALATION) at 14:43

## 2021-01-29 RX ADMIN — CARBIDOPA AND LEVODOPA 2 TABLET: 25; 100 TABLET ORAL at 17:23

## 2021-01-29 RX ADMIN — PHENYLEPHRINE HYDROCHLORIDE 100 MCG: 10 INJECTION INTRAVENOUS at 13:35

## 2021-01-29 RX ADMIN — DEXAMETHASONE SODIUM PHOSPHATE 8 MG: 4 INJECTION, SOLUTION INTRAMUSCULAR; INTRAVENOUS at 12:35

## 2021-01-29 ASSESSMENT — PULMONARY FUNCTION TESTS
PIF_VALUE: 0
PIF_VALUE: 1
PIF_VALUE: 0
PIF_VALUE: 1
PIF_VALUE: 1
PIF_VALUE: 0
PIF_VALUE: 2
PIF_VALUE: 0
PIF_VALUE: 0
PIF_VALUE: 1
PIF_VALUE: 1
PIF_VALUE: 0
PIF_VALUE: 0
PIF_VALUE: 1
PIF_VALUE: 0
PIF_VALUE: 1
PIF_VALUE: 0
PIF_VALUE: 0
PIF_VALUE: 1
PIF_VALUE: 0
PIF_VALUE: 1
PIF_VALUE: 0
PIF_VALUE: 0
PIF_VALUE: 1
PIF_VALUE: 0
PIF_VALUE: 1
PIF_VALUE: 0
PIF_VALUE: 1
PIF_VALUE: 0
PIF_VALUE: 1
PIF_VALUE: 1
PIF_VALUE: 0
PIF_VALUE: 0
PIF_VALUE: 1
PIF_VALUE: 1
PIF_VALUE: 0
PIF_VALUE: 1
PIF_VALUE: 1
PIF_VALUE: 0

## 2021-01-29 ASSESSMENT — PAIN - FUNCTIONAL ASSESSMENT: PAIN_FUNCTIONAL_ASSESSMENT: 0-10

## 2021-01-29 ASSESSMENT — PAIN SCALES - GENERAL
PAINLEVEL_OUTOF10: 0
PAINLEVEL_OUTOF10: 7
PAINLEVEL_OUTOF10: 0

## 2021-01-29 NOTE — PROGRESS NOTES
Internal Medicine Progress Note    Patient's name: Roxann Cheadle  : 1930  Admission date: 2021  Date of service: 2021   Room: [place] [id number] ORTHO SURGERY  Primary care physician: Danny Hanks MD  Reason for visit: Follow-up for hip fx    Subjective  M was seen and examined at bedside. Patient was slightly confused this morning when I talked to him. He was unsure of which hospital he was at and forgot why he was brought into the hospital.  Otherwise no other complaints at this time. Had an uneventful night. Review of Systems  There are no new complaints of chest pain, shortness of breath, abdominal pain, nausea, vomiting, diarrhea, constipation.     Hospital Medications  Current Facility-Administered Medications   Medication Dose Route Frequency Provider Last Rate Last Admin    methylPREDNISolone sodium (SOLU-MEDROL) injection 40 mg  40 mg Intravenous Q12H Josue Goznalez, DO   40 mg at 21 1719    0.9 % sodium chloride infusion   Intravenous Continuous Sri Weir MD 75 mL/hr at 21 0118 New Bag at 21 0118    cefTRIAXone (ROCEPHIN) 1,000 mg in sterile water 10 mL IV syringe  1,000 mg Intravenous Q24H Hunter MANUEL Volino, DO   1,000 mg at 21 0615    doxycycline (VIBRAMYCIN) 100 mg in dextrose 5 % 100 mL IVPB  100 mg Intravenous Q12H Hunter MAR Volino, DO   Stopped at 21 1950    morphine (PF) injection 2 mg  2 mg Intravenous Q4H PRN Hunter Velascoino, DO   2 mg at 21 0245    sodium chloride flush 0.9 % injection 10 mL  10 mL Intravenous 2 times per day Hunter MAR Volino, DO   10 mL at 21 0913    sodium chloride flush 0.9 % injection 10 mL  10 mL Intravenous PRN Hunter MANUEL Volino, DO   10 mL at 21 0848    potassium chloride (KLOR-CON M) extended release tablet 40 mEq  40 mEq Oral PRN Hunter E Volino, DO        Or    potassium bicarb-citric acid (EFFER-K) effervescent tablet 40 mEq  40 mEq Oral PRN Hunter E Volino, DO        Or    potassium chloride 10 mEq/100 mL IVPB (Peripheral Line)  10 mEq Intravenous PRN Hunter Velascoino, DO        senna (SENOKOT) tablet 8.6 mg  1 tablet Oral Daily PRN Hunter Quan, DO        acetaminophen (TYLENOL) tablet 650 mg  650 mg Oral Q6H PRN Hunter Quan, DO   650 mg at 01/27/21 1416    Or    acetaminophen (TYLENOL) suppository 650 mg  650 mg Rectal Q6H PRN Hunter Quan, DO        carbidopa-levodopa (SINEMET)  MG per tablet 2 tablet  2 tablet Oral 4x Daily Hunter Quan, DO   2 tablet at 01/28/21 2218    clobetasol (TEMOVATE) 0.05 % ointment   Topical BID Hunter Quan, DO   Given at 01/28/21 2221    doxazosin (CARDURA) tablet 4 mg  4 mg Oral Nightly Hunter Quan, DO   4 mg at 01/27/21 2235    traZODone (DESYREL) tablet 50 mg  50 mg Oral Nightly Hunter Quan, DO   50 mg at 01/28/21 2218    bumetanide (BUMEX) tablet 1 mg  1 mg Oral Daily Hunter Quan, DO   1 mg at 01/28/21 0910    ipratropium-albuterol (DUONEB) nebulizer solution 1 ampule  1 ampule Inhalation Q4H WA Hunter Quan, DO   1 ampule at 01/28/21 2015    perflutren lipid microspheres (DEFINITY) injection 1.65 mg  1.5 mL Intravenous ONCE PRN Hunter Quan, DO        fluticasone (FLONASE) 50 MCG/ACT nasal spray 2 spray  2 spray Each Nostril Daily Sonny Gonzalez DO   2 spray at 01/28/21 0912    montelukast (SINGULAIR) tablet 10 mg  10 mg Oral Nightly Josue Gonzalez, DO   10 mg at 01/27/21 2236       PRN Medications  morphine, sodium chloride flush, potassium chloride **OR** potassium alternative oral replacement **OR** potassium chloride, senna, acetaminophen **OR** acetaminophen, perflutren lipid microspheres    Objective  Most Recent Recorded Vitals  /84   Pulse 89   Temp 97.9 °F (36.6 °C) (Oral)   Resp 16   Ht 5' 7\" (1.702 m)   Wt 190 lb 8 oz (86.4 kg)   SpO2 96%   BMI 29.84 kg/m²   I/O last 3 completed shifts:   In: 900 [P.O.:900]  Out: 1450 [Urine:1450]  I/O this shift:  In: -   Out: 650 [Urine:650]    Physical Exam:  General: AAO to person/place/time/purpose, NAD, no labored breathing, [place]  Eyes: conjunctivae/corneas clear, sclera non icteric  Skin: color/texture/turgor normal, no rashes or lesions  Lungs: overall diminished, wheezing noted bilaterally 4LO2NC, no retractions/use of accessory muscles, no vocal fremitus, no rhonchi, no crackle, no rales  Heart: regular rate, regular rhythm, no murmur  Abdomen: soft, NT; bowel sounds normal; no masses,  no organomegaly  Extremities: Pain with movement of the left hip with shortening of the left leg, no cyanosis, no edema  Neurologic: cranial nerves 2-12 grossly intact, no slurred speech. Most Recent Labs  Lab Results   Component Value Date    WBC 13.8 (H) 01/28/2021    HGB 11.7 (L) 01/28/2021    HCT 36.1 (L) 01/28/2021     01/28/2021     01/28/2021    K 4.5 01/28/2021     01/28/2021    CREATININE 1.3 (H) 01/28/2021    BUN 38 (H) 01/28/2021    CO2 27 01/28/2021    GLUCOSE 152 (H) 01/28/2021    ALT <5 01/28/2021    AST 20 01/28/2021    INR 1.3 12/24/2017    TSH 5.810 (H) 09/24/2020       CTA CHEST W CONTRAST   Final Result   No evidence of pulmonary embolism. Dependent airspace disease in right upper and left lower lobes likely   represents atelectasis. Coronary artery calcification. XR HIP 2-3 VW W PELVIS LEFT   Final Result   Displaced left femoral neck fracture. XR CHEST PORTABLE   Final Result   There may be some subtle infiltrate in right upper lobe and left lower lobe. Fluoroscopy modified barium swallow with video    (Results Pending)       Last Echocardiogram 1/28/2021    The aortic valve appears mildly sclerotic.    Physiologic and/or trace aortic regurgitation is noted.    No hemodynamically significant aortic stenosis is present.    Mild tricuspid regurgitation.    The left atrium is mildly dilated.    Mild left ventricular concentric hypertrophy noted.    LV Ejection fraction is visually estimated at 60%.      Assessment   Active Hospital Problems Diagnosis    Left displaced femoral neck fracture ([facility] [S72.002A]     Priority: High    Hypoxia [R09.02]     Priority: Medium    CAP (community acquired pneumonia) [J18.9]     Priority: Medium    Venous stasis dermatitis of both lower extremities [I87.2]    Stage 3 chronic kidney disease [N18.30]    [facility] [G20]    Hypothyroidism [E03.9]    Hypertension [I10]         Plan  · Left femoral neck fracture: Ortho following-- for hemiarthroplasty 1/29. DVT prophylaxis per ortho. IV Morphine for pain- post-op pain control per ortho. PT/OT eventually. Hold ASA/ACEi. Cardio with low-intermediate risk. · ? CAP vs CHF vs asthma w/ hypoxia vs aspiration: CXR and CTA chest noted. ATB's for now--pro-arline low--stop soon. Pneumococcal/legionella urine ag's. COVID-19 neg in ED. Pulmonology reporting low-intermediate risk. O2 as needed. Bronchodilators. IV steroids per pulm. MBS/SLP eval.  · Follow labs  · DVT prophylaxis. · Please see orders for further management and care. ·  for discharge planning   Discharge plan: likely SNF will be needed, but if he does well post-op then [place] [id number]    The patient was seen and evaluated by myself and Catrachito Clifton MD PGY-1  1/29/2021 6:25 AM    Addendum: I have personally participated in a face-to-face history and physical exam on the date of service with the patient. I have discussed the case with the resident. I also participated in medical decision making with the resident on the date of service and I agree with all of the pertinent clinical information unless indicated in my editing of the note. I have reviewed and edited the note above based on my findings during my history, exam, and decision making on the same day of service.      My additional thoughts:    For surgery today   He is feeling well   He is off oxygen   Cardiology and pulmonology consults appreciated for perioperative risk assessment    Electronically signed by Dinh Royal DO on 1/29/2021 at 10:26 AM    I can be reached through [email or web address].

## 2021-01-29 NOTE — CARE COORDINATION
Social Work:    Gabrielle at [facility] is following for possible discharge home when stable. Social service will need to follow up with patient's wife after PT/OT to confirm goals for home.     Electronically signed by GERMÁN Thomas on 1/29/2021 at 12:56 PM

## 2021-01-29 NOTE — PROGRESS NOTES
Speech Language Pathology      NAME:  Chey Ivey  :  1930  DATE: 2021  ROOM:  OR POOL/NONE         Attempted to see pt for dysphagia therapy in PM.    Pt unavailable at this time due to:  [] HOLD per RN  [x] Off unit for testing/ procedure    [] With medical staff   [] Declined intervention  [] Sleeping/ Lethargic   [] Other:       Will re-attempt as able. Thank you. Left displaced femoral neck fracture ([address] Charley ALVAREZ CCC/SLP [id number]  Speech-Language Pathologist

## 2021-01-29 NOTE — ANESTHESIA PRE PROCEDURE
Department of Anesthesiology  Preprocedure Note       Name:  Katherine Espana   Age:  80 y.o.  :  1930                                          MRN:  [id number]         Date:  2021      Surgeon: Sandra Saravia):  Sam Gonzalez MD    Procedure: Procedure(s):  LEFT HIP HEMIARTHROPLASTY +++STANLEY+++    Medications prior to admission:   Prior to Admission medications    Medication Sig Start Date End Date Taking? Authorizing Provider   bumetanide (BUMEX) 1 MG tablet Take 1 mg by mouth daily    Historical Provider, MD   clobetasol (TEMOVATE) 0.05 % ointment Apply topically 2 times daily. 20   Estephania Correa DPM   carbidopa-levodopa (SINEMET)  MG per tablet Take 2 tablets by mouth 4 times daily 20   Historical Provider, MD   silver sulfADIAZINE (SILVADENE) 1 % cream Apply topically daily. 20   Amaya Gomez DO   potassium chloride (KLOR-CON M) 20 MEQ extended release tablet Take 1 tablet by mouth daily 20   Everett Liz MD   levothyroxine (SYNTHROID) 25 MCG tablet Take 3 tabs PO Monday - Friday and 2 tabs PO Saturday - 20  LORIE Vyas CNP   traZODone (DESYREL) 50 MG tablet Take 1 tablet by mouth nightly 20  LORIE Castillo CNP   doxazosin (CARDURA) 4 MG tablet Take 1 tablet by mouth nightly 20   LORIE Castillo CNP   lisinopril-hydroCHLOROthiazide (PRINZIDE;ZESTORETIC) 10-12.5 MG per tablet Take 1 tablet by mouth daily 4/1/20 3/27/21  Everett Liz MD   lisinopril (PRINIVIL;ZESTRIL) 10 MG tablet Take 1 tablet by mouth daily 20   Everett Liz MD   triamcinolone (KENALOG) 0.1 % cream Apply topically 2 times daily Apply topically 2 times daily. 10/21/19   Everett Liz MD   aspirin 81 MG tablet Take 81 mg by mouth daily    Historical Provider, MD   Misc.  Devices MISC Dispense #2 compression stalkings  Dx: leg edema 17   Osito Fleming,    Red Yeast Rice 600 MG TABS Take by mouth 2 times daily     Historical MD Estephania       Current medications:    Current Facility-Administered Medications   Medication Dose Route Frequency Provider Last Rate Last Admin    methylPREDNISolone sodium (SOLU-MEDROL) injection 40 mg  40 mg Intravenous Q12H Josue Gonzalez, DO   40 mg at 01/28/21 1719    0.9 % sodium chloride infusion   Intravenous Continuous Sam Gonzalez MD        cefTRIAXone (ROCEPHIN) 1,000 mg in sterile water 10 mL IV syringe  1,000 mg Intravenous Q24H Hunter Velascoino, DO   1,000 mg at 01/28/21 0615    doxycycline (VIBRAMYCIN) 100 mg in dextrose 5 % 100 mL IVPB  100 mg Intravenous Q12H Hunter Velascoino,  mL/hr at 01/28/21 1842 100 mg at 01/28/21 1842    morphine (PF) injection 2 mg  2 mg Intravenous Q4H PRN Hunter MANUEL Volino, DO   2 mg at 01/28/21 0407    sodium chloride flush 0.9 % injection 10 mL  10 mL Intravenous 2 times per day Hunter MANUEL Volino, DO   10 mL at 01/28/21 0913    sodium chloride flush 0.9 % injection 10 mL  10 mL Intravenous PRN Hunter MANUEL Volino, DO   10 mL at 01/28/21 0848    potassium chloride (KLOR-CON M) extended release tablet 40 mEq  40 mEq Oral PRN Hunter E Volino, DO        Or    potassium bicarb-citric acid (EFFER-K) effervescent tablet 40 mEq  40 mEq Oral PRN Hunter E Volino, DO        Or    potassium chloride 10 mEq/100 mL IVPB (Peripheral Line)  10 mEq Intravenous PRN Hunter E Volino, DO        senna (SENOKOT) tablet 8.6 mg  1 tablet Oral Daily PRN Hunter E Volino, DO        acetaminophen (TYLENOL) tablet 650 mg  650 mg Oral Q6H PRN Hunter MANUEL Volino, DO   650 mg at 01/27/21 1416    Or    acetaminophen (TYLENOL) suppository 650 mg  650 mg Rectal Q6H PRN Hunter E Volino, DO        carbidopa-levodopa (SINEMET)  MG per tablet 2 tablet  2 tablet Oral 4x Daily Hunter E Volino, DO   2 tablet at 01/28/21 1719    clobetasol (TEMOVATE) 0.05 % ointment   Topical BID Hunter MANUEL Volino, DO   Given at 01/28/21 0945    doxazosin (CARDURA) tablet 4 mg  4 mg Oral Nightly Hunter Quan DO   4 mg at 21    traZODone (DESYREL) tablet 50 mg  50 mg Oral Nightly Hunter Quan, DO   50 mg at 21    bumetanide (BUMEX) tablet 1 mg  1 mg Oral Daily Hunter Quan, DO   1 mg at 21 0910    ipratropium-albuterol (DUONEB) nebulizer solution 1 ampule  1 ampule Inhalation Q4H WA Hunter Quan DO   1 ampule at 21    perflutren lipid microspheres (DEFINITY) injection 1.65 mg  1.5 mL Intravenous ONCE PRN Hunter Quan DO        fluticasone (FLONASE) 50 MCG/ACT nasal spray 2 spray  2 spray Each Nostril Daily Josue Gonzalez DO   2 spray at 21 0912    montelukast (SINGULAIR) tablet 10 mg  10 mg Oral Nightly Josue Gonzalez, DO   10 mg at 21       Allergies:     Allergies   Allergen Reactions    Sulfa Antibiotics      Hyperactivity        Problem List:    Patient Active Problem List   Diagnosis Code    Hypertension I10    Parkinson disease ([facility] G20    Hypothyroidism E03.9    Stage 3 chronic kidney disease N18.30    Venous stasis dermatitis of both lower extremities I87.2    Left displaced femoral neck fracture ([facility] S72.002A    Hypoxia R09.02    CAP (community acquired pneumonia) J18.9       Past Medical History:        Diagnosis Date    High cholesterol     Hypertension     Hypothyroidism     Parkinson disease ([facility]     Diagnosed in  by Dr. 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 6     HERNIA REPAIR Left     groin repair     SHOULDER SURGERY Right     5-10 years ago    TONSILLECTOMY AND ADENOIDECTOMY         Social History:    Social History     Tobacco Use    Smoking status: Former Smoker     Years: 10.00     Quit date: 1970     Years since quittin.1    Smokeless tobacco: Never Used   Substance Use Topics    Alcohol use: No     Comment: Social                                Counseling given: Not Answered      Vital Signs (Current):   Vitals:    01/28/21 1231 01/28/21 1530 01/28/21 1900 01/28/21 1915   BP: (!) 119/57 126/61  130/60   Pulse: 87 89  92   Resp: 16 16  16   Temp: 97.9 °F (36.6 °C) 98.4 °F (36.9 °C)  98.9 °F (37.2 °C)   TempSrc: Oral Axillary  Oral   SpO2: 94%  95% 96%   Weight:       Height:                                                  BP Readings from Last 3 Encounters:   01/28/21 130/60   01/11/21 110/70   12/22/20 123/78       NPO Status:                                                                                 BMI:   Wt Readings from Last 3 Encounters:   01/27/21 190 lb 8 oz (86.4 kg)   07/01/20 185 lb 3.2 oz (84 kg)   07/01/20 185 lb 3.2 oz (84 kg)     Body mass index is 29.84 kg/m². CBC:   Lab Results   Component Value Date    WBC 13.8 01/28/2021    RBC 3.47 01/28/2021    HGB 11.7 01/28/2021    HCT 36.1 01/28/2021    .0 01/28/2021    RDW 15.4 01/28/2021     01/28/2021       CMP:   Lab Results   Component Value Date     01/28/2021    K 4.5 01/28/2021     01/28/2021    CO2 27 01/28/2021    BUN 38 01/28/2021    CREATININE 1.3 01/28/2021    GFRAA >60 01/28/2021    AGRATIO 1.1 12/18/2020    LABGLOM 52 01/28/2021    LABGLOM 39 01/14/2021    GLUCOSE 152 01/28/2021    GLUCOSE 123 01/14/2021    PROT 7.5 01/28/2021    CALCIUM 9.2 01/28/2021    BILITOT 0.9 01/28/2021    ALKPHOS 101 01/28/2021    AST 20 01/28/2021    ALT <5 01/28/2021       POC Tests: No results for input(s): POCGLU, POCNA, POCK, POCCL, POCBUN, POCHEMO, POCHCT in the last 72 hours.     Coags:   Lab Results   Component Value Date    PROTIME 14.0 12/24/2017    INR 1.3 12/24/2017    APTT 25.4 09/19/2017       HCG (If Applicable): No results found for: PREGTESTUR, PREGSERUM, HCG, HCGQUANT     ABGs: No results found for: PHART, PO2ART, ZSN7ANC, NSI4CSQ, BEART, [id number]     Type & Screen (If Applicable):  No results found for: LABABO, LABRH    Drug/Infectious Status (If Applicable):  No results found for: HIV, HEPCAB    COVID-19 Screening (If Applicable):   Lab Results   Component Value Date    COVID19 Not Detected 01/27/2021         Anesthesia Evaluation  Patient summary reviewed and Nursing notes reviewed no history of anesthetic complications:   Airway: Mallampati: II  TM distance: >3 FB   Neck ROM: full  Mouth opening: > = 3 FB Dental:      Comment: Grossly intact    Pulmonary:   (+) pneumonia:  COPD: moderate, severe and no interval change,  shortness of breath: new,  rhonchi,  decreased breath sounds,                            ROS comment: Former smoker   Cardiovascular:  Exercise tolerance: poor (<4 METS),   (+) hypertension: moderate, hyperlipidemia      ECG reviewed  Rhythm: regular  Rate: normal  Echocardiogram reviewed                  Neuro/Psych:   (+) neuromuscular disease:,              ROS comment: Parkinson disease GI/Hepatic/Renal:        (-) liver disease and no renal disease       Endo/Other:    (+) hypothyroidism::., .                 Abdominal:           Vascular: negative vascular ROS. Anesthesia Plan      spinal     ASA 4     (Patient is high risk due to pulmonary disease. GA as backup)  Induction: intravenous. MIPS: Postoperative opioids intended and Prophylactic antiemetics administered. Anesthetic plan and risks discussed with patient. Use of blood products discussed with patient whom consented to blood products.                  Nova Castellon MD   1/28/2021      Chart reviewed and patient examined on January 28, 2021 at 8:56 PM by Nova Castellon MD.  (Final assessment and plan per day of surgery team.)

## 2021-01-29 NOTE — BRIEF OP NOTE
Brief Postoperative Note      Patient: Lisa Paulino  YOB: 1930  MRN: [id number]    Date of Procedure: 1/29/2021    Pre-Op Diagnosis: Left femoral neck fracture    Post-Op Diagnosis: Same       Procedure(s):  LEFT HIP HEMIARTHROPLASTY    Surgeon(s):  Juan Estes MD    Assistant:  Ludwin Castillo. JUAN MANUEL Sheets    Anesthesia: Spinal    Estimated Blood Loss (mL): 740 ml    Complications: None    Specimens:   ID Type Source Tests Collected by Time Destination   A : LEFT HIP BONE Bone Bone SURGICAL PATHOLOGY Juan Estes MD 1/29/2021 1245        Implants:  Implant Name Type Inv.  Item Serial No.  Lot No. LRB No. Used Action   HEAD FEM [id number] +0MM OFFSET HIP CO CHROM V40 TAPR LO FRIC  HEAD FEM [id number] +0MM OFFSET HIP CO CHROM V40 TAPR LO FRIC  [employer] [facility] [id number] Left 1 Implanted   HEAD FEM OD47MM ID26MM HIP CO CHROM POLYETH BPLR CEMENTLESS  HEAD FEM OD47MM ID26MM HIP CO CHROM POLYETH BPLR CEMENTLESS  [employer] [facility] [id number] Left 1 Implanted   STEM FEM SZ 5 L130MM NK L37MM +48MM OFFSET 127DEG HIP CO  STEM FEM SZ 5 L130MM NK L37MM +48MM OFFSET 127DEG HIP CO  [employer] [facility] [id number] Left 1 Implanted         Drains:   Urethral Catheter (Active)   Urine Color Arabella 01/29/21 1125   Urine Appearance Hazy 01/29/21 1125   Output (mL) 250 mL 01/29/21 1125       Findings: Fracture    Electronically signed by Juan Estes MD on 1/29/2021 at 1:16 PM

## 2021-01-29 NOTE — PROGRESS NOTES
CHART REVIEWED LABS NOTED PT PULSE OXI ON RA 88 % PREOP APPLIED O2 3L N/C IMPROVED TO 95 % , CXR AND CT REVIEWED FROM 1/27/2021 .  DUONEB GIVEN UP ON FLOOR AT 0846/ SOLUMEDROL DOSE ALREADY GIVEN EARLIER AS WELL , PT S TATES BREATHING FEELS OK OCCASIONAL FEELS WINDED DR SON AWARE OF ABOVE ADDITIONAL ORDERS

## 2021-01-29 NOTE — ANESTHESIA PROCEDURE NOTES
Spinal Block    Patient location during procedure: OR  Start time: 1/29/2021 12:08 PM  End time: 1/29/2021 12:12 PM  Reason for block: primary anesthetic  Staffing  Anesthesiologist: Rosy Ramirez MD  Resident/CRNA: America Toney APRN - CRNA  Other anesthesia staff: Jeison Jay RN  Preanesthetic Checklist  Completed: patient identified, IV checked, site marked, risks and benefits discussed, surgical consent, monitors and equipment checked, pre-op evaluation, timeout performed, anesthesia consent given, oxygen available and patient being monitored  Spinal Block  Patient position: left lateral decubitus  Prep: ChloraPrep  Patient monitoring: cardiac monitor, continuous pulse ox, continuous capnometry and frequent blood pressure checks  Approach: right paramedian  Location: L3/L4  Guidance: paresthesia technique  Provider prep: mask and sterile gloves  Local infiltration: lidocaine  Agent: bupivacaine  Dose: 2  Dose: 2  Needle  Needle type: Quincke   Needle gauge: 22 G  Needle length: 3.5 in  Needle insertion depth: 3 cm

## 2021-01-30 LAB
ANION GAP SERPL CALCULATED.3IONS-SCNC: 9 MMOL/L (ref 7–16)
BUN BLDV-MCNC: 54 MG/DL (ref 8–23)
CALCIUM SERPL-MCNC: 8.4 MG/DL (ref 8.6–10.2)
CHLORIDE BLD-SCNC: 104 MMOL/L (ref 98–107)
CO2: 26 MMOL/L (ref 22–29)
CREAT SERPL-MCNC: 1.3 MG/DL (ref 0.7–1.2)
GFR AFRICAN AMERICAN: >60
GFR NON-AFRICAN AMERICAN: 52 ML/MIN/1.73
GLUCOSE BLD-MCNC: 124 MG/DL (ref 74–99)
HCT VFR BLD CALC: 28.4 % (ref 37–54)
HEMOGLOBIN: 8.9 G/DL (ref 12.5–16.5)
MRSA CULTURE ONLY: NORMAL
POTASSIUM SERPL-SCNC: 4.4 MMOL/L (ref 3.5–5)
PRO-BNP: 3791 PG/ML (ref 0–450)
SODIUM BLD-SCNC: 139 MMOL/L (ref 132–146)

## 2021-01-30 PROCEDURE — 80048 BASIC METABOLIC PNL TOTAL CA: CPT

## 2021-01-30 PROCEDURE — 2700000000 HC OXYGEN THERAPY PER DAY

## 2021-01-30 PROCEDURE — 6370000000 HC RX 637 (ALT 250 FOR IP): Performed by: ORTHOPAEDIC SURGERY

## 2021-01-30 PROCEDURE — 2580000003 HC RX 258: Performed by: ORTHOPAEDIC SURGERY

## 2021-01-30 PROCEDURE — 6360000002 HC RX W HCPCS: Performed by: ORTHOPAEDIC SURGERY

## 2021-01-30 PROCEDURE — 6370000000 HC RX 637 (ALT 250 FOR IP): Performed by: INTERNAL MEDICINE

## 2021-01-30 PROCEDURE — 97165 OT EVAL LOW COMPLEX 30 MIN: CPT | Performed by: OCCUPATIONAL THERAPIST

## 2021-01-30 PROCEDURE — 2580000003 HC RX 258: Performed by: INTERNAL MEDICINE

## 2021-01-30 PROCEDURE — 97530 THERAPEUTIC ACTIVITIES: CPT

## 2021-01-30 PROCEDURE — 36415 COLL VENOUS BLD VENIPUNCTURE: CPT

## 2021-01-30 PROCEDURE — 97116 GAIT TRAINING THERAPY: CPT

## 2021-01-30 PROCEDURE — 97161 PT EVAL LOW COMPLEX 20 MIN: CPT

## 2021-01-30 PROCEDURE — 83880 ASSAY OF NATRIURETIC PEPTIDE: CPT

## 2021-01-30 PROCEDURE — 85018 HEMOGLOBIN: CPT

## 2021-01-30 PROCEDURE — 2500000003 HC RX 250 WO HCPCS: Performed by: ORTHOPAEDIC SURGERY

## 2021-01-30 PROCEDURE — 1200000000 HC SEMI PRIVATE

## 2021-01-30 PROCEDURE — 85014 HEMATOCRIT: CPT

## 2021-01-30 PROCEDURE — 94640 AIRWAY INHALATION TREATMENT: CPT

## 2021-01-30 RX ORDER — PREDNISONE 20 MG/1
40 TABLET ORAL DAILY
Status: DISCONTINUED | OUTPATIENT
Start: 2021-01-30 | End: 2021-02-01 | Stop reason: HOSPADM

## 2021-01-30 RX ORDER — LEVOTHYROXINE SODIUM 0.03 MG/1
25 TABLET ORAL DAILY
Status: DISCONTINUED | OUTPATIENT
Start: 2021-01-30 | End: 2021-02-01 | Stop reason: HOSPADM

## 2021-01-30 RX ADMIN — ACETAMINOPHEN 1000 MG: 500 TABLET ORAL at 14:18

## 2021-01-30 RX ADMIN — PREDNISONE 40 MG: 20 TABLET ORAL at 17:01

## 2021-01-30 RX ADMIN — CARBIDOPA AND LEVODOPA 2 TABLET: 25; 100 TABLET ORAL at 17:02

## 2021-01-30 RX ADMIN — MONTELUKAST SODIUM 10 MG: 10 TABLET, FILM COATED ORAL at 21:36

## 2021-01-30 RX ADMIN — ACETAMINOPHEN 1000 MG: 500 TABLET ORAL at 03:51

## 2021-01-30 RX ADMIN — FLUTICASONE PROPIONATE 2 SPRAY: 50 SPRAY, METERED NASAL at 08:48

## 2021-01-30 RX ADMIN — MAGNESIUM HYDROXIDE 30 ML: 400 SUSPENSION ORAL at 08:55

## 2021-01-30 RX ADMIN — SODIUM CHLORIDE: 9 INJECTION, SOLUTION INTRAVENOUS at 12:50

## 2021-01-30 RX ADMIN — CARBIDOPA AND LEVODOPA 2 TABLET: 25; 100 TABLET ORAL at 12:49

## 2021-01-30 RX ADMIN — IPRATROPIUM BROMIDE AND ALBUTEROL SULFATE 1 AMPULE: .5; 3 SOLUTION RESPIRATORY (INHALATION) at 09:38

## 2021-01-30 RX ADMIN — DOCUSATE SODIUM 50 MG AND SENNOSIDES 8.6 MG 1 TABLET: 8.6; 5 TABLET, FILM COATED ORAL at 21:36

## 2021-01-30 RX ADMIN — METHYLPREDNISOLONE SODIUM SUCCINATE 40 MG: 40 INJECTION, POWDER, LYOPHILIZED, FOR SOLUTION INTRAMUSCULAR; INTRAVENOUS at 06:54

## 2021-01-30 RX ADMIN — DOXYCYCLINE 100 MG: 100 INJECTION, POWDER, LYOPHILIZED, FOR SOLUTION INTRAVENOUS at 23:07

## 2021-01-30 RX ADMIN — ACETAMINOPHEN 1000 MG: 500 TABLET ORAL at 09:18

## 2021-01-30 RX ADMIN — LEVOTHYROXINE SODIUM 25 MCG: 25 TABLET ORAL at 12:50

## 2021-01-30 RX ADMIN — CEFTRIAXONE 1000 MG: 1 INJECTION, POWDER, FOR SOLUTION INTRAMUSCULAR; INTRAVENOUS at 08:48

## 2021-01-30 RX ADMIN — ASPIRIN 325 MG: 325 TABLET, COATED ORAL at 08:48

## 2021-01-30 RX ADMIN — ACETAMINOPHEN 1000 MG: 500 TABLET ORAL at 21:36

## 2021-01-30 RX ADMIN — Medication 2000 MG: at 03:51

## 2021-01-30 RX ADMIN — BUMETANIDE 1 MG: 1 TABLET ORAL at 08:48

## 2021-01-30 RX ADMIN — SODIUM CHLORIDE, PRESERVATIVE FREE 10 ML: 5 INJECTION INTRAVENOUS at 09:11

## 2021-01-30 RX ADMIN — CLOBETASOL PROPIONATE 1 EACH: 0.5 OINTMENT TOPICAL at 09:09

## 2021-01-30 RX ADMIN — IPRATROPIUM BROMIDE AND ALBUTEROL SULFATE 1 AMPULE: .5; 3 SOLUTION RESPIRATORY (INHALATION) at 20:57

## 2021-01-30 RX ADMIN — CLOBETASOL PROPIONATE: 0.5 OINTMENT TOPICAL at 21:40

## 2021-01-30 RX ADMIN — CARBIDOPA AND LEVODOPA 2 TABLET: 25; 100 TABLET ORAL at 21:36

## 2021-01-30 RX ADMIN — DOXAZOSIN 4 MG: 4 TABLET ORAL at 21:37

## 2021-01-30 RX ADMIN — DOXYCYCLINE 100 MG: 100 INJECTION, POWDER, LYOPHILIZED, FOR SOLUTION INTRAVENOUS at 09:13

## 2021-01-30 RX ADMIN — TRAZODONE HYDROCHLORIDE 50 MG: 50 TABLET ORAL at 21:36

## 2021-01-30 RX ADMIN — DOCUSATE SODIUM 50 MG AND SENNOSIDES 8.6 MG 1 TABLET: 8.6; 5 TABLET, FILM COATED ORAL at 08:48

## 2021-01-30 RX ADMIN — CARBIDOPA AND LEVODOPA 2 TABLET: 25; 100 TABLET ORAL at 08:50

## 2021-01-30 ASSESSMENT — PAIN SCALES - GENERAL
PAINLEVEL_OUTOF10: 0

## 2021-01-30 NOTE — PROGRESS NOTES
A&O: 4/4; Follows 3 step directions   Memory:  Fair at time of evaluation   Sequencing:  Fair at time of evaluation   Problem solving:  Fair at time of evaluation   Judgement/safety:  Fair at time of evaluation     Functional Assessment:   Initial Eval Status  Date: 30Jan 2021 Treatment Status  Date: STGs = LTGs  Time frame: 5-7 days   Feeding Stand by Assist      Grooming Moderate Assist   Minimal Assist    UB Dressing Moderate Assist   Minimal Assist    LB Dressing Dependent   Maximal Assist    Bathing Dependent  Maximal Assist    Toileting Dependent   Maximal Assist    Bed Mobility  Supine to sit: Dependent   Sit to supine: Maximal Assist   Supine to sit: Moderate Assist   Sit to supine: Minimal Assist    Functional Transfers Maximal Assist   Minimal Assist    Functional Mobility Maximal Assist   Minimal Assist    Balance Sitting:     Static:  Minimal assist    Dynamic:mod/max  Standing: max     Activity Tolerance fair  good   Visual/  Perceptual Glasses: good                Hand Dominance right handed   Strength ROM Additional Info:    RUE  3-/5  Patient demonstrated increased arthritis throughout with decreased shoulder active/passive flexion/abduction 0 - 80 degrees. Arthritis throughout form digits to shoulder. Good  strength noted. LUE 3-/5  Patient demonstrated increased arthritis throughout with decreased shoulder active/passive flexion/abduction 0 - 90 degrees. Arthritis throughout form digits to shoulder. Good  strength noted. Hearing: [facility] for daily life tasks  Sensation:  Fair for light touch  Tone: WFL   Edema: none noted bilateral upper extremity    Comments: Upon arrival patient was seated in his chair. At end of session patient was seated in chair set up to eat his lunch that arrived during evaluation. At end of session, patient with call light and phone within reach, all lines and tubes intact.   Overall patient demonstrated decreased independence and safety during completion of ADL/functional transfer/mobility tasks. Pt would benefit from continued skilled OT in a structured environment to increase safety and independence with completion of ADL/IADL tasks for functional independence and quality of life. Therapist questions patient spouse ability to care for patient at this level at home without someone to assist her with functional transfers twenty four hours. Eval Complexity: Low Complexity    Assessment of current deficits   Functional mobility [x]  ADLs [x] Strength [x]  Cognition []  Functional transfers  [x] IADLs [x] Safety Awareness [x]  Endurance [x]  Fine Motor Coordination [x] Balance [x] Vision/perception [] Sensation []   Gross Motor Coordination [x] ROM [x] Delirium []                  Motor Control []    Plan of Care:  OT 1-3x/week PRN   ADL retraining [x]   Equipment needs [x]   Neuromuscular re-education [] Energy Conservation Techniques [x]  Functional Transfer training [x] Patient and/or Family Education [x]  Functional Mobility training [x]  Environmental Modifications [x]  Cognitive re-training []   Compensatory techniques for ADLs [x]  Splinting Needs []   Positioning to improve overall function [x]   Therapeutic Activity [x]  Therapeutic Exercise  [x]  Visual/Perceptual: []    Delirium prevention/treatment  []   Other:  []    Rehab Potential: Good for established goals     Patient / Family Goal: to return home      Patient and/or family were instructed on functional diagnosis, prognosis/goals and OT plan of care. Demonstrated good understanding.      Evaluation (+)    Treatment Time In:1120            Treatment Time Out: 6890                 Evaluation time includes thorough review of current medical information, gathering information on past medical history/social history and prior level of function, completion of standardized testing/informal observation of tasks, assessment of data, and development of POC/Goals      [employer], OTR/L, CLANGELA-FENG

## 2021-01-30 NOTE — PROGRESS NOTES
Educated patient on the importance of Incentive Spirometer, Patient returned demonstration of 1000 tolerated well. Patient verbalized understanding of using hourly.

## 2021-01-30 NOTE — DISCHARGE INSTR - COC
Continuity of Care Form    Patient Name: Amy Olivia   :  1930  MRN:  [id number]    Admit date:  2021  Discharge 2021    Code Status Order: Full Code   Advance Directives:   Advance Care Flowsheet Documentation     Date/Time Healthcare Directive Type of Healthcare Directive Copy in [address] Agent's Name Healthcare Agent's Phone Number    [phone]  Yes, patient has an advance directive for healthcare treatment  Durable power of  for health care  Yes, copy in chart  Spouse  --  --    21  Yes, patient has an advance directive for healthcare treatment  Durable power of  for health care; Health care treatment directive; Living will  No, copy requested from family  [address]  [phone]          Admitting Physician:  Anita Lizama DO  PCP: Pascual Quiroga MD    Discharging Nurse: [facility] Unit/Room#: 6357/4586-A  Discharging Unit Phone Number: [phone]    Emergency Contact:   Extended Emergency Contact Information  Primary Emergency Contact: Liss Luevano  Address: [address] [place] [address] Phone: [phone]  Mobile Phone: [phone]  Relation: Spouse   needed? No  Secondary Emergency Contact: [address]man[address]  [place] Phone: [phone]  Mobile Phone: [phone]  Relation: Grandchild  Preferred language: English   needed?  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 6     HERNIA REPAIR Left     groin repair     HIP SURGERY Left 2021    LEFT HIP HEMIARTHROPLASTY performed by Wang Guerrero MD at  [address] Right     5-10 years ago   Paul Yun 76         Immunization History:   Immunization History   Administered Date(s) Administered    Influenza Virus Vaccine 10/19/2010, 11/14/2011, 10/29/2012, 10/17/2013, 09/18/2014, 10/09/2015, 09/23/2016    Influenza, MDCK Quadv, IM, PF (Flucelvax 4 yrs and older) 09/20/2017    Influenza, Quadv, IM, PF (6 mo and older Fluzone, Flulaval, Fluarix, and 3 yrs and older Afluria) 10/24/2018    Influenza, Triv, inactivated, subunit, adjuvanted, IM (Fluad 65 yrs and older) 09/30/2019    Pneumococcal Conjugate 13-valent ([id number]) 06/19/2015    Pneumococcal Polysaccharide (Brmteesdd08) 10/29/2012    Zoster Live (Zostavax) 11/14/2014    Zoster Recombinant (Shingrix) 08/15/2019, 02/06/2020       Active Problems:  Patient Active Problem List   Diagnosis Code    Hypertension I10    Parkinson disease ([facility] [address] G20    Hypothyroidism E03.9    Stage 3 chronic kidney disease N18.30    Venous stasis dermatitis of both lower extremities I87.2    Left displaced femoral neck fracture (HCC) S72.002A    Hypoxia R09.02    CAP (community acquired pneumonia) J18.9       Isolation/Infection:   Isolation          No Isolation        Patient Infection Status     Infection Onset Added Last Indicated Last Indicated By Review Planned Expiration Resolved Resolved By    None active    Resolved    COVID-19 Rule Out 01/27/21 01/27/21 01/27/21 COVID-19 (Ordered)   01/27/21 Rule-Out Test Resulted          Nurse Assessment:  Last Vital Signs: /67   Pulse 65   Temp 98.3 °F (36.8 °C) (Oral)   Resp 18   Ht 5' 7\" (1.702 m)   Wt 190 lb 8 oz (86.4 kg)   SpO2 95%   BMI 29.84 kg/m²     Last documented pain score (0-10 scale): Pain Level: 3  Last Weight:   Wt Readings from Last 1 Encounters:   01/27/21 190 lb 8 oz (86.4 kg)     Mental Status:  oriented, alert, coherent, logical, thought processes intact, able to concentrate and follow conversation and can be confused forgetful at night per report    IV Access:  - None    Nursing Mobility/ADLs:  Walking   Assisted  Transfer  Assisted  Bathing  Assisted  Dressing  Assisted  Toileting  Assisted  Feeding  Assisted  Med Admin  Assisted  Med Delivery   whole and with applesauce for bigger pills    Wound Care Documentation and Therapy:        Elimination:  Continence:   · Bowel: Yes  · Bladder: No  Urinary Catheter: Insertion Date: 01/31/2021   Colostomy/Ileostomy/Ileal Conduit: No       Date of Last BM: 02/01/2021  small    Intake/Output Summary (Last 24 hours) at 1/30/2021 1142  Last data filed at 1/30/2021 0703  Gross per 24 hour   Intake 1200 ml   Output 850 ml   Net 350 ml     I/O last 3 completed shifts: In: 1200 [I.V.:1200]  Out: 550 [Urine:400; Blood:150]    Safety Concerns: At Risk for Falls    Impairments/Disabilities:      Vision, Hearing and lower ext edema and weakness    Nutrition Therapy:  Current Nutrition Therapy:   - Oral Diet:  General    Routes of Feeding: Oral  Liquids:  Thin Liquids  Daily Fluid Restriction: no  Last Modified Barium Swallow with Video (Video Swallowing Test): not done    Treatments at the Time of Hospital Discharge:   Respiratory Treatments:   Oxygen Therapy:  89-92 on room air, o2 1-2 l for comfort  Ventilator:    - No ventilator support    Rehab Therapies: Physical Therapy and Occupational Therapy  Weight Bearing Status/Restrictions: No weight bearing restirctions  Other Medical Equipment (for information only, NOT a DME order):  walker and bedside commode  Other Treatments: austin hose, incentive spirometer    Patient's personal belongings (please select all that are sent with patient):  Glasses    RN SIGNATURE:  Electronically signed by Fiona White RN on 2/1/2021 at 10:36 AM      CASE MANAGEMENT/SOCIAL WORK SECTION    Inpatient Status Date: ***    Readmission Risk Assessment Score:  Readmission Risk              Risk of Unplanned Readmission:        15           Discharging to Facility/ Agency   · Name: [facility]   · Address:  · Phone:[phone]  · Fax:    Dialysis Facility (if applicable)   · Name:  · Address:  · Dialysis Schedule:  · Phone:  · Fax:    /Social Worker signature: Electronically signed by GERMÁN Springer on 2/1/2021 at 9:34 AM    PHYSICIAN SECTION    Prognosis: {Prognosis:2506398655}    Condition at Discharge: Stable     Rehab Potential (if transferring to Rehab): {Prognosis:5744066421}    Recommended Labs or Other Treatments After Discharge: ***    Physician Certification: I certify the above information and transfer of Leverette Dubin  is necessary for the continuing treatment of the diagnosis listed and that he requires [facility] for less 30 days.      Update Admission H&P: {CHP DME Changes in GDBER:262613099}    PHYSICIAN SIGNATURE:  {Esignature:[id number]}

## 2021-01-30 NOTE — PROGRESS NOTES
Valeria Moya M.D.,[facility]  Jolynn Bertrand D.O., F.A.C.O.I., Willian Ramírez M.D. David Khan M.D., Belinda Conley M.D. Shannan Forde D.O. Daily Pulmonary Progress Note    Patient:  Azra Vasquez 80 y.o. male MRN: [id number]     Date of Service: 1/30/2021        Subjective      Patient was seen and examined. No shortness of breath or wheezing today. Oxygen has been decreased now down to 1 L. Objective   Vitals: /67   Pulse 65   Temp 98.3 °F (36.8 °C) (Oral)   Resp 18   Ht 5' 7\" (1.702 m)   Wt 190 lb 8 oz (86.4 kg)   SpO2 95%   BMI 29.84 kg/m²     I/O:    Intake/Output Summary (Last 24 hours) at 1/30/2021 1051  Last data filed at 1/30/2021 0703  Gross per 24 hour   Intake 1200 ml   Output 1100 ml   Net 100 ml       CURRENT MEDS :  Scheduled Meds:   sodium chloride flush  10 mL Intravenous 2 times per day    acetaminophen  1,000 mg Oral Q6H    sennosides-docusate sodium  1 tablet Oral BID    aspirin  325 mg Oral Daily    methylPREDNISolone  40 mg Intravenous Q12H    cefTRIAXone (ROCEPHIN) IV  1,000 mg Intravenous Q24H    doxycycline (VIBRAMYCIN) IV  100 mg Intravenous Q12H    carbidopa-levodopa  2 tablet Oral 4x Daily    clobetasol   Topical BID    doxazosin  4 mg Oral Nightly    traZODone  50 mg Oral Nightly    bumetanide  1 mg Oral Daily    ipratropium-albuterol  1 ampule Inhalation Q4H WA    fluticasone  2 spray Each Nostril Daily    montelukast  10 mg Oral Nightly       Continuous Infusions:   sodium chloride 125 mL/hr at 01/29/21 1520       PRN Meds:  sodium chloride flush, magnesium hydroxide, traMADol **OR** traMADol, HYDROmorphone, promethazine **OR** ondansetron, potassium chloride **OR** potassium alternative oral replacement **OR** potassium chloride      Physical Exam:  Physical Exam  Constitutional:       General: He is not in acute distress. HENT:      Head: Normocephalic and atraumatic.       Mouth/Throat:      Pharynx: No oropharyngeal exudate. Eyes:      General: No scleral icterus. Conjunctiva/sclera: Conjunctivae normal.   Neck:      Musculoskeletal: Neck supple. Trachea: No tracheal deviation. Cardiovascular:      Rate and Rhythm: Normal rate. Heart sounds: Normal heart sounds. Pulmonary:      Effort: Pulmonary effort is normal.      Breath sounds: No wheezing. Abdominal:      Palpations: Abdomen is soft. Tenderness: There is no abdominal tenderness. Musculoskeletal:         General: No swelling or deformity. Lymphadenopathy:      Cervical: No cervical adenopathy. Skin:     General: Skin is warm. Findings: No rash. Neurological:      General: No focal deficit present. Mental Status: He is alert and oriented to person, place, and time. Psychiatric:         Behavior: Behavior normal.         Pertinent/ New Labs and Imaging Studies     Pulmonary Function Testing personally reviewed and interpreted. PERTINENT LAB RESULTS: Labs reviewed. DIAGNOSTICS: Pertinent imaging reviewed. Assessment:      1. Acute hypoxic respiratory failure -improved  *secondary to atelectasis, asthma exacerbation, may have component of aspiration given Parkinson's  2. Mechanical fall with left hip femoral neck fracture s/left hemiarthroplasty  3. Wheezing - mild asthma exacerbation -improved  4. Chronic rhinitis  5. Parkinson's disease      Plan:     · Duo nebs every 4 while awake and as needed  · Check ambulatory pulse ox prior to discharge  · Aspiration precautions  · Start Flonase, Singulair  · Transition to prednisone 40 mg x 5 days  · Mobilization as tolerated, PT/OT, incentive spirometry      Thank you for allowing me to participate in the care of SHEILA Scherer. Please feel free to call with questions.      Electronically signed by Holly Vanegas DO on 1/30/2021 at 10:51 AM

## 2021-01-30 NOTE — PROGRESS NOTES
Initial Evaluation        Attending Provider:  Lexie Davis DO    Evaluating PT:  Romelle Cranker PT    Room #:  7681/4667-U  Diagnosis:  S/P fall with LT hip Fx, LT Hip larissa   Pertinent PMHx/PSHx:  HTN, Parkinsons,   Procedure/Surgery:  LT Hip hemiarthroplasty  Precautions:  Falls, WBAT, Hip precautions, general, Parkinsons  Equipment Needs:  [address], Abd Pillow    SUBJECTIVE:    Pt lives with wife in a 1 story home with 1+1 stairs and 0 rail to enter. Pt ambulated with SC and or WW PTA; SC primarily used. Can mobilize only short distances/home ambulator at best due to parkinsons, fatigue. OBJECTIVE:   Initial Evaluation  Date: 1/30/21 Treatment Short Term/ Long Term   Goals   Was pt agreeable to Eval/treatment? Yes     Does pt have pain? Yes LT Hip     Bed Mobility  Rolling: Max/dep  Supine to sit: Max/dep  Sit to supine: NA  Scooting: Max/dep  Mod x 2    Transfers Sit to stand: Max/dep x 2  Stand to sit: Max/Mod x 2  Stand pivot: NA  Mod x 2   Ambulation   1-2 feet with [address] Max x 2  10 feet with [address] Mod x 2   Stair negotiation: ascended and descended        ROM See below       MMT See below     AM-PAC 6 Clicks 1/68       Pt is A & O x 3   Sensation:  Pt denies numbness and tingling to extremities  Edema:  Post op LT hip/LE  Balance: sitting: Mod x 2 standing: Max/dep x 2  Endurance: Poor    ROM: B/L LE rigid and limited to knee flex 90*, RT hip flex 75* and LT hip flex 65*, B/L knee ext WNL  MMT: LT hip 2/5, knee 3+/5      Patient education  Pt and wife educated on [place] hip precautions, WBAT, Transfers, bed mobility,     Patient response to education:   Pt -wife verbalized understanding Pt demonstrated skill Pt requires further education in this area   Yes Y-N Yes     ASSESSMENT:    Comments: In bed on arrival.  Reports no pain at rest in supine position. Relevant pre-fall mobility status with wife and patient. Ambulates short in house distances for most part with use SC or WW with primary SC.   Bed

## 2021-01-30 NOTE — PROGRESS NOTES
Daily Treat  Evaluating PT:  Sharyle Allis PT     Room #:  1801/9265-T  Diagnosis:  S/P fall with LT hip Fx, LT Hip larissa   Pertinent PMHx/PSHx:  HTN, Parkinsons,   Procedure/Surgery:  LT Hip hemiarthroplasty  Precautions:  Falls, WBAT, Hip precautions, general, Parkinsons  Equipment Needs:  [employer], Abd Pillow     SUBJECTIVE:     Pt lives with wife in a 1 story home with 1+1 stairs and 0 rail to enter. Pt ambulated with SC and or WW PTA; SC primarily used. Can mobilize only short distances/home ambulator at best due to parkinsons, fatigue.      OBJECTIVE:    Initial Evaluation  Date: 1/30/21 Treatment  1/30/21 Short Term/ Long Term   Goals   Was pt agreeable to Eval/treatment? Yes  Y     Does pt have pain? Yes LT Hip LT hip      Bed Mobility  Rolling: Max/dep  Supine to sit: Max/dep  Sit to supine: NA  Scooting: Max/dep  Dep/Max 2 all levels including rolls Mod x 2    Transfers Sit to stand: Max/dep x 2  Stand to sit: Max/Mod x 2  Stand pivot: NA  Max x 2+ sit-stand and Mod stand to sit. Mod x 2   Ambulation   1-2 feet with [employer] Max x 2  2-3' with [employer] Max x 2 to bedside 10 feet with [employer] Mod x 2   Stair negotiation: ascended and descended    NA     ROM See below   See Below     MMT See below  See below     AM-PAC 6 Clicks   3/63      ROM: B/L LE rigid and limited to knee flex 90*, RT hip flex 75* and LT hip flex 65*, B/L knee ext WNL, ankles 10-20* in DF/PF  MMT: LT hip 2/5, knee 3-3+/5, Ankle 3/5    Balance: Zero/Poor standing with Max A x 2     Therapeutic Exercises:  LT hip/knee and ankle x 30 reps ea    Patient education  Pt educated on SUKUMAR prec, transfers, bed mobility, to not hold breath     Patient response to education:   Pt verbalized understanding Pt demonstrated skill Pt requires further education in this area   Yes N Yes     ASSESSMENT:    Comments:  Remains in hip chair from AM assessment. Rigidity with high tone BLE's with above ROM impairments.   B/L knees flex to approx 90* limiting sit-stand transfers as well as weakness, pain and global rigidity. Able to passively/AA range LT hip to 80*, knee flex 90* and ext WNL. Assist x 2-3 from chair to stand to allow use of urinal; stood approx 2 min to allow with Mod/max assist to maintain standing. RN held urinal while allowing him to grasp [employer] both hands. Tolerated manual and AA ROM LTLE. Gives marginal assistance with there-ex LT hip ROM but full LT knee. Transition to EOB to supine position x 2-3 assistance. Aids remain in room on my exit. Family very appreciative for care. Treatment:  Patient practiced and was instructed in the following treatment:     There-ex   Amb   Bed mobility    PLAN:    Pt is making poor progress toward established Physical Therapy goals. Continue with physical therapy current plan of care. Total Treatment Time  25 minutes     Evaluation Time includes thorough review of current medical information, gathering information on past medical history/social history and prior level of function, completion of standardized testing/informal observation of tasks, assessment of data and education on plan of care and goals.     CPT codes:  [] Low Complexity PT evaluation 16463  [] Moderate Complexity PT evaluation 41995  [] High Complexity PT evaluation 37779  [] PT Re-evaluation 54718  [x] Gait training 81785 5 minutes  [] Manual therapy 26644  minutes  [x] Therapeutic activities 69120  10 minutes  [x] Therapeutic exercises 85533 10 minutes  [] Neuromuscular reeducation 82245  minutes       Marycarmen Sr PT

## 2021-01-30 NOTE — CARE COORDINATION
Social Work discharge planning   Chart check to see if pt worked with PT OT yet today for discharge planning. Will continue to follow. Electronically signed by Jeffrey Shabazz on 1/30/2021 at 9:06 AM     Addendum-    Spoke to pt's wife about PT AMPA of 8/24 today. She said she and her  will discuss SNF. SW gave them list fo SNF providers to review to give unit SW 3 choices Monday am for referrals.    Electronically signed by Jeffrey Shabazz on 1/30/2021 at 11:41 AM

## 2021-01-30 NOTE — PROGRESS NOTES
Internal Medicine Progress Note      Synopsis: Patient admitted on 1/27/2021     Subjective    Patient was admitted on the 27th with a history of Parkinson's and hypertension hyperlipidemia. He had a fall early in the morning. He is [address] of age. He fell onto his left side. He was diagnosed with a left femoral neck fracture. Finally taken to surgery yesterday for a left femoral neck fracture given a procedure for left hemiarthroplasty. Also respiratory issues for which pulmonary was consulted  He is feeling better. Treated for asthma exacerbation and steroids started and also covered for aspiration      Exam:  /67   Pulse 65   Temp 98.3 °F (36.8 °C) (Oral)   Resp 18   Ht 5' 7\" (1.702 m)   Wt 190 lb 8 oz (86.4 kg)   SpO2 95%   BMI 29.84 kg/m²   General appearance: No apparent distress, appears stated age and cooperative. HEENT: Pupils equal, round, and reactive to light. Conjunctivae/corneas clear. Neck: Supple. No jugular venous distention. Trachea midline. Respiratory: Decreased bibasilar   cardiovascular: Regular rate and rhythm with normal S1/S2 without murmurs, rubs or gallops. Abdomen: Soft, non-tender, non-distended with normal bowel sounds. Musculoskeletal: Stiff upper and lower extremities.   Left hip with dressing in place  SCDs in place edema 1+  Skin:  No rashes    Neurologic: awake, alert and following commands and slow in speech    Medications:  Reviewed    Infusion Medications    sodium chloride 125 mL/hr at 01/29/21 1520     Scheduled Medications    predniSONE  40 mg Oral Daily    sodium chloride flush  10 mL Intravenous 2 times per day    acetaminophen  1,000 mg Oral Q6H    sennosides-docusate sodium  1 tablet Oral BID    aspirin  325 mg Oral Daily    cefTRIAXone (ROCEPHIN) IV  1,000 mg Intravenous Q24H    doxycycline (VIBRAMYCIN) IV  100 mg Intravenous Q12H    carbidopa-levodopa  2 tablet Oral 4x Daily    clobetasol   Topical BID    doxazosin  4 mg Oral Nightly    traZODone  50 mg Oral Nightly    bumetanide  1 mg Oral Daily    ipratropium-albuterol  1 ampule Inhalation Q4H WA    fluticasone  2 spray Each Nostril Daily    montelukast  10 mg Oral Nightly     PRN Meds: sodium chloride flush, magnesium hydroxide, traMADol **OR** traMADol, HYDROmorphone, promethazine **OR** ondansetron, potassium chloride **OR** potassium alternative oral replacement **OR** potassium chloride    I/O    Intake/Output Summary (Last 24 hours) at 1/30/2021 1107  Last data filed at 1/30/2021 0703  Gross per 24 hour   Intake 1200 ml   Output 1100 ml   Net 100 ml       Labs:   Recent Labs     01/28/21  0415 01/30/21  0400   WBC 13.8*  --    HGB 11.7* 8.9*   HCT 36.1* 28.4*     --        Recent Labs     01/28/21  0415      K 4.5      CO2 27   BUN 38*   CREATININE 1.3*   CALCIUM 9.2       Recent Labs     01/28/21  0415   PROT 7.5   ALKPHOS 101   ALT <5   AST 20   BILITOT 0.9       No results for input(s): INR in the last 72 hours. No results for input(s): Christi Baseman in the last 72 hours.     Chronic labs:  Lab Results   Component Value Date    CHOL 134 09/24/2020    TRIG 72 09/24/2020    HDL 36 09/24/2020    LDLCALC 84 09/24/2020    TSH 5.810 (H) 09/24/2020    INR 1.3 12/24/2017       Radiology:  Echo Complete    Result Date: 1/28/2021  Transthoracic Echocardiography Report (TTE)  Demographics   Patient Name       Inocencio Martin Gender               Male   Medical Record     [id number]      Room Number          4788  Number   Account #          [de-identified]     Procedure Date       01/27/2021   Corporate ID                     Ordering Physician   Sahara Ray   Accession Number   [id number]    Referring Physician  Sayda Gillespie   Date of Birth      08/12/1930    Sonographer          El Wells 12   Age                80 year(s)    Interpreting         [facility]                                   Physician            Makayla Fletcher MD Any Other  Procedure Type of Study   TTE procedure:Echo Complete W/Doppler & Color Flow. Procedure Date Date: 01/27/2021 Start: 03:20 PM Study Location: ER Technical Quality: Adequate visualization Indications:Congestive heart failure. Patient Status: Routine Height: 66 inches Weight: 190 pounds BSA: 1.96 m^2 BMI: 30.67 kg/m^2 HR: 69 bpm BP: 125/57 mmHg  Findings   Left Ventricle  Mild left ventricular concentric hypertrophy noted. LV Ejection fraction is visually estimated at 60%. Right Ventricle  Normal right ventricle structure and function. Left Atrium  The left atrium is mildly dilated. Right Atrium  Normal right atrium. Mitral Valve  Physiologic and/or trace mitral regurgitation is present. No evidence of hemodynamically significant mitral stenosis. Tricuspid Valve  Mild tricuspid regurgitation. Aortic Valve  The aortic valve appears mildly sclerotic. Physiologic and/or trace aortic regurgitation is noted. No hemodynamically significant aortic stenosis is present. Pulmonic Valve  Normal pulmonic valve structure and function. Pericardial Effusion  No evidence for hemodynamically significant pericardial effusion. Pleural Effusion  No evidence of pleural effusion. Aorta  Normal aortic root and ascending aorta. Miscellaneous  The inferior vena cava diameter is normal with normal respiratory  variation. Conclusions   Summary  The aortic valve appears mildly sclerotic. Physiologic and/or trace aortic regurgitation is noted. No hemodynamically significant aortic stenosis is present. Mild tricuspid regurgitation. The left atrium is mildly dilated. Mild left ventricular concentric hypertrophy noted. LV Ejection fraction is visually estimated at 60%.    Signature   ----------------------------------------------------------------  Electronically signed by Lisandra Trimble MD(Interpreting  physician) on 01/28/2021 12:07 PM msec        PV Mean Gradient: 1.2 mmHg  cm^2                Estimated PASP: 36.76  MV Area             mmHg                   DC ED Velocity: 1.11 m/s  (continuity): 2.4  cm^2                Pulm. Vein D  MV E' Septal        Velocity:0.77 m/s  Velocity: 0.04 m/s  Pulm. Vein S Velocity:  MV E' Lateral       0.39 m/s  Velocity: 8 m/s  [email or web address] [id number]    Xr Chest Portable    Result Date: 1/27/2021  EXAMINATION: ONE XRAY VIEW OF THE CHEST 1/27/2021 4:21 am COMPARISON: 11/25/2020 HISTORY: ORDERING SYSTEM PROVIDED HISTORY: hypoxia TECHNOLOGIST PROVIDED HISTORY: Reason for exam:->hypoxia FINDINGS: There is unchanged mild cardiomegaly. There is question of subtle infiltrate in the right upper lobe and left lower lobe. There is no pneumothorax. There is no pleural effusion. There may be some subtle infiltrate in right upper lobe and left lower lobe. Cta Chest W Contrast    Result Date: 1/27/2021  EXAMINATION: CTA OF THE CHEST 1/27/2021 5:33 am TECHNIQUE: CTA of the chest was performed after the administration of intravenous contrast.  Multiplanar reformatted images are provided for review. MIP images are provided for review. Dose modulation, iterative reconstruction, and/or weight based adjustment of the mA/kV was utilized to reduce the radiation dose to as low as reasonably achievable. COMPARISON: None. HISTORY: ORDERING SYSTEM PROVIDED HISTORY: evaluate for PE TECHNOLOGIST PROVIDED HISTORY: Reason for exam:->evaluate for PE Decision Support Exception->Emergency Medical Condition (MA) FINDINGS: Pulmonary Arteries: Pulmonary arteries are adequately opacified for evaluation. No evidence of intraluminal filling defect to suggest pulmonary embolism. Main pulmonary artery is normal in caliber. Mediastinum: No evidence of mediastinal lymphadenopathy.   The heart and pericardium demonstrate no acute abnormality. There is no acute abnormality of the thoracic aorta. There are coronary artery calcifications. There is calcified plaque in the descending thoracic aorta. Lungs/pleura: There is some mild dependent airspace disease posteriorly in the right upper lobe and also in the left lower lobe. Suspect atelectasis, less likely infiltrates. There is no pneumothorax. There is no pleural effusion. Upper Abdomen: There is diverticulosis involving visualized transverse colon. No diverticulitis seen. Soft Tissues/Bones: There is no acute bony or soft tissue abnormality seen. No evidence of pulmonary embolism. Dependent airspace disease in right upper and left lower lobes likely represents atelectasis. Coronary artery calcification. Xr Hip 1 Vw W Pelvis Left    Result Date: 1/29/2021  EXAMINATION: ONE XRAY VIEW OF THE PELVIS AND ONE XRAY VIEW LEFT HIP 1/29/2021 2:31 pm COMPARISON: 01/27/2021 HISTORY: ORDERING SYSTEM PROVIDED HISTORY: Post operative total hip arthroplasty TECHNOLOGIST PROVIDED HISTORY: Reason for exam:->Post operative total hip arthroplasty FINDINGS: Subcapital fracture of the left hip status post left total hip arthroplasty without cement. The prosthetic components appear in good position. No new fracture or other complication is seen. Expected postoperative change with small soft tissue gas at the operative bed. Left total hip arthroplasty. Expected postoperative change and no complication seen. Xr Hip 2-3 Vw W Pelvis Left    Result Date: 1/27/2021  EXAMINATION: ONE XRAY VIEW OF THE PELVIS AND TWO XRAY VIEWS LEFT HIP 1/27/2021 4:21 am COMPARISON: None. HISTORY: ORDERING SYSTEM PROVIDED HISTORY: fall TECHNOLOGIST PROVIDED HISTORY: Reason for exam:->fall FINDINGS: There is a displaced fracture of the left femoral neck. There is no dislocation. No other fracture seen. Hip joint spaces are mildly narrowed bilaterally. There is mild acetabular marginal spurring.     Displaced left femoral neck fracture. ASSESSMENT:    Principal Problem:    Left displaced femoral neck fracture (HCC)  Active Problems:    Hypoxia    CAP (community acquired pneumonia)    Hypertension    Parkinson disease ([address]    Hypothyroidism    Stage 3 chronic kidney disease    Venous stasis dermatitis of both lower extremities  Resolved Problems:    * No resolved hospital problems. *       PLAN:  1. Begin to drop IV rate  2. Tolerating current treatments  3 steroids per pulmonary and respiratory status is better  4. Hemoglobin did drop however not a blood transfusion level  5,. Premorbid meds for Parkinson's to stay in place  6. Coverage for pneumonia and aspiration  7. DVT prophylaxis in place  Await PT OT and discharge planning      Diet: DIET GENERAL;  Code Status: Full Code  PT/OT Eval Status: In place  DVT Prophylaxis:   Done  Recommended disposition at discharge:    is working with them    +++++++++++++++++++++++++++++++++++++++++++++++++  Leonie Jeffery MD   [facility].  +++++++++++++++++++++++++++++++++++++++++++++++++  NOTE: This report was transcribed using voice recognition software. Every effort was made to ensure accuracy; however, inadvertent computerized transcription errors may be present.

## 2021-01-30 NOTE — PROGRESS NOTES
Left hip hemiarthroplasty    Post op Day  1      S:  Complaints: none. Pt reports no pain Left hip. He did get up to a chair yesterday. O:  Afebrile, Vital signs stable     Dressing Intact   Full range of motion left ankle and toes   Sensation intact to light touch     H/H:   Recent Labs     01/30/21  0400   HGB 8.9*   HCT 28.4*        PT/INR:   Recent Labs     01/28/21  0415   PROT 7.5                   Xray:  stable implants    A/P:  Stable   Acute post-op blood loss anemia-stable   Proceed with Physical Therapy              D/C Lang catheter              Heplock IV's if PO intake is adequate              ECASA 325 mg q for DVT prophylaxis   Evaluate for Home Discharge versus Rehab   Home health care needed secondary to unsteady gait, walker for ambulation,                      and need for home physical therapy.

## 2021-01-31 LAB
HCT VFR BLD CALC: 27.6 % (ref 37–54)
HEMOGLOBIN: 9.1 G/DL (ref 12.5–16.5)
TSH SERPL DL<=0.05 MIU/L-ACNC: 7.06 UIU/ML (ref 0.27–4.2)

## 2021-01-31 PROCEDURE — 85014 HEMATOCRIT: CPT

## 2021-01-31 PROCEDURE — 2580000003 HC RX 258: Performed by: ORTHOPAEDIC SURGERY

## 2021-01-31 PROCEDURE — 6360000002 HC RX W HCPCS: Performed by: ORTHOPAEDIC SURGERY

## 2021-01-31 PROCEDURE — 36415 COLL VENOUS BLD VENIPUNCTURE: CPT

## 2021-01-31 PROCEDURE — 85018 HEMOGLOBIN: CPT

## 2021-01-31 PROCEDURE — 97110 THERAPEUTIC EXERCISES: CPT

## 2021-01-31 PROCEDURE — 94640 AIRWAY INHALATION TREATMENT: CPT

## 2021-01-31 PROCEDURE — 6370000000 HC RX 637 (ALT 250 FOR IP): Performed by: ORTHOPAEDIC SURGERY

## 2021-01-31 PROCEDURE — 1200000000 HC SEMI PRIVATE

## 2021-01-31 PROCEDURE — 6370000000 HC RX 637 (ALT 250 FOR IP): Performed by: INTERNAL MEDICINE

## 2021-01-31 PROCEDURE — 84443 ASSAY THYROID STIM HORMONE: CPT

## 2021-01-31 PROCEDURE — 2700000000 HC OXYGEN THERAPY PER DAY

## 2021-01-31 PROCEDURE — 2500000003 HC RX 250 WO HCPCS: Performed by: ORTHOPAEDIC SURGERY

## 2021-01-31 PROCEDURE — 97116 GAIT TRAINING THERAPY: CPT

## 2021-01-31 RX ORDER — POLYETHYLENE GLYCOL 3350 17 G/17G
17 POWDER, FOR SOLUTION ORAL DAILY
Status: DISCONTINUED | OUTPATIENT
Start: 2021-01-31 | End: 2021-02-01 | Stop reason: HOSPADM

## 2021-01-31 RX ORDER — DOCUSATE SODIUM 100 MG/1
100 CAPSULE, LIQUID FILLED ORAL DAILY
Status: DISCONTINUED | OUTPATIENT
Start: 2021-01-31 | End: 2021-02-01 | Stop reason: HOSPADM

## 2021-01-31 RX ORDER — FERROUS SULFATE 325(65) MG
325 TABLET ORAL 2 TIMES DAILY WITH MEALS
Status: DISCONTINUED | OUTPATIENT
Start: 2021-01-31 | End: 2021-02-01 | Stop reason: HOSPADM

## 2021-01-31 RX ADMIN — ACETAMINOPHEN 1000 MG: 500 TABLET ORAL at 06:02

## 2021-01-31 RX ADMIN — PREDNISONE 40 MG: 20 TABLET ORAL at 09:23

## 2021-01-31 RX ADMIN — ACETAMINOPHEN 1000 MG: 500 TABLET ORAL at 18:38

## 2021-01-31 RX ADMIN — DOCUSATE SODIUM 50 MG AND SENNOSIDES 8.6 MG 1 TABLET: 8.6; 5 TABLET, FILM COATED ORAL at 21:59

## 2021-01-31 RX ADMIN — ACETAMINOPHEN 1000 MG: 500 TABLET ORAL at 12:55

## 2021-01-31 RX ADMIN — DOXAZOSIN 4 MG: 4 TABLET ORAL at 21:59

## 2021-01-31 RX ADMIN — ASPIRIN 325 MG: 325 TABLET, COATED ORAL at 09:26

## 2021-01-31 RX ADMIN — POLYETHYLENE GLYCOL 3350 17 G: 17 POWDER, FOR SOLUTION ORAL at 13:01

## 2021-01-31 RX ADMIN — IPRATROPIUM BROMIDE AND ALBUTEROL SULFATE 1 AMPULE: .5; 3 SOLUTION RESPIRATORY (INHALATION) at 16:30

## 2021-01-31 RX ADMIN — BUMETANIDE 1 MG: 1 TABLET ORAL at 09:23

## 2021-01-31 RX ADMIN — SODIUM CHLORIDE, PRESERVATIVE FREE 10 ML: 5 INJECTION INTRAVENOUS at 09:18

## 2021-01-31 RX ADMIN — FLUTICASONE PROPIONATE 2 SPRAY: 50 SPRAY, METERED NASAL at 09:19

## 2021-01-31 RX ADMIN — SODIUM CHLORIDE, PRESERVATIVE FREE 10 ML: 5 INJECTION INTRAVENOUS at 22:00

## 2021-01-31 RX ADMIN — DOXYCYCLINE 100 MG: 100 INJECTION, POWDER, LYOPHILIZED, FOR SOLUTION INTRAVENOUS at 11:00

## 2021-01-31 RX ADMIN — DOCUSATE SODIUM 100 MG: 100 CAPSULE ORAL at 13:01

## 2021-01-31 RX ADMIN — TRAZODONE HYDROCHLORIDE 50 MG: 50 TABLET ORAL at 21:59

## 2021-01-31 RX ADMIN — CARBIDOPA AND LEVODOPA 2 TABLET: 25; 100 TABLET ORAL at 22:01

## 2021-01-31 RX ADMIN — CARBIDOPA AND LEVODOPA 2 TABLET: 25; 100 TABLET ORAL at 09:23

## 2021-01-31 RX ADMIN — MONTELUKAST SODIUM 10 MG: 10 TABLET, FILM COATED ORAL at 21:59

## 2021-01-31 RX ADMIN — CARBIDOPA AND LEVODOPA 2 TABLET: 25; 100 TABLET ORAL at 18:38

## 2021-01-31 RX ADMIN — CLOBETASOL PROPIONATE: 0.5 OINTMENT TOPICAL at 22:00

## 2021-01-31 RX ADMIN — MAGNESIUM HYDROXIDE 30 ML: 400 SUSPENSION ORAL at 09:24

## 2021-01-31 RX ADMIN — DOCUSATE SODIUM 50 MG AND SENNOSIDES 8.6 MG 1 TABLET: 8.6; 5 TABLET, FILM COATED ORAL at 09:23

## 2021-01-31 RX ADMIN — CEFTRIAXONE 1000 MG: 1 INJECTION, POWDER, FOR SOLUTION INTRAMUSCULAR; INTRAVENOUS at 09:25

## 2021-01-31 RX ADMIN — FERROUS SULFATE TAB 325 MG (65 MG ELEMENTAL FE) 325 MG: 325 (65 FE) TAB at 18:38

## 2021-01-31 RX ADMIN — IPRATROPIUM BROMIDE AND ALBUTEROL SULFATE 1 AMPULE: .5; 3 SOLUTION RESPIRATORY (INHALATION) at 13:38

## 2021-01-31 RX ADMIN — CARBIDOPA AND LEVODOPA 2 TABLET: 25; 100 TABLET ORAL at 13:01

## 2021-01-31 RX ADMIN — IPRATROPIUM BROMIDE AND ALBUTEROL SULFATE 1 AMPULE: .5; 3 SOLUTION RESPIRATORY (INHALATION) at 21:47

## 2021-01-31 RX ADMIN — DOXYCYCLINE 100 MG: 100 INJECTION, POWDER, LYOPHILIZED, FOR SOLUTION INTRAVENOUS at 21:59

## 2021-01-31 RX ADMIN — LEVOTHYROXINE SODIUM 25 MCG: 25 TABLET ORAL at 05:59

## 2021-01-31 ASSESSMENT — PAIN SCALES - GENERAL
PAINLEVEL_OUTOF10: 0

## 2021-01-31 NOTE — PROGRESS NOTES
this area   Yes No Yes      ASSESSMENT:    Comments:  RN approves tx. RN in room taking vitals on arrival.  Receptive for AM PT session. On 1L NC O2. Audible wheezing during mobility; no dyspnea or audible wheezing at rest prior to mobility. Tendency to hold breath with transfers, gait and there-ex. Poor safety skills with transfers, gait, ww holds. Needs daily universal hip prec education. Max assist to stand from hip chair due to impairments in B/L knee flex 90*, weakness LE's fear of falling, parkinson and general pulmonary and neuromuscular endurance's. Poor MARY standing leading to instability. Can load and advance LTLE indep but labored and limited step lengths. Step through gait pattern with assist to guide WW FWD and inc on turns. High fear when turning walking requiring inc support and guidances. Overall hip ROM was good with no inc pain reported. Wanted to remain in hip chair with footrest and pillow for comfort. Pillow placed behind back for comfort. Call light and wall phone provided. Very appreciative for care. RN remains in room. Treatment:  Patient practiced and was instructed in the following treatment:     Amb   There-ex   Education    PLAN:    Pt is making poor progress toward established Physical Therapy goals. Continue with physical therapy current plan of care. Total Treatment Time  25 minutes     Evaluation Time includes thorough review of current medical information, gathering information on past medical history/social history and prior level of function, completion of standardized testing/informal observation of tasks, assessment of data and education on plan of care and goals.     CPT codes:  [] Low Complexity PT evaluation 43220  [] Moderate Complexity PT evaluation 50331  [] High Complexity PT evaluation 89144  [] PT Re-evaluation 45019  [x] Gait training 98862 10 minutes  [] Manual therapy 16971 minutes  [x] Therapeutic activities 00885  5 minutes  [x] Therapeutic exercises 13347 10 minutes  [] Neuromuscular reeducation 35383  minutes       Dawna Guerra PT

## 2021-01-31 NOTE — PROGRESS NOTES
Daily Treat    Evaluating PT:  Alexa Alba PT     Room #:  2552/7345-W  Diagnosis:  S/P fall with LT hip Fx, LT Hip larissa   Pertinent PMHx/PSHx:  HTN, Parkinsons,   Procedure/Surgery:  LT Hip hemiarthroplasty  Precautions:  Falls, WBAT, Hip precautions, general, Parkinsons  Equipment Needs:  [employer], Abd Pillow     SUBJECTIVE:     Pt lives with wife in a 1 story home with 1+1 stairs and 0 rail to enter. Pt ambulated with SC and or WW PTA; SC primarily used. Can mobilize only short distances/home ambulator at best due to parkinsons, fatigue.      OBJECTIVE:    Initial Evaluation  Date: 1/30/21 Treatment  1/31/21 PM Short Term/ Long Term   Goals   Was pt agreeable to Eval/treatment? Yes Yes      Does pt have pain? Yes LT Hip LT hip      Bed Mobility  Rolling: Max/dep  Supine to sit: Max/dep  Sit to supine: NA  Scooting: Max/dep  Roll: dep/max   Sit-supine: dep/max x 2   Scoot: dep/max x 2 Mod x 2    Transfers Sit to stand: Max/dep x 2  Stand to sit: Max/Mod x 2  Stand pivot: NA  NA/In bed therapy Mod x 2   Ambulation   1-2 feet with [employer] Max x 2  NA: see AM note 10 feet with [employer] Mod x 2   Stair negotiation: ascended and descended    NA     ROM See below   See below     MMT See below  See below     AM-PAC 6 Clicks 8/96 5/85        Pt is alert and oriented x3  Balance: Stand max A x 2    ROM: supine hip flex AA/P 70*, knee flex bed surface limited/90* AM, Knee ext [place] [place]  MMT: LT hip 3-/5, knee 3/5, ankle 3/5    RTLE ROM: hip flex 70*, knee flex 90*, knee ext [place] [place]      Therapeutic Exercises:  Supine bedside hip, knee and ankle x 30 reps ea. Patient education  Pt educated on Counce hip, wt bearing, bed mobility. Patient response to education:   Pt verbalized understanding Pt demonstrated skill Pt requires further education in this area   Y Y-N Y, review hip prec, hand place transfers, bed mobility     ASSESSMENT:    Comments:  RN approved PM care. In bed on arrival as had hands on care nursing.   Agreeable to participate bedside there-ex. ROM impairments in B/L LE all joints and planes. Very stiff ROM all jts/planes. Highly cooperative and engaged. No improvements in ROM from AM session LT hip/knee. Continues with max to dependant assisted bed mobility, transfers. Severe ROM impairments B/L shoulders limiting reaching to assist bed mobility, reach for items on tray. Call light and phone placed within reach hands. Nursing care to place IV line. Bed lowered to lowest position post ROM. Very appreciative for care. Treatment:  Patient practiced and was instructed in the following treatment:     There-ex    PLAN:    Pt is making poor progress toward established Physical Therapy goals. Continue with physical therapy current plan of care. Total Treatment Time  15 minutes     Evaluation Time includes thorough review of current medical information, gathering information on past medical history/social history and prior level of function, completion of standardized testing/informal observation of tasks, assessment of data and education on plan of care and goals.     CPT codes:  [] Low Complexity PT evaluation 80058  [] Moderate Complexity PT evaluation 25012  [] High Complexity PT evaluation 11760  [] PT Re-evaluation 80230  [] Gait training 92769  minutes  [] Manual therapy 28536   minutes  [] Therapeutic activities 58440  minutes  [x] Therapeutic exercises 33173 15  minutes  [] Neuromuscular reeducation 44783  minutes       Bogdan Mora PT

## 2021-01-31 NOTE — PROGRESS NOTES
Educated patient on the importance of Incentive Spirometer, Patient returned demonstration of 1000, tolerated well.

## 2021-01-31 NOTE — PROGRESS NOTES
Patient ambulated to chair for breakfast. Patient was a two assist and would like to continue sitting in the chair for an hour.

## 2021-01-31 NOTE — PROGRESS NOTES
Internal Medicine Progress Note      Synopsis: Patient admitted on 1/27/2021     Subjective    Patient was admitted on the 27th with a history of Parkinson's and hypertension hyperlipidemia. He had a fall early in the morning. He is 80years of age. He fell onto his left side. He was diagnosed with a left femoral neck fracture. Finally taken to surgery yesterday for a left femoral neck fracture given a procedure for left hemiarthroplasty. Also respiratory issues for which pulmonary was consulted  He is feeling better. Treated for asthma exacerbation and steroids started and also covered for aspiration  January 31, 2021  Overall better. O2 needs are down. BNP was significantly elevated. Now complaining of frequent urination and incomplete emptying and willing for catheter if he has significant postvoid residual.  Wife seems to feel the same. Exam:  /62   Pulse 72   Temp 98.7 °F (37.1 °C) (Oral)   Resp 18   Ht 5' 7\" (1.702 m)   Wt 185 lb 8 oz (84.1 kg)   SpO2 96%   BMI 29.05 kg/m²   General appearance: No apparent distress, appears stated age and cooperative. HEENT: Pupils equal, round, and reactive to light. Conjunctivae/corneas clear. Neck: Supple. No jugular venous distention. Trachea midline. Respiratory: Decreased bibasilar   cardiovascular: Regular rate and rhythm with normal S1/S2 without murmurs, rubs or gallops. Abdomen: Soft, non-tender, non-distended with normal bowel sounds. Musculoskeletal: Stiff upper and lower extremities.   Left hip with dressing in place  SCDs in place edema 2+  Skin:  No rashes    Neurologic: awake, alert and following commands and slow in speech    Medications:  Reviewed    Infusion Medications     Scheduled Medications    predniSONE  40 mg Oral Daily    levothyroxine  25 mcg Oral Daily    sodium chloride flush  10 mL Intravenous 2 times per day    acetaminophen  1,000 mg Oral Q6H    sennosides-docusate sodium  1 tablet Oral BID    aspirin 325 mg Oral Daily    cefTRIAXone (ROCEPHIN) IV  1,000 mg Intravenous Q24H    doxycycline (VIBRAMYCIN) IV  100 mg Intravenous Q12H    carbidopa-levodopa  2 tablet Oral 4x Daily    clobetasol   Topical BID    doxazosin  4 mg Oral Nightly    traZODone  50 mg Oral Nightly    bumetanide  1 mg Oral Daily    ipratropium-albuterol  1 ampule Inhalation Q4H WA    fluticasone  2 spray Each Nostril Daily    montelukast  10 mg Oral Nightly     PRN Meds: sodium chloride flush, magnesium hydroxide, traMADol **OR** traMADol, HYDROmorphone, promethazine **OR** ondansetron, potassium chloride **OR** potassium alternative oral replacement **OR** potassium chloride    I/O    Intake/Output Summary (Last 24 hours) at 1/31/2021 1148  Last data filed at 1/31/2021 1030  Gross per 24 hour   Intake 440 ml   Output 800 ml   Net -360 ml       Labs:   Recent Labs     01/30/21  0400 01/31/21  0541   HGB 8.9* 9.1*   HCT 28.4* 27.6*       Recent Labs     01/30/21  1745      K 4.4      CO2 26   BUN 54*   CREATININE 1.3*   CALCIUM 8.4*       No results for input(s): PROT, ALB, ALKPHOS, ALT, AST, BILITOT, AMYLASE, LIPASE in the last 72 hours. No results for input(s): INR in the last 72 hours. No results for input(s): Teryl Drown in the last 72 hours.     Chronic labs:  Lab Results   Component Value Date    CHOL 134 09/24/2020    TRIG 72 09/24/2020    HDL 36 09/24/2020    LDLCALC 84 09/24/2020    TSH 5.810 (H) 09/24/2020    INR 1.3 12/24/2017       Radiology:  Echo Complete    Result Date: 1/28/2021  Transthoracic Echocardiography Report (TTE)  Demographics   Patient Name       Rachel Mercedes Gender               Male   Medical Record     [id number]      Room Number          7829  Number   Account #          [de-identified]     Procedure Date       01/27/2021   Corporate ID                     Ordering Physician   Gallo Rodriguez   Accession Number   [id number]    Referring Physician  Shahab Blanchard   Date of Birth      08/12/1930 1446.5 msec            PV Peak Gradient: 2.94 mmHg  MV P1/2t: 64.2 msec LVOT VTI: 17.9 cm      PV Mean Velocity: 0.49 m/s  MVA by PHT:3.43     IVRT: 73.8 msec        PV Mean Gradient: 1.2 mmHg  cm^2                Estimated PASP: 36.76  MV Area             mmHg                   DE ED Velocity: 1.11 m/s  (continuity): 2.4  cm^2                Pulm. Vein D  MV E' Septal        Velocity:0.77 m/s  Velocity: 0.04 m/s  Pulm. Vein S Velocity:  MV E' Lateral       0.39 m/s  Velocity: 8 m/s  [email or web address] [id number]    Xr Chest Portable    Result Date: 1/27/2021  EXAMINATION: ONE XRAY VIEW OF THE CHEST 1/27/2021 4:21 am COMPARISON: 11/25/2020 HISTORY: ORDERING SYSTEM PROVIDED HISTORY: hypoxia TECHNOLOGIST PROVIDED HISTORY: Reason for exam:->hypoxia FINDINGS: There is unchanged mild cardiomegaly. There is question of subtle infiltrate in the right upper lobe and left lower lobe. There is no pneumothorax. There is no pleural effusion. There may be some subtle infiltrate in right upper lobe and left lower lobe. Cta Chest W Contrast    Result Date: 1/27/2021  EXAMINATION: CTA OF THE CHEST 1/27/2021 5:33 am TECHNIQUE: CTA of the chest was performed after the administration of intravenous contrast.  Multiplanar reformatted images are provided for review. MIP images are provided for review. Dose modulation, iterative reconstruction, and/or weight based adjustment of the mA/kV was utilized to reduce the radiation dose to as low as reasonably achievable. COMPARISON: None. HISTORY: ORDERING SYSTEM PROVIDED HISTORY: evaluate for PE TECHNOLOGIST PROVIDED HISTORY: Reason for exam:->evaluate for PE Decision Support Exception->Emergency Medical Condition (MA) FINDINGS: Pulmonary Arteries: Pulmonary arteries are adequately opacified for evaluation.   No evidence of intraluminal filling defect to suggest pulmonary embolism. Main pulmonary artery is normal in caliber. Mediastinum: No evidence of mediastinal lymphadenopathy. The heart and pericardium demonstrate no acute abnormality. There is no acute abnormality of the thoracic aorta. There are coronary artery calcifications. There is calcified plaque in the descending thoracic aorta. Lungs/pleura: There is some mild dependent airspace disease posteriorly in the right upper lobe and also in the left lower lobe. Suspect atelectasis, less likely infiltrates. There is no pneumothorax. There is no pleural effusion. Upper Abdomen: There is diverticulosis involving visualized transverse colon. No diverticulitis seen. Soft Tissues/Bones: There is no acute bony or soft tissue abnormality seen. No evidence of pulmonary embolism. Dependent airspace disease in right upper and left lower lobes likely represents atelectasis. Coronary artery calcification. Xr Hip 1 Vw W Pelvis Left    Result Date: 1/29/2021  EXAMINATION: ONE XRAY VIEW OF THE PELVIS AND ONE XRAY VIEW LEFT HIP 1/29/2021 2:31 pm COMPARISON: 01/27/2021 HISTORY: ORDERING SYSTEM PROVIDED HISTORY: Post operative total hip arthroplasty TECHNOLOGIST PROVIDED HISTORY: Reason for exam:->Post operative total hip arthroplasty FINDINGS: Subcapital fracture of the left hip status post left total hip arthroplasty without cement. The prosthetic components appear in good position. No new fracture or other complication is seen. Expected postoperative change with small soft tissue gas at the operative bed. Left total hip arthroplasty. Expected postoperative change and no complication seen. Xr Hip 2-3 Vw W Pelvis Left    Result Date: 1/27/2021  EXAMINATION: ONE XRAY VIEW OF THE PELVIS AND TWO XRAY VIEWS LEFT HIP 1/27/2021 4:21 am COMPARISON: None.  HISTORY: ORDERING SYSTEM PROVIDED HISTORY: fall TECHNOLOGIST PROVIDED HISTORY: Reason for exam:->fall FINDINGS: There is a displaced fracture of the left femoral neck.  There is no dislocation. No other fracture seen. Hip joint spaces are mildly narrowed bilaterally. There is mild acetabular marginal spurring. Displaced left femoral neck fracture. ASSESSMENT:    Principal Problem:    Left displaced femoral neck fracture (HCC)  Active Problems:    Hypoxia    CAP (community acquired pneumonia)    Hypertension    Parkinson disease ([address]    Hypothyroidism    Stage 3 chronic kidney disease    Venous stasis dermatitis of both lower extremities  Resolved Problems:    * No resolved hospital problems. *       PLAN:  1. Begin to drop IV rate  2. Tolerating current treatments  3 steroids per pulmonary and respiratory status is better  4. Hemoglobin did drop however not a blood transfusion level  5,. Premorbid meds for Parkinson's to stay in place  6. Coverage for pneumonia and aspiration  7. DVT prophylaxis in place  Await PT OT and discharge planning  January 31, 2021  1. Hemoglobin is stable  2. Respiratory status is better  3. Steroids are now oral.  Still on IV antibiotics  4. Bumex is still being given orally. 5.  Renal function slightly better and blood pressure seems appropriate  6. Await discharge planning. Left hip site still looks good  7. Lang's catheter per his request but would check a postvoid residual at first  Diet: DIET GENERAL;  Code Status: Full Code  PT/OT Eval Status: In place  DVT Prophylaxis:   Done  Recommended disposition at discharge:    is working with them    +++++++++++++++++++++++++++++++++++++++++++++++++  Charlene Gagnon MD   [facility].  +++++++++++++++++++++++++++++++++++++++++++++++++  NOTE: This report was transcribed using voice recognition software. Every effort was made to ensure accuracy; however, inadvertent computerized transcription errors may be present.

## 2021-01-31 NOTE — PROGRESS NOTES
S/p Left hip hemiarthroplasty    Post op Day 2      S:  Complaints: none    O:  Afebrile, Vital signs stable. Pt up in chair. Dressing Intact   Full range of motion left ankle and toes   Sensation intact to light touch     H/H:   Recent Labs     01/31/21  0541   HGB 9.1*   HCT 27.6*        PT/INR: No results for input(s): PROT, INR in the last 72 hours. A/P:  Stable   Proceed with Physical Therapy. Pt making slow progress. He typically spends a good deal of time in a lift chair/recliner at home, so his ability to arise from a chair prior to hip fracture was limited due to bilat lower extremity stiffness & weakness                            Acute post-op blood loss anemia-  stable              Discharge planning. Patient & family are aware that a rehab stay will be his best option.      I did speak with the patient's spouse this AM.     Would continue ECASA 325 mg daily for 1 month   Follow up in 3 weeks in office    Electronically signed by Elvis Morris MD on 1/31/2021 at 9:49 AM

## 2021-02-01 ENCOUNTER — APPOINTMENT (OUTPATIENT)
Dept: GENERAL RADIOLOGY | Age: 86
DRG: 521 | End: 2021-02-01
Payer: MEDICARE

## 2021-02-01 VITALS
HEART RATE: 71 BPM | BODY MASS INDEX: 28.28 KG/M2 | HEIGHT: 67 IN | OXYGEN SATURATION: 93 % | RESPIRATION RATE: 18 BRPM | TEMPERATURE: 97.6 F | WEIGHT: 180.2 LBS | DIASTOLIC BLOOD PRESSURE: 72 MMHG | SYSTOLIC BLOOD PRESSURE: 149 MMHG

## 2021-02-01 LAB
HCT VFR BLD CALC: 29 % (ref 37–54)
HEMOGLOBIN: 9 G/DL (ref 12.5–16.5)

## 2021-02-01 PROCEDURE — 94640 AIRWAY INHALATION TREATMENT: CPT

## 2021-02-01 PROCEDURE — 92526 ORAL FUNCTION THERAPY: CPT | Performed by: SPEECH-LANGUAGE PATHOLOGIST

## 2021-02-01 PROCEDURE — 6370000000 HC RX 637 (ALT 250 FOR IP): Performed by: INTERNAL MEDICINE

## 2021-02-01 PROCEDURE — 2700000000 HC OXYGEN THERAPY PER DAY

## 2021-02-01 PROCEDURE — 6370000000 HC RX 637 (ALT 250 FOR IP): Performed by: ORTHOPAEDIC SURGERY

## 2021-02-01 PROCEDURE — 36415 COLL VENOUS BLD VENIPUNCTURE: CPT

## 2021-02-01 PROCEDURE — 71045 X-RAY EXAM CHEST 1 VIEW: CPT

## 2021-02-01 PROCEDURE — 97530 THERAPEUTIC ACTIVITIES: CPT

## 2021-02-01 PROCEDURE — 97110 THERAPEUTIC EXERCISES: CPT

## 2021-02-01 PROCEDURE — 85018 HEMOGLOBIN: CPT

## 2021-02-01 PROCEDURE — 85014 HEMATOCRIT: CPT

## 2021-02-01 PROCEDURE — 2580000003 HC RX 258: Performed by: ORTHOPAEDIC SURGERY

## 2021-02-01 PROCEDURE — 97535 SELF CARE MNGMENT TRAINING: CPT

## 2021-02-01 RX ORDER — FERROUS SULFATE 325(65) MG
325 TABLET ORAL
Qty: 30 TABLET | Refills: 0 | DISCHARGE
Start: 2021-02-01

## 2021-02-01 RX ORDER — PSEUDOEPHEDRINE HCL 30 MG
100 TABLET ORAL DAILY
DISCHARGE
Start: 2021-02-01 | End: 2021-08-30 | Stop reason: ALTCHOICE

## 2021-02-01 RX ORDER — POLYETHYLENE GLYCOL 3350 17 G/17G
17 POWDER, FOR SOLUTION ORAL DAILY
Qty: 527 G | Refills: 1 | Status: ON HOLD | DISCHARGE
Start: 2021-02-01 | End: 2021-03-04

## 2021-02-01 RX ORDER — SENNA AND DOCUSATE SODIUM 50; 8.6 MG/1; MG/1
1 TABLET, FILM COATED ORAL 2 TIMES DAILY
DISCHARGE
Start: 2021-02-01 | End: 2021-08-30 | Stop reason: ALTCHOICE

## 2021-02-01 RX ORDER — IPRATROPIUM BROMIDE AND ALBUTEROL SULFATE 2.5; .5 MG/3ML; MG/3ML
3 SOLUTION RESPIRATORY (INHALATION)
Qty: 360 ML | DISCHARGE
Start: 2021-02-01 | End: 2021-08-30 | Stop reason: ALTCHOICE

## 2021-02-01 RX ORDER — TRAMADOL HYDROCHLORIDE 50 MG/1
50 TABLET ORAL EVERY 6 HOURS PRN
Qty: 12 TABLET | Refills: 0 | Status: SHIPPED | OUTPATIENT
Start: 2021-02-01 | End: 2021-02-04

## 2021-02-01 RX ORDER — PREDNISONE 10 MG/1
TABLET ORAL
Qty: 30 TABLET | Refills: 0 | DISCHARGE
Start: 2021-02-01 | End: 2021-02-11

## 2021-02-01 RX ORDER — MONTELUKAST SODIUM 10 MG/1
10 TABLET ORAL NIGHTLY
Qty: 30 TABLET | Refills: 0 | DISCHARGE
Start: 2021-02-01

## 2021-02-01 RX ADMIN — ACETAMINOPHEN 1000 MG: 500 TABLET ORAL at 06:32

## 2021-02-01 RX ADMIN — POLYETHYLENE GLYCOL 3350 17 G: 17 POWDER, FOR SOLUTION ORAL at 09:11

## 2021-02-01 RX ADMIN — FERROUS SULFATE TAB 325 MG (65 MG ELEMENTAL FE) 325 MG: 325 (65 FE) TAB at 09:08

## 2021-02-01 RX ADMIN — DOCUSATE SODIUM 50 MG AND SENNOSIDES 8.6 MG 1 TABLET: 8.6; 5 TABLET, FILM COATED ORAL at 09:08

## 2021-02-01 RX ADMIN — ASPIRIN 325 MG: 325 TABLET, COATED ORAL at 09:06

## 2021-02-01 RX ADMIN — CARBIDOPA AND LEVODOPA 2 TABLET: 25; 100 TABLET ORAL at 09:09

## 2021-02-01 RX ADMIN — IPRATROPIUM BROMIDE AND ALBUTEROL SULFATE 1 AMPULE: .5; 3 SOLUTION RESPIRATORY (INHALATION) at 09:00

## 2021-02-01 RX ADMIN — IPRATROPIUM BROMIDE AND ALBUTEROL SULFATE 1 AMPULE: .5; 3 SOLUTION RESPIRATORY (INHALATION) at 12:35

## 2021-02-01 RX ADMIN — BUMETANIDE 1 MG: 1 TABLET ORAL at 09:06

## 2021-02-01 RX ADMIN — DOCUSATE SODIUM 100 MG: 100 CAPSULE ORAL at 09:08

## 2021-02-01 RX ADMIN — FLUTICASONE PROPIONATE 2 SPRAY: 50 SPRAY, METERED NASAL at 09:11

## 2021-02-01 RX ADMIN — SODIUM CHLORIDE, PRESERVATIVE FREE 10 ML: 5 INJECTION INTRAVENOUS at 09:12

## 2021-02-01 RX ADMIN — MAGNESIUM HYDROXIDE 30 ML: 400 SUSPENSION ORAL at 06:33

## 2021-02-01 RX ADMIN — CARBIDOPA AND LEVODOPA 2 TABLET: 25; 100 TABLET ORAL at 12:52

## 2021-02-01 RX ADMIN — ACETAMINOPHEN 1000 MG: 500 TABLET ORAL at 00:43

## 2021-02-01 RX ADMIN — LEVOTHYROXINE SODIUM 25 MCG: 25 TABLET ORAL at 06:32

## 2021-02-01 RX ADMIN — PREDNISONE 40 MG: 20 TABLET ORAL at 09:06

## 2021-02-01 ASSESSMENT — PAIN SCALES - GENERAL
PAINLEVEL_OUTOF10: 0
PAINLEVEL_OUTOF10: 0

## 2021-02-01 NOTE — PROGRESS NOTES
Daily Treat    Evaluating PT:  Austin Iqbal PT     Room #:  6813/5270-D  Diagnosis:  S/P fall with LT hip Fx, LT Hip larissa   Pertinent PMHx/PSHx:  HTN, Parkinsons,   Procedure/Surgery:  LT Hip hemiarthroplasty  Precautions:  Falls, WBAT, Hip precautions, general, Parkinsons  Equipment Needs:  [employer], Abd Pillow     SUBJECTIVE:     Pt lives with wife in a 1 story home with 1+1 stairs and 0 rail to enter. Pt ambulated with SC and or WW PTA; SC primarily used. Can mobilize only short distances/home ambulator at best due to parkinsons, fatigue.      OBJECTIVE:    Initial Evaluation  Date: 1/30/21 Treatment  2/1/21  Short Term/ Long Term   Goals   Was pt agreeable to Eval/treatment? Yes Yes      Does pt have pain? Yes LT Hip LT hip      Bed Mobility  Rolling: Max/dep  Supine to sit: Max/dep  Sit to supine: NA  Scooting: Max/dep  Roll: NT   Sit to supine: Max x 2   Scoot: Max x 2 Mod x 2    Transfers Sit to stand: Max/dep x 2  Stand to sit: Max/Mod x 2  Stand pivot: NA Sit to stand: Max A due to retroversion  Stand to sit: Max A  Stand pivot:  Max A using [employer] for support Mod x 2   Ambulation   1-2 feet with [employer] Max x 2 10 feet x 1 using [employer] for support Mod/Max A for balance 10 feet with [employer] Mod x 2   Stair negotiation: ascended and descended    NA     ROM See below   See below     MMT See below  See below     AM-PAC 6 Clicks 3/83  6/80          Pt is alert and able to follow instruction  Balance: poor dynamic using [employer] for support    Pt performed therapeutic exercise of the following seated in a hip chair: B ankle pumps x 30 A/AAROM; glut sets AROML LE heel slide motions within precautions, hip ABd AAROM x 20    Patient education  Pt was educated on exercise promoting circulation, ROM and strengthening, UE usage to assist with transfers, gait promoting LE advancement    Patient response to education:   Pt verbalized understanding Pt demonstrated skill Pt requires further education in this area   yes With repeated instruction yes ASSESSMENT:   Comments: Pt found in bed, agreed to Rx, BETHANY present to assist Pt OOB to a bedside commode. Pt able to WB and advance LEs during pivot but displays poor balance. Pt re approached later for gait, osvaldo slow and inconsistent, step to pattern used. Once to the hip chair, exercise performed. Treatment: Pt practiced and was instructed in the following treatment: exercise, gait, transfers    Pt was left in a bedside hip chair with call light in reach    Total Treatment Time 24 minutes   CPT codes:     Therapeutic activities 19583 14 minutes   Therapeutic exercises 02592 10 minutes       Pt is making good progress toward established Physical Therapy goals as per ability to gait a short distance and exercise participation. Continue with physical therapy current plan of care.     [facility]   License Number: [id number]

## 2021-02-01 NOTE — PROGRESS NOTES
Wife at bedside questioning if patient is in need of antibiotics at discharge due to cough. Perfect serve message sent to dr Magdalena Mcdonnell to clarify. Stated no need for continued antibiotics and was verified with pulmonary.

## 2021-02-01 NOTE — PROGRESS NOTES
SPEECH LANGUAGE PATHOLOGY  DAILY PROGRESS NOTE        PATIENT NAME:  Chandrakant Harrington      :  1930          TODAY'S DATE:  2021 ROOM:  [address] [place] [address]        SWALLOWING  Chart reviewed. Wife reports strong cough with sudden onset. Pt and wife report no problems eating or drinking on current diet. No trial swallows administered due to wife's request for nurse to address cough. Nurse aware. DYSPHAGIA DIAGNOSIS:  No clinical s/s of aspiration, however silent aspiration cannot be ruled out during a bedside eval. If silent aspiration is suspected, a video swallow study is recommended when pt is able to participate. DIET RECOMMENDATIONS:  Regular consistency solids with  thin liquids as tolerated                 FEEDING RECOMMENDATIONS:                           Assistance level:  No assistance needed      Education- Pt/wife educated on results and POC. Pt/wife trained on compensatory strategies for safe swallow with good outcome. Questions answered. Will continue SP intervention as per previously established POC. Sylwia Moore   SLP Student      General Darryn ALVAREZ CCC/SLP [id number]  Speech Pathologist              CPT code(s) 74248  dysphagia tx  Total minutes :  15 minutes

## 2021-02-01 NOTE — PROGRESS NOTES
Occupational Therapy  OT BEDSIDE TREATMENT NOTE      Date:2021  Patient Name: Shira Ng  MRN: [id number]  : 1930  Room: [address] [place] [address]     Evaluating OT: Jere Veliz     AM-PAC Daily Activity Raw Score: AM-PAC Daily Activity Inpatient   How much help for putting on and taking off regular lower body clothing?: Total  How much help for Bathing?: Total  How much help for Toileting?: Total  How much help for putting on and taking off regular upper body clothing?: A Lot  How much help for taking care of personal grooming?: A Lot  How much help for eating meals?: A Little  AM-PAC Inpatient Daily Activity Raw Score: 10  AM-PAC Inpatient ADL T-Scale Score : 27.31  ADL Inpatient CMS 0-100% Score: 74.7  ADL Inpatient CMS G-Code Modifier : CL        Recommended Adaptive Equipment: raised commode seat, shower seat, adl adaptive equipment      Diagnosis: left femoral neck fracture   Surgery: left hip hemiarthroplasty   Pertinent Medical History: patient had fall at home and fractured left hip requiring surgery. Precautions:  Falls, posteral lateral approach precautions, wbat, parkinson disease     Home Living: Pt lives with spouse in a one floor home with two step entry and no rail. Patient uses a wheeled walker and quad cane. Has a shower stall with grab bars and shower seat. Patient has assistance from spouse for bathing and dressing and patient does not perform any home management tasks.      Prior Level of Function: Moderate to maximum assist with ADLs , assistance from spouse with IADLs; ambulated with use of wheeled walker / quad cane / furniture crawled  Driving: patient does not drive  Occupation: retired     Pain Level: Pt denied having hip pain   Cognition: Alert and grossly oriented. Cues for safety awareness/judgement.                 Functional Assessment:    Initial Eval Status  Date: 2021 Treatment Status STGs = LTGs  Time frame: 5-7 days   Feeding Stand by Assist  Setup       Grooming Moderate Assist  Min  A  Minimal Assist    UB Dressing Moderate Assist  Min A to change gown  Minimal Assist    LB Dressing Dependent   max A  Maximal Assist    Bathing Dependent Mod A   Maximal Assist    Toileting Dependent  Max A hygiene after bowel movement on BSC. Pt has catheter.   Maximal Assist    Bed Mobility  Supine to sit: Dependent   Sit to supine: Maximal Assist  Max A supine to sit   Supine to sit: Moderate Assist   Sit to supine: Minimal Assist    Functional Transfers Maximal Assist  Max A   Minimal Assist    Functional Mobility Maximal Assist  Mod A with posterior lean   Minimal Assist    Balance Sitting:     Static:  Minimal assist    Dynamic:mod/max  Standing: max       Activity Tolerance fair Fair -   good       Comments:  Pt laying in the bed and motivated to get moving. Cues for safety throughout session. Sponge bathe while seated on BSC. Cues for hip precautions. Pt requiring increased time on BSC. Remained seated in chair at end of the session. Education/treatment:  ADL retraining with facilitation of movement and instruction of hip precautions for self care skills. Therapeutic activity to address balance, strength, and endurance for ADL and transfers. Pt education of hip precautions, walker safety,and transfer safety. · Pt has made  progress towards set goals. Plan of Care:  OT 1-3x/week PRN   ADL retraining [x]? Equipment needs [x]? Neuromuscular re-education []? Energy Conservation Techniques [x]? Functional Transfer training [x]? Patient and/or Family Education [x]? Functional Mobility training [x]? Environmental Modifications [x]? Cognitive re-training []? Compensatory techniques for ADLs [x]? Splinting Needs []? Positioning to improve overall function [x]? Therapeutic Activity [x]?                       Therapeutic Exercise [x]?  Visual/Perceptual: []? Delirium prevention/treatment  []?    Other:      Time In: 8:15  Time Out: 8:56     Min Units   Therapeutic Ex 72114     Therapeutic Activities 55234 34 1   ADL/Self Care 89492 30 2   Orthotic Management 14085     Neuro Re-Ed 95105     Non-Billable Time     TOTAL TIMED TREATMENT [address] [facility] BETHANY/L [address]

## 2021-02-01 NOTE — DISCHARGE SUMMARY
Internal Medicine Discharge Summary    NAME: Eleni Kulkarni :  1930  MRN:  [id number] PCP:Anjali Cedillo MD    ADMITTED: 2021   DISCHARGED: 2021  1:39 PM    ADMITTING PHYSICIAN: Fior Denis DO    PCP: Emma Jiménez MD    CONSULTANT(S):   IP CONSULT TO INTERNAL MEDICINE  IP CONSULT TO ORTHOPEDIC SURGERY  IP CONSULT TO CARDIOLOGY  IP CONSULT TO PULMONOLOGY  IP CONSULT TO SOCIAL WORK  IP CONSULT TO HOSPITALIST  IP CONSULT TO CASE MANAGEMENT  IP CONSULT TO SOCIAL WORK  IP CONSULT TO HOME CARE NEEDS     ADMITTING DIAGNOSIS:   Left displaced femoral neck fracture ([facility] [address] [S72.002A]     Please see H&P for further details    DISCHARGE DIAGNOSES:   Active Hospital Problems    Diagnosis    Left displaced femoral neck fracture ([address] [S72.002A]     Priority: High    Hypoxia [R09.02]     Priority: Medium    CAP (community acquired pneumonia) [J18.9]     Priority: Medium    Venous stasis dermatitis of both lower extremities [I87.2]    Stage 3 chronic kidney disease [N18.30]    Parkinson disease ([address] [G20]    Hypothyroidism [E03.9]    Hypertension [I10]       BRIEF HISTORY OF PRESENT ILLNESS: Eleni Kulkarni is a 80 y.o. male patient of Emma Jiménez MD who  has a past medical history of High cholesterol, Hypertension, Hypothyroidism, Parkinson disease ([address] and RBBB. who originally had concerns including Hip Pain (left hip pain after falling onto left side tonight) and Shortness of Breath (onset after fall - CP). at presentation on 2021, and was found to have Left displaced femoral neck fracture ([address] [S72.002A] after workup. Please see H&P for further details. HOSPITAL COURSE:   The patient presented to the hospital with the chief complaint of Hip Pain (left hip pain after falling onto left side tonight) and Shortness of Breath (onset after fall - CP). The patient was admitted to the hospital.     · Left femoral neck fracture: Ortho following-- sp hemiarthroplasty . DVT prophylaxis per ortho.  IV Post-op pain control per ortho. PT/OT-- 8/24. Hold ASA/ACEi. Cardio with low-intermediate risk.   · ? CAP vs CHF vs asthma w/ hypoxia vs aspiration: CXR and CTA chest noted. DC ATB's--pro-arline low. Pneumococcal/legionella urine ag's neg. COVID-19 neg in ED. Pulmonology following. O2 as needed. Bronchodilators. Steroids per pulm. MBS/SLP eval.  · DC'ed to the nursing home    BRIEF PHYSICAL EXAMINATION AND LABORATORIES ON DAY OF DISCHARGE:  VITALS:  BP (!) 149/72   Pulse 71   Temp 97.6 °F (36.4 °C) (Oral)   Resp 18   Ht 5' 7\" (1.702 m)   Wt 180 lb 3.2 oz (81.7 kg)   SpO2 93%   BMI 28.22 kg/m²     Please see note from the same day. LABS[de-identified]  Recent Labs     01/30/21  1745      K 4.4      CO2 26   BUN 54*   CREATININE 1.3*   GLUCOSE 124*   CALCIUM 8.4*     No results for input(s): ALKPHOS, ALT, AST, PROT, BILITOT, BILIDIR, LABALBU in the last 72 hours. Recent Labs     01/30/21  0400 01/31/21  0541 02/01/21  0310   HGB 8.9* 9.1* 9.0*   HCT 28.4* 27.6* 29.0*     No results found for: LABA1C  Lab Results   Component Value Date    INR 1.3 12/24/2017    INR 1.3 09/19/2017    PROTIME 14.0 (H) 12/24/2017    PROTIME 14.3 (H) 09/19/2017      Lab Results   Component Value Date    TSH 7.060 (H) 01/31/2021    TSH 5.810 (H) 09/24/2020    TSH 2.920 10/21/2019     Lab Results   Component Value Date    TRIG 72 09/24/2020    TRIG 113 10/21/2019    HDL 36 09/24/2020    HDL 39 10/21/2019    LDLCALC 84 09/24/2020    LDLCALC 88 10/21/2019     No results for input(s): MG in the last 72 hours. No results for input(s): CKTOTAL, CKMB, TROPONINI in the last 72 hours. No results for input(s): LACTA in the last 72 hours.     IMAGING:  Echo Complete    Result Date: 1/28/2021  Transthoracic Echocardiography Report (TTE)  Demographics   Patient Name       Re Lines Gender               Male   Medical Record     [id number]      Room Number          3667  Number   Account #          [de-identified]     Procedure Date 01/27/2021   Corporate ID                     Ordering Physician   Shawna Pimentel   Accession Number   [id number]    Referring Physician  Dennischrissy Rosa   Date of Birth      08/12/1930    Sonographer          Leilani Spence [facility]   Age                80 year(s)    Interpreting         [facility]                                   Physician            Esme Ma MD                                    Any Other  Procedure Type of Study   TTE procedure:Echo Complete W/Doppler & Color Flow. Procedure Date Date: 01/27/2021 Start: 03:20 PM Study Location: ER Technical Quality: Adequate visualization Indications:Congestive heart failure. Patient Status: Routine Height: 66 inches Weight: 190 pounds BSA: 1.96 m^2 BMI: 30.67 kg/m^2 HR: 69 bpm BP: 125/57 mmHg  Findings   Left Ventricle  Mild left ventricular concentric hypertrophy noted. LV Ejection fraction is visually estimated at 60%. Right Ventricle  Normal right ventricle structure and function. Left Atrium  The left atrium is mildly dilated. Right Atrium  Normal right atrium. Mitral Valve  Physiologic and/or trace mitral regurgitation is present. No evidence of hemodynamically significant mitral stenosis. Tricuspid Valve  Mild tricuspid regurgitation. Aortic Valve  The aortic valve appears mildly sclerotic. Physiologic and/or trace aortic regurgitation is noted. No hemodynamically significant aortic stenosis is present. Pulmonic Valve  Normal pulmonic valve structure and function. Pericardial Effusion  No evidence for hemodynamically significant pericardial effusion. Pleural Effusion  No evidence of pleural effusion. Aorta  Normal aortic root and ascending aorta. Miscellaneous  The inferior vena cava diameter is normal with normal respiratory  variation. Conclusions   Summary  The aortic valve appears mildly sclerotic. Physiologic and/or trace aortic regurgitation is noted. No hemodynamically significant aortic stenosis is present.   Mild tricuspid regurgitation. The left atrium is mildly dilated. Mild left ventricular concentric hypertrophy noted. LV Ejection fraction is visually estimated at 60%.    Signature   ----------------------------------------------------------------  Electronically signed by Conard Dubin MD(Interpreting  physician) on 01/28/2021 12:07 PM  ----------------------------------------------------------------  M-Mode/2D Measurements & Calculations   LV Diastolic    LV Systolic Dimension: 2.7   AV Cusp Separation: 1.4 cmLA  Dimension: 4.3  cm                           Dimension: 4.5 cmAO Root  cm              LV Volume Diastolic: 42.6 ml Dimension: 3.1 cm  LV FS:37.2 %    LV Volume Systolic: 18.6 ml  LV PW           LV EDV/LV EDV Index: 43.3  Diastolic: 1.1  VM/39 PK/Z^8AS ESV/LV ESV  cm              Index: 28.2 ml/14ml/ m^2     RV Diastolic Dimension: 4.1  LV PW Systolic: EF Calculated: 67.9 %        cm  1.6 cm          LV Mass Index: 89 l/min*m^2  Septum                                       LA/Aorta: 1.01  Diastolic: 1.2                               Ascending Aorta: 3.2 cm  cm              LVOT: 2.1 cm                 LA volume/Index: 76.3 ml  Septum                                       /15.25NY/C^0  Systolic: 1.7                                RA Area: 20.9 cm^2  cm  CO: 4.28 l/min  CI: 2.18  l/m*m^2  LV Mass: 173.65  g  Doppler Measurements & Calculations   MV Peak E-Wave:     AV Peak Velocity: 1.45 LVOT Peak Velocity: 0.81 m/s  1.01 m/s            m/s                    LVOT Mean Velocity: 0.54 m/s  MV Peak A-Wave:     AV Peak Gradient: 8.43 LVOT Peak Gradient: 2.6  0.88 m/s            mmHg                   mmHgLVOT Mean Gradient: 1.4  MV E/A Ratio: 1.15  AV Mean Velocity: 1.02 mmHg  MV Peak Gradient: 4 m/s                    Estimated RVSP: 36.7 mmHg  mmHg                AV Mean Gradient: 4.6  Estimated RAP:3 mmHg  MV Mean Gradient:   mmHg  1.6 mmHg            AV VTI: 32 cm  MV Mean Velocity:   AV Area Exception->Emergency Medical Condition (MA) FINDINGS: Pulmonary Arteries: Pulmonary arteries are adequately opacified for evaluation. No evidence of intraluminal filling defect to suggest pulmonary embolism. Main pulmonary artery is normal in caliber. Mediastinum: No evidence of mediastinal lymphadenopathy. The heart and pericardium demonstrate no acute abnormality. There is no acute abnormality of the thoracic aorta. There are coronary artery calcifications. There is calcified plaque in the descending thoracic aorta. Lungs/pleura: There is some mild dependent airspace disease posteriorly in the right upper lobe and also in the left lower lobe. Suspect atelectasis, less likely infiltrates. There is no pneumothorax. There is no pleural effusion. Upper Abdomen: There is diverticulosis involving visualized transverse colon. No diverticulitis seen. Soft Tissues/Bones: There is no acute bony or soft tissue abnormality seen. No evidence of pulmonary embolism. Dependent airspace disease in right upper and left lower lobes likely represents atelectasis. Coronary artery calcification. Xr Hip 1 Vw W Pelvis Left    Result Date: 1/29/2021  EXAMINATION: ONE XRAY VIEW OF THE PELVIS AND ONE XRAY VIEW LEFT HIP 1/29/2021 2:31 pm COMPARISON: 01/27/2021 HISTORY: ORDERING SYSTEM PROVIDED HISTORY: Post operative total hip arthroplasty TECHNOLOGIST PROVIDED HISTORY: Reason for exam:->Post operative total hip arthroplasty FINDINGS: Subcapital fracture of the left hip status post left total hip arthroplasty without cement. The prosthetic components appear in good position. No new fracture or other complication is seen. Expected postoperative change with small soft tissue gas at the operative bed. Left total hip arthroplasty. Expected postoperative change and no complication seen.     Xr Hip 2-3 Vw W Pelvis Left    Result Date: 1/27/2021  EXAMINATION: ONE XRAY VIEW OF THE PELVIS AND TWO XRAY VIEWS LEFT HIP 1/27/2021 4:21 am COMPARISON: None. HISTORY: ORDERING SYSTEM PROVIDED HISTORY: fall TECHNOLOGIST PROVIDED HISTORY: Reason for exam:->fall FINDINGS: There is a displaced fracture of the left femoral neck. There is no dislocation. No other fracture seen. Hip joint spaces are mildly narrowed bilaterally. There is mild acetabular marginal spurring. Displaced left femoral neck fracture. MICROBIOLOGY:  BLOOD CX #1  No results for input(s): BC in the last 72 hours. BLOOD CX #2  No results for input(s): Irean Cassis in the last 72 hours. TIP CULTURE  No results for input(s): CXCATHTIP in the last 72 hours. CULTURE, RESPIRATORY   No results for input(s): CULTRESP in the last 72 hours. RESPIRATORY SMEAR  No results for input(s): RESPSMEAR in the last 72 hours. DISPOSITION:  The patient's condition is fair. The patient is being discharged to nursing home    DISCHARGE MEDICATIONS:    [facility] Medication Instructions ASS:[id number]    Printed on:02/05/21 6507   Medication Information                      aspirin 325 MG EC tablet  Take 1 tablet by mouth daily             bumetanide (BUMEX) 1 MG tablet  Take 1 mg by mouth daily             carbidopa-levodopa (SINEMET)  MG per tablet  Take 2 tablets by mouth 4 times daily             clobetasol (TEMOVATE) 0.05 % ointment  Apply topically 2 times daily.              docusate sodium (COLACE, DULCOLAX) 100 MG CAPS  Take 100 mg by mouth daily             doxazosin (CARDURA) 4 MG tablet  Take 1 tablet by mouth nightly             ferrous sulfate (IRON 325) 325 (65 Fe) MG tablet  Take 1 tablet by mouth daily (with breakfast)             ipratropium-albuterol (DUONEB) 0.5-2.5 (3) MG/3ML SOLN nebulizer solution  Inhale 3 mLs into the lungs every 4 hours (while awake)             levothyroxine (SYNTHROID) 25 MCG tablet  Take 3 tabs PO Monday - Friday and 2 tabs PO Saturday - Sunday             lisinopril (PRINIVIL;ZESTRIL) 10 MG tablet  Take 1 tablet by mouth g by mouth daily, Disp-527 g, R-1DC to [facility]      sennosides-docusate sodium (SENOKOT-S) 8.6-50 MG tablet Take 1 tablet by mouth 2 times dailyDC to SNF      traMADol (ULTRAM) 50 MG tablet Take 1 tablet by mouth every 6 hours as needed for Pain for up to 3 days. , Disp-12 tablet, R-0Normal      predniSONE (DELTASONE) 10 MG tablet Take 4 tabs x 3 days, 3 tabs x 3 days, 2 tabs x 3 days, 1 tab x 3 days, stop., Disp-30 tablet, R-0DC to [facility]             INTERNAL MEDICINE FOLLOW UP/INSTRUCTIONS:  · Follow-up with primary care physician as directed in discharge paperwork. · Please review results of imaging studies with PCP. · Follow-up with consultants as directed by them. · If recurrence or worsening of symptoms go to the ED or call primary care physician. · Diet: DIET GENERAL    Preparing for this patient's discharge, including paperwork, orders, instructions, and meeting with patient did required 34 minutes.     Electronically signed by Tom Ragsdale DO on 2/5/2021 at 4:18 PM

## 2021-02-01 NOTE — ADT AUTH CERT
Utilization Reviews       Physician Advisor Recommendation by Brunilda Pereira RN       Review Status Review Entered   In Primary 2021 16:58      Criteria Review            Summary: slr keep in We recommend that the following pt's current hospitalization under. ..         Note type: Physician Advisor Note level: Hospital Account       Note text: slr keep in               We recommend that the following pt's current hospitalization under Inpatient status is APPROPRIATE .                        Name: Amy Olivia        : 1930        CSN: [id number]        INSURANCE: [employer]               80 M       Left femoral neck fracture       CAP vs CHF vs asthma w/ hypoxia       Multiple , severe Comorbidities               PLAN: Left hip hemiarthroplasty                        MCG criteria applies Y                       This chart was reviewed at 5:05 PM 2021               Caty Chaparro MD        Physician Advisor        [facility]        CELL : [phone]       _______________________________________________________________________________________               Commercial & Medicare Advantage Plan : The final decision of the patient's hospitalization status depends on the attending physician's judgment.                        The information in this document is a recommendation to be used for utilization review and utilization management purposes only. This recommendation is not an order. The recommendation is made based on the information reviewed at the time of the referral, is pursuant to the [facility] Conditions of Participation (42 CFR Part 482), and is neither a judgment nor an assessment with regard to the appropriateness or quality of clinical care. Nothing in this document may be used to limit, alter, or       affect clinical services provided to the patient named below.  The provider of services is ultimately responsible for the submission of a claim that has met all requirements for correct coding, billing, and reimbursement.          Musculoskeletal Disease GRG - Care Day 3 (1/29/2021) by Gilmar Brian RN       Review Status Review Entered   Completed 1/29/2021 16:53      Criteria Review      Care Day: 3 Care Date: 1/29/2021 Level of Care: Telemetry    Guideline Day 2    Level Of Care    (X) Floor    Clinical Status    (X) * No ICU or intermediate care needs    Interventions    (X) Inpatient interventions continue    1/29/2021 4:53 PM EST by Jonathan Fox      vibramycin iv, NaCl iv, morphine iv    * Milestone   Additional Notes   1/29    97.4 (36.3)  15  58  134/62  -  -  -  6  98  Simple Mask     Sat noted 88%ra      Scheduled Meds:·  sodium chloride flush, 10 mL, Intravenous, 2 times per day   ·  acetaminophen, 1,000 mg, Oral, Q6H   ·  sennosides-docusate sodium, 1 tablet, Oral, BID   ·  ceFAZolin (ANCEF) IVPB, 2,000 mg, Intravenous, Q8H   ·  [START ON 1/30/2021] aspirin, 325 mg, Oral, Daily   ·  methylPREDNISolone, 40 mg, Intravenous, Q12H   ·  cefTRIAXone (ROCEPHIN) IV, 1,000 mg, Intravenous, Q24H   ·  doxycycline (VIBRAMYCIN) IV, 100 mg, Intravenous, Q12H   ·  carbidopa-levodopa, 2 tablet, Oral, 4x Daily   ·  clobetasol, , Topical, BID   ·  doxazosin, 4 mg, Oral, Nightly   ·  traZODone, 50 mg, Oral, Nightly   ·  bumetanide, 1 mg, Oral, Daily   ·  ipratropium-albuterol, 1 ampule, Inhalation, Q4H WA   ·  fluticasone, 2 spray, Each Nostril, Daily   ·  montelukast, 10 mg, Oral, Nightly   Ancef 2000mg iv x1      Continuous Infusions:·  sodium chloride Last Rate: 125 mL/hr at 01/29/21 1520         PRN Meds given: morphine 2mg iv x1      X-ray left hip   Impression:        Left total hip arthroplasty.  Expected postoperative change and no    complication seen.       IM note   Subjective   M was seen and examined at bedside.  Patient was slightly confused this morning when I talked to him. Carla Acharya was unsure of which hospital he was at and forgot why he was brought into the [facility] [place] [facility] no other complaints at this time.  Had an uneventful night. Physical Exam:   General: AAO to person/place/time/purpose, NAD, no labored breathing, [place]   Eyes: conjunctivae/corneas clear, sclera non icteric   Skin: color/texture/turgor normal, no rashes or lesions   Lungs: overall diminished, wheezing noted bilaterally 4LO2NC, no retractions/use of accessory muscles, no vocal fremitus, no rhonchi, no crackle, no rales   Heart: regular rate, regular rhythm, no murmur   Abdomen: soft, NT; bowel sounds normal; no masses,  no organomegaly   Extremities: Pain with movement of the left hip with shortening of the left leg, no cyanosis, no edema   Neurologic: cranial nerves 2-12 grossly intact, no slurred speech. Assessment    Active Hospital Problems     Diagnosis   · Left displaced femoral neck fracture (HCC) [S72.002A]       Priority: High   · Hypoxia [R09.02]       Priority: Medium   · CAP (community acquired pneumonia) [J18.9]       Priority: Medium   · Venous stasis dermatitis of both lower extremities [I87.2]   · Stage 3 chronic kidney disease [N18.30]   · Parkinson disease ([facility] [address] [G20]   · Hypothyroidism [E03.9]   · Hypertension [I10]               Plan   · Left femoral neck fracture: Ortho following-- for hemiarthroplasty 1/29. DVT prophylaxis per ortho. IV Morphine for pain- post-op pain control per ortho. PT/OT eventually. Hold ASA/ACEi. Cardio with low-intermediate risk.    · ? CAP vs CHF vs asthma w/ hypoxia vs aspiration: CXR and CTA chest noted. ATB's for now--pro-arline low--stop soon. Pneumococcal/legionella urine ag's. COVID-19 neg in ED. Pulmonology reporting low-intermediate risk. O2 as needed. Bronchodilators. IV steroids per pulm. MBS/SLP eval.   · Follow labs   · DVT prophylaxis. · Please see orders for further management and care.    ·  for discharge planning   · Discharge plan: likely SNF will be needed, but if he does well post-op then maybe [facility]     OP note   Pre-Op Diagnosis: Left femoral neck fracture       Post-Op Diagnosis: Same         Procedure(s):   LEFT HIP HEMIARTHROPLASTY       Surgeon(s):   Bertha Montague MD       Assistant:   ASIF Sheets PA-C       Anesthesia: Spinal       Estimated Blood Loss (mL): 213 ml       Complications: None       Specimens:    ID Type Source Tests Collected by Time Destination   A : LEFT HIP BONE Bone Bone SURGICAL PATHOLOGY Bertha Montague MD 1/29/2021 1245             Implants:   Implant Name Type Inv.  Item Serial No.  Lot No. LRB No. Used Action   HEAD FEM [id number] +0MM OFFSET HIP CO CHROM V40 TAPR LO FRIC   HEAD FEM [id number] +0MM OFFSET HIP CO CHROM V40 TAPR LO FRIC   [employer] [id number] Left 1 Implanted   HEAD FEM OD47MM ID26MM HIP CO CHROM POLYETH BPLR CEMENTLESS   HEAD FEM OD47MM ID26MM HIP CO CHROM POLYETH BPLR CEMENTLESS   [employer] [id number] Left 1 Implanted   STEM FEM SZ 5 L130MM NK L37MM +48MM OFFSET 127DEG HIP CO   STEM FEM SZ 5 L130MM NK L37MM +48MM OFFSET 127DEG HIP CO   [employer] [id number] Left 1 Implanted            Drains:    Urethral Catheter (Active)   Urine Color Arabella 01/29/21 1125   Urine Appearance Hazy 01/29/21 1125   Output (mL) 250 mL 01/29/21 1125           Findings: Fracture

## 2021-02-01 NOTE — PROGRESS NOTES
Ambulance transportation here for transport to [facility].  Nurse at facility updated on patient regarding chest xray

## 2021-02-01 NOTE — ANESTHESIA POSTPROCEDURE EVALUATION
Department of Anesthesiology  Postprocedure Note    Patient: Shira Ng  MRN: [id number]  YOB: 1930  Date of evaluation: 2/1/2021  Time:  12:10 PM     Procedure Summary     Date: 01/29/21 Room / Location: [place] [address] / [address] [place] [address]    Anesthesia Start: 8118 Anesthesia Stop: 1401    Procedure: LEFT HIP HEMIARTHROPLASTY (Left Hip) Diagnosis: (/)    Surgeons: Tai Ridley MD Responsible Provider: Ankur Ortiz MD    Anesthesia Type: spinal ASA Status: 4          Anesthesia Type: spinal    Bayron Phase I: Bayron Score: 9    Bayron Phase II:      Last vitals: Reviewed and per EMR flowsheets.        Anesthesia Post Evaluation

## 2021-02-01 NOTE — PROGRESS NOTES
a BM. Transfer performed chair to EOB, Pt then states feels the need to have a BM. Pivot then performed EOB to bedside commode. WW used for pivot, Pt able to WB and advance LEs but displays poor balance and stability, displayed retroversion throughout. Treatment: Pt practiced and was instructed in the following treatment: transfers    Pt was left on a bedside commode with call light in reach    Total Treatment Time 10 minutes   CPT codes:     Therapeutic activities 81595 10 minutes   Therapeutic exercises 62826 0 minutes       Pt is making good progress toward established Physical Therapy goals as per ability to perform transfers  Continue with physical therapy current plan of care.     Giuseppe Troncoso [facility]   License Number: [id number]

## 2021-02-01 NOTE — PROGRESS NOTES
sounds: Normal heart sounds. Pulmonary:      Effort: Pulmonary effort is normal.      Breath sounds: No wheezing. Abdominal:      Palpations: Abdomen is soft. Tenderness: There is no abdominal tenderness. Musculoskeletal:         General: No swelling or deformity. Lymphadenopathy:      Cervical: No cervical adenopathy. Skin:     General: Skin is warm. Findings: No rash. Neurological:      General: No focal deficit present. Mental Status: He is alert and oriented to person, place, and time. Psychiatric:         Behavior: Behavior normal.         Pertinent/ New Labs and Imaging Studies     Pulmonary Function Testing personally reviewed and interpreted. PERTINENT LAB RESULTS: Labs reviewed. DIAGNOSTICS: Pertinent imaging reviewed. Assessment:      1. Acute hypoxic respiratory failure -improved  *secondary to atelectasis, asthma exacerbation, may have component of aspiration given Parkinson's  2. Mechanical fall with left hip femoral neck fracture s/left hemiarthroplasty  3. Wheezing - mild asthma exacerbation -improved  4. Chronic rhinitis  5. Parkinson's disease      Plan:     · Duo nebs every 4 while awake and as needed  · Check ambulatory pulse ox prior to discharge  · Aspiration precautions  ·  Flonase, Singulair  · Transition to prednisone 40 mg x 5 days  · Mobilization as tolerated, PT/OT, incentive spirometry  · Will check stat cxr before pt leaves today       Thank you for allowing me to participate in the care of SHEILA Gillette. Please feel free to call with questions. Electronically signed by LORIE Melendez on 2/1/2021 at 12:57 PM     I personally saw, examined, and cared for the patient. Labs, medications, radiographs reviewed. I agree with history exam and plans detailed in NP note. CXR with no pneumonic process.     Electronically signed by Shanice Whitaker DO on 2/1/2021 at 7:14 PM

## 2021-02-01 NOTE — PROGRESS NOTES
Internal Medicine Progress Note    Patient's name: Leverette Dubin  : 1930  Admission date: 2021  Date of service: 2021   Room: [address] [place] [address] SURGERY  Primary care physician: Westley Sun MD  Reason for visit: Follow-up for hip fx    Subjective  M was seen and examined at bedside. He was sitting up in a chair. His breathing is stable. He is on oxygen. He is tolerating current meds. He thinks that he is ready to go to the skilled nursing facility today. He denies any other complaints or issues. Review of Systems  There are no new complaints of chest pain, shortness of breath, abdominal pain, nausea, vomiting, diarrhea, constipation.     Hospital Medications  Current Facility-Administered Medications   Medication Dose Route Frequency Provider Last Rate Last Admin    ferrous sulfate (IRON 325) tablet 325 mg  325 mg Oral BID WC Jarvis Liu MD   325 mg at 21 0908    polyethylene glycol (GLYCOLAX) packet 17 g  17 g Oral Daily Jarvis Liu MD   17 g at 21 0911    docusate sodium (COLACE) capsule 100 mg  100 mg Oral Daily Jarvis Liu MD   100 mg at 21 0908    predniSONE (DELTASONE) tablet 40 mg  40 mg Oral Daily Josue Gonzalez DO   40 mg at 21 3071    levothyroxine (SYNTHROID) tablet 25 mcg  25 mcg Oral Daily Jarvis Liu MD   25 mcg at 21 2889    sodium chloride flush 0.9 % injection 10 mL  10 mL Intravenous 2 times per day Wendy Hernandez MD   10 mL at 21 0912    sodium chloride flush 0.9 % injection 10 mL  10 mL Intravenous PRN Wendy Hernandez MD        acetaminophen (TYLENOL) tablet 1,000 mg  1,000 mg Oral Q6H Wendy Hernandez MD   1,000 mg at 21 2938    sennosides-docusate sodium (SENOKOT-S) 8.6-50 MG tablet 1 tablet  1 tablet Oral BID Wendy Hernandez MD   1 tablet at 21 0908    magnesium hydroxide (MILK OF MAGNESIA) 400 MG/5ML suspension 30 mL  30 mL Oral Daily PRN Wendy Hernandez MD   30 mL at 21 2557    traMADol subtle infiltrate in right upper lobe and left lower lobe. Last Echocardiogram 1/28/2021    The aortic valve appears mildly sclerotic.    Physiologic and/or trace aortic regurgitation is noted.    No hemodynamically significant aortic stenosis is present.    Mild tricuspid regurgitation.    The left atrium is mildly dilated.    Mild left ventricular concentric hypertrophy noted.    LV Ejection fraction is visually estimated at 60%. Assessment   Active Hospital Problems    Diagnosis    Left displaced femoral neck fracture ([facility] [S72.002A]     Priority: High    Hypoxia [R09.02]     Priority: Medium    CAP (community acquired pneumonia) [J18.9]     Priority: Medium    Venous stasis dermatitis of both lower extremities [I87.2]    Stage 3 chronic kidney disease [N18.30]    Parkinson disease ([facility] [G20]    Hypothyroidism [E03.9]    Hypertension [I10]         Plan  · Left femoral neck fracture: Ortho following-- sp hemiarthroplasty 1/29. DVT prophylaxis per ortho. IV Post-op pain control per ortho. PT/OT-- 8/24. Hold ASA/ACEi. Cardio with low-intermediate risk. · ? CAP vs CHF vs asthma w/ hypoxia vs aspiration: CXR and CTA chest noted. DC ATB's--pro-arline low. Pneumococcal/legionella urine ag's neg. COVID-19 neg in ED. Pulmonology following. O2 as needed. Bronchodilators. Steroids per pulm. MBS/SLP eval.  · Follow labs  · DVT prophylaxis. · Please see orders for further management and care. ·  for discharge planning  · Discharge plan: SNF later today    Electronically signed by Jolanda Pallas, DO on 2/1/2021 at 10:23 AM    I can be reached through [address] [place] [address].

## 2021-03-02 ENCOUNTER — HOSPITAL ENCOUNTER (INPATIENT)
Age: 86
LOS: 3 days | Discharge: NURSING, REHAB OR LONG-TERM CARE | DRG: 481 | End: 2021-03-05
Attending: EMERGENCY MEDICINE | Admitting: INTERNAL MEDICINE
Payer: MEDICARE

## 2021-03-02 ENCOUNTER — APPOINTMENT (OUTPATIENT)
Dept: GENERAL RADIOLOGY | Age: 86
DRG: 481 | End: 2021-03-02
Payer: MEDICARE

## 2021-03-02 DIAGNOSIS — S72.141A CLOSED DISPLACED INTERTROCHANTERIC FRACTURE OF RIGHT FEMUR, INITIAL ENCOUNTER (HCC): Primary | ICD-10-CM

## 2021-03-02 PROBLEM — S72.002A LEFT DISPLACED FEMORAL NECK FRACTURE (HCC): Status: RESOLVED | Noted: 2021-01-27 | Resolved: 2021-03-02

## 2021-03-02 PROBLEM — R09.02 HYPOXIA: Status: RESOLVED | Noted: 2021-01-27 | Resolved: 2021-03-02

## 2021-03-02 PROBLEM — S72.001A CLOSED FRACTURE OF NECK OF RIGHT FEMUR (HCC): Status: ACTIVE | Noted: 2021-03-02

## 2021-03-02 LAB
ABO/RH: NORMAL
ALBUMIN SERPL-MCNC: 3.6 G/DL (ref 3.5–5.2)
ALP BLD-CCNC: 245 U/L (ref 40–129)
ALT SERPL-CCNC: <5 U/L (ref 0–40)
ANION GAP SERPL CALCULATED.3IONS-SCNC: 6 MMOL/L (ref 7–16)
ANTIBODY SCREEN: NORMAL
APTT: 28.3 SEC (ref 24.5–35.1)
AST SERPL-CCNC: 15 U/L (ref 0–39)
BASOPHILS ABSOLUTE: 0.07 E9/L (ref 0–0.2)
BASOPHILS RELATIVE PERCENT: 0.6 % (ref 0–2)
BILIRUB SERPL-MCNC: 0.7 MG/DL (ref 0–1.2)
BUN BLDV-MCNC: 24 MG/DL (ref 8–23)
CALCIUM SERPL-MCNC: 8.9 MG/DL (ref 8.6–10.2)
CHLORIDE BLD-SCNC: 110 MMOL/L (ref 98–107)
CO2: 26 MMOL/L (ref 22–29)
CREAT SERPL-MCNC: 1.2 MG/DL (ref 0.7–1.2)
EOSINOPHILS ABSOLUTE: 0.17 E9/L (ref 0.05–0.5)
EOSINOPHILS RELATIVE PERCENT: 1.4 % (ref 0–6)
GFR AFRICAN AMERICAN: >60
GFR NON-AFRICAN AMERICAN: 57 ML/MIN/1.73
GLUCOSE BLD-MCNC: 109 MG/DL (ref 74–99)
HCT VFR BLD CALC: 32.5 % (ref 37–54)
HEMOGLOBIN: 10.1 G/DL (ref 12.5–16.5)
IMMATURE GRANULOCYTES #: 0.18 E9/L
IMMATURE GRANULOCYTES %: 1.5 % (ref 0–5)
INR BLD: 1.7
LYMPHOCYTES ABSOLUTE: 0.77 E9/L (ref 1.5–4)
LYMPHOCYTES RELATIVE PERCENT: 6.2 % (ref 20–42)
MCH RBC QN AUTO: 34 PG (ref 26–35)
MCHC RBC AUTO-ENTMCNC: 31.1 % (ref 32–34.5)
MCV RBC AUTO: 109.4 FL (ref 80–99.9)
MONOCYTES ABSOLUTE: 0.81 E9/L (ref 0.1–0.95)
MONOCYTES RELATIVE PERCENT: 6.5 % (ref 2–12)
NEUTROPHILS ABSOLUTE: 10.41 E9/L (ref 1.8–7.3)
NEUTROPHILS RELATIVE PERCENT: 83.8 % (ref 43–80)
PDW BLD-RTO: 17.2 FL (ref 11.5–15)
PLATELET # BLD: 267 E9/L (ref 130–450)
PMV BLD AUTO: 10.2 FL (ref 7–12)
POTASSIUM REFLEX MAGNESIUM: 4.4 MMOL/L (ref 3.5–5)
PROTHROMBIN TIME: 19 SEC (ref 9.3–12.4)
RBC # BLD: 2.97 E12/L (ref 3.8–5.8)
SARS-COV-2, NAAT: NOT DETECTED
SODIUM BLD-SCNC: 142 MMOL/L (ref 132–146)
TOTAL PROTEIN: 6.7 G/DL (ref 6.4–8.3)
WBC # BLD: 12.4 E9/L (ref 4.5–11.5)

## 2021-03-02 PROCEDURE — 87635 SARS-COV-2 COVID-19 AMP PRB: CPT

## 2021-03-02 PROCEDURE — 80053 COMPREHEN METABOLIC PANEL: CPT

## 2021-03-02 PROCEDURE — 86900 BLOOD TYPING SEROLOGIC ABO: CPT

## 2021-03-02 PROCEDURE — 86850 RBC ANTIBODY SCREEN: CPT

## 2021-03-02 PROCEDURE — 1200000000 HC SEMI PRIVATE

## 2021-03-02 PROCEDURE — 96374 THER/PROPH/DIAG INJ IV PUSH: CPT

## 2021-03-02 PROCEDURE — 73502 X-RAY EXAM HIP UNI 2-3 VIEWS: CPT

## 2021-03-02 PROCEDURE — 85610 PROTHROMBIN TIME: CPT

## 2021-03-02 PROCEDURE — 6370000000 HC RX 637 (ALT 250 FOR IP): Performed by: INTERNAL MEDICINE

## 2021-03-02 PROCEDURE — 71045 X-RAY EXAM CHEST 1 VIEW: CPT

## 2021-03-02 PROCEDURE — 2580000003 HC RX 258: Performed by: INTERNAL MEDICINE

## 2021-03-02 PROCEDURE — 86901 BLOOD TYPING SEROLOGIC RH(D): CPT

## 2021-03-02 PROCEDURE — 85025 COMPLETE CBC W/AUTO DIFF WBC: CPT

## 2021-03-02 PROCEDURE — 6360000002 HC RX W HCPCS: Performed by: INTERNAL MEDICINE

## 2021-03-02 PROCEDURE — 85730 THROMBOPLASTIN TIME PARTIAL: CPT

## 2021-03-02 PROCEDURE — 93005 ELECTROCARDIOGRAM TRACING: CPT | Performed by: EMERGENCY MEDICINE

## 2021-03-02 PROCEDURE — 6360000002 HC RX W HCPCS: Performed by: EMERGENCY MEDICINE

## 2021-03-02 PROCEDURE — 99284 EMERGENCY DEPT VISIT MOD MDM: CPT

## 2021-03-02 RX ORDER — SENNA PLUS 8.6 MG/1
1 TABLET ORAL DAILY PRN
Status: DISCONTINUED | OUTPATIENT
Start: 2021-03-02 | End: 2021-03-05 | Stop reason: HOSPADM

## 2021-03-02 RX ORDER — ONDANSETRON 2 MG/ML
4 INJECTION INTRAMUSCULAR; INTRAVENOUS EVERY 6 HOURS PRN
Status: DISCONTINUED | OUTPATIENT
Start: 2021-03-02 | End: 2021-03-02

## 2021-03-02 RX ORDER — LEVOTHYROXINE SODIUM 0.07 MG/1
75 TABLET ORAL DAILY
Status: DISCONTINUED | OUTPATIENT
Start: 2021-03-03 | End: 2021-03-05 | Stop reason: HOSPADM

## 2021-03-02 RX ORDER — SODIUM CHLORIDE 0.9 % (FLUSH) 0.9 %
10 SYRINGE (ML) INJECTION PRN
Status: DISCONTINUED | OUTPATIENT
Start: 2021-03-02 | End: 2021-03-05 | Stop reason: HOSPADM

## 2021-03-02 RX ORDER — MONTELUKAST SODIUM 10 MG/1
10 TABLET ORAL NIGHTLY
Status: DISCONTINUED | OUTPATIENT
Start: 2021-03-02 | End: 2021-03-05 | Stop reason: HOSPADM

## 2021-03-02 RX ORDER — DOXAZOSIN MESYLATE 4 MG/1
4 TABLET ORAL NIGHTLY
Status: DISCONTINUED | OUTPATIENT
Start: 2021-03-02 | End: 2021-03-05 | Stop reason: HOSPADM

## 2021-03-02 RX ORDER — DOCUSATE SODIUM 100 MG/1
100 CAPSULE, LIQUID FILLED ORAL DAILY
Status: DISCONTINUED | OUTPATIENT
Start: 2021-03-03 | End: 2021-03-05 | Stop reason: HOSPADM

## 2021-03-02 RX ORDER — CLOBETASOL PROPIONATE 0.5 MG/G
OINTMENT TOPICAL 2 TIMES DAILY
Status: DISCONTINUED | OUTPATIENT
Start: 2021-03-02 | End: 2021-03-05 | Stop reason: HOSPADM

## 2021-03-02 RX ORDER — SENNA AND DOCUSATE SODIUM 50; 8.6 MG/1; MG/1
1 TABLET, FILM COATED ORAL 2 TIMES DAILY
Status: DISCONTINUED | OUTPATIENT
Start: 2021-03-02 | End: 2021-03-05 | Stop reason: HOSPADM

## 2021-03-02 RX ORDER — FENTANYL CITRATE 50 UG/ML
25 INJECTION, SOLUTION INTRAMUSCULAR; INTRAVENOUS ONCE
Status: COMPLETED | OUTPATIENT
Start: 2021-03-02 | End: 2021-03-02

## 2021-03-02 RX ORDER — TRAZODONE HYDROCHLORIDE 50 MG/1
50 TABLET ORAL NIGHTLY
Status: DISCONTINUED | OUTPATIENT
Start: 2021-03-02 | End: 2021-03-05 | Stop reason: HOSPADM

## 2021-03-02 RX ORDER — POTASSIUM CHLORIDE 1.5 G/1.77G
40 POWDER, FOR SOLUTION ORAL DAILY
COMMUNITY

## 2021-03-02 RX ORDER — MORPHINE SULFATE 2 MG/ML
2 INJECTION, SOLUTION INTRAMUSCULAR; INTRAVENOUS EVERY 4 HOURS PRN
Status: DISCONTINUED | OUTPATIENT
Start: 2021-03-02 | End: 2021-03-05 | Stop reason: HOSPADM

## 2021-03-02 RX ORDER — POTASSIUM CHLORIDE 7.45 MG/ML
10 INJECTION INTRAVENOUS PRN
Status: DISCONTINUED | OUTPATIENT
Start: 2021-03-02 | End: 2021-03-05 | Stop reason: HOSPADM

## 2021-03-02 RX ORDER — POLYETHYLENE GLYCOL 3350 17 G/17G
17 POWDER, FOR SOLUTION ORAL DAILY
Status: DISCONTINUED | OUTPATIENT
Start: 2021-03-03 | End: 2021-03-05 | Stop reason: HOSPADM

## 2021-03-02 RX ORDER — BUMETANIDE 1 MG/1
1 TABLET ORAL DAILY
Status: DISCONTINUED | OUTPATIENT
Start: 2021-03-03 | End: 2021-03-05 | Stop reason: HOSPADM

## 2021-03-02 RX ORDER — FERROUS SULFATE 325(65) MG
325 TABLET ORAL
Status: DISCONTINUED | OUTPATIENT
Start: 2021-03-03 | End: 2021-03-05 | Stop reason: HOSPADM

## 2021-03-02 RX ORDER — SODIUM CHLORIDE 0.9 % (FLUSH) 0.9 %
10 SYRINGE (ML) INJECTION EVERY 12 HOURS SCHEDULED
Status: DISCONTINUED | OUTPATIENT
Start: 2021-03-02 | End: 2021-03-05 | Stop reason: HOSPADM

## 2021-03-02 RX ORDER — ACETAMINOPHEN 325 MG/1
650 TABLET ORAL EVERY 6 HOURS PRN
Status: DISCONTINUED | OUTPATIENT
Start: 2021-03-02 | End: 2021-03-05 | Stop reason: HOSPADM

## 2021-03-02 RX ORDER — TAMSULOSIN HYDROCHLORIDE 0.4 MG/1
0.8 CAPSULE ORAL NIGHTLY
Status: ON HOLD | COMMUNITY
End: 2021-03-04 | Stop reason: HOSPADM

## 2021-03-02 RX ORDER — IPRATROPIUM BROMIDE AND ALBUTEROL SULFATE 2.5; .5 MG/3ML; MG/3ML
3 SOLUTION RESPIRATORY (INHALATION)
Status: DISCONTINUED | OUTPATIENT
Start: 2021-03-03 | End: 2021-03-05 | Stop reason: HOSPADM

## 2021-03-02 RX ORDER — ACETAMINOPHEN 650 MG/1
650 SUPPOSITORY RECTAL EVERY 6 HOURS PRN
Status: DISCONTINUED | OUTPATIENT
Start: 2021-03-02 | End: 2021-03-05 | Stop reason: HOSPADM

## 2021-03-02 RX ORDER — POTASSIUM CHLORIDE 20 MEQ/1
40 TABLET, EXTENDED RELEASE ORAL PRN
Status: DISCONTINUED | OUTPATIENT
Start: 2021-03-02 | End: 2021-03-05 | Stop reason: HOSPADM

## 2021-03-02 RX ORDER — PROMETHAZINE HYDROCHLORIDE 25 MG/1
12.5 TABLET ORAL EVERY 6 HOURS PRN
Status: DISCONTINUED | OUTPATIENT
Start: 2021-03-02 | End: 2021-03-02

## 2021-03-02 RX ADMIN — DOCUSATE SODIUM AND SENNOSIDES 1 TABLET: 8.6; 5 TABLET, FILM COATED ORAL at 23:58

## 2021-03-02 RX ADMIN — MORPHINE SULFATE 2 MG: 2 INJECTION, SOLUTION INTRAMUSCULAR; INTRAVENOUS at 23:04

## 2021-03-02 RX ADMIN — MONTELUKAST SODIUM 10 MG: 10 TABLET, FILM COATED ORAL at 23:05

## 2021-03-02 RX ADMIN — FENTANYL CITRATE 25 MCG: 50 INJECTION, SOLUTION INTRAMUSCULAR; INTRAVENOUS at 19:49

## 2021-03-02 RX ADMIN — CARBIDOPA AND LEVODOPA 2 TABLET: 25; 100 TABLET ORAL at 23:05

## 2021-03-02 RX ADMIN — Medication 10 ML: at 23:05

## 2021-03-02 RX ADMIN — TRAZODONE HYDROCHLORIDE 50 MG: 50 TABLET ORAL at 23:05

## 2021-03-02 RX ADMIN — FENTANYL CITRATE 25 MCG: 50 INJECTION, SOLUTION INTRAMUSCULAR; INTRAVENOUS at 18:42

## 2021-03-02 RX ADMIN — SENNOSIDES 8.6 MG: 8.6 TABLET, FILM COATED ORAL at 23:05

## 2021-03-02 ASSESSMENT — PAIN DESCRIPTION - ONSET: ONSET: ON-GOING

## 2021-03-02 ASSESSMENT — PAIN SCALES - GENERAL
PAINLEVEL_OUTOF10: 8
PAINLEVEL_OUTOF10: 9
PAINLEVEL_OUTOF10: 2
PAINLEVEL_OUTOF10: 8

## 2021-03-02 ASSESSMENT — PAIN DESCRIPTION - PROGRESSION: CLINICAL_PROGRESSION: NOT CHANGED

## 2021-03-02 ASSESSMENT — PAIN DESCRIPTION - PAIN TYPE: TYPE: ACUTE PAIN

## 2021-03-02 ASSESSMENT — PAIN DESCRIPTION - DESCRIPTORS: DESCRIPTORS: SHOOTING

## 2021-03-02 ASSESSMENT — PAIN DESCRIPTION - LOCATION: LOCATION: HIP

## 2021-03-02 NOTE — ED NOTES
Bed:  [place] [address]  Expected date:   Expected time:   Means of arrival:   Comments:  EMS     Britt Le RN  03/02/21 2508

## 2021-03-02 NOTE — ED PROVIDER NOTES
his mother. The patients home medications have been reviewed.     Allergies: Sulfa antibiotics    -------------------------------------------------- RESULTS -------------------------------------------------  All laboratory and radiology results have been personally reviewed by myself   LABS:  Results for orders placed or performed during the hospital encounter of 03/02/21   COVID-19, Rapid    Specimen: Nasopharyngeal Swab   Result Value Ref Range    SARS-CoV-2, NAAT Not Detected Not Detected   CBC Auto Differential   Result Value Ref Range    WBC 12.4 (H) 4.5 - 11.5 E9/L    RBC 2.97 (L) 3.80 - 5.80 E12/L    Hemoglobin 10.1 (L) 12.5 - 16.5 g/dL    Hematocrit 32.5 (L) 37.0 - 54.0 %    .4 (H) 80.0 - 99.9 fL    MCH 34.0 26.0 - 35.0 pg    MCHC 31.1 (L) 32.0 - 34.5 %    RDW 17.2 (H) 11.5 - 15.0 fL    Platelets 635 676 - 338 E9/L    MPV 10.2 7.0 - 12.0 fL    Neutrophils % 83.8 (H) 43.0 - 80.0 %    Immature Granulocytes % 1.5 0.0 - 5.0 %    Lymphocytes % 6.2 (L) 20.0 - 42.0 %    Monocytes % 6.5 2.0 - 12.0 %    Eosinophils % 1.4 0.0 - 6.0 %    Basophils % 0.6 0.0 - 2.0 %    Neutrophils Absolute 10.41 (H) 1.80 - 7.30 E9/L    Immature Granulocytes # 0.18 E9/L    Lymphocytes Absolute 0.77 (L) 1.50 - 4.00 E9/L    Monocytes Absolute 0.81 0.10 - 0.95 E9/L    Eosinophils Absolute 0.17 0.05 - 0.50 E9/L    Basophils Absolute 0.07 0.00 - 0.20 E9/L   Comprehensive Metabolic Panel w/ Reflex to MG   Result Value Ref Range    Sodium 142 132 - 146 mmol/L    Potassium reflex Magnesium 4.4 3.5 - 5.0 mmol/L    Chloride 110 (H) 98 - 107 mmol/L    CO2 26 22 - 29 mmol/L    Anion Gap 6 (L) 7 - 16 mmol/L    Glucose 109 (H) 74 - 99 mg/dL    BUN 24 (H) 8 - 23 mg/dL    CREATININE 1.2 0.7 - 1.2 mg/dL    GFR Non-African American 57 >=60 mL/min/1.73    GFR African American >60     Calcium 8.9 8.6 - 10.2 mg/dL    Total Protein 6.7 6.4 - 8.3 g/dL    Albumin 3.6 3.5 - 5.2 g/dL    Total Bilirubin 0.7 0.0 - 1.2 mg/dL    Alkaline Phosphatase 245 (H) 40 - 129 U/L    ALT <5 0 - 40 U/L    AST 15 0 - 39 U/L   Protime-INR   Result Value Ref Range    Protime 19.0 (H) 9.3 - 12.4 sec    INR 1.7    APTT   Result Value Ref Range    aPTT 28.3 24.5 - 35.1 sec   EKG 12 Lead   Result Value Ref Range    Ventricular Rate 85 BPM    Atrial Rate 85 BPM    P-R Interval 242 ms    QRS Duration 138 ms    Q-T Interval 416 ms    QTc Calculation (Bazett) 495 ms    P Axis 39 degrees    R Axis -84 degrees    T Axis 28 degrees   TYPE AND SCREEN   Result Value Ref Range    ABO/Rh O POS     Antibody Screen NEG        RADIOLOGY:  Interpreted by Radiologist.  XR HIP 2-3 VW W PELVIS RIGHT   Final Result   1. Comminuted intertrochanteric fractures of the right femur. 2. Demineralized bones. XR CHEST PORTABLE   Final Result   Airspace disease in the right upper lobe appears more prominent than on the   prior examination. Developing pneumonia, and in a setting of trauma   developing lung contusion must be considered. Question nodularity also in   the right upper lobe not clearly identified on the prior examination. Radiographic follow-up recommended. Consider further evaluation with CT as   clinically indicated. ------------------------- NURSING NOTES AND VITALS REVIEWED ---------------------------   The nursing notes within the ED encounter and vital signs as below have been reviewed. BP (!) 180/70   Pulse 88   Temp 97.4 °F (36.3 °C) (Oral)   Resp 16   Ht 5' 6\" (1.676 m)   Wt 191 lb 12.8 oz (87 kg)   SpO2 93%   BMI 30.96 kg/m²   Oxygen Saturation Interpretation: Normal      ---------------------------------------------------PHYSICAL EXAM--------------------------------------      Constitutional/General: Alert and oriented x3, appears chronically ill, non toxic in NAD  Head: Normocephalic and atraumatic  Eyes: PERRL, EOMI  Mouth: Oropharynx clear, handling secretions, no trismus  Neck: Supple, full ROM, no cervical spine tenderness.   Pulmonary: Lungs clear to auscultation bilaterally, no wheezes, rales, or rhonchi. Not in respiratory distress  Cardiovascular:  Regular rate and rhythm, no murmurs, gallops, or rubs. 2+ distal pulses  Abdomen: Soft, non tender, non distended,   Pelvic: Right lateral hip pain, no warmth or erythema to joint, decreased ROM, right lower extremity shortened and externally rotated. Left hip- incisions are clean and intact without evidence of infection, no tenderness, warmth, or erythema, normal range of motion. Extremities: Moves all extremities x 4. Warm and well perfused. Bilateral LE pitting edema. Soft and easily compressible compartments, DP and PT pulses intact.   Skin: warm and dry without rash  Neurologic: GCS 15, no focal motor or sensory deficits   Psych: Normal Affect      ------------------------------ ED COURSE/MEDICAL DECISION MAKING----------------------  Medications   ceFAZolin (ANCEF) 2000 mg in sterile water 20 mL IV syringe (has no administration in time range)   aspirin EC tablet 325 mg (has no administration in time range)   bumetanide (BUMEX) tablet 1 mg (has no administration in time range)   carbidopa-levodopa (SINEMET)  MG per tablet 2 tablet (2 tablets Oral Given 3/2/21 2305)   clobetasol (TEMOVATE) 0.05 % ointment ( Topical Not Given 3/2/21 2357)   docusate sodium (COLACE) capsule 100 mg (has no administration in time range)   doxazosin (CARDURA) tablet 4 mg (4 mg Oral Not Given 3/2/21 2358)   ferrous sulfate (IRON 325) tablet 325 mg (has no administration in time range)   ipratropium-albuterol (DUONEB) nebulizer solution 3 mL (has no administration in time range)   levothyroxine (SYNTHROID) tablet 75 mcg (has no administration in time range)   montelukast (SINGULAIR) tablet 10 mg (10 mg Oral Given 3/2/21 2305)   polyethylene glycol (GLYCOLAX) packet 17 g (has no administration in time range)   sennosides-docusate sodium (SENOKOT-S) 8.6-50 MG tablet 1 tablet (1 tablet Oral Given 3/2/21 4727)   traZODone (DESYREL) tablet 50 mg (50 mg Oral Given 3/2/21 2305)   sodium chloride flush 0.9 % injection 10 mL (10 mLs Intravenous Given 3/2/21 2305)   sodium chloride flush 0.9 % injection 10 mL (has no administration in time range)   potassium chloride (KLOR-CON M) extended release tablet 40 mEq (has no administration in time range)     Or   potassium bicarb-citric acid (EFFER-K) effervescent tablet 40 mEq (has no administration in time range)     Or   potassium chloride 10 mEq/100 mL IVPB (Peripheral Line) (has no administration in time range)   enoxaparin (LOVENOX) injection 40 mg (has no administration in time range)   senna (SENOKOT) tablet 8.6 mg (8.6 mg Oral Given 3/2/21 2305)   acetaminophen (TYLENOL) tablet 650 mg (has no administration in time range)     Or   acetaminophen (TYLENOL) suppository 650 mg (has no administration in time range)   morphine (PF) injection 2 mg (2 mg Intravenous Given 3/2/21 2304)   fentaNYL (SUBLIMAZE) injection 25 mcg (25 mcg Intravenous Given 3/2/21 1842)   fentaNYL (SUBLIMAZE) injection 25 mcg (25 mcg Intravenous Given 3/2/21 1949)       Medical Decision Making/ED COURSE:   Patient is a 79-year-old male presenting after mechanical fall with right hip pain. In the ED, patient was hemodynamically stable and afebrile. On exam, patient was awake and alert. His right lower extremity was shortened and externally rotated. He was neurovascularly intact. Left hip incision appeared to be healing well without evidence of infection. Labs, CXR, and right hip/pelvic xray obtained. Patient administered fentanyl. I reviewed and interpreted labs. Labs were significant for a mild leukocytosis of 12.4. Anemia of 10.1 appears stable when compared to prior labs. Urinalysis was sent but pending upon admission due to patient unable to provide a urine sample. Alk phos was mildly elevated at 245. He had no abdominal tenderness. Chest X-ray showed possible pneumonia.   Patient has clear lung sounds and

## 2021-03-03 ENCOUNTER — ANESTHESIA (OUTPATIENT)
Dept: OPERATING ROOM | Age: 86
DRG: 481 | End: 2021-03-03
Payer: MEDICARE

## 2021-03-03 ENCOUNTER — APPOINTMENT (OUTPATIENT)
Dept: GENERAL RADIOLOGY | Age: 86
DRG: 481 | End: 2021-03-03
Payer: MEDICARE

## 2021-03-03 ENCOUNTER — ANESTHESIA EVENT (OUTPATIENT)
Dept: OPERATING ROOM | Age: 86
DRG: 481 | End: 2021-03-03
Payer: MEDICARE

## 2021-03-03 VITALS
TEMPERATURE: 95.9 F | OXYGEN SATURATION: 100 % | SYSTOLIC BLOOD PRESSURE: 106 MMHG | DIASTOLIC BLOOD PRESSURE: 58 MMHG | RESPIRATION RATE: 12 BRPM

## 2021-03-03 LAB
ADENOVIRUS BY PCR: NOT DETECTED
ALBUMIN SERPL-MCNC: 3.4 G/DL (ref 3.5–5.2)
ALP BLD-CCNC: 201 U/L (ref 40–129)
ALT SERPL-CCNC: <5 U/L (ref 0–40)
ANION GAP SERPL CALCULATED.3IONS-SCNC: 7 MMOL/L (ref 7–16)
AST SERPL-CCNC: 14 U/L (ref 0–39)
BACTERIA: ABNORMAL /HPF
BASOPHILS ABSOLUTE: 0.06 E9/L (ref 0–0.2)
BASOPHILS RELATIVE PERCENT: 0.6 % (ref 0–2)
BILIRUB SERPL-MCNC: 1.1 MG/DL (ref 0–1.2)
BILIRUBIN URINE: NEGATIVE
BLOOD, URINE: ABNORMAL
BORDETELLA PARAPERTUSSIS BY PCR: NOT DETECTED
BORDETELLA PERTUSSIS BY PCR: NOT DETECTED
BUN BLDV-MCNC: 21 MG/DL (ref 8–23)
CALCIUM SERPL-MCNC: 8.5 MG/DL (ref 8.6–10.2)
CHLAMYDOPHILIA PNEUMONIAE BY PCR: NOT DETECTED
CHLORIDE BLD-SCNC: 106 MMOL/L (ref 98–107)
CLARITY: ABNORMAL
CO2: 25 MMOL/L (ref 22–29)
COLOR: YELLOW
CORONAVIRUS 229E BY PCR: NOT DETECTED
CORONAVIRUS HKU1 BY PCR: NOT DETECTED
CORONAVIRUS NL63 BY PCR: NOT DETECTED
CORONAVIRUS OC43 BY PCR: NOT DETECTED
CREAT SERPL-MCNC: 1.1 MG/DL (ref 0.7–1.2)
EKG ATRIAL RATE: 85 BPM
EKG P AXIS: 39 DEGREES
EKG P-R INTERVAL: 242 MS
EKG Q-T INTERVAL: 416 MS
EKG QRS DURATION: 138 MS
EKG QTC CALCULATION (BAZETT): 495 MS
EKG R AXIS: -84 DEGREES
EKG T AXIS: 28 DEGREES
EKG VENTRICULAR RATE: 85 BPM
EOSINOPHILS ABSOLUTE: 0.19 E9/L (ref 0.05–0.5)
EOSINOPHILS RELATIVE PERCENT: 1.8 % (ref 0–6)
EPITHELIAL CELLS, UA: ABNORMAL /HPF
GFR AFRICAN AMERICAN: >60
GFR NON-AFRICAN AMERICAN: >60 ML/MIN/1.73
GLUCOSE BLD-MCNC: 112 MG/DL (ref 74–99)
GLUCOSE URINE: NEGATIVE MG/DL
HCT VFR BLD CALC: 30.1 % (ref 37–54)
HEMOGLOBIN: 9.5 G/DL (ref 12.5–16.5)
HUMAN METAPNEUMOVIRUS BY PCR: NOT DETECTED
HUMAN RHINOVIRUS/ENTEROVIRUS BY PCR: NOT DETECTED
IMMATURE GRANULOCYTES #: 0.11 E9/L
IMMATURE GRANULOCYTES %: 1 % (ref 0–5)
INFLUENZA A BY PCR: NOT DETECTED
INFLUENZA B BY PCR: NOT DETECTED
INR BLD: 1.7
KETONES, URINE: NEGATIVE MG/DL
LEUKOCYTE ESTERASE, URINE: ABNORMAL
LYMPHOCYTES ABSOLUTE: 0.92 E9/L (ref 1.5–4)
LYMPHOCYTES RELATIVE PERCENT: 8.7 % (ref 20–42)
MCH RBC QN AUTO: 34.5 PG (ref 26–35)
MCHC RBC AUTO-ENTMCNC: 31.6 % (ref 32–34.5)
MCV RBC AUTO: 109.5 FL (ref 80–99.9)
MONOCYTES ABSOLUTE: 0.79 E9/L (ref 0.1–0.95)
MONOCYTES RELATIVE PERCENT: 7.5 % (ref 2–12)
MYCOPLASMA PNEUMONIAE BY PCR: NOT DETECTED
NEUTROPHILS ABSOLUTE: 8.48 E9/L (ref 1.8–7.3)
NEUTROPHILS RELATIVE PERCENT: 80.4 % (ref 43–80)
NITRITE, URINE: NEGATIVE
PARAINFLUENZA VIRUS 1 BY PCR: NOT DETECTED
PARAINFLUENZA VIRUS 2 BY PCR: NOT DETECTED
PARAINFLUENZA VIRUS 3 BY PCR: NOT DETECTED
PARAINFLUENZA VIRUS 4 BY PCR: NOT DETECTED
PDW BLD-RTO: 17.2 FL (ref 11.5–15)
PH UA: 6 (ref 5–9)
PLATELET # BLD: 257 E9/L (ref 130–450)
PMV BLD AUTO: 10.5 FL (ref 7–12)
POTASSIUM REFLEX MAGNESIUM: 4.2 MMOL/L (ref 3.5–5)
PROTEIN UA: NEGATIVE MG/DL
PROTHROMBIN TIME: 18.7 SEC (ref 9.3–12.4)
RBC # BLD: 2.75 E12/L (ref 3.8–5.8)
RBC UA: ABNORMAL /HPF (ref 0–2)
RESPIRATORY SYNCYTIAL VIRUS BY PCR: NOT DETECTED
SARS-COV-2, PCR: NOT DETECTED
SODIUM BLD-SCNC: 138 MMOL/L (ref 132–146)
SPECIFIC GRAVITY UA: 1.02 (ref 1–1.03)
TOTAL PROTEIN: 6.2 G/DL (ref 6.4–8.3)
UROBILINOGEN, URINE: 0.2 E.U./DL
WBC # BLD: 10.6 E9/L (ref 4.5–11.5)
WBC UA: >20 /HPF (ref 0–5)
YEAST: PRESENT /HPF

## 2021-03-03 PROCEDURE — 6370000000 HC RX 637 (ALT 250 FOR IP): Performed by: INTERNAL MEDICINE

## 2021-03-03 PROCEDURE — 0202U NFCT DS 22 TRGT SARS-COV-2: CPT

## 2021-03-03 PROCEDURE — 94664 DEMO&/EVAL PT USE INHALER: CPT

## 2021-03-03 PROCEDURE — 2709999900 HC NON-CHARGEABLE SUPPLY: Performed by: ORTHOPAEDIC SURGERY

## 2021-03-03 PROCEDURE — 2700000000 HC OXYGEN THERAPY PER DAY

## 2021-03-03 PROCEDURE — 2580000003 HC RX 258: Performed by: NURSE ANESTHETIST, CERTIFIED REGISTERED

## 2021-03-03 PROCEDURE — C1713 ANCHOR/SCREW BN/BN,TIS/BN: HCPCS | Performed by: ORTHOPAEDIC SURGERY

## 2021-03-03 PROCEDURE — 2720000010 HC SURG SUPPLY STERILE: Performed by: ORTHOPAEDIC SURGERY

## 2021-03-03 PROCEDURE — 7100000000 HC PACU RECOVERY - FIRST 15 MIN: Performed by: ORTHOPAEDIC SURGERY

## 2021-03-03 PROCEDURE — 85025 COMPLETE CBC W/AUTO DIFF WBC: CPT

## 2021-03-03 PROCEDURE — 6360000002 HC RX W HCPCS: Performed by: ORTHOPAEDIC SURGERY

## 2021-03-03 PROCEDURE — 3209999900 FLUORO FOR SURGICAL PROCEDURES

## 2021-03-03 PROCEDURE — 1200000000 HC SEMI PRIVATE

## 2021-03-03 PROCEDURE — 2500000003 HC RX 250 WO HCPCS: Performed by: ORTHOPAEDIC SURGERY

## 2021-03-03 PROCEDURE — 85610 PROTHROMBIN TIME: CPT

## 2021-03-03 PROCEDURE — 3700000001 HC ADD 15 MINUTES (ANESTHESIA): Performed by: ORTHOPAEDIC SURGERY

## 2021-03-03 PROCEDURE — 3700000000 HC ANESTHESIA ATTENDED CARE: Performed by: ORTHOPAEDIC SURGERY

## 2021-03-03 PROCEDURE — 2580000003 HC RX 258: Performed by: ORTHOPAEDIC SURGERY

## 2021-03-03 PROCEDURE — 6360000002 HC RX W HCPCS: Performed by: NURSE ANESTHETIST, CERTIFIED REGISTERED

## 2021-03-03 PROCEDURE — C1776 JOINT DEVICE (IMPLANTABLE): HCPCS | Performed by: ORTHOPAEDIC SURGERY

## 2021-03-03 PROCEDURE — 80053 COMPREHEN METABOLIC PANEL: CPT

## 2021-03-03 PROCEDURE — 94640 AIRWAY INHALATION TREATMENT: CPT

## 2021-03-03 PROCEDURE — 81001 URINALYSIS AUTO W/SCOPE: CPT

## 2021-03-03 PROCEDURE — C1769 GUIDE WIRE: HCPCS | Performed by: ORTHOPAEDIC SURGERY

## 2021-03-03 PROCEDURE — 36415 COLL VENOUS BLD VENIPUNCTURE: CPT

## 2021-03-03 PROCEDURE — 93010 ELECTROCARDIOGRAM REPORT: CPT | Performed by: INTERNAL MEDICINE

## 2021-03-03 PROCEDURE — 7100000001 HC PACU RECOVERY - ADDTL 15 MIN: Performed by: ORTHOPAEDIC SURGERY

## 2021-03-03 PROCEDURE — 3600000013 HC SURGERY LEVEL 3 ADDTL 15MIN: Performed by: ORTHOPAEDIC SURGERY

## 2021-03-03 PROCEDURE — 2500000003 HC RX 250 WO HCPCS: Performed by: NURSE ANESTHETIST, CERTIFIED REGISTERED

## 2021-03-03 PROCEDURE — 0QS636Z REPOSITION RIGHT UPPER FEMUR WITH INTRAMEDULLARY INTERNAL FIXATION DEVICE, PERCUTANEOUS APPROACH: ICD-10-PCS | Performed by: ORTHOPAEDIC SURGERY

## 2021-03-03 PROCEDURE — 3600000003 HC SURGERY LEVEL 3 BASE: Performed by: ORTHOPAEDIC SURGERY

## 2021-03-03 PROCEDURE — 6370000000 HC RX 637 (ALT 250 FOR IP): Performed by: ORTHOPAEDIC SURGERY

## 2021-03-03 PROCEDURE — 2580000003 HC RX 258: Performed by: INTERNAL MEDICINE

## 2021-03-03 PROCEDURE — 87081 CULTURE SCREEN ONLY: CPT

## 2021-03-03 DEVICE — NAIL IM L235MM DIA11MM 125DEG SHT GRN R PROX FEM TI: Type: IMPLANTABLE DEVICE | Status: FUNCTIONAL

## 2021-03-03 DEVICE — SCREW BNE L38MM DIA5MM TIB LT GRN TI ST CANN LOK FULL THRD: Type: IMPLANTABLE DEVICE | Status: FUNCTIONAL

## 2021-03-03 DEVICE — SCREW BNE L95MM DIA10.35MM PROX FEM G TI CANN FOR TFN ADV: Type: IMPLANTABLE DEVICE | Status: FUNCTIONAL

## 2021-03-03 RX ORDER — PROPOFOL 10 MG/ML
INJECTION, EMULSION INTRAVENOUS PRN
Status: DISCONTINUED | OUTPATIENT
Start: 2021-03-03 | End: 2021-03-03 | Stop reason: SDUPTHER

## 2021-03-03 RX ORDER — MEPERIDINE HYDROCHLORIDE 25 MG/ML
12.5 INJECTION INTRAMUSCULAR; INTRAVENOUS; SUBCUTANEOUS EVERY 5 MIN PRN
Status: DISCONTINUED | OUTPATIENT
Start: 2021-03-03 | End: 2021-03-03

## 2021-03-03 RX ORDER — OXYCODONE HYDROCHLORIDE 5 MG/1
2.5 TABLET ORAL EVERY 4 HOURS PRN
Status: DISCONTINUED | OUTPATIENT
Start: 2021-03-03 | End: 2021-03-05 | Stop reason: HOSPADM

## 2021-03-03 RX ORDER — OXYCODONE HYDROCHLORIDE 5 MG/1
5 TABLET ORAL EVERY 4 HOURS PRN
Status: DISCONTINUED | OUTPATIENT
Start: 2021-03-03 | End: 2021-03-05 | Stop reason: HOSPADM

## 2021-03-03 RX ORDER — SODIUM CHLORIDE 9 MG/ML
INJECTION, SOLUTION INTRAVENOUS CONTINUOUS
Status: ACTIVE | OUTPATIENT
Start: 2021-03-03 | End: 2021-03-04

## 2021-03-03 RX ORDER — ROCURONIUM BROMIDE 10 MG/ML
INJECTION, SOLUTION INTRAVENOUS PRN
Status: DISCONTINUED | OUTPATIENT
Start: 2021-03-03 | End: 2021-03-03 | Stop reason: SDUPTHER

## 2021-03-03 RX ORDER — SODIUM CHLORIDE 0.9 % (FLUSH) 0.9 %
10 SYRINGE (ML) INJECTION PRN
Status: DISCONTINUED | OUTPATIENT
Start: 2021-03-03 | End: 2021-03-05 | Stop reason: HOSPADM

## 2021-03-03 RX ORDER — FENTANYL CITRATE 50 UG/ML
INJECTION, SOLUTION INTRAMUSCULAR; INTRAVENOUS PRN
Status: DISCONTINUED | OUTPATIENT
Start: 2021-03-03 | End: 2021-03-03 | Stop reason: SDUPTHER

## 2021-03-03 RX ORDER — SODIUM CHLORIDE 9 MG/ML
INJECTION, SOLUTION INTRAVENOUS CONTINUOUS PRN
Status: DISCONTINUED | OUTPATIENT
Start: 2021-03-03 | End: 2021-03-03 | Stop reason: SDUPTHER

## 2021-03-03 RX ORDER — ONDANSETRON 2 MG/ML
INJECTION INTRAMUSCULAR; INTRAVENOUS PRN
Status: DISCONTINUED | OUTPATIENT
Start: 2021-03-03 | End: 2021-03-03 | Stop reason: SDUPTHER

## 2021-03-03 RX ORDER — ACETAMINOPHEN 325 MG/1
650 TABLET ORAL EVERY 6 HOURS
Status: DISCONTINUED | OUTPATIENT
Start: 2021-03-03 | End: 2021-03-05 | Stop reason: HOSPADM

## 2021-03-03 RX ORDER — DEXAMETHASONE SODIUM PHOSPHATE 4 MG/ML
INJECTION, SOLUTION INTRA-ARTICULAR; INTRALESIONAL; INTRAMUSCULAR; INTRAVENOUS; SOFT TISSUE PRN
Status: DISCONTINUED | OUTPATIENT
Start: 2021-03-03 | End: 2021-03-03 | Stop reason: SDUPTHER

## 2021-03-03 RX ORDER — LIDOCAINE HYDROCHLORIDE 20 MG/ML
INJECTION, SOLUTION EPIDURAL; INFILTRATION; INTRACAUDAL; PERINEURAL PRN
Status: DISCONTINUED | OUTPATIENT
Start: 2021-03-03 | End: 2021-03-03 | Stop reason: SDUPTHER

## 2021-03-03 RX ORDER — EPHEDRINE SULFATE 50 MG/ML
INJECTION, SOLUTION INTRAVENOUS PRN
Status: DISCONTINUED | OUTPATIENT
Start: 2021-03-03 | End: 2021-03-03 | Stop reason: SDUPTHER

## 2021-03-03 RX ORDER — BUPIVACAINE HYDROCHLORIDE 5 MG/ML
INJECTION, SOLUTION EPIDURAL; INTRACAUDAL PRN
Status: DISCONTINUED | OUTPATIENT
Start: 2021-03-03 | End: 2021-03-03 | Stop reason: ALTCHOICE

## 2021-03-03 RX ORDER — ONDANSETRON 2 MG/ML
4 INJECTION INTRAMUSCULAR; INTRAVENOUS
Status: DISCONTINUED | OUTPATIENT
Start: 2021-03-03 | End: 2021-03-03

## 2021-03-03 RX ORDER — SODIUM CHLORIDE 0.9 % (FLUSH) 0.9 %
10 SYRINGE (ML) INJECTION EVERY 12 HOURS SCHEDULED
Status: DISCONTINUED | OUTPATIENT
Start: 2021-03-03 | End: 2021-03-05 | Stop reason: HOSPADM

## 2021-03-03 RX ORDER — SODIUM CHLORIDE 9 MG/ML
INJECTION, SOLUTION INTRAVENOUS PRN
Status: DISCONTINUED | OUTPATIENT
Start: 2021-03-03 | End: 2021-03-05 | Stop reason: HOSPADM

## 2021-03-03 RX ADMIN — EPHEDRINE SULFATE 5 MG: 50 INJECTION INTRAMUSCULAR; INTRAVENOUS; SUBCUTANEOUS at 15:21

## 2021-03-03 RX ADMIN — IPRATROPIUM BROMIDE AND ALBUTEROL SULFATE 3 ML: .5; 3 SOLUTION RESPIRATORY (INHALATION) at 21:15

## 2021-03-03 RX ADMIN — SODIUM CHLORIDE: 9 INJECTION, SOLUTION INTRAVENOUS at 17:07

## 2021-03-03 RX ADMIN — ROCURONIUM BROMIDE 50 MG: 10 INJECTION, SOLUTION INTRAVENOUS at 13:47

## 2021-03-03 RX ADMIN — FENTANYL CITRATE 100 MCG: 50 INJECTION, SOLUTION INTRAMUSCULAR; INTRAVENOUS at 13:47

## 2021-03-03 RX ADMIN — LEVOTHYROXINE SODIUM 75 MCG: 75 TABLET ORAL at 05:38

## 2021-03-03 RX ADMIN — Medication 2000 MG: at 21:08

## 2021-03-03 RX ADMIN — Medication 2 G: at 13:44

## 2021-03-03 RX ADMIN — OXYCODONE HYDROCHLORIDE 5 MG: 5 TABLET ORAL at 18:26

## 2021-03-03 RX ADMIN — SODIUM CHLORIDE: 9 INJECTION, SOLUTION INTRAVENOUS at 13:44

## 2021-03-03 RX ADMIN — IPRATROPIUM BROMIDE AND ALBUTEROL SULFATE 3 ML: .5; 3 SOLUTION RESPIRATORY (INHALATION) at 08:45

## 2021-03-03 RX ADMIN — PROPOFOL 130 MG: 10 INJECTION, EMULSION INTRAVENOUS at 13:47

## 2021-03-03 RX ADMIN — ACETAMINOPHEN 650 MG: 325 TABLET ORAL at 05:42

## 2021-03-03 RX ADMIN — CARBIDOPA AND LEVODOPA 2 TABLET: 25; 100 TABLET ORAL at 17:07

## 2021-03-03 RX ADMIN — EPHEDRINE SULFATE 10 MG: 50 INJECTION INTRAMUSCULAR; INTRAVENOUS; SUBCUTANEOUS at 15:15

## 2021-03-03 RX ADMIN — DOXAZOSIN 4 MG: 4 TABLET ORAL at 21:07

## 2021-03-03 RX ADMIN — TRAZODONE HYDROCHLORIDE 50 MG: 50 TABLET ORAL at 21:07

## 2021-03-03 RX ADMIN — CARBIDOPA AND LEVODOPA 2 TABLET: 25; 100 TABLET ORAL at 21:07

## 2021-03-03 RX ADMIN — Medication 10 ML: at 08:05

## 2021-03-03 RX ADMIN — LIDOCAINE HYDROCHLORIDE 60 MG: 20 INJECTION, SOLUTION EPIDURAL; INFILTRATION; INTRACAUDAL; PERINEURAL at 13:47

## 2021-03-03 RX ADMIN — CLOBETASOL PROPIONATE: 0.5 OINTMENT TOPICAL at 21:09

## 2021-03-03 RX ADMIN — DOCUSATE SODIUM AND SENNOSIDES 1 TABLET: 8.6; 5 TABLET, FILM COATED ORAL at 21:07

## 2021-03-03 RX ADMIN — ACETAMINOPHEN 650 MG: 325 TABLET ORAL at 17:07

## 2021-03-03 RX ADMIN — BUMETANIDE 1 MG: 1 TABLET ORAL at 08:04

## 2021-03-03 RX ADMIN — DEXAMETHASONE SODIUM PHOSPHATE 10 MG: 4 INJECTION, SOLUTION INTRAMUSCULAR; INTRAVENOUS at 13:48

## 2021-03-03 RX ADMIN — ONDANSETRON 4 MG: 2 INJECTION INTRAMUSCULAR; INTRAVENOUS at 13:48

## 2021-03-03 RX ADMIN — MONTELUKAST SODIUM 10 MG: 10 TABLET, FILM COATED ORAL at 21:07

## 2021-03-03 RX ADMIN — CARBIDOPA AND LEVODOPA 2 TABLET: 25; 100 TABLET ORAL at 08:04

## 2021-03-03 RX ADMIN — SUGAMMADEX 350 MG: 100 INJECTION, SOLUTION INTRAVENOUS at 15:24

## 2021-03-03 ASSESSMENT — PULMONARY FUNCTION TESTS
PIF_VALUE: 22
PIF_VALUE: 25
PIF_VALUE: 3
PIF_VALUE: 24
PIF_VALUE: 25
PIF_VALUE: 10
PIF_VALUE: 24
PIF_VALUE: 23
PIF_VALUE: 25
PIF_VALUE: 9
PIF_VALUE: 24
PIF_VALUE: 23
PIF_VALUE: 26
PIF_VALUE: 25
PIF_VALUE: 24
PIF_VALUE: 16
PIF_VALUE: 5
PIF_VALUE: 3
PIF_VALUE: 25
PIF_VALUE: 22
PIF_VALUE: 12
PIF_VALUE: 25
PIF_VALUE: 4
PIF_VALUE: 0
PIF_VALUE: 22
PIF_VALUE: 24
PIF_VALUE: 3
PIF_VALUE: 24
PIF_VALUE: 20
PIF_VALUE: 2
PIF_VALUE: 23
PIF_VALUE: 6
PIF_VALUE: 25
PIF_VALUE: 25
PIF_VALUE: 35
PIF_VALUE: 22
PIF_VALUE: 2
PIF_VALUE: 26
PIF_VALUE: 26
PIF_VALUE: 25
PIF_VALUE: 22
PIF_VALUE: 31

## 2021-03-03 ASSESSMENT — COPD QUESTIONNAIRES: CAT_SEVERITY: MODERATE

## 2021-03-03 ASSESSMENT — PAIN DESCRIPTION - ONSET: ONSET: ON-GOING

## 2021-03-03 ASSESSMENT — PAIN DESCRIPTION - PROGRESSION: CLINICAL_PROGRESSION: GRADUALLY IMPROVING

## 2021-03-03 ASSESSMENT — PAIN DESCRIPTION - ORIENTATION: ORIENTATION: RIGHT

## 2021-03-03 ASSESSMENT — PAIN SCALES - GENERAL: PAINLEVEL_OUTOF10: 3

## 2021-03-03 ASSESSMENT — PAIN DESCRIPTION - LOCATION: LOCATION: HIP

## 2021-03-03 ASSESSMENT — ENCOUNTER SYMPTOMS: SHORTNESS OF BREATH: 1

## 2021-03-03 ASSESSMENT — PAIN - FUNCTIONAL ASSESSMENT: PAIN_FUNCTIONAL_ASSESSMENT: PREVENTS OR INTERFERES SOME ACTIVE ACTIVITIES AND ADLS

## 2021-03-03 ASSESSMENT — PAIN DESCRIPTION - DESCRIPTORS: DESCRIPTORS: DULL;DISCOMFORT

## 2021-03-03 NOTE — DISCHARGE INSTR - COC
Continuity of Care Form    Patient Name: Brenden Raygoza   :  1930  MRN:  [id number]    Admit date:  3/2/2021  Discharge date:  3/4/21    Code Status Order: Full Code   Advance Directives:      Admitting Physician:  Tiffanie Velazquez DO  PCP: Marsha Tilley MD    Discharging Nurse: Eduardo Unit/Room#: 6444/5860-G  Discharging Unit Phone Number: [phone]    Emergency Contact:   Extended Emergency Contact Information  Primary Emergency Contact: BassemLiss  Address: [address] [place] [address] Phone: [phone]  Mobile Phone: [phone]  Relation: Spouse   needed? No  Secondary Emergency Contact: Luiza Rodriguez Dr  [place] Phone: [phone]  Mobile Phone: [phone]  Relation: Grandchild  Preferred language: English   needed?  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 6     HERNIA REPAIR Left     groin repair     HIP SURGERY Left 2021    LEFT HIP HEMIARTHROPLASTY performed by Chris Marroquin MD at [address] [place] [address] Right     5-10 years ago   [place] [address]         Immunization History:   Immunization History   Administered Date(s) Administered    Influenza Virus Vaccine 10/19/2010, 2011, 10/29/2012, 10/17/2013, 2014, 10/09/2015, 2016    Influenza, MDCK Quadv, IM, PF (Flucelvax 4 yrs and older) 2017    Influenza, Quadv, IM, PF (6 mo and older Fluzone, Flulaval, Fluarix, and 3 yrs and older Afluria) 10/24/2018    Influenza, Triv, inactivated, subunit, adjuvanted, IM (Fluad 65 yrs and older) 2019    Pneumococcal Conjugate 13-valent ([id number]) 2015    Pneumococcal Polysaccharide ([id number]) 10/29/2012    Zoster Live (Zostavax) 2014    Zoster Recombinant (Shingrix) 08/15/2019, 2020       Active Problems:  Patient Active Problem List   Diagnosis Code    Hypertension I10    Parkinson disease ([facility] G20    Hypothyroidism E03.9    Stage 3 chronic kidney disease N18.30    Venous stasis dermatitis of both lower extremities I87.2    CAP (community acquired pneumonia) J18.9    Closed fracture of neck of right femur ([facility] S72.001A       Isolation/Infection:   Isolation            No Isolation          Patient Infection Status       Infection Onset Added Last Indicated Last Indicated By Review Planned Expiration Resolved Resolved By    None active    Resolved    COVID-19 Rule Out 01/27/21 01/27/21 01/27/21 COVID-19 (Ordered)   01/27/21 Rule-Out Test Resulted            Nurse Assessment:  Last Vital Signs: /63   Pulse 65   Temp 97.3 °F (36.3 °C) (Oral)   Resp 16   Ht 5' 6\" (1.676 m)   Wt 191 lb 12.8 oz (87 kg)   SpO2 96%   BMI 30.96 kg/m²     Last documented pain score (0-10 scale): Pain Level: 3  Last Weight:   Wt Readings from Last 1 Encounters:   03/02/21 191 lb 12.8 oz (87 kg)     Mental Status:  oriented and alert    IV Access:  - None    Nursing Mobility/ADLs:  Walking   Assisted  Transfer  Assisted  Bathing  Assisted  Dressing  Assisted  Toileting  Assisted  Feeding  Independent  Med Admin  Assisted  Med Delivery   whole    Wound Care Documentation and Therapy:        Elimination:  Continence:   · Bowel:  Yes  · Bladder: {YES / OP:51230}  Urinary Catheter: {Urinary Catheter:[id number]}   Colostomy/Ileostomy/Ileal Conduit: No       Date of Last BM: ***    Intake/Output Summary (Last 24 hours) at 3/3/2021 5342  Last data filed at 3/3/2021 0025  Gross per 24 hour   Intake --   Output 850 ml   Net -850 ml     I/O last 3 completed shifts:  In: -   Out: 850 [Urine:850]    Safety Concerns:     Sundowners Sundrome and History of Falls (last 30 days)    Impairments/Disabilities:      None    Nutrition Therapy:  Current Nutrition Therapy:   - Oral Diet:  General    Routes of Feeding: Oral  Liquids: No Restrictions  Daily Fluid Restriction: no  Last Modified Barium Swallow with Video (Video Swallowing Test): not done    Treatments at the Time of Hospital Discharge:   Respiratory Treatments: ***  Oxygen Therapy:  is on oxygen at 2 L/min per nasal cannula. Ventilator:    - No ventilator support    Rehab Therapies: Physical Therapy, Occupational Therapy and Speech/Language Therapy  Weight Bearing Status/Restrictions: No weight bearing restirctions  Other Medical Equipment (for information only, NOT a DME order):  walker and bedside commode  Other Treatments: ***    Patient's personal belongings (please select all that are sent with patient):  Glasses    RN SIGNATURE:  Electronically signed by Evon Brian RN on 3/4/21 at 12:51 PM EST    CASE MANAGEMENT/SOCIAL WORK SECTION    Inpatient Status Date: ***    Readmission Risk Assessment Score:  Readmission Risk              Risk of Unplanned Readmission:        18           Discharging to Facility/ Agency   · Name: [facility]  · [address] [place] [address]   · HRDPB:[phone]  · Fax:[phone]    Dialysis Facility (if applicable)   · Name:  · Address:  · Dialysis Schedule:  · Phone:  · Fax:    / signature: {Esignature:[id number]} Electronically signed by GERMÁN Gipson on 3/3/21 at 9:25 AM EST      PHYSICIAN SECTION    Prognosis: Fair    Condition at Discharge: Stable    Rehab Potential (if transferring to Rehab): Fair    Recommended Labs or Other Treatments After Discharge: watch closely for falls    Physician Certification: I certify the above information and transfer of Annetta Germain  is necessary for the continuing treatment of the diagnosis listed and that he requires [facility] for less 30 days.      Update Admission H&P: No change in H&P    PHYSICIAN SIGNATURE:  Electronically signed by Gordon Perez DO on 3/4/21 at 10:18 AM EST

## 2021-03-03 NOTE — OP NOTE
Operative Report  Priyank Wesley  [id number]  3/3/2021    Pre-operative diagnosis: Right intertrochanteric hip fracture    Post-operative diagnosis: Right intertrochanteric hip fracture    Procedure: Right Intramedullary trochanteric nail     Surgeon: Morgan Ann MD    Assistant:  None    Anesthesia:  General    Operative Indication:  80 y.o. male who fell sustaining a right intertrochanteric hip fracture. Patient was admitted for pain control, medical optimization and definitive surgical treatment. The rationale behind femoral intramedullary nail fixation as a means of fracture treatment and early mobilization / rehabilitation was explained and informed consent was obtained following a thorough review of risks, benefits, and alternative treatment options. The risks for surgery that were discussed include bleeding, infection, damage to blood vessels, damage to nerves, risk of further surgery, restricted range of motion, risk of continued discomfort, risk of malunion, risk of nonunion, risk of need for further reconstructive procedures, risk of need for altered activities and altered gait, risk of blood clots, pulmonary embolism, myocardial infarction, and risk of death were discussed. The patient understood these risks and consented for surgery. The typical post-operative recovery process was also discussed. EBL: 741 cc    Complications: None    Components:    1. Synthes TFNA Nail, 125° 11 x intermediate nail        2. Nomacorc lag screw 10.5 mm x 95 mm        3. Synthes 5.0 mm cortical screw size 38 mm            Operative Procedure: The patient was positioned supine on the fracture table and general anaesthesia was achieved. The patient was then positioned on the table and the right foot was placed in a fracture boot. The fracture was then manipulated under fluoroscopic control until we could obtain near anatomic alignment.  At this point, the right hip and leg was then prepped and draped in the usual sterile manner. A timeout was performed identifying the patient, operative site procedure being performed and administration of an IV antibiotic. A guide pin was then placed percutaneously into the tip of the greater trochanter and a small incision was made overlying the guide pin. An overlying drill was inserted to the proper depth. A Synthes TFNA Nail, 125° 11 mm intermediate nail was chosen and inserted into the intramedullary canal to the proper depth. Proper rotation was obtained and the guide for the lag screw was inserted. A small incision was made for this as well. A guide pin was inserted and felt to be in proper position. The hole for the lag screw was then drilled and a 95 mm lag screw was placed. At this point, an incision was made distally and a distal screw was placed. Reduction was then confirmed with fluoroscopy. The wounds were then thoroughly irrigated with normal saline. The wound was closed with 0, 2-0 Vicryl sutures, and the skin was reapproximated with staples. The area was then infiltrated with a mixture of a 0.5% Marcaine. A sterile dressing was then applied. There were no complications and the patient tolerated the procedure well. The patient was taken to the recovery room in stable condition.     Woo Rose MD

## 2021-03-03 NOTE — CONSULTS
Yosi Kellogg M.D.,[facility]  Peter Allen D.O., FDARLIN., Matteo Jones M.D. Starr Lyon M.D., Brandi Navarro M.D. Elle Morales D.O. Patient:  Nando Alejandra 80 y.o. male MRN: [id number]     Date of Service: 3/3/2021      PULMONARY CONSULTATION    Reason for Consultation: Abnormal chest x-ray preoperative clearance  Referring Physician: Dr. Santiago Cotto with the referring physician will be sent via the electronic medical record. Chief Complaint:  pain    CODE STATUS:full    SUBJECTIVE:  HPI:  Nando Alejandra is a 80 y.o.  male with history of Parkinson's, HTN, HLD, asthma  and recent fall and left hip arthroplasty seen last by our service on 2/1 that presents to emergency department after having a mechanical fall and falling onto his right  hip. Patient reports that he was getting out of bed and fell onto his right hip he denies any head trauma or loss of consciousness. Patient is currently at [facility] for rehab. Mild leukocytosis 12.4. Anemia 10.1. He did have exacerbation of asthma during his last admission and was treated with course of steroids. Pulmonary consulted for preop clearance, abnormal cxr and hypoxia. CXR was performed and did show right upper lobe prominence developing pneumonia possible lung contusion questionable nodularity in the right upper lobe. He did have a  CT angiogram of the chest on 1/27/2021 did not show any PE, there was airspace/atelectasis of right upper lobe, and left lower lobe.   He has been on 2 L with saturation of 96%. No family present , pt can tell me year and what happened and what operation he will have. Reports having occasional cough for a \"very long time\". Cough is dry nonproductive.   Does report having occasional wheeze from time to time, but not overly bothersome to him.         Past Medical History:   Diagnosis Date    High cholesterol     Hypertension     Hypothyroidism     Parkinson disease ([facility] [address]     Diagnosed in  by Dr. Ashley Fitting due to resting tremor    RBBB        Past Surgical History:   Procedure Laterality Date    FOOT SURGERY  89    FOOT SURGERY      club foot repair-age 6     HERNIA REPAIR Left     groin repair     HIP SURGERY Left 2021    LEFT HIP HEMIARTHROPLASTY performed by Gerry Steele MD at [address] [place] [address] Right     5-10 years ago    TONSILLECTOMY AND ADENOIDECTOMY         Family History   Problem Relation Age of Onset    Heart Disease Mother     Cancer Father         lung    Breast Cancer Sister        Social History:   Social History     Socioeconomic History    Marital status:      Spouse name: Not on file    Number of children: Not on file    Years of education: Not on file    Highest education level: Not on file   Occupational History    Not on file   Social Needs    Financial resource strain: Not on file    Food insecurity     Worry: Not on file     Inability: Not on file    Transportation needs     Medical: Not on file     Non-medical: Not on file   Tobacco Use    Smoking status: Former Smoker     Years: 10.00     Quit date: 1970     Years since quittin.2    Smokeless tobacco: Never Used   Substance and Sexual Activity    Alcohol use: No     Comment: Social    Drug use: No    Sexual activity: Never     Partners: Female   Lifestyle    Physical activity     Days per week: Not on file     Minutes per session: Not on file    Stress: Not on file   Relationships    Social connections     Talks on phone: Not on file     Gets together: Not on file     Attends [employer] service: Not on file     Active member of club or organization: Not on file     Attends meetings of clubs or organizations: Not on file     Relationship status: Not on file    Intimate partner violence     Fear of current or ex partner: Not on file     Emotionally abused: Not on file     Physically abused: Not on file     Forced sexual activity: Not on file   Other Topics Concern    Not on file   Social History Narrative    Not on file     Social History:    reports that he quit smoking about 50 years ago. He quit after 10.00 years of use. He has never used smokeless tobacco. He reports that he does not drink alcohol or use drugs.     Family History:   family history includes Breast Cancer in his sister; Cancer in his father; Heart Disease in his mother.           ETOH:   reports no history of alcohol use. Exposures: There  is not history of TB or TB exposure. There is not asbestos or silica dust exposure. The patient reports does not have coal, foundry, quarry or [employer] exposure. Recent travel history none. There is not  history of recreational or IV drug use. There is not hot tub exposure. The patient does not have any exotic pets, turtles or exotic birds.        Immunization History   Administered Date(s) Administered    Influenza Virus Vaccine 10/19/2010, 11/14/2011, 10/29/2012, 10/17/2013, 09/18/2014, 10/09/2015, 09/23/2016    Influenza, MDCK Quadv, IM, PF (Flucelvax 4 yrs and older) 09/20/2017    Influenza, Quadv, IM, PF (6 mo and older Fluzone, Flulaval, Fluarix, and 3 yrs and older Afluria) 10/24/2018    Influenza, Triv, inactivated, subunit, adjuvanted, IM (Fluad 65 yrs and older) 09/30/2019    Pneumococcal Conjugate 13-valent ([id number]) 06/19/2015    Pneumococcal Polysaccharide (Uexomeaqp33) 10/29/2012    Zoster Live (Zostavax) 11/14/2014    Zoster Recombinant (Shingrix) 08/15/2019, 02/06/2020        Home Meds: Medications Prior to Admission: calcium carbonate-vitamin D (CALTRATE) 600-400 MG-UNIT TABS per tab, Take 1 tablet by mouth daily  potassium chloride (KLOR-CON) 20 MEQ packet, Take 40 mEq by mouth daily  tamsulosin (FLOMAX) 0.4 MG capsule, Take 0.8 mg by mouth nightly  aspirin 325 MG EC tablet, Take 1 tablet by mouth daily  docusate sodium (COLACE, DULCOLAX) 100 MG CAPS, Take 100 mg by mouth daily  ferrous sulfate (IRON 325) 325 (65 Fe) MG tablet, Take 1 tablet by mouth daily (with breakfast)  montelukast (SINGULAIR) 10 MG tablet, Take 1 tablet by mouth nightly  polyethylene glycol (GLYCOLAX) 17 g packet, Take 17 g by mouth daily  sennosides-docusate sodium (SENOKOT-S) 8.6-50 MG tablet, Take 1 tablet by mouth 2 times daily  bumetanide (BUMEX) 1 MG tablet, Take 1 mg by mouth daily  carbidopa-levodopa (SINEMET)  MG per tablet, Take 2 tablets by mouth 4 times daily  lisinopril (PRINIVIL;ZESTRIL) 10 MG tablet, Take 1 tablet by mouth daily  Misc. Devices MISC, Dispense #2 compression stalkings Dx: leg edema  Red Yeast Rice 600 MG TABS, Take by mouth 2 times daily   ipratropium-albuterol (DUONEB) 0.5-2.5 (3) MG/3ML SOLN nebulizer solution, Inhale 3 mLs into the lungs every 4 hours (while awake)  clobetasol (TEMOVATE) 0.05 % ointment, Apply topically 2 times daily. silver sulfADIAZINE (SILVADENE) 1 % cream, Apply topically daily. levothyroxine (SYNTHROID) 25 MCG tablet, Take 3 tabs PO Monday - Friday and 2 tabs PO Saturday - Sunday (Patient taking differently: 100 mcg )  traZODone (DESYREL) 50 MG tablet, Take 1 tablet by mouth nightly  doxazosin (CARDURA) 4 MG tablet, Take 1 tablet by mouth nightly    CURRENT MEDS :  Scheduled Meds:   ceFAZolin  2,000 mg Intravenous See Admin Instructions    aspirin  325 mg Oral Daily    bumetanide  1 mg Oral Daily    carbidopa-levodopa  2 tablet Oral 4x Daily    clobetasol   Topical BID    docusate sodium  100 mg Oral Daily    doxazosin  4 mg Oral Nightly    ferrous sulfate  325 mg Oral Daily with breakfast    ipratropium-albuterol  3 mL Inhalation Q4H WA    levothyroxine  75 mcg Oral Daily    montelukast  10 mg Oral Nightly    polyethylene glycol  17 g Oral Daily    sennosides-docusate sodium  1 tablet Oral BID    traZODone  50 mg Oral Nightly    sodium chloride flush  10 mL Intravenous 2 times per day    enoxaparin  40 mg Subcutaneous Daily       Continuous Infusions:       Allergies   Allergen Reactions    Sulfa Antibiotics      Hyperactivity        REVIEW OF SYSTEMS:  Constitutional: Denies fever, weight loss, night sweats, and fatigue  Skin: Denies pigmentation, dark lesions, and rashes   HEENT: Denies hearing loss, tinnitus, ear drainage, epistaxis, sore throat, and hoarseness. Cardiovascular: Denies palpitations, chest pain, and chest pressure. Respiratory: Denies cough, dyspnea at rest, hemoptysis, apnea, and choking. Gastrointestinal: Denies nausea, vomiting, poor appetite, diarrhea, heartburn or reflux  Genitourinary: Denies dysuria, frequency, urgency or hematuria  Musculoskeletal: Denies myalgias, muscle weakness, +bone pain right hip  Neurological: Denies dizziness, vertigo, headache, and focal weakness  Psychological: Denies anxiety and depression  Endocrine: Denies heat intolerance and cold intolerance  Hematopoietic/Lymphatic: Denies bleeding problems and blood transfusions    OBJECTIVE:   /63   Pulse 65   Temp 97.3 °F (36.3 °C) (Oral)   Resp 16   Ht 5' 6\" (1.676 m)   Wt 191 lb 12.8 oz (87 kg)   SpO2 95%   BMI 30.96 kg/m²   SpO2 Readings from Last 1 Encounters:   03/03/21 95%        I/O:    Intake/Output Summary (Last 24 hours) at 3/3/2021 0805  Last data filed at 3/3/2021 0025  Gross per 24 hour   Intake --   Output 850 ml   Net -850 ml     Vent Information  SpO2: 95 %                Physical Exam:  General: The patient is lying in bed comfortably without any distress. Breathing is not labored  HEENT: Pupils are equal round and reactive to light, there are no oral lesions and no post-nasal drip   Neck: supple without adenopathy  Cardiovascular: regular rate and rhythm without murmur or gallop  Respiratory: Clear to auscultation bilaterally without wheezing or crackles.   Air entry is symmetric  Abdomen: soft, non-tender, non-distended, normal bowel sounds  Extremities: warm, no edema, no clubbing  Skin: no rash or lesion, left hip bruising   Neurologic: CN II-XII grossly intact, no focal deficits    Pulmonary Function Testing personally reviewed and interpreted  none    Imaging personally reviewed:     EXAMINATION:   ONE XRAY VIEW OF THE CHEST   3/2/2021 4:54 pm   COMPARISON:   February 1   HISTORY:   ORDERING SYSTEM PROVIDED HISTORY: fall   TECHNOLOGIST PROVIDED HISTORY:   Reason for exam:->fall   FINDINGS:   Cardiac silhouette is mildly enlarged and unchanged. Airspace disease in the right upper lobe appears more prominent than on the   prior examination.  Questionable nodularity in the left upper lobe, not   clearly identified on the prior examination. No evidence of pneumothorax or pleural effusion. The pulmonary vasculature is within normal limits. Bony thorax is unremarkable.       Impression   Airspace disease in the right upper lobe appears more prominent than on the   prior examination.  Developing pneumonia, and in a setting of trauma   developing lung contusion must be considered.  Question nodularity also in   the right upper lobe not clearly identified on the prior examination. Radiographic follow-up recommended.  Consider further evaluation with CT as   clinically indicated.           EXAMINATION:   CTA OF THE CHEST 1/27/2021 5:33 am   TECHNIQUE:   CTA of the chest was performed after the administration of intravenous   contrast.  Multiplanar reformatted images are provided for review.  MIP   images are provided for review. Dose modulation, iterative reconstruction,   and/or weight based adjustment of the mA/kV was utilized to reduce the   radiation dose to as low as reasonably achievable. COMPARISON:   None.    HISTORY:   ORDERING SYSTEM PROVIDED HISTORY: evaluate for PE   TECHNOLOGIST PROVIDED HISTORY:   Reason for exam:->evaluate for PE   Decision Support Exception->Emergency Medical Condition (MA)   FINDINGS:   Pulmonary Arteries: Pulmonary arteries are adequately opacified for   evaluation.  No evidence of intraluminal filling defect to suggest pulmonary   embolism.  Main pulmonary artery is normal in caliber. Mediastinum: No evidence of mediastinal lymphadenopathy.  The heart and   pericardium demonstrate no acute abnormality.  There is no acute abnormality   of the thoracic aorta. There are coronary artery calcifications. [place] is   calcified plaque in the descending thoracic aorta. Lungs/pleura:   There is some mild dependent airspace disease posteriorly in   the right upper lobe and also in the left lower lobe.  Suspect atelectasis,   less likely infiltrates. [place] Hawks is no pneumothorax. [place] Hawks is no pleural   effusion. Upper Abdomen: There is diverticulosis involving visualized transverse colon. No diverticulitis seen. Soft Tissues/Bones: There is no acute bony or soft tissue abnormality seen.       Impression   No evidence of pulmonary embolism. Dependent airspace disease in right upper and left lower lobes likely   represents atelectasis. Coronary artery calcification. Echo:  1/27/2021  variation. Conclusions      Summary   The aortic valve appears mildly sclerotic. Physiologic and/or trace aortic regurgitation is noted. No hemodynamically significant aortic stenosis is present. Mild tricuspid regurgitation. The left atrium is mildly dilated. Mild left ventricular concentric hypertrophy noted. LV Ejection fraction is visually estimated at 60%.       Signature       Labs:  Lab Results   Component Value Date    WBC 10.6 03/03/2021    HGB 9.5 03/03/2021    HCT 30.1 03/03/2021    .5 03/03/2021    MCH 34.5 03/03/2021    MCHC 31.6 03/03/2021    RDW 17.2 03/03/2021     03/03/2021    MPV 10.5 03/03/2021     Lab Results   Component Value Date     03/03/2021    K 4.2 03/03/2021     03/03/2021    CO2 25 03/03/2021    BUN 21 03/03/2021    CREATININE 1.1 03/03/2021    LABALBU 3.4 03/03/2021    CALCIUM 8.5 03/03/2021    GFRAA >60 03/03/2021    LABGLOM >60 03/03/2021    LABGLOM 39 01/14/2021     Lab Results   Component Value Date    PROTIME 19.0 03/02/2021    INR 1.7 03/02/2021     No results for input(s): PROBNP in the last 72 hours. No results for input(s): TROPONINI in the last 72 hours. No results for input(s): PROCAL in the last 72 hours. This SmartLink has not been configured with any valid records. Micro:  No results for input(s): CULTRESP in the last 72 hours. No results for input(s): LABGRAM in the last 72 hours. No results for input(s): LEGUR in the last 72 hours. No results for input(s): STREPNEUMAGU in the last 72 hours. No results for input(s): LP1UAG in the last 72 hours. Assessment:  1.  Acute displaced right intertrochanteric hip fracture  2.  chronic hypoxic respiratory failure   *secondary to atelectasis, asthma , may have component of chronic  aspiration given Parkinson's  2.  s/p 1/27 Mechanical fall with left hip femoral neck fracture s/left hemiarthroplasty  3. Hx mild asthma ,exacerbation 1/27 treated with course of steroids   4.  Chronic rhinitis  5.  Parkinson's disease       Plan:  1. Considering patient's recent fall and left hip arthroplasty, with asthma exacerbation and atelectasis,will benefit from aggressive pulmonary hygiene will add incentive spirometry, pep flutter, EZ Pap, and DuoNebs every 4 hours  2. In regards to his risk of pulmonary complications, according to the ARISCAT Risk index the patient has a  intermediate (13.3%) chance for pulmonary complications of any severity. Their chances of respiratory failure as predicted by the [place] respiratory failure index the patient has a 4.2 chance of respiratory failure perioperatively. Risk can be minimized by DVT prophylaxis (if not contraindicated), early mobilization, and incentive spirometry. 3. Considering his history of Parkinson's strict aspiration precautions   4. Can add as needed BiPAP postoperatively   5. Keep pulse oximetry greater than 92%, currently on 2 L 95 to 96%  6.  Ortho following  7. Continue to monitor imaging postoperatively    Thank you for allowing me to participate in the care of Joey Batres. Please feel free to call with questions. This plan of care was reviewed in collaboration with Dr. Katelyn Mena    Electronically signed by LORIE Mcclain on 3/3/2021 at 8:05 AM      Note: This report was completed utilizing computer voice recognition software. Every effort has been made to ensure accuracy, however; inadvertent computerized transcription errors may be present        I personally saw, examined and provided care for the patient. Radiographs, labs and medication list were reviewed by me independently. The case was discussed in detail and plans for care were established. Review of NP documentation was conducted and revisions were made as appropriate. I agree with the above documented exam, problem list and plan of care.     Thierno Caceres MD

## 2021-03-03 NOTE — ANESTHESIA POSTPROCEDURE EVALUATION
Department of Anesthesiology  Postprocedure Note    Patient: Carey Hernandez  MRN: [id number]  YOB: 1930  Date of evaluation: 3/3/2021  Time:  4:03 PM     Procedure Summary     Date: 03/03/21 Room / Location: [facility] OR 04 / 106 [facility]    Anesthesia Start: 8326 Anesthesia Stop: 7730    Procedure: INTRAMEDULLARY NAIL HIP FIXATION RIGHT HIP   +++SYNTHES+++ (Right ) Diagnosis: (/)    Surgeons: Erlin Cullen MD Responsible Provider: Luis E Black MD    Anesthesia Type: general ASA Status: 4          Anesthesia Type: general    Bayron Phase I: Bayron Score: 9    Bayron Phase II:      Last vitals: Reviewed and per EMR flowsheets.        Anesthesia Post Evaluation    Patient location during evaluation: PACU  Patient participation: complete - patient participated  Level of consciousness: awake and alert  Airway patency: patent  Nausea & Vomiting: no vomiting and no nausea  Complications: no  Cardiovascular status: hemodynamically stable  Respiratory status: acceptable  Hydration status: stable

## 2021-03-03 NOTE — ANESTHESIA PRE PROCEDURE
Department of Anesthesiology  Preprocedure Note       Name:  Priyank Wesley   Age:  80 y.o.  :  1930                                          MRN:  [id number]         Date:  3/3/2021      Surgeon: Sandra Saravia):  Sukumar Mcnamara MD    Procedure: Procedure(s):  INTRAMEDULLARY NAIL HIP FIXATION RIGHT HIP   +++SYNTHES+++    Medications prior to admission:   Prior to Admission medications    Medication Sig Start Date End Date Taking? Authorizing Provider   calcium carbonate-vitamin D (CALTRATE) 600-400 MG-UNIT TABS per tab Take 1 tablet by mouth daily    Historical Provider, MD   potassium chloride (KLOR-CON) 20 MEQ packet Take 40 mEq by mouth daily    Historical Provider, MD   tamsulosin (FLOMAX) 0.4 MG capsule Take 0.8 mg by mouth nightly    Historical Provider, MD   aspirin 325 MG EC tablet Take 1 tablet by mouth daily 21   Hunter Quan DO   docusate sodium (COLACE, DULCOLAX) 100 MG CAPS Take 100 mg by mouth daily 21   Hunter Quan DO   ferrous sulfate (IRON 325) 325 (65 Fe) MG tablet Take 1 tablet by mouth daily (with breakfast) 21   Hunter Quan DO   ipratropium-albuterol (DUONEB) 0.5-2.5 (3) MG/3ML SOLN nebulizer solution Inhale 3 mLs into the lungs every 4 hours (while awake) 21   Hunter Quan DO   montelukast (SINGULAIR) 10 MG tablet Take 1 tablet by mouth nightly 21   Hunter Quan DO   polyethylene glycol (GLYCOLAX) 17 g packet Take 17 g by mouth daily 2/1/21 3/3/21  Hunter Quan DO   sennosides-docusate sodium (SENOKOT-S) 8.6-50 MG tablet Take 1 tablet by mouth 2 times daily 21   Hunter Quan DO   bumetanide (BUMEX) 1 MG tablet Take 1 mg by mouth daily    Historical Provider, MD   clobetasol (TEMOVATE) 0.05 % ointment Apply topically 2 times daily. 20   Estephania Correa DPM   carbidopa-levodopa (SINEMET)  MG per tablet Take 2 tablets by mouth 4 times daily 20   Historical Provider, MD   silver sulfADIAZINE (SILVADENE) 1 % cream Apply topically daily.  20 Stewart Manuel DO   levothyroxine (SYNTHROID) 25 MCG tablet Take 3 tabs PO Monday - Friday and 2 tabs PO Saturday - Sunday  Patient taking differently: 100 mcg  4/23/20 1/11/21  LORIE Rice CNP   traZODone (DESYREL) 50 MG tablet Take 1 tablet by mouth nightly 4/6/20 1/11/21  Latina Hodgkin, APRN - CNP   doxazosin (CARDURA) 4 MG tablet Take 1 tablet by mouth nightly 4/6/20   Latina Hodgkin, APRN - CNP   lisinopril (PRINIVIL;ZESTRIL) 10 MG tablet Take 1 tablet by mouth daily 4/1/20   Jairo Paredes [place]   [facility] [place] MISC Dispense #2 compression stalkings  Dx: leg edema 12/24/17   Sam Fleming DO   Red Yeast Rice 600 MG TABS Take by mouth 2 times daily     Historical Provider, MD       Current medications:    No current facility-administered medications for this visit. No current outpatient medications on file.      Facility-Administered Medications Ordered in Other Visits   Medication Dose Route Frequency Provider Last Rate Last Admin    ceFAZolin (ANCEF) 2000 mg in sterile water 20 mL IV syringe  2,000 mg Intravenous See Admin Instructions Philippe Andrews MD        aspirin EC tablet 325 mg  325 mg Oral Daily Hunter Quan DO        bumetanide (BUMEX) tablet 1 mg  1 mg Oral Daily Hunter Quan DO   1 mg at 03/03/21 0804    carbidopa-levodopa (SINEMET)  MG per tablet 2 tablet  2 tablet Oral 4x Daily Hunter Quan DO   2 tablet at 03/03/21 0804    clobetasol (TEMOVATE) 0.05 % ointment   Topical BID Hunter Quan DO        docusate sodium (COLACE) capsule 100 mg  100 mg Oral Daily Hunter Quan DO        doxazosin (CARDURA) tablet 4 mg  4 mg Oral Nightly Hunter Quan DO        ferrous sulfate (IRON 325) tablet 325 mg  325 mg Oral Daily with breakfast Hunter Quan DO        ipratropium-albuterol (DUONEB) nebulizer solution 3 mL  3 mL Inhalation Q4H WA Hunter Quan DO   3 mL at 03/03/21 0845    levothyroxine (SYNTHROID) tablet 75 mcg  75 mcg Oral Daily Hunter MANUEL Volino, DO   75 mcg at 03/03/21 0538    montelukast (SINGULAIR) tablet 10 mg  10 mg Oral Nightly Hunter Velascoino, DO   10 mg at 03/02/21 2305    polyethylene glycol (GLYCOLAX) packet 17 g  17 g Oral Daily Hunter Velascoino, DO        sennosides-docusate sodium (SENOKOT-S) 8.6-50 MG tablet 1 tablet  1 tablet Oral BID Hunter Velascoino, DO   1 tablet at 03/02/21 2358    traZODone (DESYREL) tablet 50 mg  50 mg Oral Nightly Hunter MANUEL Volino, DO   50 mg at 03/02/21 2305    sodium chloride flush 0.9 % injection 10 mL  10 mL Intravenous 2 times per day Hunter Velascoino, DO   10 mL at 03/03/21 0805    sodium chloride flush 0.9 % injection 10 mL  10 mL Intravenous PRN Hunter Velascoino, DO        potassium chloride (KLOR-CON M) extended release tablet 40 mEq  40 mEq Oral PRN Hunter Quan, DO        Or    potassium bicarb-citric acid (EFFER-K) effervescent tablet 40 mEq  40 mEq Oral PRN Hunter Velascoino, DO        Or    potassium chloride 10 mEq/100 mL IVPB (Peripheral Line)  10 mEq Intravenous PRN Hunter Velascoino, DO        enoxaparin (LOVENOX) injection 40 mg  40 mg Subcutaneous Daily Hunter Quan, DO        senna (SENOKOT) tablet 8.6 mg  1 tablet Oral Daily PRN Hunter Velascoino, DO   8.6 mg at 03/02/21 2305    acetaminophen (TYLENOL) tablet 650 mg  650 mg Oral Q6H PRN Hunter Quan, DO   650 mg at 03/03/21 0542    Or    acetaminophen (TYLENOL) suppository 650 mg  650 mg Rectal Q6H PRN Hunter Quan, DO        morphine (PF) injection 2 mg  2 mg Intravenous Q4H PRN Hunter MANUEL Volino, DO   2 mg at 03/02/21 2304       Allergies:     Allergies   Allergen Reactions    Sulfa Antibiotics      Hyperactivity        Problem List:    Patient Active Problem List   Diagnosis Code    Hypertension I10    Parkinson disease ([facility] G20    Hypothyroidism E03.9    Stage 3 chronic kidney disease N18.30    Venous stasis dermatitis of both lower extremities I87.2    CAP (community acquired pneumonia) J18.9    Closed fracture of neck of right femur ([facility] S72.001A       Past Medical History:        Diagnosis Date    High cholesterol     Hypertension     Hypothyroidism     Parkinson disease ([facility]     Diagnosed in  by Dr. Manpreet Mobley due to resting tremor    RBBB        Past Surgical History:        Procedure Laterality Date    FOOT SURGERY  1935    FOOT SURGERY      club foot repair-age 6     HERNIA REPAIR Left     groin repair     HIP SURGERY Left 2021    LEFT HIP HEMIARTHROPLASTY performed by Quin Aguero MD at [address] [place] [address] Right     5-10 years ago    TONSILLECTOMY AND ADENOIDECTOMY         Social History:    Social History     Tobacco Use    Smoking status: Former Smoker     Years: 10.00     Quit date: 1970     Years since quittin.2    Smokeless tobacco: Never Used   Substance Use Topics    Alcohol use: No     Comment: Social                                Counseling given: Not Answered      Vital Signs (Current): There were no vitals filed for this visit.                                            BP Readings from Last 3 Encounters:   21 (!) 160/74   21 (!) 149/72   21 133/69       NPO Status:                                                                                 BMI:   Wt Readings from Last 3 Encounters:   21 191 lb 12.8 oz (87 kg)   21 180 lb 3.2 oz (81.7 kg)   20 185 lb 3.2 oz (84 kg)     There is no height or weight on file to calculate BMI.    CBC:   Lab Results   Component Value Date    WBC 10.6 2021    RBC 2.75 2021    HGB 9.5 2021    HCT 30.1 2021    .5 2021    RDW 17.2 2021     2021       CMP:   Lab Results   Component Value Date     2021    K 4.2 2021     2021    CO2 25 2021    BUN 21 2021    CREATININE 1.1 2021    GFRAA >60 2021    AGRATIO 1.1 2020    LABGLOM >60 2021    LABGLOM 39 2021    GLUCOSE 112 2021 GLUCOSE 123 01/14/2021    PROT 6.2 03/03/2021    CALCIUM 8.5 03/03/2021    BILITOT 1.1 03/03/2021    ALKPHOS 201 03/03/2021    AST 14 03/03/2021    ALT <5 03/03/2021       POC Tests: No results for input(s): POCGLU, POCNA, POCK, POCCL, POCBUN, POCHEMO, POCHCT in the last 72 hours. Coags:   Lab Results   Component Value Date    PROTIME 19.0 03/02/2021    INR 1.7 03/02/2021    APTT 28.3 03/02/2021       HCG (If Applicable): No results found for: PREGTESTUR, PREGSERUM, HCG, HCGQUANT     ABGs: No results found for: PHART, PO2ART, FHW9NPW, VQS0ZJV, BEART, S5GDPSXY     Type & Screen (If Applicable):  No results found for: LABABO, LABRH    Drug/Infectious Status (If Applicable):  No results found for: HIV, HEPCAB    COVID-19 Screening (If Applicable):   Lab Results   Component Value Date    COVID19 Not Detected 03/02/2021         Anesthesia Evaluation  Patient summary reviewed and Nursing notes reviewed no history of anesthetic complications:   Airway: Mallampati: II  TM distance: >3 FB   Neck ROM: full  Mouth opening: > = 3 FB Dental:      Comment: Grossly intact    Pulmonary: breath sounds clear to auscultation  (+) pneumonia:  COPD: moderate, severe and no interval change,  shortness of breath: new,      (-) rhonchi                          ROS comment: Former smoker   Cardiovascular:  Exercise tolerance: poor (<4 METS),   (+) hypertension: moderate, hyperlipidemia      ECG reviewed  Rhythm: regular  Rate: normal  Echocardiogram reviewed                  Neuro/Psych:   (+) neuromuscular disease:,              ROS comment: Parkinson disease GI/Hepatic/Renal:        (-) liver disease and no renal disease       Endo/Other:    (+) hypothyroidism::., .                 Abdominal:           Vascular: negative vascular ROS. Anesthesia Plan      general     ASA 4       Induction: intravenous.   BIS  MIPS: Postoperative opioids intended and Prophylactic antiemetics administered. Anesthetic plan and risks discussed with patient. Use of blood products discussed with patient whom consented to blood products. Plan discussed with CRNA.                   Cari Harada, MD   3/3/2021

## 2021-03-03 NOTE — CONSULTS
Orthopaedic Consultation  ASIF Callejas MD      CHIEF COMPLAINT:  Right hip pain    History of Present Illness:  Keila Camilo is a 80y.o. year-old male who presented to the ER with right hip pain. He had a fall onto his right side while getting out of bed. He had immediate right hip pain and was unable to ambulate. He denies LOC. He recently had a left hip hemiarthroplasty with my partner Dr. Trini Beyer. Currently the pain is 7/10. Pain is worse with any movement of the right hip and improves with rest.  The pain is localized to the groin and lateral hip. Prior to the fall he ambulated with a walker. He currently resides at a rehab facility. He takes ASA.       Past Medical History:   Diagnosis Date    High cholesterol     Hypertension     Hypothyroidism     Parkinson disease ([address]     Diagnosed in 2008 by Dr. Vee Kwok due to resting tremor    RBBB          Past Surgical History:   Procedure Laterality Date    FOOT SURGERY  1935    FOOT SURGERY      club foot repair-age 6     HERNIA REPAIR Left     groin repair     HIP SURGERY Left 1/29/2021    LEFT HIP HEMIARTHROPLASTY performed by Ian Dash MD at [address] [place] [address] Right     5-10 years ago   Paul Yun 76         Medications Prior to Admission:    Medications Prior to Admission: calcium carbonate-vitamin D (CALTRATE) 600-400 MG-UNIT TABS per tab, Take 1 tablet by mouth daily  potassium chloride (KLOR-CON) 20 MEQ packet, Take 40 mEq by mouth daily  tamsulosin (FLOMAX) 0.4 MG capsule, Take 0.8 mg by mouth nightly  aspirin 325 MG EC tablet, Take 1 tablet by mouth daily  docusate sodium (COLACE, DULCOLAX) 100 MG CAPS, Take 100 mg by mouth daily  ferrous sulfate (IRON 325) 325 (65 Fe) MG tablet, Take 1 tablet by mouth daily (with breakfast)  montelukast (SINGULAIR) 10 MG tablet, Take 1 tablet by mouth nightly  polyethylene glycol (GLYCOLAX) 17 g packet, Take 17 g by mouth daily  sennosides-docusate sodium (SENOKOT-S) 8.6-50 MG tablet, Take 1 tablet by mouth 2 times daily  bumetanide (BUMEX) 1 MG tablet, Take 1 mg by mouth daily  carbidopa-levodopa (SINEMET)  MG per tablet, Take 2 tablets by mouth 4 times daily  lisinopril (PRINIVIL;ZESTRIL) 10 MG tablet, Take 1 tablet by mouth daily  Misc. Devices MISC, Dispense #2 compression stalkings Dx: leg edema  Red Yeast Rice 600 MG TABS, Take by mouth 2 times daily   ipratropium-albuterol (DUONEB) 0.5-2.5 (3) MG/3ML SOLN nebulizer solution, Inhale 3 mLs into the lungs every 4 hours (while awake)  clobetasol (TEMOVATE) 0.05 % ointment, Apply topically 2 times daily. silver sulfADIAZINE (SILVADENE) 1 % cream, Apply topically daily. levothyroxine (SYNTHROID) 25 MCG tablet, Take 3 tabs PO Monday - Friday and 2 tabs PO Saturday - Sunday (Patient taking differently: 100 mcg )  traZODone (DESYREL) 50 MG tablet, Take 1 tablet by mouth nightly  doxazosin (CARDURA) 4 MG tablet, Take 1 tablet by mouth nightly    Allergies:    Sulfa antibiotics    Social History:    reports that he quit smoking about 50 years ago. He quit after 10.00 years of use. He has never used smokeless tobacco. He reports that he does not drink alcohol or use drugs. Family History:   family history includes Breast Cancer in his sister; Cancer in his father; Heart Disease in his mother. REVIEW OF SYSTEMS:  As above in the HPI, otherwise negative    PHYSICAL EXAM:    Vitals:  BP (!) 180/70   Pulse 88   Temp 97.4 °F (36.3 °C) (Oral)   Resp 16   Ht 5' 6\" (1.676 m)   Wt 191 lb 12.8 oz (87 kg)   SpO2 93%   BMI 30.96 kg/m²     General: Frail appearing, pleasant, alert and oriented x 3  HEENT: Normocephalic, atraumatic.  EOM intact, sclera clear   Neck: Supple  Cardiovascular: No apparent abnormalities, no lower leg edema, no varicosities, pedal pulses are palpable  Lungs: Breathing not labored, no acute distress  Abdomen: Soft, nontender  Skin: Warm and dry, no open lesions or rash  Neuro: Sensation intact to light touch in bilateral upper and lower extremities     Right lower extremity:  Right leg shortened and externally rotated  Skin intact  Pain with any movement  No tenderness to palpation right knee or ankle  SILT L1-S1  TA/EHL/GS intact  Palpable DP, W/WP     Left lower extremity  No tenderness to palpation, full passive ROM, SILT     Bilateral upper extremities:  No tenderness to palpation, full passive ROM, SILT    LABS:  CBC:   Lab Results   Component Value Date    WBC 10.6 03/03/2021    RBC 2.75 03/03/2021    HGB 9.5 03/03/2021    HCT 30.1 03/03/2021    .5 03/03/2021    MCH 34.5 03/03/2021    MCHC 31.6 03/03/2021    RDW 17.2 03/03/2021     03/03/2021    MPV 10.5 03/03/2021     BMP:    Lab Results   Component Value Date     03/03/2021    K 4.2 03/03/2021     03/03/2021    CO2 25 03/03/2021    BUN 21 03/03/2021    LABALBU 3.4 03/03/2021    CREATININE 1.1 03/03/2021    CALCIUM 8.5 03/03/2021    GFRAA >60 03/03/2021    LABGLOM >60 03/03/2021    LABGLOM 39 01/14/2021    GLUCOSE 112 03/03/2021    GLUCOSE 123 01/14/2021       IMAGES:   Xray AP Pelvis and 2 view right hip: They show a displaced right intertrochanteric hip fracture    ASSESSMENT:    80y.o. year-old male with right intertrochanteric hip fracture      PLAN:  I had a long discussion with the patient regarding displaced intertrochanteric hip fractures. Operative management was discussed. The risks and benefits of surgery were discussed and all questions were answered. He would like to proceed with surgery. - Medical clearance  - NPO   - IVF  - NWB RLE  - Pain control  - Plan for IMN fixation right hip fracture 3/3/21    I spent over 50 minutes reviewing the EMR, radiographs, examining the patient, and discussing the injury and treatment plan with the patient and his wife.

## 2021-03-03 NOTE — PROGRESS NOTES
Consent received from 2 RN's for Intramedullary nail fixation right hip fracture by spouse.      Electronically signed by Raissa Matos RN on 3/3/2021 at 7:48 AM

## 2021-03-03 NOTE — H&P
 TONSILLECTOMY AND ADENOIDECTOMY         Family History   Problem Relation Age of Onset    Heart Disease Mother     Cancer Father         lung    Breast Cancer Sister        Social History  Patient lives at home with his wife but most recently has been in a skilled nursing facility rehabbing after a left hip fracture. Employment: Retired  Illicit drug use- denies  TOBACCO:   reports that he quit smoking about 50 years ago. He quit after 10.00 years of use. He has never used smokeless tobacco.  ETOH:   reports no history of alcohol use. Home Medications  Prior to Admission medications    Medication Sig Start Date End Date Taking?  Authorizing Provider   calcium carbonate-vitamin D (CALTRATE) 600-400 MG-UNIT TABS per tab Take 1 tablet by mouth daily   Yes Historical Provider, MD   potassium chloride (KLOR-CON) 20 MEQ packet Take 40 mEq by mouth daily   Yes Historical Provider, MD   tamsulosin (FLOMAX) 0.4 MG capsule Take 0.8 mg by mouth nightly   Yes Historical Provider, MD   aspirin 325 MG EC tablet Take 1 tablet by mouth daily 2/1/21  Yes Hunter Quan DO   docusate sodium (COLACE, DULCOLAX) 100 MG CAPS Take 100 mg by mouth daily 2/1/21  Yes Hunter Quan DO   ferrous sulfate (IRON 325) 325 (65 Fe) MG tablet Take 1 tablet by mouth daily (with breakfast) 2/1/21  Yes Hunter Quan DO   montelukast (SINGULAIR) 10 MG tablet Take 1 tablet by mouth nightly 2/1/21  Yes Hunter Quan DO   polyethylene glycol (GLYCOLAX) 17 g packet Take 17 g by mouth daily 2/1/21 3/3/21 Yes Hunter Quan DO   sennosides-docusate sodium (SENOKOT-S) 8.6-50 MG tablet Take 1 tablet by mouth 2 times daily 2/1/21  Yes Hunter Quan DO   bumetanide (BUMEX) 1 MG tablet Take 1 mg by mouth daily   Yes Historical Provider, MD   carbidopa-levodopa (SINEMET)  MG per tablet Take 2 tablets by mouth 4 times daily 6/7/20  Yes Historical Provider, MD   lisinopril (PRINIVIL;ZESTRIL) 10 MG tablet Take 1 tablet by mouth daily 4/1/20 Yes Omari Leone MD   Misc. Devices MISC Dispense #2 compression stalkings  Dx: leg edema 12/24/17  Yes Eitan Fleming, DO   Red Yeast Rice 600 MG TABS Take by mouth 2 times daily    Yes Historical Provider, MD   ipratropium-albuterol (DUONEB) 0.5-2.5 (3) MG/3ML SOLN nebulizer solution Inhale 3 mLs into the lungs every 4 hours (while awake) 2/1/21   Hunter Quan, DO   clobetasol (TEMOVATE) 0.05 % ointment Apply topically 2 times daily. 7/6/20   Papo Hwang DPM   silver sulfADIAZINE (SILVADENE) 1 % cream Apply topically daily. 6/9/20   Tamia Ham,    levothyroxine (SYNTHROID) 25 MCG tablet Take 3 tabs PO Monday - Friday and 2 tabs PO Saturday - Sunday  Patient taking differently: 100 mcg  4/23/20 1/11/21  LORIE Zambrano - CNP   traZODone (DESYREL) 50 MG tablet Take 1 tablet by mouth nightly 4/6/20 1/11/21  LORIE Cardenas CNP   doxazosin (CARDURA) 4 MG tablet Take 1 tablet by mouth nightly 4/6/20   LORIE Cardenas CNP       Allergies  Allergies   Allergen Reactions    Sulfa Antibiotics      Hyperactivity        Review of Systems  Please see HPI above. All bolded are positive. All un-bolded are negative.   Constitutional Symptoms: fever, chills, fatigue, generalized weakness, diaphoresis, increase in thirst, loss of appetite  Eyes: vision change   Ears, Nose, Mouth, Throat: hearing loss, nasal congestion, sores in the mouth  Cardiovascular: chest pain, chest heaviness, palpitations  Respiratory: shortness of breath, wheezing, coughing  Gastrointestinal: abdominal pain, nausea, vomiting, diarrhea, constipation, melena, hematochezia, hematemesis  Genitourinary: dysuria, hematuria, increased frequency  Musculoskeletal: lower extremity edema, myalgias, arthralgias, back pain  Integumentary: rashes, itching   Neurological: headache, lightheadedness, dizziness, confusion, syncope, numbness, tingling, focal weakness  Psychiatric: depression, suicidal ideation, anxiety  Endocrine: unintentional weight change  Hematologic/Lymphatic: lymphadenopathy, easy bruising, easy bleeding   Allergic/Immunologic: recurrent infections      Objective  VITALS:  /63   Pulse 65   Temp 97.3 °F (36.3 °C) (Oral)   Resp 16   Ht 5' 6\" (1.676 m)   Wt 191 lb 12.8 oz (87 kg)   SpO2 96%   BMI 30.96 kg/m²     Physical Exam:  General: awake, alert, oriented to person, place, time, and purpose, appears stated age, cooperative, no acute distress, pleasant, appropriate mood, nasal cannula oxygen in place  Eyes: conjunctivae/corneas clear, sclera non icteric, EOMI  Ears: no obvious scars, no lesions, no masses, hearing intact  Mouth: mucous membranes moist, no obvious oral sores  Head: normocephalic, atraumatic  Neck: no JVD, no adenopathy, no thyromegaly, neck is supple, trachea is midline  Back: ROM normal, no CVA tenderness. Chest: no pain on palpation  Lungs:  Only very scant rhonchi and wheezing noted, diminished breath sounds posteriorly  Heart: regular rate and regular rhythm, no murmur, normal S1, S2  Abdomen: soft, non-tender; bowel sounds normal; no masses, no organomegaly  : Deferred   Extremities: Trace lower extremity edema, extremities atraumatic but pain with movement of the right hip, no cyanosis, no clubbing, 2+ pedal pulses palpated  Skin: normal color, normal texture, normal turgor, no rashes, no lesions  Neurologic:5/5 muscle strength throughout, normal muscle tone throughout, face symmetric, hearing intact, tongue midline, speech appropriate without slurring, sensation to fine touch intact in upper and lower extremities    Labs-   Lab Results   Component Value Date    WBC 10.6 03/03/2021    HGB 9.5 (L) 03/03/2021    HCT 30.1 (L) 03/03/2021     03/03/2021     03/03/2021    K 4.2 03/03/2021     03/03/2021    CREATININE 1.1 03/03/2021    BUN 21 03/03/2021    CO2 25 03/03/2021    GLUCOSE 112 (H) 03/03/2021    ALT <5 03/03/2021    AST 14 03/03/2021    INR 1.7 03/02/2021 Lab Results   Component Value Date    TROPONINI 0.02 01/27/2021       Recent Radiological Studies:  XR HIP 2-3 VW W PELVIS RIGHT   Final Result   1. Comminuted intertrochanteric fractures of the right femur. 2. Demineralized bones. XR CHEST PORTABLE   Final Result   Airspace disease in the right upper lobe appears more prominent than on the   prior examination. Developing pneumonia, and in a setting of trauma   developing lung contusion must be considered. Question nodularity also in   the right upper lobe not clearly identified on the prior examination. Radiographic follow-up recommended. Consider further evaluation with CT as   clinically indicated. Assessment  Active Hospital Problems    Diagnosis    Closed fracture of neck of right femur ([facility] [S72.001A]     Priority: High    CAP (community acquired pneumonia) [J18.9]     Priority: Medium    Venous stasis dermatitis of both lower extremities [I87.2]    Stage 3 chronic kidney disease [N18.30]    Parkinson disease ([address] [G20]    Hypothyroidism [E03.9]    Hypertension [I10]       Patient Active Problem List    Diagnosis Date Noted    Closed fracture of neck of right femur ([address] 03/02/2021     Priority: High    CAP (community acquired pneumonia) 01/27/2021     Priority: Medium    Stage 3 chronic kidney disease 10/21/2019    Venous stasis dermatitis of both lower extremities 10/21/2019    Hypertension     Parkinson disease ([facility]     Hypothyroidism        Plan  · Right femoral neck fracture (recent left fx): Ortho consult-- for right hemiarthroplasty 3/3-- sp left hemiarthroplasty 1/29. DVT prophylaxis per ortho. IV Post-op pain control per ortho. Hold ASA/ACEi. Cardio with low-intermediate risk last admission.   · Abnormal CXR: Pulm consult for vilma-op risk assessment and management as they saw him last time he was in as well--he is described at moderate risk for pulmonary complications from surgery.   · Continue home medications  · PT/OT post-op   · Follow labs  · DVT prophylaxis. · Please see orders for further management and care. ·  for discharge planning  · Discharge plan: Back to SNF when stable-likely in a couple days    Hunter Quan DO  3/3/2021  9:53 AM    I can be reached through [employer]. NOTE:  This report was transcribed using voice recognition software. Every effort was made to ensure accuracy; however, inadvertent computerized transcription errors may be present.

## 2021-03-03 NOTE — CARE COORDINATION
Social Work / Discharge Planning : Patient was admitted from [facility]. Patient had a fall and fx hip. Patient scheduled for sx. SW spoke to Afsaneh at [facility]. [facility] can accept back PENDING bed availability. They will need pre-cert and negative COVID. SAW spoke to patient spouse Jarred Hernandez and she verified plan at discharge is [facility]. N 17 generated and transport forms completed. SW to follow.  Electronically signed by GERMÁN Ramos on 3/3/21 at 9:23 AM EST

## 2021-03-04 LAB
ALBUMIN SERPL-MCNC: 2.9 G/DL (ref 3.5–5.2)
ALP BLD-CCNC: 160 U/L (ref 40–129)
ALT SERPL-CCNC: <5 U/L (ref 0–40)
ANION GAP SERPL CALCULATED.3IONS-SCNC: 8 MMOL/L (ref 7–16)
ANISOCYTOSIS: ABNORMAL
AST SERPL-CCNC: 11 U/L (ref 0–39)
BASOPHILS ABSOLUTE: 0.02 E9/L (ref 0–0.2)
BASOPHILS RELATIVE PERCENT: 0.2 % (ref 0–2)
BILIRUB SERPL-MCNC: 0.5 MG/DL (ref 0–1.2)
BUN BLDV-MCNC: 24 MG/DL (ref 8–23)
CALCIUM SERPL-MCNC: 7.8 MG/DL (ref 8.6–10.2)
CHLORIDE BLD-SCNC: 107 MMOL/L (ref 98–107)
CO2: 25 MMOL/L (ref 22–29)
CREAT SERPL-MCNC: 1.1 MG/DL (ref 0.7–1.2)
EOSINOPHILS ABSOLUTE: 0 E9/L (ref 0.05–0.5)
EOSINOPHILS RELATIVE PERCENT: 0 % (ref 0–6)
GFR AFRICAN AMERICAN: >60
GFR NON-AFRICAN AMERICAN: >60 ML/MIN/1.73
GLUCOSE BLD-MCNC: 143 MG/DL (ref 74–99)
HCT VFR BLD CALC: 26.1 % (ref 37–54)
HEMOGLOBIN: 8 G/DL (ref 12.5–16.5)
IMMATURE GRANULOCYTES #: 0.07 E9/L
IMMATURE GRANULOCYTES %: 0.7 % (ref 0–5)
LYMPHOCYTES ABSOLUTE: 0.53 E9/L (ref 1.5–4)
LYMPHOCYTES RELATIVE PERCENT: 5.5 % (ref 20–42)
MCH RBC QN AUTO: 33.6 PG (ref 26–35)
MCHC RBC AUTO-ENTMCNC: 30.7 % (ref 32–34.5)
MCV RBC AUTO: 109.7 FL (ref 80–99.9)
MONOCYTES ABSOLUTE: 0.64 E9/L (ref 0.1–0.95)
MONOCYTES RELATIVE PERCENT: 6.6 % (ref 2–12)
NEUTROPHILS ABSOLUTE: 8.46 E9/L (ref 1.8–7.3)
NEUTROPHILS RELATIVE PERCENT: 87 % (ref 43–80)
OVALOCYTES: ABNORMAL
PDW BLD-RTO: 16.9 FL (ref 11.5–15)
PLATELET # BLD: 228 E9/L (ref 130–450)
PMV BLD AUTO: 10.2 FL (ref 7–12)
POIKILOCYTES: ABNORMAL
POLYCHROMASIA: ABNORMAL
POTASSIUM REFLEX MAGNESIUM: 4.5 MMOL/L (ref 3.5–5)
RBC # BLD: 2.38 E12/L (ref 3.8–5.8)
SODIUM BLD-SCNC: 140 MMOL/L (ref 132–146)
TOTAL PROTEIN: 5.6 G/DL (ref 6.4–8.3)
WBC # BLD: 9.7 E9/L (ref 4.5–11.5)

## 2021-03-04 PROCEDURE — 94640 AIRWAY INHALATION TREATMENT: CPT

## 2021-03-04 PROCEDURE — 97165 OT EVAL LOW COMPLEX 30 MIN: CPT

## 2021-03-04 PROCEDURE — 2700000000 HC OXYGEN THERAPY PER DAY

## 2021-03-04 PROCEDURE — 2580000003 HC RX 258: Performed by: ORTHOPAEDIC SURGERY

## 2021-03-04 PROCEDURE — 6370000000 HC RX 637 (ALT 250 FOR IP): Performed by: ORTHOPAEDIC SURGERY

## 2021-03-04 PROCEDURE — 36415 COLL VENOUS BLD VENIPUNCTURE: CPT

## 2021-03-04 PROCEDURE — 80053 COMPREHEN METABOLIC PANEL: CPT

## 2021-03-04 PROCEDURE — 85025 COMPLETE CBC W/AUTO DIFF WBC: CPT

## 2021-03-04 PROCEDURE — 1200000000 HC SEMI PRIVATE

## 2021-03-04 PROCEDURE — 97161 PT EVAL LOW COMPLEX 20 MIN: CPT

## 2021-03-04 PROCEDURE — 97110 THERAPEUTIC EXERCISES: CPT

## 2021-03-04 PROCEDURE — 6360000002 HC RX W HCPCS: Performed by: ORTHOPAEDIC SURGERY

## 2021-03-04 PROCEDURE — 97530 THERAPEUTIC ACTIVITIES: CPT

## 2021-03-04 RX ORDER — OXYCODONE HYDROCHLORIDE 5 MG/1
2.5 TABLET ORAL EVERY 6 HOURS PRN
Qty: 10 TABLET | Refills: 0 | Status: SHIPPED | OUTPATIENT
Start: 2021-03-04 | End: 2021-03-09

## 2021-03-04 RX ORDER — POLYETHYLENE GLYCOL 3350 17 G/17G
17 POWDER, FOR SOLUTION ORAL DAILY
Qty: 527 G | Refills: 1 | DISCHARGE
Start: 2021-03-04 | End: 2021-04-03

## 2021-03-04 RX ADMIN — Medication 10 ML: at 21:00

## 2021-03-04 RX ADMIN — CARBIDOPA AND LEVODOPA 2 TABLET: 25; 100 TABLET ORAL at 13:01

## 2021-03-04 RX ADMIN — CARBIDOPA AND LEVODOPA 2 TABLET: 25; 100 TABLET ORAL at 20:59

## 2021-03-04 RX ADMIN — BUMETANIDE 1 MG: 1 TABLET ORAL at 08:21

## 2021-03-04 RX ADMIN — IPRATROPIUM BROMIDE AND ALBUTEROL SULFATE 3 ML: .5; 3 SOLUTION RESPIRATORY (INHALATION) at 09:19

## 2021-03-04 RX ADMIN — IPRATROPIUM BROMIDE AND ALBUTEROL SULFATE 3 ML: .5; 3 SOLUTION RESPIRATORY (INHALATION) at 16:04

## 2021-03-04 RX ADMIN — IPRATROPIUM BROMIDE AND ALBUTEROL SULFATE 3 ML: .5; 3 SOLUTION RESPIRATORY (INHALATION) at 20:14

## 2021-03-04 RX ADMIN — DOCUSATE SODIUM 100 MG: 100 CAPSULE, LIQUID FILLED ORAL at 08:20

## 2021-03-04 RX ADMIN — Medication 10 ML: at 08:21

## 2021-03-04 RX ADMIN — OXYCODONE HYDROCHLORIDE 2.5 MG: 5 TABLET ORAL at 08:31

## 2021-03-04 RX ADMIN — ACETAMINOPHEN 650 MG: 325 TABLET ORAL at 10:15

## 2021-03-04 RX ADMIN — IPRATROPIUM BROMIDE AND ALBUTEROL SULFATE 3 ML: .5; 3 SOLUTION RESPIRATORY (INHALATION) at 13:10

## 2021-03-04 RX ADMIN — ASPIRIN 325 MG: 325 TABLET, COATED ORAL at 08:31

## 2021-03-04 RX ADMIN — POLYETHYLENE GLYCOL 3350 17 G: 17 POWDER, FOR SOLUTION ORAL at 08:20

## 2021-03-04 RX ADMIN — FERROUS SULFATE TAB 325 MG (65 MG ELEMENTAL FE) 325 MG: 325 (65 FE) TAB at 08:21

## 2021-03-04 RX ADMIN — DOCUSATE SODIUM AND SENNOSIDES 1 TABLET: 8.6; 5 TABLET, FILM COATED ORAL at 08:21

## 2021-03-04 RX ADMIN — DOXAZOSIN 4 MG: 4 TABLET ORAL at 21:00

## 2021-03-04 RX ADMIN — ACETAMINOPHEN 650 MG: 325 TABLET ORAL at 18:57

## 2021-03-04 RX ADMIN — MONTELUKAST SODIUM 10 MG: 10 TABLET, FILM COATED ORAL at 20:59

## 2021-03-04 RX ADMIN — CLOBETASOL PROPIONATE: 0.5 OINTMENT TOPICAL at 21:00

## 2021-03-04 RX ADMIN — Medication 2000 MG: at 05:13

## 2021-03-04 RX ADMIN — CARBIDOPA AND LEVODOPA 2 TABLET: 25; 100 TABLET ORAL at 08:20

## 2021-03-04 RX ADMIN — CARBIDOPA AND LEVODOPA 2 TABLET: 25; 100 TABLET ORAL at 18:57

## 2021-03-04 RX ADMIN — LEVOTHYROXINE SODIUM 75 MCG: 75 TABLET ORAL at 05:12

## 2021-03-04 RX ADMIN — ACETAMINOPHEN 650 MG: 325 TABLET ORAL at 05:12

## 2021-03-04 RX ADMIN — DOCUSATE SODIUM AND SENNOSIDES 1 TABLET: 8.6; 5 TABLET, FILM COATED ORAL at 21:00

## 2021-03-04 RX ADMIN — TRAZODONE HYDROCHLORIDE 50 MG: 50 TABLET ORAL at 20:59

## 2021-03-04 ASSESSMENT — PAIN SCALES - GENERAL
PAINLEVEL_OUTOF10: 2
PAINLEVEL_OUTOF10: 0
PAINLEVEL_OUTOF10: 4

## 2021-03-04 ASSESSMENT — PAIN - FUNCTIONAL ASSESSMENT: PAIN_FUNCTIONAL_ASSESSMENT: ACTIVITIES ARE NOT PREVENTED

## 2021-03-04 ASSESSMENT — PAIN DESCRIPTION - PROGRESSION
CLINICAL_PROGRESSION: NOT CHANGED
CLINICAL_PROGRESSION: NOT CHANGED

## 2021-03-04 ASSESSMENT — PAIN DESCRIPTION - ORIENTATION: ORIENTATION: RIGHT

## 2021-03-04 NOTE — PROGRESS NOTES
Internal Medicine Progress Note    Patient's name: Sophy Bruce  : 1930  Admission date: 3/2/2021  Date of service: 3/4/2021   Room: [address] MED SURG/TELE   Primary care physician: Jairo Paredes MD  Reason for visit: Follow-up for hip fx    Subjective  Ashley Kirkland was seen and examined in his room. He was sitting up in a chair. He denied complaints or issues. I told him that he was probably going back to the skilled nursing facility today and he told me that this should be fine. His breathing is very stable. Review of Systems  There are no new complaints of chest pain, shortness of breath, abdominal pain, nausea, vomiting, diarrhea, constipation.     Hospital Medications  Current Facility-Administered Medications   Medication Dose Route Frequency Provider Last Rate Last Admin    0.9 % sodium chloride infusion   Intravenous PRN Philippe Andrews MD        sodium chloride flush 0.9 % injection 10 mL  10 mL Intravenous 2 times per day Philippe Andrews MD   10 mL at 21 0821    sodium chloride flush 0.9 % injection 10 mL  10 mL Intravenous PRN Philippe Andrews MD        acetaminophen (TYLENOL) tablet 650 mg  650 mg Oral Q6H Philippe Andrews MD   650 mg at 21 9395    oxyCODONE (ROXICODONE) immediate release tablet 2.5 mg  2.5 mg Oral Q4H PRN Philippe Andrews MD   2.5 mg at 21 0831    Or    oxyCODONE (ROXICODONE) immediate release tablet 5 mg  5 mg Oral Q4H PRN Philippe Andrews MD   5 mg at 21 1826    magnesium hydroxide (MILK OF MAGNESIA) 400 MG/5ML suspension 30 mL  30 mL Oral Daily PRN Philippe Andrews MD        aspirin EC tablet 325 mg  325 mg Oral Daily Philippe Andrews MD   325 mg at 21 0831    bumetanide (BUMEX) tablet 1 mg  1 mg Oral Daily Philippe Andrews MD   1 mg at 21 4485    carbidopa-levodopa (SINEMET)  MG per tablet 2 tablet  2 tablet Oral 4x Daily Philippe Andrews MD   2 tablet at 21 0820    clobetasol (TEMOVATE) 0.05 % ointment   Topical BID Philippe Andrews MD   Given at 21 3988    docusate sodium (COLACE) capsule 100 mg  100 mg Oral Daily Jose Juan Oh MD   100 mg at 03/04/21 0820    doxazosin (CARDURA) tablet 4 mg  4 mg Oral Nightly Jose Juan Oh MD   4 mg at 03/03/21 2107    ferrous sulfate (IRON 325) tablet 325 mg  325 mg Oral Daily with breakfast Jose Juan Oh MD   325 mg at 03/04/21 0821    ipratropium-albuterol (DUONEB) nebulizer solution 3 mL  3 mL Inhalation Q4H WA Jose Juan Oh MD   3 mL at 03/04/21 0919    levothyroxine (SYNTHROID) tablet 75 mcg  75 mcg Oral Daily Jose Juan Oh MD   75 mcg at 03/04/21 0512    montelukast (SINGULAIR) tablet 10 mg  10 mg Oral Nightly Jose Juan Oh MD   10 mg at 03/03/21 2107    polyethylene glycol (GLYCOLAX) packet 17 g  17 g Oral Daily Jose Juan Oh MD   17 g at 03/04/21 0820    sennosides-docusate sodium (SENOKOT-S) 8.6-50 MG tablet 1 tablet  1 tablet Oral BID Jose Juan Oh MD   1 tablet at 03/04/21 1116    traZODone (DESYREL) tablet 50 mg  50 mg Oral Nightly Jose Juan Oh MD   50 mg at 03/03/21 2107    sodium chloride flush 0.9 % injection 10 mL  10 mL Intravenous 2 times per day Jose Juan Oh MD   10 mL at 03/03/21 0805    sodium chloride flush 0.9 % injection 10 mL  10 mL Intravenous PRN Jose Juan Oh MD        potassium chloride (KLOR-CON M) extended release tablet 40 mEq  40 mEq Oral PRN oJse Juan Oh MD        Or    potassium bicarb-citric acid (EFFER-K) effervescent tablet 40 mEq  40 mEq Oral PRN Jose Juan Oh MD        Or    potassium chloride 10 mEq/100 mL IVPB (Peripheral Line)  10 mEq Intravenous PRN Jose Juan Oh MD        enoxaparin (LOVENOX) injection 40 mg  40 mg Subcutaneous Daily Jose Juan Oh MD        [facility]) tablet 8.6 mg  1 tablet Oral Daily PRN Jose Juan Oh MD   8.6 mg at 03/02/21 2305    acetaminophen (TYLENOL) tablet 650 mg  650 mg Oral Q6H PRN Jose Juan Oh MD   650 mg at 03/03/21 0542    Or    acetaminophen (TYLENOL) suppository 650 mg  650 mg Rectal Q6H PRN Jose Juan Oh MD        morphine (PF) injection 2 mg  2 mg Intravenous Q4H PRN Jose Juan Oh MD   2 mg at 03/02/21 2304       PRN Medications  sodium chloride, sodium chloride flush, oxyCODONE **OR** oxyCODONE, magnesium hydroxide, sodium chloride flush, potassium chloride **OR** potassium alternative oral replacement **OR** potassium chloride, senna, acetaminophen **OR** acetaminophen, morphine    Objective  Most Recent Recorded Vitals  BP (!) 115/57   Pulse 68   Temp 97.3 °F (36.3 °C) (Oral)   Resp 16   Ht 5' 6\" (1.676 m)   Wt 191 lb 12.8 oz (87 kg)   SpO2 94%   BMI 30.96 kg/m²   I/O last 3 completed shifts: In: 900 [I.V.:900]  Out: 9385 [Urine:1450; Blood:300]  No intake/output data recorded. Physical Exam:  General: AAO to person/place/time/purpose, NAD, no labored breathing, Habematolel, nasal cannula oxygen  Eyes: conjunctivae/corneas clear, sclera non icteric  Skin: color/texture/turgor normal, no rashes or lesions  Lungs: Overall diminished breath sounds but no wheezing, rales, rhonchi  Heart: regular rate, regular rhythm, no murmur  Abdomen: soft, NT; bowel sounds normal; no masses,  no organomegaly  Extremities: Pain with movement of the right-- sp hemiarthroplasty, no cyanosis, no edema  Neurologic: cranial nerves 2-12 grossly intact, no slurred speech. Most Recent Labs  Lab Results   Component Value Date    WBC 9.7 03/04/2021    HGB 8.0 (L) 03/04/2021    HCT 26.1 (L) 03/04/2021     03/04/2021     03/04/2021    K 4.5 03/04/2021     03/04/2021    CREATININE 1.1 03/04/2021    BUN 24 (H) 03/04/2021    CO2 25 03/04/2021    GLUCOSE 143 (H) 03/04/2021    ALT <5 03/04/2021    AST 11 03/04/2021    INR 1.7 03/03/2021    TSH 7.060 (H) 01/31/2021       FLUORO FOR SURGICAL PROCEDURES   Final Result   Intraprocedural fluoroscopic spot images as above. See separate procedure   report for more information. XR HIP 2-3 VW W PELVIS RIGHT   Final Result   1. Comminuted intertrochanteric fractures of the right femur. 2. Demineralized bones.       XR CHEST PORTABLE reached through [employer].

## 2021-03-04 NOTE — PROGRESS NOTES
[facility] Quality Flow/Interdisciplinary Rounds Progress Note        Quality Flow Rounds held on March 4, 2021    Disciplines Attending:  Bedside Nurse, ,  and Nursing Unit Leadership    Sherman Rivas was admitted on 3/2/2021  5:24 PM    Anticipated Discharge Date:  Expected Discharge Date: 03/19/21    Disposition:    Sriram Score:  Sriram Scale Score: 18    Readmission Risk              Risk of Unplanned Readmission:        21           Discussed patient goal for the day, patient clinical progression, and barriers to discharge.   The following Goal(s) of the Day/Commitment(s) have been identified:  await Dr. Amadeo Beaulieu for discharge      Eliana Muro  March 4, 2021

## 2021-03-04 NOTE — PROGRESS NOTES
Occupational Therapy  OCCUPATIONAL THERAPY INITIAL EVALUATION      Date:3/4/2021  Patient Name: Chirag Meier  MRN: [id number]  : 1930  Room: [address] [place] [address]    Referring Provider: Vania Langley MD  Evaluating OT: Niles Villalba. Kieran Osorio - [id number]    AM-PAC Daily Activity Raw Score:     Recommended Adaptive Equipment: Continue to assess. Diagnosis: Closed [facility] of neck of right femur[facility] [S72.001A]   Surgery: Patient underwent Right Intramedullary trochanteric nail on 3/3/2021. Pertinent Medical History: Parkinson's disease, HTN     Precautions: falls, FWBAT, posterolateral hip precautions L LE, O2 via nasal cannula, bed alarm, skin integrity    Home Living: Patient admitted from SNF/[facility]. Prior to 2021, patient lived with his wife in a one-floor setup. Equipment Owned: walker    Prior Level of Function (PLOF): Patient reported that he was needing assistance with ADLs and functional transfers/mobility (with walker) at SNF. Patient noted that he had been mostly independent with ADLs and functional transfers/mobility prior to 2021; patient's wife is primarily responsible for completion of IADLs. Driving: No - patient's wife is the primary     Pain Level: Patient reported experiencing pain in his R hip, which he rated 5 out of 10 during bed mobility. Cognition: Patient alert and oriented to person and grossly to time; confusion demonstrated occasionally. WFL command follow demonstrated. Patient pleasant, cooperative, and motivated to return to [facility] and home environment.    Memory: Fair   Sequencing: Fair   Problem Solving: Fair   Judgement/Safety: Fair    Functional Assessment:   Initial Eval Status  Date: 3/4/2021 Treatment Status  Date:  Short Term Goals   Feeding SBA  Setup   Grooming Mod A  SBA  (seated)   UB Dressing Mod A  SBA   LB Dressing Max A  Mod A - with use of AE - while demonstrating Good adherence to posterolateral hip precautions   Bathing Max A Mod A - with use of AE/DME - while demonstrating Good adherence to posterolateral hip precautions   Toileting Max A  Patient with brewster catheter currently. Mod A   Bed Mobility  Supine-to-Sit: Max A  Sit-to-Supine: Max Ax2  Scooting: Max A toward [facility] while seated at EOB      Functional Transfers Patient declined to attempt sit-to-stand. Mod A   Functional Mobility Not assessed. Min A (with use of most appropriate AD) in order to maximize independence with ADLs. Balance Sitting: Fair+  (at EOB)  Patient demonstrated Good use of B UEs to stabilize his balance while seated at EOB. Good dynamic sitting balance during completion of ADLs and other functional tasks. Activity Tolerance Fair-  Patient will demonstrate Good understanding and consistent implementation of energy conservation techniques and work simplification techniques into ADL routines. Visual/  Perceptual WFL  Patient wears glasses consistently. N/A   B UE Strength R Shoulder: 2-/5  Distal R UE: 4-/5 grossly  L Shoulder: 3-/5  Distal L UE: 4-/5  Patient will demonstrate 4+/5 distal B UE strength in order to maximize independence with ADLs, bed mobility, and functional transfers. Long-Term Goal: Patient will increase functional independence to PLOF in order to allow patient to live in least restrictive environment. ROM: Additional Information:    R UE  0 - 30 degrees of active shoulder flexion; distal AROM WFL grossly Mild tremor noted. Fair [address] demonstrated. L UE 0 - 75 degrees of active shoulder flexion; distal AROM WFL grossly    Hand Dominance: Right    Hearing: Fair  Sensation: Patient denied experiencing numbness/tingling in B UEs. Tone: WFL  Edema: No    Comments: RN approved patient's participation in EOB/OOB activities. Upon arrival, patient supine in bed. At end of session, patient supine in bed with call light and phone within reach, bed alarm activated, and all lines and tubes intact.  Patient would benefit from continued skilled OT to increase safety and independence with completion of ADL/IADL tasks for functional independence and quality of life. Treatment: OT treatment provided this date included:    Instruction/training regarding posterolateral precautions for L LE and implications of these on bed mobility and ADLs    Patient education provided regardin) importance of EOB/OOB activities, 2) importance of having staff assistance with ADLs and other EOB/OOB activities to prevent falls/injury during hospitalization. Patient indicated [address] understanding. Further skilled OT treatment indicated to increase patient's safety and independence with completion of ADL/IADL tasks in order to maximize patient's functional independence and quality of life.     Assessment of Current Deficits:   Functional Mobility [x]  ADLs [x] Strength [x]  Cognition []  Functional Transfers  [x] IADLs [x] Safety Awareness [x]  Endurance [x]  Fine Motor Coordination [] Balance [x] Vision/Perception [] Sensation []   Gross Motor Coordination [] ROM [] Delirium []                  Motor Control []    Plan of Care: 2-5 days/week for 1-2 weeks PRN  [x]ADL retraining/adaptive techniques and AE recommendations to increase functional independence within precautions  [x]Energy conservation techniques to improve tolerance for ADLs/IADLs  [x]Functional transfer/mobility training/DME recommendations for increased independence, safety and fall prevention  [x]Patient/family education to increase safety and functional independence  [x]Environmental modifications for safe mobility and completion of ADLs/IADLs  []Cognitive retraining exercises to improve problem solving skills & safe participation in ADLs/IADLs   []Sensory re-education techniques to improve extremity awareness, maintain skin integrity and improve hand function   []Visual/Perceptual retraining  to improve body awareness and safety during transfers and ADLs   []Splinting/positioning needs to maintain joint/skin integrity and prevent contractures   [x]Therapeutic activity to improve functional performance during ADLs/IADLs  [x]Therapeutic exercise to improve tolerance and functional strength for ADLs/IADLs  [x]Balance retraining/tolerance tasks for facilitation of postural control with dynamic challenges during ADLs   [x]Neuromuscular re-education: facilitate righting/equilibrium reactions, midline orientation, scapular stability/mobility, normalize muscle tone, and facilitate active functional movement/attention  []Delirium prevention/treatment   []Positioning to improve functional independence and prevent skin breakdown   []Other:     Rehab Potential: Good for established goals. Patient / Family Goal: Patient indicated that he would ultimately like to be independent with ADLs and functional transfers/mobility. Patient and/or family were instructed on functional diagnosis, prognosis/goals, and OT plan of care. Demonstrated Fair understanding. Eval Complexity: Low    Time In: 1432  Time Out: 1455  Total Treatment Time: 13 minutes      Minutes Units   OT Eval Low 54523 10 1   OT Eval Medium 49315     OT Eval High 92863     OT Re-Eval [id number]     Therapeutic Ex 09370     Therapeutic Activities 80234 13 1   ADL/Self Care 19046     Orthotic Management 18338     Neuro Re-Ed 82169     Non-Billable Time N/A ---     Evaluation time includes thorough review of current medical information, gathering information on past medical history/social history and prior level of function, completion of standardized testing/informal observation of tasks, assessment of data, and education on plan of care and goals. Monica Maddox, HANSELR/L  License Number: [id number]

## 2021-03-04 NOTE — PROGRESS NOTES
Yosi Kellogg M.D.,[facility]  Peter Allen D.O., FSAMONikolasI., Matteo Jones M.D. Starr Lyon M.D., Brandi Navarro M.D. Elle Morales D.O. Daily Pulmonary Progress Note    Patient:  Nando Alejandra 80 y.o. male MRN: [id number]     Date of Service: 3/4/2021      Synopsis     We are following patient for     \"CC\"     Code status:      Subjective      Patient was seen and examined. Review of Systems:  Constitutional: Denies fever, weight loss, night sweats, and fatigue  Skin: Denies pigmentation, dark lesions, and rashes   HEENT: Denies hearing loss, tinnitus, ear drainage, epistaxis, sore throat, and hoarseness. Cardiovascular: Denies palpitations, chest pain, and chest pressure. Respiratory: Denies cough, dyspnea at rest, hemoptysis, apnea, and choking.   Gastrointestinal: Denies nausea, vomiting, poor appetite, diarrhea, heartburn or reflux  Genitourinary: Denies dysuria, frequency, urgency or hematuria  Musculoskeletal: Denies myalgias, muscle weakness, and bone pain  Neurological: Denies dizziness, vertigo, headache, and focal weakness  Psychological: Denies anxiety and depression  Endocrine: Denies heat intolerance and cold intolerance  Hematopoietic/Lymphatic: Denies bleeding problems and blood transfusions    24-hour events:      Objective   Vitals: BP (!) 115/57   Pulse 68   Temp 97.3 °F (36.3 °C) (Oral)   Resp 16   Ht 5' 6\" (1.676 m)   Wt 191 lb 12.8 oz (87 kg)   SpO2 94%   BMI 30.96 kg/m²     I/O:    Intake/Output Summary (Last 24 hours) at 3/4/2021 1412  Last data filed at 3/4/2021 1256  Gross per 24 hour   Intake 900 ml   Output 1250 ml   Net -350 ml       Vent Information  SpO2: 94 %                CURRENT MEDS :  Scheduled Meds:   sodium chloride flush  10 mL Intravenous 2 times per day    acetaminophen  650 mg Oral Q6H    aspirin  325 mg Oral Daily    bumetanide  1 mg Oral Daily    carbidopa-levodopa  2 tablet Oral 4x Daily    clobetasol   Topical BID  docusate sodium  100 mg Oral Daily    doxazosin  4 mg Oral Nightly    ferrous sulfate  325 mg Oral Daily with breakfast    ipratropium-albuterol  3 mL Inhalation Q4H WA    levothyroxine  75 mcg Oral Daily    montelukast  10 mg Oral Nightly    polyethylene glycol  17 g Oral Daily    sennosides-docusate sodium  1 tablet Oral BID    traZODone  50 mg Oral Nightly    sodium chloride flush  10 mL Intravenous 2 times per day    enoxaparin  40 mg Subcutaneous Daily       Physical Exam:  General Appearance: appears comfortable in no acute distress. HEENT: Normocephalic atraumatic without obvious abnormality   Neck: Lips, mucosa, and tongue normal.  Supple, symmetrical, trachea midline, no adenopathy;thyroid:  no enlargement/tenderness/nodules or JVD. Lung: Breath sounds CTA. Respirations   unlabored. Symmetrical expansion. Heart: RRR, normal S1, S2. No MRG  Abdomen: Soft, NT, ND. BS present x 4 quadrants. No bruit or organomegaly. Extremities: Pedal pulses 2+ symmetric b/l. Extremities normal, no cyanosis, clubbing, or edema. Musculokeletal: No joint swelling, no muscle tenderness. ROM normal in all joints of extremities. Neurologic: Mental status: Alert and Oriented X3 .     Pertinent/ New Labs and Imaging Studies     Imaging Personally Reviewed:      ECHO      Labs:  Lab Results   Component Value Date    WBC 9.7 03/04/2021    HGB 8.0 03/04/2021    HCT 26.1 03/04/2021    .7 03/04/2021    MCH 33.6 03/04/2021    MCHC 30.7 03/04/2021    RDW 16.9 03/04/2021     03/04/2021    MPV 10.2 03/04/2021     Lab Results   Component Value Date     03/04/2021    K 4.5 03/04/2021     03/04/2021    CO2 25 03/04/2021    BUN 24 03/04/2021    CREATININE 1.1 03/04/2021    LABALBU 2.9 03/04/2021    CALCIUM 7.8 03/04/2021    GFRAA >60 03/04/2021    LABGLOM >60 03/04/2021    LABGLOM 39 01/14/2021     Lab Results   Component Value Date    PROTIME 18.7 03/03/2021    INR 1.7 03/03/2021     No results for input(s): PROBNP in the last 72 hours. No results for input(s): PROCAL in the last 72 hours. This SmartLink has not been configured with any valid records. Micro:  No results for input(s): CULTRESP in the last 72 hours. No results for input(s): LABGRAM in the last 72 hours. No results for input(s): LEGUR in the last 72 hours. No results for input(s): STREPNEUMAGU in the last 72 hours. No results for input(s): LP1UAG in the last 72 hours. Assessment:    1.  Acute displaced right intertrochanteric hip fracture, post op 3/3 Right Intramedullary trochanteric nail  2.  chronic hypoxic respiratory failure   *secondary to atelectasis, asthma , may have component of chronic  aspiration given Parkinson's  2.  s/p 1/27 Mechanical fall with left hip femoral neck fracture s/left hemiarthroplasty  3. Hx mild asthma ,exacerbation 1/27 treated with course of steroids   4.  Chronic rhinitis  5.  Parkinson's disease      Plan:   1. Continue incentive spirometry, pep flutter, EZ Pap, and DuoNebs every 4 hours  2. Considering his history of Parkinson's strict aspiration precautions   3. PRN BiPAP postoperatively   4. Keep pulse oximetry greater than 92%, currently on 2 L 95 to 96%  5. Ortho following  6. Continue to monitor imaging postoperatively      This plan of care was reviewed in collaboration with   Electronically signed by LORIE Brock on 3/4/2021 at 2:12 PM      I personally saw, examined, and cared for the patient. Labs, medications, radiographs reviewed. I agree with history exam and plans detailed in NP note.     Samantha Boast, MD

## 2021-03-04 NOTE — CARE COORDINATION
Social Work / Discharge Planning : Patient has discharge order noted. OLIVER reached out to Dara at [facility] to check status of bed availability. AWait response. Will need therapy notes for pre-cert. SW to follow. Electronically signed by GERMÁN Soto on 3/4/21 at 11:20 AM EST      Addendum :Thuan Rosenthal from [facility] can accept back at discharge. Will need  pre-cert and Thuan Rosenthal will submit when therapy completed. Family updated. .SW to follow.  Electronically signed by GERMÁN Soto on 3/4/21 at 1:27 PM EST

## 2021-03-04 NOTE — PROGRESS NOTES
Physical Therapy    Facility/Department: [address] MED SURG/TELE  Initial Assessment    NAME: Tanner Sun  : 1930  MRN: [id number]    Date of Service: 3/4/2021      Attending Provider:  Georgie Rivero DO    Evaluating PT:  Clarice Zimmerman., P.T. Room #:  0561/9703-V  Diagnosis:  R femur fracture  Pertinent PMHx/PSHx:  Parkinson's disease, 21 L hip hemiarthroplasty  Procedure/Surgery:  3/3/21 R hip IT nail  Precautions:  WBAT BLE, FALLS, R knee buckled, bed/chair alarm    SUBJECTIVE:    Pt lives with wife in a 1 story home with 1+1 stairs and no rail to enter. [facility] Pt ambulated with ww while in rehab PTA. OBJECTIVE:   Initial Evaluation  Date: 3/4/21 Treatment Short Term/ Long Term   Goals   Was pt agreeable to Eval/treatment? yes     Does pt have pain? C/o no pain at rest, but R hip pain with movement and WBing     Bed Mobility  NA, pt was found and left sitting up in chair. Independent   Transfers Sit to stand: MAX A x2  Stand to sit: MAX A x2  Stand pivot: NA  SBA   Ambulation   2 feet with ww MAX A x2 and R knee buckled  50 feet with ww SBA   Stair negotiation: ascended and descended NA  2 steps with 2 rails SBA   AM-PAC 6 Clicks 60/29       BLE ROM is WFL, but limited due to tightness BLE which he reports is his normal, but has less ROM of R hip and knee due to recent fracture and surgery. BLE strength is grossly 2/5 to 3-/5. Edema:  BLE  Balance: Standing with ww is MAX A x2  Endurance: fair-    Vitals:   Pt was on 2L O2 and at rest pulse ox was 90%. He was placed on RA when attempting to stand and after sitting back down O2 sat was 77%. He was placed back on 2L O2 and pulse ox went up to 93% in 1 min.      Therapeutic Exercises:  Pt was instructed/performed seated exercises on the RLE of the following: PF/DF with R calf on stool AAROM 2x10 reps, heel slides and quad sets with foot on stool with AAROM 2x10 reps, hip ABD/ADD AAROM 2x10 reps, and seated knee flex/extension 2x10 reps. Patient education  Pt educated on above exs and hand placement during transfers. Patient response to education:   Pt verbalized understanding Pt demonstrated skill Pt requires further education in this area   yes yes yes     ASSESSMENT:    Comments:  Pt was up in chair on arrival.  He was instructed in/ performed above exs. Pt then required MAX A x2 and VCs for hand placement to stand up with ww. He stood for 30 sec and had no c/o light headedness. He walked 2 feet forward and R knee buckled. With MAX A x2 he was able to stand back up then required MAX A x2 to go back to chair and sit down. Pt has decreased strength and endurance, along with pain in RLE that limits his functional mobility at this time and increases his risk of falling. Treatment:  Patient practiced and was instructed in the following treatment:     Above exs to improve R knee ROM and decrease spasm and pain. Pt was left sitting up in chair as found with call light left by patient. Chair/bed alarm: chair alarm was activated. Pt's/ family goals   1. To go home. Patient and or family understand(s) diagnosis, prognosis, and plan of care. PLAN OF CARE:    Current Treatment Recommendations      [x] Strengthening     [x] ROM   [x] Balance Training   [x] Endurance Training   [x] Transfer Training   [x] Gait Training   [x] Stair Training   [] Positioning   [x] Safety and Education Training   [] Patient/Caregiver Education   [x] HEP  [] Other     PT care will be provided in accordance with the objectives noted above. Exercises and functional mobility practice will be used as well as appropriate assistive devices or modalities to obtain goals. Patient and family education will also be administered as needed. Frequency of treatments: 2-5x/week x 1-2 weeks.     Time in  10:45  Time out  11:10    Total Treatment Time  10 minutes     Evaluation Time includes thorough review of current medical

## 2021-03-04 NOTE — DISCHARGE SUMMARY
Internal Medicine Discharge Summary    NAME: Yana Zavala :  1930  MRN:  [id number] PCP:Anjali Simmons MD    ADMITTED: 3/2/2021   DISCHARGED: 3/5/2021  3:00 PM    ADMITTING PHYSICIAN: Jolanda Pallas, DO    PCP: Jose Maria Hernandez MD    CONSULTANT(S):   IP CONSULT TO PRIMARY CARE PROVIDER  IP CONSULT TO ORTHOPEDIC SURGERY  IP CONSULT TO SOCIAL WORK  IP CONSULT TO PULMONOLOGY  IP CONSULT TO CASE MANAGEMENT     ADMITTING DIAGNOSIS:   Closed fracture of neck of right femur, initial encounter ([facility] [S72.001A]     Please see H&P for further details    DISCHARGE DIAGNOSES:   Active Hospital Problems    Diagnosis    Closed fracture of neck of right femur ([facility] [S72.001A]     Priority: High    CAP (community acquired pneumonia) [J18.9]     Priority: Medium    Venous stasis dermatitis of both lower extremities [I87.2]    Stage 3 chronic kidney disease [N18.30]    Parkinson disease ([facility] [G20]    Hypothyroidism [E03.9]    Hypertension [I10]       BRIEF HISTORY OF PRESENT ILLNESS: Yana Zavala is a 80 y.o. male patient of Jose Maria Hernandez MD who  has a past medical history of High cholesterol, Hypertension, Hypothyroidism, Parkinson disease ([facility] and RBBB. who originally had concerns including Fall (out of bed, right hip pain). at presentation on 3/2/2021, and was found to have Closed fracture of neck of right femur, initial encounter ([facility] [S72.001A] after workup. Please see H&P for further details. HOSPITAL COURSE:   The patient presented to the hospital with the chief complaint of Fall (out of bed, right hip pain). The patient was admitted to the hospital.     · Right femoral neck fracture (recent left fx): Ortho following--sp right hemiarthroplasty 3/3-- sp left hemiarthroplasty . DVT prophylaxis per ortho. Post-op pain control per ortho. Hold ASA/ACEi.  Cardio with low-intermediate risk last admission.   · Abnormal CXR: Pulm input appreciated for vilma-op risk assessment and management as they saw him last time he was in as well--he was described at moderate risk for pulmonary complications from surgery. · DC'ed back to SNF    BRIEF PHYSICAL EXAMINATION AND LABORATORIES ON DAY OF DISCHARGE:  VITALS:  BP (!) 98/46   Pulse 78   Temp 97.3 °F (36.3 °C) (Oral)   Resp 16   Ht 5' 6\" (1.676 m)   Wt 191 lb 12.8 oz (87 kg)   SpO2 93%   BMI 30.96 kg/m²     General: AAO to person/place/time/purpose, NAD, no labored breathing, [place], nasal cannula oxygen  Eyes: conjunctivae/corneas clear, sclera non icteric  Skin: color/texture/turgor normal, no rashes or lesions  Lungs: Overall diminished breath sounds but no wheezing, rales, rhonchi  Heart: regular rate, regular rhythm, no murmur  Abdomen: soft, NT; bowel sounds normal; no masses,  no organomegaly  Extremities: Pain with movement of the right-- sp hemiarthroplasty, no cyanosis, no edema  Neurologic: cranial nerves 2-12 grossly intact, no slurred speech.     LABS[de-identified]  Recent Labs     03/03/21 0528 03/04/21 0522 03/05/21  0353    140 140   K 4.2 4.5 3.8    107 105   CO2 25 25 28   BUN 21 24* 28*   CREATININE 1.1 1.1 1.2   GLUCOSE 112* 143* 117*   CALCIUM 8.5* 7.8* 7.4*     Recent Labs     03/03/21 0528 03/04/21 0522 03/05/21  0353   ALKPHOS 201* 160* 164*   ALT <5 <5 <5   AST 14 11 11   PROT 6.2* 5.6* 5.5*   BILITOT 1.1 0.5 0.4   LABALBU 3.4* 2.9* 2.9*     Recent Labs     03/03/21 0528 03/04/21 0522 03/05/21  0353   WBC 10.6 9.7 9.6   RBC 2.75* 2.38* 2.15*   HGB 9.5* 8.0* 7.4*   HCT 30.1* 26.1* 23.5*   .5* 109.7* 109.3*   MCH 34.5 33.6 34.4   MCHC 31.6* 30.7* 31.5*   RDW 17.2* 16.9* 16.9*    228 238   MPV 10.5 10.2 10.7     No results found for: LABA1C  Lab Results   Component Value Date    INR 1.7 03/03/2021    INR 1.7 03/02/2021    INR 1.3 12/24/2017    PROTIME 18.7 (H) 03/03/2021    PROTIME 19.0 (H) 03/02/2021    PROTIME 14.0 (H) 12/24/2017      Lab Results   Component Value Date    TSH 7.060 (H) 01/31/2021    TSH 5.810 (H) 09/24/2020    TSH 2.920 10/21/2019     Lab Results   Component Value Date    TRIG 72 09/24/2020    TRIG 113 10/21/2019    HDL 36 09/24/2020    HDL 39 10/21/2019    LDLCALC 84 09/24/2020    LDLCALC 88 10/21/2019     No results for input(s): MG in the last 72 hours. No results for input(s): CKTOTAL, CKMB, TROPONINI in the last 72 hours. No results for input(s): LACTA in the last 72 hours. IMAGING:  Xr Chest Portable    Result Date: 3/2/2021  EXAMINATION: ONE XRAY VIEW OF THE CHEST 3/2/2021 4:54 pm COMPARISON: February 1 HISTORY: ORDERING SYSTEM PROVIDED HISTORY: fall TECHNOLOGIST PROVIDED HISTORY: Reason for exam:->fall FINDINGS: Cardiac silhouette is mildly enlarged and unchanged. Airspace disease in the right upper lobe appears more prominent than on the prior examination. Questionable nodularity in the left upper lobe, not clearly identified on the prior examination. No evidence of pneumothorax or pleural effusion. The pulmonary vasculature is within normal limits. Bony thorax is unremarkable. Airspace disease in the right upper lobe appears more prominent than on the prior examination. Developing pneumonia, and in a setting of trauma developing lung contusion must be considered. Question nodularity also in the right upper lobe not clearly identified on the prior examination. Radiographic follow-up recommended. Consider further evaluation with CT as clinically indicated. Fluoro For Surgical Procedures    Result Date: 3/3/2021  EXAMINATION: SPOT FLUOROSCOPIC IMAGES 3/3/2021 3:20 pm TECHNIQUE: Fluoroscopy was provided by the radiology department for procedure. Radiologist was not present during examination. FLUOROSCOPY DOSE AND TYPE OR TIME AND EXPOSURES: 121.3 seconds COMPARISON: None HISTORY: ORDERING SYSTEM PROVIDED HISTORY: rt hip nailing TECHNOLOGIST PROVIDED HISTORY: Reason for exam:->rt hip nailing Intraprocedural imaging. FINDINGS: 4 spot images of the right hip were obtained.  Spot images demonstrate open reduction internal fixation of a traumatic right femoral neck fracture. Intraprocedural fluoroscopic spot images as above. See separate procedure report for more information. Xr Hip 2-3 Vw W Pelvis Right    Result Date: 3/2/2021  EXAMINATION: ONE XRAY VIEW OF THE PELVIS AND TWO XRAY VIEWS RIGHT HIP 3/2/2021 5:54 pm COMPARISON: None. HISTORY: ORDERING SYSTEM PROVIDED HISTORY: falll, right hip pain TECHNOLOGIST PROVIDED HISTORY: Reason for exam:->falll, right hip pain FINDINGS: There comminuted intertrochanteric fractures in the proximal right femur. The bones are diffusely demineralized. The pelvic bones are intact. The sacroiliac joints and the pubic symphysis are unremarkable. The left hip arthroplasty is also grossly intact. 1. Comminuted intertrochanteric fractures of the right femur. 2. Demineralized bones. MICROBIOLOGY:  BLOOD CX #1  No results for input(s): BC in the last 72 hours. BLOOD CX #2  No results for input(s): Rolm Perez in the last 72 hours. TIP CULTURE  No results for input(s): CXCATHTIP in the last 72 hours. CULTURE, RESPIRATORY   No results for input(s): CULTRESP in the last 72 hours. RESPIRATORY SMEAR  No results for input(s): RESPSMEAR in the last 72 hours. DISPOSITION:  The patient's condition is fair. The patient is being discharged to nursing home    DISCHARGE MEDICATIONS:    Deepak Randhawa   Home Medication Instructions [id number]    Printed on:03/06/21 3680   Medication Information                      aspirin 325 MG EC tablet  Take 1 tablet by mouth daily             bumetanide (BUMEX) 1 MG tablet  Take 1 mg by mouth daily             calcium carbonate-vitamin D (CALTRATE) 600-400 MG-UNIT TABS per tab  Take 1 tablet by mouth daily             carbidopa-levodopa (SINEMET)  MG per tablet  Take 2 tablets by mouth 4 times daily             clobetasol (TEMOVATE) 0.05 % ointment  Apply topically 2 times daily.              docusate sodium (COLACE, DULCOLAX) 100 MG CAPS  Take 100 mg by mouth daily             doxazosin (CARDURA) 4 MG tablet  Take 1 tablet by mouth nightly             ferrous sulfate (IRON 325) 325 (65 Fe) MG tablet  Take 1 tablet by mouth daily (with breakfast)             ipratropium-albuterol (DUONEB) 0.5-2.5 (3) MG/3ML SOLN nebulizer solution  Inhale 3 mLs into the lungs every 4 hours (while awake)             levothyroxine (SYNTHROID) 25 MCG tablet  Take 3 tabs PO Monday - Friday and 2 tabs PO Saturday - Sunday             lisinopril (PRINIVIL;ZESTRIL) 10 MG tablet  Take 1 tablet by mouth daily             Misc. Devices MISC  Dispense #2 compression stalkings  Dx: leg edema             montelukast (SINGULAIR) 10 MG tablet  Take 1 tablet by mouth nightly             oxyCODONE (ROXICODONE) 5 MG immediate release tablet  Take 0.5 tablets by mouth every 6 hours as needed for Pain for up to 5 days. Intended supply: 5 days. Take lowest dose possible to manage pain             oxyCODONE (ROXICODONE) 5 MG immediate release tablet  Take 0.5 tablets by mouth every 6 hours as needed for Pain for up to 7 days. polyethylene glycol (GLYCOLAX) 17 g packet  Take 17 g by mouth daily             potassium chloride (KLOR-CON) 20 MEQ packet  Take 40 mEq by mouth daily             Red Yeast Rice 600 MG TABS  Take by mouth 2 times daily              sennosides-docusate sodium (SENOKOT-S) 8.6-50 MG tablet  Take 1 tablet by mouth 2 times daily             silver sulfADIAZINE (SILVADENE) 1 % cream  Apply topically daily.              traZODone (DESYREL) 50 MG tablet  Take 1 tablet by mouth nightly                 Discharge Medication List as of 3/5/2021  2:21 PM      CONTINUE these medications which have CHANGED    Details   polyethylene glycol (GLYCOLAX) 17 g packet Take 17 g by mouth daily, Disp-527 g, R-1DC to [facility]           Discharge Medication List as of 3/5/2021  2:21 PM      STOP taking these medications       tamsulosin (FLOMAX) 0.4 MG capsule Comments:   Reason

## 2021-03-05 VITALS
SYSTOLIC BLOOD PRESSURE: 98 MMHG | OXYGEN SATURATION: 93 % | HEIGHT: 66 IN | TEMPERATURE: 97.3 F | BODY MASS INDEX: 30.82 KG/M2 | HEART RATE: 78 BPM | DIASTOLIC BLOOD PRESSURE: 46 MMHG | RESPIRATION RATE: 16 BRPM | WEIGHT: 191.8 LBS

## 2021-03-05 LAB
ALBUMIN SERPL-MCNC: 2.9 G/DL (ref 3.5–5.2)
ALP BLD-CCNC: 164 U/L (ref 40–129)
ALT SERPL-CCNC: <5 U/L (ref 0–40)
ANION GAP SERPL CALCULATED.3IONS-SCNC: 7 MMOL/L (ref 7–16)
AST SERPL-CCNC: 11 U/L (ref 0–39)
BASOPHILS ABSOLUTE: 0.04 E9/L (ref 0–0.2)
BASOPHILS RELATIVE PERCENT: 0.4 % (ref 0–2)
BILIRUB SERPL-MCNC: 0.4 MG/DL (ref 0–1.2)
BUN BLDV-MCNC: 28 MG/DL (ref 8–23)
CALCIUM SERPL-MCNC: 7.4 MG/DL (ref 8.6–10.2)
CHLORIDE BLD-SCNC: 105 MMOL/L (ref 98–107)
CO2: 28 MMOL/L (ref 22–29)
CREAT SERPL-MCNC: 1.2 MG/DL (ref 0.7–1.2)
EOSINOPHILS ABSOLUTE: 0.18 E9/L (ref 0.05–0.5)
EOSINOPHILS RELATIVE PERCENT: 1.9 % (ref 0–6)
GFR AFRICAN AMERICAN: >60
GFR NON-AFRICAN AMERICAN: 57 ML/MIN/1.73
GLUCOSE BLD-MCNC: 117 MG/DL (ref 74–99)
HCT VFR BLD CALC: 23.5 % (ref 37–54)
HEMOGLOBIN: 7.4 G/DL (ref 12.5–16.5)
IMMATURE GRANULOCYTES #: 0.09 E9/L
IMMATURE GRANULOCYTES %: 0.9 % (ref 0–5)
LYMPHOCYTES ABSOLUTE: 1.12 E9/L (ref 1.5–4)
LYMPHOCYTES RELATIVE PERCENT: 11.7 % (ref 20–42)
MCH RBC QN AUTO: 34.4 PG (ref 26–35)
MCHC RBC AUTO-ENTMCNC: 31.5 % (ref 32–34.5)
MCV RBC AUTO: 109.3 FL (ref 80–99.9)
MONOCYTES ABSOLUTE: 1.06 E9/L (ref 0.1–0.95)
MONOCYTES RELATIVE PERCENT: 11 % (ref 2–12)
MRSA CULTURE ONLY: NORMAL
NEUTROPHILS ABSOLUTE: 7.11 E9/L (ref 1.8–7.3)
NEUTROPHILS RELATIVE PERCENT: 74.1 % (ref 43–80)
PDW BLD-RTO: 16.9 FL (ref 11.5–15)
PLATELET # BLD: 238 E9/L (ref 130–450)
PMV BLD AUTO: 10.7 FL (ref 7–12)
POTASSIUM REFLEX MAGNESIUM: 3.8 MMOL/L (ref 3.5–5)
RBC # BLD: 2.15 E12/L (ref 3.8–5.8)
SODIUM BLD-SCNC: 140 MMOL/L (ref 132–146)
TOTAL PROTEIN: 5.5 G/DL (ref 6.4–8.3)
WBC # BLD: 9.6 E9/L (ref 4.5–11.5)

## 2021-03-05 PROCEDURE — 2700000000 HC OXYGEN THERAPY PER DAY

## 2021-03-05 PROCEDURE — 94640 AIRWAY INHALATION TREATMENT: CPT

## 2021-03-05 PROCEDURE — 36415 COLL VENOUS BLD VENIPUNCTURE: CPT

## 2021-03-05 PROCEDURE — 97535 SELF CARE MNGMENT TRAINING: CPT

## 2021-03-05 PROCEDURE — 6370000000 HC RX 637 (ALT 250 FOR IP): Performed by: ORTHOPAEDIC SURGERY

## 2021-03-05 PROCEDURE — 85025 COMPLETE CBC W/AUTO DIFF WBC: CPT

## 2021-03-05 PROCEDURE — 2580000003 HC RX 258: Performed by: ORTHOPAEDIC SURGERY

## 2021-03-05 PROCEDURE — 80053 COMPREHEN METABOLIC PANEL: CPT

## 2021-03-05 PROCEDURE — 97530 THERAPEUTIC ACTIVITIES: CPT

## 2021-03-05 PROCEDURE — 97110 THERAPEUTIC EXERCISES: CPT

## 2021-03-05 PROCEDURE — 94669 MECHANICAL CHEST WALL OSCILL: CPT

## 2021-03-05 RX ORDER — OXYCODONE HYDROCHLORIDE 5 MG/1
2.5 TABLET ORAL EVERY 6 HOURS PRN
Qty: 14 TABLET | Refills: 0 | Status: SHIPPED | OUTPATIENT
Start: 2021-03-05 | End: 2021-03-12

## 2021-03-05 RX ADMIN — IPRATROPIUM BROMIDE AND ALBUTEROL SULFATE 3 ML: .5; 3 SOLUTION RESPIRATORY (INHALATION) at 08:21

## 2021-03-05 RX ADMIN — DOCUSATE SODIUM AND SENNOSIDES 1 TABLET: 8.6; 5 TABLET, FILM COATED ORAL at 09:20

## 2021-03-05 RX ADMIN — BUMETANIDE 1 MG: 1 TABLET ORAL at 10:38

## 2021-03-05 RX ADMIN — FERROUS SULFATE TAB 325 MG (65 MG ELEMENTAL FE) 325 MG: 325 (65 FE) TAB at 09:20

## 2021-03-05 RX ADMIN — DOCUSATE SODIUM 100 MG: 100 CAPSULE, LIQUID FILLED ORAL at 09:21

## 2021-03-05 RX ADMIN — ASPIRIN 325 MG: 325 TABLET, COATED ORAL at 09:20

## 2021-03-05 RX ADMIN — ACETAMINOPHEN 650 MG: 325 TABLET ORAL at 10:39

## 2021-03-05 RX ADMIN — ACETAMINOPHEN 650 MG: 325 TABLET ORAL at 05:24

## 2021-03-05 RX ADMIN — Medication 10 ML: at 09:21

## 2021-03-05 RX ADMIN — IPRATROPIUM BROMIDE AND ALBUTEROL SULFATE 3 ML: .5; 3 SOLUTION RESPIRATORY (INHALATION) at 12:08

## 2021-03-05 RX ADMIN — CARBIDOPA AND LEVODOPA 2 TABLET: 25; 100 TABLET ORAL at 13:05

## 2021-03-05 RX ADMIN — CARBIDOPA AND LEVODOPA 2 TABLET: 25; 100 TABLET ORAL at 09:20

## 2021-03-05 RX ADMIN — CLOBETASOL PROPIONATE: 0.5 OINTMENT TOPICAL at 09:21

## 2021-03-05 RX ADMIN — LEVOTHYROXINE SODIUM 75 MCG: 75 TABLET ORAL at 05:24

## 2021-03-05 RX ADMIN — POLYETHYLENE GLYCOL 3350 17 G: 17 POWDER, FOR SOLUTION ORAL at 09:20

## 2021-03-05 ASSESSMENT — PAIN SCALES - GENERAL
PAINLEVEL_OUTOF10: 2
PAINLEVEL_OUTOF10: 3

## 2021-03-05 NOTE — CARE COORDINATION
POD #2 ORIF RT hip; accepted back HW; covid neg; precert initiated; await approval;transport packet on chart. Mario Horvath.

## 2021-03-05 NOTE — CARE COORDINATION
Social Work / Discharge PLanning :SW spoke to Afsaneh at [facility] in regards to status of pre-cert. Still awaiting pre-cert. SW to follow. Electronically signed by GERMÁN Rousseau on 3/5/21 at 10:26 AM EST    Addendum : HW did get pre-cert. Patient will be discharged to [facility] SNF today at 4:30 via [address] ambulance. Patient spouse updated as well as RN and Eleni Mena and Afsaneh. OLIVER to follow.  Electronically signed by GERMÁN Rousseau on 3/5/21 at 12:48 PM EST

## 2021-03-05 NOTE — PROGRESS NOTES
Physician Progress Note      PATIENTHillary Dionna  CSN #:                  [id number]  :                       1930  ADMIT DATE:       3/2/2021 5:24 PM  [address] [place] DATE:  RESPONDING  PROVIDER #:        Brianne SOLIS DO          QUERY TEXT:    Patient admitted with Right femoral neck fracture. Patient noted to be on   vitamin D and calcium. If possible, please document in progress notes and   discharge summary if you are evaluating and/or treating any of the following: The medical record reflects the following:  Risk Factors: Increased age  Clinical Indicators: Per ED provider note \". Grisel Wu Patient states that he was   getting out of bed when he fell onto his right hip. Grisel Wu \" Per H&P \". Grisel Wu Right   femoral neck fracture (recent left fx? \" Per X ray completed 3/2   \". Grisel Wu Impression. Griselmyron Wu Grisel Wu 1. Comminuted intertrochanteric fractures of the right femur. 2. Demineralized bones. Grisel Wu \"  Treatment: Home medications of Caltrate  Options provided:  -- Pathological Right femoral neck fracture  -- Osteoporotic Right femoral neck fracture  -- Osteoporotic Right femoral neck fracture following fall which would not   usually break a normal, healthy bone  -- Traumatic Right femoral neck fracture  -- Other - I will add my own diagnosis  -- Disagree - Not applicable / Not valid  -- Disagree - Clinically unable to determine / Unknown  -- Refer to Clinical Documentation Reviewer    PROVIDER RESPONSE TEXT:    This patient has a traumatic fracture of Right femoral neck fracture.     Query created by: Loc Savage on 3/4/2021 11:14 AM      Electronically signed by:  Pipe Trujillo DO 3/4/2021 9:22 PM

## 2021-03-05 NOTE — PROGRESS NOTES
Orthopaedic Surgery Progress Note  ASIF Pate MD      SUBJECTIVE:  No acute events over night. Pain controlled. Denies chest pain or shortness of breath. Hgb 7.4    OBJECTIVE:   AAOx3, NAD    Right lower extremity    Dressings C/D/I  SILT L1-S1  TA/EHL/GS intact  Calf soft, NT  +2 DP, W/WP     Data:  CBC:   Lab Results   Component Value Date    WBC 9.6 03/05/2021    RBC 2.15 03/05/2021    HGB 7.4 03/05/2021    HCT 23.5 03/05/2021    .3 03/05/2021    MCH 34.4 03/05/2021    MCHC 31.5 03/05/2021    RDW 16.9 03/05/2021     03/05/2021    MPV 10.7 03/05/2021     BMP:    Lab Results   Component Value Date     03/05/2021    K 3.8 03/05/2021     03/05/2021    CO2 28 03/05/2021    BUN 28 03/05/2021    LABALBU 2.9 03/05/2021    CREATININE 1.2 03/05/2021    CALCIUM 7.4 03/05/2021    GFRAA >60 03/05/2021    LABGLOM 57 03/05/2021    LABGLOM 39 01/14/2021    GLUCOSE 117 03/05/2021    GLUCOSE 123 01/14/2021         A/P: POD 2 IMN right hip fracture  - WBAT  - Pain control  - PT/OT  - DVT prophylaxis  - D/C planning for rehab  - Follow up in the office in 3 weeks. Call for an appt    ASIF Pate MD

## 2021-03-05 NOTE — PROGRESS NOTES
Occupational Therapy  OT BEDSIDE TREATMENT NOTE      Date:3/5/2021  Patient Name: Nando Alejandra  MRN: [id number]  : 1930  Room: [address] [place] [address]     Referring Provider: Essie Renteria MD  Evaluating OT: Foreign Hsieh, OTR/L - [id number]     AM-PAC Daily Activity Raw Score:      Recommended Adaptive Equipment: Continue to assess.      Diagnosis: Closed fracture of neck of right femur, initial encounter ([facility] [S72.001A]   Surgery: Patient underwent Right Intramedullary trochanteric nail on 3/3/2021. Pertinent Medical History: Parkinson's disease, HTN     Precautions: falls, FWBAT B LE, posterolateral hip precautions L LE(21 R hip larissa), O2      Home Living: Patient admitted from SNF/[facility]. Prior to 2021, patient lived with his wife in a one-floor setup. Equipment Owned: walker     Prior Level of Function (PLOF): Patient reported that he was needing assistance with ADLs and functional transfers/mobility (with walker) at SNF. Patient noted that he had been mostly independent with ADLs and functional transfers/mobility prior to 2021; patient's wife is primarily responsible for completion of IADLs.    Pain Level: Pt did not rate pain this session. Cognition: Patient alert and grossly oriented. Cues for safety.                     Functional Assessment:    Initial Eval Status  Date: 3/4/2021 Treatment Status    Short Term Goals   Feeding SBA   Setup   Grooming Mod A  min A  SBA  (seated)   UB Dressing Mod A Min A to don gown  SBA   LB Dressing Max A  max A don brief and arrange over hips. Mod A - with use of AE - while demonstrating Good adherence to posterolateral hip precautions   Bathing Max A   Mod A - with use of AE/DME - while demonstrating Good adherence to posterolateral hip precautions   Toileting Max A  Patient with brewster catheter currently.   Mod A   Bed Mobility  Supine-to-Sit: Max A  Sit-to-Supine: Max Ax2  Scooting:  Max A toward [facility] while seated at EOB   max A supine to sit    mobility and completion of ADLs/IADLs  []? ?Cognitive retraining exercises to improve problem solving skills & safe participation in ADLs/IADLs   []? ?Sensory re-education techniques to improve extremity awareness, maintain skin integrity and improve hand function   []? ? Visual/Perceptual retraining  to improve body awareness and safety during transfers and ADLs   []? ? Splinting/positioning needs to maintain joint/skin integrity and prevent contractures   [x]? ? Therapeutic activity to improve functional performance during ADLs/IADLs  [x]? ? Therapeutic exercise to improve tolerance and functional strength for ADLs/IADLs  [x]? ? Balance retraining/tolerance tasks for facilitation of postural control with dynamic challenges during ADLs   [x]? ? Neuromuscular re-education: facilitate righting/equilibrium reactions, midline orientation, scapular stability/mobility, normalize muscle tone, and facilitate active functional movement/attention  []? ? Delirium prevention/treatment   []? Positioning to improve functional independence and prevent skin breakdown     Time In: 9:30   Time Out: 9:55      Min Units   Therapeutic Ex 09654     Therapeutic Activities 28145 8 1   ADL/Self Care 35652 17 1   Orthotic Management 68885     Neuro Re-Ed 09244     Non-Billable Time     TOTAL TIMED TREATMENT [address] [facility] BETHANY/L [address]

## 2021-03-05 NOTE — PROGRESS NOTES
Physical Therapy    Facility/Department: [address] MED SURG/TELE  Treatment Note  NAME: Shepard Meckel  : 1930  MRN: [id number]    Date of Service: 3/5/2021      Attending Provider:  Hari Piña DO    Evaluating PT:  Marjan Monday Madan Beugm P.TNikolas Room #:  1095/4246-V  Diagnosis:  R femur fracture  Pertinent PMHx/PSHx:  Parkinson's disease, 21 L hip hemiarthroplasty  Procedure/Surgery:  3/3/21 R hip IT nail  Precautions:  WBAT BLE, FALLS, R knee buckled, bed/chair alarm, Hgb 7.4 3/5/21    SUBJECTIVE:    Pt lives with wife in a 1 story home with 1+1 stairs and no rail to enter. Pt came in from [place] [address]. Pt ambulated with ww while in rehab PTA. OBJECTIVE:   Initial Evaluation  Date: 3/4/21 Treatment Short Term/ Long Term   Goals   Was pt agreeable to Eval/treatment? yes yes    Does pt have pain? C/o no pain at rest, but R hip pain with movement and WBing No c/o pain at rest, but c/o R hip pain with movement and WBing    Bed Mobility  NA, pt was found and left sitting up in chair. NA, pt was found and left sitting up in chair Independent   Transfers Sit to stand: MAX A x2  Stand to sit: MAX A x2  Stand pivot: NA Sit to stand: MAX A  Stand to sit: MOD A  Stand pivot: NA SBA   Ambulation   2 feet with ww MAX A x2 and R knee buckled NA, pre-gait activities were performed today see below. 50 feet with ww SBA   Stair negotiation: ascended and descended NA NA 2 steps with 2 rails SBA   AM-PAC 6 Clicks 62/83 18/57      BLE ROM is WFL, but limited due to tightness BLE which he reports is his normal, but pt's was not as tight and rigid as yesterday. BLE strength is grossly 2/5 to 3-/5.    Edema:  BLE  Balance: Standing with ww is MIN A/MOD A  Endurance: fair-    Therapeutic Exercises:  Pt was instructed/performed seated exercises on the RLE of the following:  PF/DF AROM with foot lifted off floor 2x15 reps, seated knee flex/extension 2x15 reps, heel slides and quad sets with AAROM 2x12 reps, and manual calf stretch 2x10 reps. Patient education  Pt educated on above exs and hand placement during transfers. Patient response to education:   Pt verbalized understanding Pt demonstrated skill Pt requires further education in this area   yes yes yes     ASSESSMENT:    Comments:  Pt was found sitting up in chair and performed above exs. He then stood and performed pre-gait activities with ww x1 min and worked on weight shifting and then sat back down due to fatigue for rest break. He stood a second time for 1 min with ww and performed taking step forward and backward with RLE 5 reps each then needed to sit back down due to fatigue. Pt does have low Hbg which may be contributing to him fatiguing quickly. Treatment:  Patient practiced and was instructed in the following treatment:     Transfers and Pre-gait activities to improve functional strength and endurance.  Above exs to improve RLE ROM and decrease spasm and pain. Pt was left sitting up in chair as found with call light left by patient. Chair/bed alarm: chair alarm was activated and family was present. PLAN:    Pt is making good progress toward established Physical Therapy goals. Continue with physical therapy current plan of care. Time in  10:10  Time out  10:35    Total Treatment Time  25 minutes     Evaluation Time includes thorough review of current medical information, gathering information on past medical history/social history and prior level of function, completion of standardized testing/informal observation of tasks, assessment of data and education on plan of care and goals.     CPT codes:  [] Low Complexity PT evaluation 50453  [] Moderate Complexity PT evaluation 59475  [] High Complexity PT evaluation 19528  [] PT Re-evaluation 79003  [] Gait training 42423 ** minutes  [] Manual therapy 77675 ** minutes  [x] Therapeutic activities 29473 10 minutes  [x] Therapeutic exercises 47823 15 minutes  [] Neuromuscular reeducation 53798 ** minutes     Timothy B. Clovis Heimlich., P.T.   License Number: [id number]

## 2021-03-14 ENCOUNTER — APPOINTMENT (OUTPATIENT)
Dept: CT IMAGING | Age: 86
DRG: 698 | End: 2021-03-14
Payer: MEDICARE

## 2021-03-14 ENCOUNTER — APPOINTMENT (OUTPATIENT)
Dept: GENERAL RADIOLOGY | Age: 86
DRG: 698 | End: 2021-03-14
Payer: MEDICARE

## 2021-03-14 ENCOUNTER — HOSPITAL ENCOUNTER (INPATIENT)
Age: 86
LOS: 2 days | Discharge: NURSING, REHAB OR LONG-TERM CARE | DRG: 698 | End: 2021-03-18
Attending: EMERGENCY MEDICINE | Admitting: INTERNAL MEDICINE
Payer: MEDICARE

## 2021-03-14 DIAGNOSIS — S32.9XXA CLOSED NONDISPLACED FRACTURE OF PELVIS, UNSPECIFIED PART OF PELVIS, INITIAL ENCOUNTER (HCC): ICD-10-CM

## 2021-03-14 DIAGNOSIS — G93.41 METABOLIC ENCEPHALOPATHY: ICD-10-CM

## 2021-03-14 DIAGNOSIS — R77.8 ELEVATED TROPONIN: ICD-10-CM

## 2021-03-14 DIAGNOSIS — N39.0 URINARY TRACT INFECTION IN MALE: Primary | ICD-10-CM

## 2021-03-14 LAB
ALBUMIN SERPL-MCNC: 3.2 G/DL (ref 3.5–5.2)
ALP BLD-CCNC: 166 U/L (ref 40–129)
ALT SERPL-CCNC: <5 U/L (ref 0–40)
ANION GAP SERPL CALCULATED.3IONS-SCNC: 9 MMOL/L (ref 7–16)
ANISOCYTOSIS: ABNORMAL
AST SERPL-CCNC: 12 U/L (ref 0–39)
BACTERIA: ABNORMAL /HPF
BASOPHILS ABSOLUTE: 0.11 E9/L (ref 0–0.2)
BASOPHILS RELATIVE PERCENT: 1 % (ref 0–2)
BILIRUB SERPL-MCNC: 1.5 MG/DL (ref 0–1.2)
BILIRUBIN URINE: NEGATIVE
BLOOD, URINE: ABNORMAL
BUN BLDV-MCNC: 27 MG/DL (ref 8–23)
CALCIUM SERPL-MCNC: 8.5 MG/DL (ref 8.6–10.2)
CHLORIDE BLD-SCNC: 105 MMOL/L (ref 98–107)
CLARITY: CLEAR
CO2: 26 MMOL/L (ref 22–29)
COLOR: YELLOW
CREAT SERPL-MCNC: 1.1 MG/DL (ref 0.7–1.2)
EOSINOPHILS ABSOLUTE: 0.57 E9/L (ref 0.05–0.5)
EOSINOPHILS RELATIVE PERCENT: 5.3 % (ref 0–6)
GFR AFRICAN AMERICAN: >60
GFR NON-AFRICAN AMERICAN: >60 ML/MIN/1.73
GLUCOSE BLD-MCNC: 93 MG/DL (ref 74–99)
GLUCOSE URINE: NEGATIVE MG/DL
HCT VFR BLD CALC: 28.3 % (ref 37–54)
HEMOGLOBIN: 8.5 G/DL (ref 12.5–16.5)
HYPOCHROMIA: ABNORMAL
IMMATURE GRANULOCYTES #: 0.1 E9/L
IMMATURE GRANULOCYTES %: 0.9 % (ref 0–5)
KETONES, URINE: NEGATIVE MG/DL
LACTIC ACID, SEPSIS: 1.1 MMOL/L (ref 0.5–1.9)
LEUKOCYTE ESTERASE, URINE: ABNORMAL
LIPASE: 31 U/L (ref 13–60)
LYMPHOCYTES ABSOLUTE: 0.71 E9/L (ref 1.5–4)
LYMPHOCYTES RELATIVE PERCENT: 6.7 % (ref 20–42)
MCH RBC QN AUTO: 33.2 PG (ref 26–35)
MCHC RBC AUTO-ENTMCNC: 30 % (ref 32–34.5)
MCV RBC AUTO: 110.5 FL (ref 80–99.9)
MONOCYTES ABSOLUTE: 0.8 E9/L (ref 0.1–0.95)
MONOCYTES RELATIVE PERCENT: 7.5 % (ref 2–12)
NEUTROPHILS ABSOLUTE: 8.38 E9/L (ref 1.8–7.3)
NEUTROPHILS RELATIVE PERCENT: 78.6 % (ref 43–80)
NITRITE, URINE: POSITIVE
OVALOCYTES: ABNORMAL
PDW BLD-RTO: 15.8 FL (ref 11.5–15)
PH UA: 6 (ref 5–9)
PLATELET # BLD: 332 E9/L (ref 130–450)
PMV BLD AUTO: 9.8 FL (ref 7–12)
POIKILOCYTES: ABNORMAL
POLYCHROMASIA: ABNORMAL
POTASSIUM REFLEX MAGNESIUM: 3.8 MMOL/L (ref 3.5–5)
PROTEIN UA: 100 MG/DL
RBC # BLD: 2.56 E12/L (ref 3.8–5.8)
RBC UA: ABNORMAL /HPF (ref 0–2)
SODIUM BLD-SCNC: 140 MMOL/L (ref 132–146)
SPECIFIC GRAVITY UA: 1.02 (ref 1–1.03)
TOTAL PROTEIN: 6.3 G/DL (ref 6.4–8.3)
TROPONIN: 0.04 NG/ML (ref 0–0.03)
UROBILINOGEN, URINE: 0.2 E.U./DL
WBC # BLD: 10.7 E9/L (ref 4.5–11.5)
WBC UA: ABNORMAL /HPF (ref 0–5)

## 2021-03-14 PROCEDURE — 2580000003 HC RX 258: Performed by: EMERGENCY MEDICINE

## 2021-03-14 PROCEDURE — 6360000002 HC RX W HCPCS: Performed by: EMERGENCY MEDICINE

## 2021-03-14 PROCEDURE — 84484 ASSAY OF TROPONIN QUANT: CPT

## 2021-03-14 PROCEDURE — 96374 THER/PROPH/DIAG INJ IV PUSH: CPT

## 2021-03-14 PROCEDURE — 6370000000 HC RX 637 (ALT 250 FOR IP): Performed by: INTERNAL MEDICINE

## 2021-03-14 PROCEDURE — 83690 ASSAY OF LIPASE: CPT

## 2021-03-14 PROCEDURE — 6360000004 HC RX CONTRAST MEDICATION: Performed by: RADIOLOGY

## 2021-03-14 PROCEDURE — 71045 X-RAY EXAM CHEST 1 VIEW: CPT

## 2021-03-14 PROCEDURE — 80053 COMPREHEN METABOLIC PANEL: CPT

## 2021-03-14 PROCEDURE — 83605 ASSAY OF LACTIC ACID: CPT

## 2021-03-14 PROCEDURE — 87077 CULTURE AEROBIC IDENTIFY: CPT

## 2021-03-14 PROCEDURE — 93005 ELECTROCARDIOGRAM TRACING: CPT | Performed by: EMERGENCY MEDICINE

## 2021-03-14 PROCEDURE — 87088 URINE BACTERIA CULTURE: CPT

## 2021-03-14 PROCEDURE — G0378 HOSPITAL OBSERVATION PER HR: HCPCS

## 2021-03-14 PROCEDURE — 81001 URINALYSIS AUTO W/SCOPE: CPT

## 2021-03-14 PROCEDURE — 87186 SC STD MICRODIL/AGAR DIL: CPT

## 2021-03-14 PROCEDURE — 99283 EMERGENCY DEPT VISIT LOW MDM: CPT

## 2021-03-14 PROCEDURE — 2580000003 HC RX 258: Performed by: INTERNAL MEDICINE

## 2021-03-14 PROCEDURE — 74177 CT ABD & PELVIS W/CONTRAST: CPT

## 2021-03-14 PROCEDURE — 85025 COMPLETE CBC W/AUTO DIFF WBC: CPT

## 2021-03-14 PROCEDURE — 70450 CT HEAD/BRAIN W/O DYE: CPT

## 2021-03-14 RX ORDER — MONTELUKAST SODIUM 10 MG/1
10 TABLET ORAL NIGHTLY
Status: DISCONTINUED | OUTPATIENT
Start: 2021-03-14 | End: 2021-03-18 | Stop reason: HOSPADM

## 2021-03-14 RX ORDER — DOCUSATE SODIUM 100 MG/1
100 CAPSULE, LIQUID FILLED ORAL DAILY
Status: DISCONTINUED | OUTPATIENT
Start: 2021-03-14 | End: 2021-03-18 | Stop reason: HOSPADM

## 2021-03-14 RX ORDER — TRAZODONE HYDROCHLORIDE 50 MG/1
50 TABLET ORAL NIGHTLY
Status: DISCONTINUED | OUTPATIENT
Start: 2021-03-14 | End: 2021-03-18 | Stop reason: HOSPADM

## 2021-03-14 RX ORDER — CEFDINIR 300 MG/1
300 CAPSULE ORAL ONCE
Status: DISCONTINUED | OUTPATIENT
Start: 2021-03-14 | End: 2021-03-14

## 2021-03-14 RX ORDER — PROCHLORPERAZINE EDISYLATE 5 MG/ML
10 INJECTION INTRAMUSCULAR; INTRAVENOUS EVERY 6 HOURS PRN
Status: DISCONTINUED | OUTPATIENT
Start: 2021-03-14 | End: 2021-03-18 | Stop reason: HOSPADM

## 2021-03-14 RX ORDER — FERROUS SULFATE 325(65) MG
325 TABLET ORAL
Status: DISCONTINUED | OUTPATIENT
Start: 2021-03-15 | End: 2021-03-18 | Stop reason: HOSPADM

## 2021-03-14 RX ORDER — POTASSIUM CHLORIDE 7.45 MG/ML
10 INJECTION INTRAVENOUS PRN
Status: DISCONTINUED | OUTPATIENT
Start: 2021-03-14 | End: 2021-03-18 | Stop reason: HOSPADM

## 2021-03-14 RX ORDER — ACETAMINOPHEN 325 MG/1
650 TABLET ORAL EVERY 6 HOURS PRN
Status: DISCONTINUED | OUTPATIENT
Start: 2021-03-14 | End: 2021-03-18 | Stop reason: HOSPADM

## 2021-03-14 RX ORDER — SODIUM CHLORIDE 0.9 % (FLUSH) 0.9 %
10 SYRINGE (ML) INJECTION PRN
Status: DISCONTINUED | OUTPATIENT
Start: 2021-03-14 | End: 2021-03-18 | Stop reason: HOSPADM

## 2021-03-14 RX ORDER — OMEGA-3S/DHA/EPA/FISH OIL/D3 300MG-1000
400 CAPSULE ORAL DAILY
Status: DISCONTINUED | OUTPATIENT
Start: 2021-03-14 | End: 2021-03-18 | Stop reason: HOSPADM

## 2021-03-14 RX ORDER — SENNA PLUS 8.6 MG/1
1 TABLET ORAL DAILY PRN
Status: DISCONTINUED | OUTPATIENT
Start: 2021-03-14 | End: 2021-03-18 | Stop reason: HOSPADM

## 2021-03-14 RX ORDER — DOXAZOSIN MESYLATE 4 MG/1
4 TABLET ORAL NIGHTLY
Status: DISCONTINUED | OUTPATIENT
Start: 2021-03-14 | End: 2021-03-18 | Stop reason: HOSPADM

## 2021-03-14 RX ORDER — POLYETHYLENE GLYCOL 3350 17 G/17G
17 POWDER, FOR SOLUTION ORAL DAILY
Status: DISCONTINUED | OUTPATIENT
Start: 2021-03-14 | End: 2021-03-18 | Stop reason: HOSPADM

## 2021-03-14 RX ORDER — ACETAMINOPHEN 650 MG/1
650 SUPPOSITORY RECTAL EVERY 6 HOURS PRN
Status: DISCONTINUED | OUTPATIENT
Start: 2021-03-14 | End: 2021-03-18 | Stop reason: HOSPADM

## 2021-03-14 RX ORDER — PROMETHAZINE HYDROCHLORIDE 25 MG/1
12.5 TABLET ORAL EVERY 6 HOURS PRN
Status: DISCONTINUED | OUTPATIENT
Start: 2021-03-14 | End: 2021-03-14

## 2021-03-14 RX ORDER — SODIUM CHLORIDE 0.9 % (FLUSH) 0.9 %
10 SYRINGE (ML) INJECTION EVERY 12 HOURS SCHEDULED
Status: DISCONTINUED | OUTPATIENT
Start: 2021-03-14 | End: 2021-03-18 | Stop reason: HOSPADM

## 2021-03-14 RX ORDER — BUMETANIDE 1 MG/1
1 TABLET ORAL DAILY
Status: DISCONTINUED | OUTPATIENT
Start: 2021-03-14 | End: 2021-03-18 | Stop reason: HOSPADM

## 2021-03-14 RX ORDER — LEVOTHYROXINE SODIUM 0.1 MG/1
100 TABLET ORAL DAILY
Status: DISCONTINUED | OUTPATIENT
Start: 2021-03-14 | End: 2021-03-18 | Stop reason: HOSPADM

## 2021-03-14 RX ORDER — POTASSIUM CHLORIDE 20 MEQ/1
40 TABLET, EXTENDED RELEASE ORAL PRN
Status: DISCONTINUED | OUTPATIENT
Start: 2021-03-14 | End: 2021-03-18 | Stop reason: HOSPADM

## 2021-03-14 RX ORDER — ONDANSETRON 2 MG/ML
4 INJECTION INTRAMUSCULAR; INTRAVENOUS EVERY 6 HOURS PRN
Status: DISCONTINUED | OUTPATIENT
Start: 2021-03-14 | End: 2021-03-14

## 2021-03-14 RX ORDER — CALCIUM CARBONATE 500(1250)
500 TABLET ORAL DAILY
Status: DISCONTINUED | OUTPATIENT
Start: 2021-03-14 | End: 2021-03-18 | Stop reason: HOSPADM

## 2021-03-14 RX ORDER — LISINOPRIL 10 MG/1
10 TABLET ORAL DAILY
Status: DISCONTINUED | OUTPATIENT
Start: 2021-03-14 | End: 2021-03-18 | Stop reason: HOSPADM

## 2021-03-14 RX ADMIN — DOXAZOSIN 4 MG: 4 TABLET ORAL at 23:44

## 2021-03-14 RX ADMIN — MONTELUKAST SODIUM 10 MG: 10 TABLET, FILM COATED ORAL at 23:45

## 2021-03-14 RX ADMIN — CARBIDOPA AND LEVODOPA 2 TABLET: 25; 100 TABLET ORAL at 18:43

## 2021-03-14 RX ADMIN — CEFTRIAXONE 1000 MG: 1 INJECTION, POWDER, FOR SOLUTION INTRAMUSCULAR; INTRAVENOUS at 11:18

## 2021-03-14 RX ADMIN — Medication 10 ML: at 21:03

## 2021-03-14 RX ADMIN — TRAZODONE HYDROCHLORIDE 50 MG: 50 TABLET ORAL at 23:45

## 2021-03-14 RX ADMIN — IOPAMIDOL 75 ML: 755 INJECTION, SOLUTION INTRAVENOUS at 10:53

## 2021-03-14 RX ADMIN — CARBIDOPA AND LEVODOPA 2 TABLET: 25; 100 TABLET ORAL at 23:44

## 2021-03-14 ASSESSMENT — ENCOUNTER SYMPTOMS
ABDOMINAL PAIN: 0
EYE PAIN: 0
VOMITING: 0
ABDOMINAL DISTENTION: 1
BACK PAIN: 0
NAUSEA: 0
SHORTNESS OF BREATH: 0
SORE THROAT: 0
DIARRHEA: 0
COUGH: 0

## 2021-03-14 ASSESSMENT — PAIN SCALES - GENERAL: PAINLEVEL_OUTOF10: 0

## 2021-03-14 NOTE — ED PROVIDER NOTES
abdominal distention. Negative for abdominal pain, diarrhea, nausea and vomiting. Genitourinary: Positive for difficulty urinating. Negative for dysuria and frequency. Musculoskeletal: Negative for arthralgias and back pain. Skin: Negative for rash and wound. Neurological: Negative for weakness and headaches. All other systems reviewed and are negative. Physical Exam  Vitals signs and nursing note reviewed. Constitutional:       General: He is not in acute distress. Appearance: He is well-developed. He is not ill-appearing. HENT:      Head: Normocephalic and atraumatic. Eyes:      Extraocular Movements: Extraocular movements intact. Pupils: Pupils are equal, round, and reactive to light. Neck:      Musculoskeletal: Normal range of motion and neck supple. Cardiovascular:      Rate and Rhythm: Normal rate and regular rhythm. Heart sounds: Normal heart sounds. No murmur. Pulmonary:      Effort: Pulmonary effort is normal. No respiratory distress. Breath sounds: Normal breath sounds. No wheezing or rales. Abdominal:      General: There is distension. Palpations: Abdomen is soft. Tenderness: There is no abdominal tenderness. There is no guarding or rebound. Skin:     General: Skin is warm and dry. Capillary Refill: Capillary refill takes less than 2 seconds. Neurological:      Mental Status: He is alert and oriented to person, place, and time. GCS: GCS eye subscore is 3. GCS verbal subscore is 5. GCS motor subscore is 6. Cranial Nerves: No cranial nerve deficit or facial asymmetry. Sensory: No sensory deficit. Motor: No weakness. Coordination: Coordination normal.      Comments: ANO x3, follows commands, moves all extremities spontaneously. No focalized weakness, neurovascularly intact          Procedures     MDM  Number of Diagnoses or Management Options  Patient presented to the emergency department for altered mental status. department and updated about all lab work, imaging studies and patient's care here in the department. They are agreeable with the plan. Patient was admitted. ED Course as of Mar 14 1724   Sun Mar 14, 2021   4291 EKG: This EKG is signed and interpreted by ED Physician. Time:  0730   Rate: 59  Rhythm: Sinus. Interpretation: sinus bradycardia. . Left axis deviation. Inversions in the anterior leads. QTc 493. No acute ST segment elevation  Comparison: stable as compared to patient's most recent EKG. [KP]      ED Course User Index  [KP] Adriana Laureano, DO         ----------------------------------------------- PAST HISTORY --------------------------------------------  Past Medical History:  has a past medical history of High cholesterol, Hypertension, Hypothyroidism, Parkinson disease ([facility] [address] and RBBB. Past Surgical History:  has a past surgical history that includes Foot surgery (1935); shoulder surgery (Right); hernia repair (Left); Tonsillectomy and Adenoidectomy; Foot surgery; hip surgery (Left, 1/29/2021); and Hip fracture surgery (Right, 3/3/2021). Social History:  reports that he quit smoking about 50 years ago. He quit after 10.00 years of use. He has never used smokeless tobacco. He reports that he does not drink alcohol or use drugs. Family History: family history includes Breast Cancer in his sister; Cancer in his father; Heart Disease in his mother. The patients home medications have been reviewed.     Allergies: Sulfa antibiotics    ------------------------------------------------ RESULTS ---------------------------------------------------    LABS:  Results for orders placed or performed during the hospital encounter of 03/14/21   CBC Auto Differential   Result Value Ref Range    WBC 10.7 4.5 - 11.5 E9/L    RBC 2.56 (L) 3.80 - 5.80 E12/L    Hemoglobin 8.5 (L) 12.5 - 16.5 g/dL    Hematocrit 28.3 (L) 37.0 - 54.0 %    .5 (H) 80.0 - 99.9 fL    MCH 33.2 26.0 - 35.0 pg    MCHC 30.0 (L) 32.0 - 34.5 %    RDW 15.8 (H) 11.5 - 15.0 fL    Platelets 196 614 - 371 E9/L    MPV 9.8 7.0 - 12.0 fL    Neutrophils % 78.6 43.0 - 80.0 %    Immature Granulocytes % 0.9 0.0 - 5.0 %    Lymphocytes % 6.7 (L) 20.0 - 42.0 %    Monocytes % 7.5 2.0 - 12.0 %    Eosinophils % 5.3 0.0 - 6.0 %    Basophils % 1.0 0.0 - 2.0 %    Neutrophils Absolute 8.38 (H) 1.80 - 7.30 E9/L    Immature Granulocytes # 0.10 E9/L    Lymphocytes Absolute 0.71 (L) 1.50 - 4.00 E9/L    Monocytes Absolute 0.80 0.10 - 0.95 E9/L    Eosinophils Absolute 0.57 (H) 0.05 - 0.50 E9/L    Basophils Absolute 0.11 0.00 - 0.20 E9/L    Anisocytosis 1+     Polychromasia 1+     Hypochromia 1+     Poikilocytes 1+     Ovalocytes 1+    Comprehensive Metabolic Panel w/ Reflex to MG   Result Value Ref Range    Sodium 140 132 - 146 mmol/L    Potassium reflex Magnesium 3.8 3.5 - 5.0 mmol/L    Chloride 105 98 - 107 mmol/L    CO2 26 22 - 29 mmol/L    Anion Gap 9 7 - 16 mmol/L    Glucose 93 74 - 99 mg/dL    BUN 27 (H) 8 - 23 mg/dL    CREATININE 1.1 0.7 - 1.2 mg/dL    GFR Non-African American >60 >=60 mL/min/1.73    GFR African American >60     Calcium 8.5 (L) 8.6 - 10.2 mg/dL    Total Protein 6.3 (L) 6.4 - 8.3 g/dL    Albumin 3.2 (L) 3.5 - 5.2 g/dL    Total Bilirubin 1.5 (H) 0.0 - 1.2 mg/dL    Alkaline Phosphatase 166 (H) 40 - 129 U/L    ALT <5 0 - 40 U/L    AST 12 0 - 39 U/L   Lipase   Result Value Ref Range    Lipase 31 13 - 60 U/L   Lactate, Sepsis   Result Value Ref Range    Lactic Acid, Sepsis 1.1 0.5 - 1.9 mmol/L   Troponin   Result Value Ref Range    Troponin 0.04 (H) 0.00 - 0.03 ng/mL   Urinalysis, reflex to microscopic   Result Value Ref Range    Color, UA Yellow Straw/Yellow    Clarity, UA Clear Clear    Glucose, Ur Negative Negative mg/dL    Bilirubin Urine Negative Negative    Ketones, Urine Negative Negative mg/dL    Specific Gravity, UA 1.025 1.005 - 1.030    Blood, Urine MODERATE (A) Negative    pH, UA 6.0 5.0 - 9.0    Protein,  (A) Negative mg/dL    Urobilinogen, Urine 0.2 <2.0 E.U./dL    Nitrite, Urine POSITIVE (A) Negative    Leukocyte Esterase, Urine LARGE (A) Negative   Microscopic Urinalysis   Result Value Ref Range    WBC, UA PACKED (A) 0 - 5 /HPF    RBC, UA 1-3 0 - 2 /HPF    Bacteria, UA MODERATE (A) None Seen /HPF   EKG 12 Lead   Result Value Ref Range    Ventricular Rate 59 BPM    Atrial Rate 53 BPM    QRS Duration 146 ms    Q-T Interval 498 ms    QTc Calculation (Bazett) 493 ms    P Axis 57 degrees    R Axis -75 degrees    T Axis -25 degrees       RADIOLOGY:    All Radiology results interpreted by Radiologist unless otherwise noted. CT ABDOMEN PELVIS W IV CONTRAST Additional Contrast? None   Final Result   1. Presence of a acute/subacute fracture of the left pubic bone affecting   the body, superior ramus, and inferior ramus. 2.  strong streak artifact from the left hip prosthesis and from the ORIF for   proximal right fibular fracture of recently performed. 3.  Full bladder is not distended, there is a Lang catheter in the bladder   lumen. Mild the thickening of the bladder wall is seen. 4.  No obstructive uropathy. 5.  Pattern of constipation. Uncomplicated diverticulosis seen throughout   the colon but there is no conspicuous acute diverticulitis. 6.  Normal distended gallbladder with small gallstone. No dilatation of the   biliary tree pancreatic ductal system. CT Head WO Contrast   Final Result   No acute intracranial abnormality. XR CHEST PORTABLE   Final Result   1. Resolving infiltrate within the right upper lobe. 2. Persistent small infiltrate seen within the retrocardiac region. ---------------------------- NURSING NOTES AND VITALS REVIEWED -------------------------   The nursing notes within the ED encounter and vital signs as below have been reviewed.    BP (!) 127/56   Pulse 68   Temp 97.8 °F (36.6 °C) (Oral)   Resp 18   Ht 5' 6\" (1.676 m)   Wt 190 lb (86.2 kg)   SpO2 95%   BMI 30.67 kg/m²   Oxygen Saturation Interpretation: Normal      ------------------------------------------PROGRESS NOTES -------------------------------------------    ED COURSE MEDICATIONS:                Medications   aspirin EC tablet 325 mg (has no administration in time range)   bumetanide (BUMEX) tablet 1 mg (has no administration in time range)   carbidopa-levodopa (SINEMET)  MG per tablet 2 tablet (has no administration in time range)   docusate (COLACE, DULCOLAX) CAPS 100 mg (has no administration in time range)   doxazosin (CARDURA) tablet 4 mg (has no administration in time range)   ferrous sulfate (IRON 325) tablet 325 mg (has no administration in time range)   levothyroxine (SYNTHROID) tablet 100 mcg (has no administration in time range)   lisinopril (PRINIVIL;ZESTRIL) tablet 10 mg (has no administration in time range)   montelukast (SINGULAIR) tablet 10 mg (has no administration in time range)   polyethylene glycol (GLYCOLAX) packet 17 g (has no administration in time range)   potassium chloride (KLOR-CON) packet 40 mEq (has no administration in time range)   traZODone (DESYREL) tablet 50 mg (has no administration in time range)   sodium chloride flush 0.9 % injection 10 mL (has no administration in time range)   sodium chloride flush 0.9 % injection 10 mL (has no administration in time range)   potassium chloride (KLOR-CON M) extended release tablet 40 mEq (has no administration in time range)     Or   potassium bicarb-citric acid (EFFER-K) effervescent tablet 40 mEq (has no administration in time range)     Or   potassium chloride 10 mEq/100 mL IVPB (Peripheral Line) (has no administration in time range)   promethazine (PHENERGAN) tablet 12.5 mg (has no administration in time range)     Or   ondansetron (ZOFRAN) injection 4 mg (has no administration in time range)   senna (SENOKOT) tablet 8.6 mg (has no administration in time range)   acetaminophen (TYLENOL) tablet 650

## 2021-03-15 PROBLEM — J18.9 CAP (COMMUNITY ACQUIRED PNEUMONIA): Status: RESOLVED | Noted: 2021-01-27 | Resolved: 2021-03-15

## 2021-03-15 LAB
ALBUMIN SERPL-MCNC: 2.9 G/DL (ref 3.5–5.2)
ALP BLD-CCNC: 171 U/L (ref 40–129)
ALT SERPL-CCNC: <5 U/L (ref 0–40)
ANION GAP SERPL CALCULATED.3IONS-SCNC: 7 MMOL/L (ref 7–16)
AST SERPL-CCNC: 13 U/L (ref 0–39)
BASOPHILS ABSOLUTE: 0.09 E9/L (ref 0–0.2)
BASOPHILS RELATIVE PERCENT: 0.8 % (ref 0–2)
BILIRUB SERPL-MCNC: 0.8 MG/DL (ref 0–1.2)
BUN BLDV-MCNC: 26 MG/DL (ref 8–23)
CALCIUM SERPL-MCNC: 7.8 MG/DL (ref 8.6–10.2)
CHLORIDE BLD-SCNC: 107 MMOL/L (ref 98–107)
CO2: 25 MMOL/L (ref 22–29)
CREAT SERPL-MCNC: 1 MG/DL (ref 0.7–1.2)
EKG ATRIAL RATE: 53 BPM
EKG P AXIS: 57 DEGREES
EKG Q-T INTERVAL: 498 MS
EKG QRS DURATION: 146 MS
EKG QTC CALCULATION (BAZETT): 493 MS
EKG R AXIS: -75 DEGREES
EKG T AXIS: -25 DEGREES
EKG VENTRICULAR RATE: 59 BPM
EOSINOPHILS ABSOLUTE: 0.54 E9/L (ref 0.05–0.5)
EOSINOPHILS RELATIVE PERCENT: 4.8 % (ref 0–6)
FERRITIN: 291 NG/ML
FOLATE: 8.8 NG/ML (ref 4.8–24.2)
GFR AFRICAN AMERICAN: >60
GFR NON-AFRICAN AMERICAN: >60 ML/MIN/1.73
GLUCOSE BLD-MCNC: 130 MG/DL (ref 74–99)
HCT VFR BLD CALC: 26 % (ref 37–54)
HEMOGLOBIN: 8 G/DL (ref 12.5–16.5)
IMMATURE GRANULOCYTES #: 0.1 E9/L
IMMATURE GRANULOCYTES %: 0.9 % (ref 0–5)
IRON SATURATION: 24 % (ref 20–55)
IRON: 41 MCG/DL (ref 59–158)
LYMPHOCYTES ABSOLUTE: 1.21 E9/L (ref 1.5–4)
LYMPHOCYTES RELATIVE PERCENT: 10.7 % (ref 20–42)
MAGNESIUM: 2.1 MG/DL (ref 1.6–2.6)
MCH RBC QN AUTO: 33.2 PG (ref 26–35)
MCHC RBC AUTO-ENTMCNC: 30.8 % (ref 32–34.5)
MCV RBC AUTO: 107.9 FL (ref 80–99.9)
MONOCYTES ABSOLUTE: 0.94 E9/L (ref 0.1–0.95)
MONOCYTES RELATIVE PERCENT: 8.3 % (ref 2–12)
NEUTROPHILS ABSOLUTE: 8.43 E9/L (ref 1.8–7.3)
NEUTROPHILS RELATIVE PERCENT: 74.5 % (ref 43–80)
PDW BLD-RTO: 15.9 FL (ref 11.5–15)
PLATELET # BLD: 343 E9/L (ref 130–450)
PMV BLD AUTO: 10.5 FL (ref 7–12)
POTASSIUM REFLEX MAGNESIUM: 3.5 MMOL/L (ref 3.5–5)
RBC # BLD: 2.41 E12/L (ref 3.8–5.8)
SARS-COV-2, NAAT: NOT DETECTED
SODIUM BLD-SCNC: 139 MMOL/L (ref 132–146)
TOTAL IRON BINDING CAPACITY: 173 MCG/DL (ref 250–450)
TOTAL PROTEIN: 5.8 G/DL (ref 6.4–8.3)
TROPONIN: 0.05 NG/ML (ref 0–0.03)
VITAMIN B-12: 1233 PG/ML (ref 211–946)
WBC # BLD: 11.3 E9/L (ref 4.5–11.5)

## 2021-03-15 PROCEDURE — G0378 HOSPITAL OBSERVATION PER HR: HCPCS

## 2021-03-15 PROCEDURE — 82607 VITAMIN B-12: CPT

## 2021-03-15 PROCEDURE — 94664 DEMO&/EVAL PT USE INHALER: CPT

## 2021-03-15 PROCEDURE — 80053 COMPREHEN METABOLIC PANEL: CPT

## 2021-03-15 PROCEDURE — 82746 ASSAY OF FOLIC ACID SERUM: CPT

## 2021-03-15 PROCEDURE — 2580000003 HC RX 258: Performed by: INTERNAL MEDICINE

## 2021-03-15 PROCEDURE — 97161 PT EVAL LOW COMPLEX 20 MIN: CPT

## 2021-03-15 PROCEDURE — 83550 IRON BINDING TEST: CPT

## 2021-03-15 PROCEDURE — 6370000000 HC RX 637 (ALT 250 FOR IP): Performed by: INTERNAL MEDICINE

## 2021-03-15 PROCEDURE — 96376 TX/PRO/DX INJ SAME DRUG ADON: CPT

## 2021-03-15 PROCEDURE — 83735 ASSAY OF MAGNESIUM: CPT

## 2021-03-15 PROCEDURE — 87635 SARS-COV-2 COVID-19 AMP PRB: CPT

## 2021-03-15 PROCEDURE — 36415 COLL VENOUS BLD VENIPUNCTURE: CPT

## 2021-03-15 PROCEDURE — 94640 AIRWAY INHALATION TREATMENT: CPT

## 2021-03-15 PROCEDURE — 93010 ELECTROCARDIOGRAM REPORT: CPT | Performed by: INTERNAL MEDICINE

## 2021-03-15 PROCEDURE — 84484 ASSAY OF TROPONIN QUANT: CPT

## 2021-03-15 PROCEDURE — 82728 ASSAY OF FERRITIN: CPT

## 2021-03-15 PROCEDURE — 85025 COMPLETE CBC W/AUTO DIFF WBC: CPT

## 2021-03-15 PROCEDURE — 83540 ASSAY OF IRON: CPT

## 2021-03-15 PROCEDURE — 97165 OT EVAL LOW COMPLEX 30 MIN: CPT

## 2021-03-15 PROCEDURE — 6360000002 HC RX W HCPCS: Performed by: INTERNAL MEDICINE

## 2021-03-15 RX ORDER — IPRATROPIUM BROMIDE AND ALBUTEROL SULFATE 2.5; .5 MG/3ML; MG/3ML
3 SOLUTION RESPIRATORY (INHALATION)
Status: DISCONTINUED | OUTPATIENT
Start: 2021-03-15 | End: 2021-03-18 | Stop reason: HOSPADM

## 2021-03-15 RX ADMIN — DOXAZOSIN 4 MG: 4 TABLET ORAL at 20:41

## 2021-03-15 RX ADMIN — POTASSIUM BICARBONATE 40 MEQ: 782 TABLET, EFFERVESCENT ORAL at 09:51

## 2021-03-15 RX ADMIN — MONTELUKAST SODIUM 10 MG: 10 TABLET, FILM COATED ORAL at 20:41

## 2021-03-15 RX ADMIN — CARBIDOPA AND LEVODOPA 2 TABLET: 25; 100 TABLET ORAL at 09:50

## 2021-03-15 RX ADMIN — IPRATROPIUM BROMIDE AND ALBUTEROL SULFATE 3 ML: .5; 3 SOLUTION RESPIRATORY (INHALATION) at 12:25

## 2021-03-15 RX ADMIN — BUMETANIDE 1 MG: 1 TABLET ORAL at 09:50

## 2021-03-15 RX ADMIN — Medication 10 ML: at 20:41

## 2021-03-15 RX ADMIN — FERROUS SULFATE TAB 325 MG (65 MG ELEMENTAL FE) 325 MG: 325 (65 FE) TAB at 09:50

## 2021-03-15 RX ADMIN — DOCUSATE SODIUM 100 MG: 100 CAPSULE, LIQUID FILLED ORAL at 09:50

## 2021-03-15 RX ADMIN — LISINOPRIL 10 MG: 10 TABLET ORAL at 09:50

## 2021-03-15 RX ADMIN — CEFTRIAXONE 1000 MG: 1 INJECTION, POWDER, FOR SOLUTION INTRAMUSCULAR; INTRAVENOUS at 13:50

## 2021-03-15 RX ADMIN — ASPIRIN 325 MG: 325 TABLET, COATED ORAL at 09:50

## 2021-03-15 RX ADMIN — ACETAMINOPHEN 650 MG: 325 TABLET ORAL at 18:19

## 2021-03-15 RX ADMIN — CALCIUM 500 MG: 500 TABLET ORAL at 09:51

## 2021-03-15 RX ADMIN — CHOLECALCIFEROL TAB 10 MCG (400 UNIT) 400 UNITS: 10 TAB at 09:51

## 2021-03-15 RX ADMIN — TRAZODONE HYDROCHLORIDE 50 MG: 50 TABLET ORAL at 20:41

## 2021-03-15 RX ADMIN — POLYETHYLENE GLYCOL 3350 17 G: 17 POWDER, FOR SOLUTION ORAL at 09:51

## 2021-03-15 RX ADMIN — IPRATROPIUM BROMIDE AND ALBUTEROL SULFATE 3 ML: .5; 3 SOLUTION RESPIRATORY (INHALATION) at 09:36

## 2021-03-15 RX ADMIN — Medication 10 ML: at 09:51

## 2021-03-15 RX ADMIN — CARBIDOPA AND LEVODOPA 2 TABLET: 25; 100 TABLET ORAL at 20:41

## 2021-03-15 RX ADMIN — CARBIDOPA AND LEVODOPA 2 TABLET: 25; 100 TABLET ORAL at 16:43

## 2021-03-15 RX ADMIN — IPRATROPIUM BROMIDE AND ALBUTEROL SULFATE 3 ML: .5; 3 SOLUTION RESPIRATORY (INHALATION) at 16:00

## 2021-03-15 RX ADMIN — CARBIDOPA AND LEVODOPA 2 TABLET: 25; 100 TABLET ORAL at 13:50

## 2021-03-15 RX ADMIN — POTASSIUM CHLORIDE 40 MEQ: 1500 TABLET, EXTENDED RELEASE ORAL at 20:41

## 2021-03-15 RX ADMIN — IPRATROPIUM BROMIDE AND ALBUTEROL SULFATE 3 ML: .5; 3 SOLUTION RESPIRATORY (INHALATION) at 22:07

## 2021-03-15 ASSESSMENT — PAIN SCALES - GENERAL
PAINLEVEL_OUTOF10: 3
PAINLEVEL_OUTOF10: 0

## 2021-03-15 NOTE — CONSULTS
3/15/2021 1:05 PM  Service: Urology  Group: [facility] (Dario/Ariana/Shikha)    Lorie Giang  [id number]     Chief Complaint:    Urinary Retention     History of Present Illness: The patient is a 80 y.o. male patient who presented to the hospital yesterday for altered mental status from a nursing facility. He has been staying at the nursing facility after suffering from a hip fracture. The facility has been straight cathing him multiple times per day. He had a brewster catheter inserted in the emergency department for unknown immediate output.        Past Medical History:   Diagnosis Date    High cholesterol     Hypertension     Hypothyroidism     Parkinson disease ([address]     Diagnosed in 2008 by Dr. 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6     HERNIA REPAIR Left     groin repair     HIP FRACTURE SURGERY Right 3/3/2021    INTRAMEDULLARY NAIL HIP FIXATION RIGHT HIP   +++SYNTHES+++ performed by Samantha Hardwick MD at [facility] Left 1/29/2021    LEFT HIP HEMIARTHROPLASTY performed by Laney Gama MD at [address] [place] [address] Right     5-10 years ago   [address]         Medications Prior to Admission:    Medications Prior to Admission: polyethylene glycol (GLYCOLAX) 17 g packet, Take 17 g by mouth daily  calcium carbonate-vitamin D (CALTRATE) 600-400 MG-UNIT TABS per tab, Take 1 tablet by mouth daily  potassium chloride (KLOR-CON) 20 MEQ packet, Take 40 mEq by mouth daily  aspirin 325 MG EC tablet, Take 1 tablet by mouth daily  docusate sodium (COLACE, DULCOLAX) 100 MG CAPS, Take 100 mg by mouth daily  ferrous sulfate (IRON 325) 325 (65 Fe) MG tablet, Take 1 tablet by mouth daily (with breakfast)  ipratropium-albuterol (DUONEB) 0.5-2.5 (3) MG/3ML SOLN nebulizer solution, Inhale 3 mLs into the lungs every 4 hours (while awake)  sennosides-docusate sodium (SENOKOT-S) 8.6-50 MG tablet, Take 1 tablet by mouth 2 times daily  bumetanide (BUMEX) 1 MG tablet, Take 1 mg by mouth daily  clobetasol (TEMOVATE) 0.05 % ointment, Apply topically 2 times daily. silver sulfADIAZINE (SILVADENE) 1 % cream, Apply topically daily. levothyroxine (SYNTHROID) 25 MCG tablet, Take 3 tabs PO Monday - Friday and 2 tabs PO Saturday - Sunday (Patient taking differently: 100 mcg )  traZODone (DESYREL) 50 MG tablet, Take 1 tablet by mouth nightly  lisinopril (PRINIVIL;ZESTRIL) 10 MG tablet, Take 1 tablet by mouth daily  montelukast (SINGULAIR) 10 MG tablet, Take 1 tablet by mouth nightly  carbidopa-levodopa (SINEMET)  MG per tablet, Take 2 tablets by mouth 4 times daily  doxazosin (CARDURA) 4 MG tablet, Take 1 tablet by mouth nightly  Misc. Devices MISC, Dispense #2 compression stalkings Dx: leg edema  Red Yeast Rice 600 MG TABS, Take by mouth 2 times daily     Allergies:    Sulfa antibiotics    Social History:    reports that he quit smoking about 50 years ago. He quit after 10.00 years of use. He has never used smokeless tobacco. He reports that he does not drink alcohol or use drugs. Family History:   Non-contributory to this Urological problem  family history includes Breast Cancer in his sister; Cancer in his father; Heart Disease in his mother.     Review of Systems:  Constitutional: No fever or chills   Respiratory: negative for cough and hemoptysis  Cardiovascular: negative for chest pain and dyspnea  Gastrointestinal: negative for abdominal pain, diarrhea, nausea and vomiting   Derm: negative for rash and skin lesion(s)  Neurological: negative for seizures and tremors  Musculoskeletal: Recent hip fracture   Psychiatric: Negative   : As above in the HPI, otherwise negative  All other reviews are negative    Physical Exam:     Vitals:  /65   Pulse 61   Temp 97.6 °F (36.4 °C) (Oral)   Resp 16   Ht 5' 6\" (1.676 m)   Wt 173 lb 3.2 oz (78.6 kg)   SpO2 93%   BMI 27.96 kg/m²     General:  Awake and alert, no acute distress   HEENT:  Normocephalic, atraumatic. Lungs:  Respirations symmetric and non-labored. Abdomen:  soft, nontender, no masses  Extremities:  No clubbing, cyanosis, or edema, immobility from hip fracture  Skin:  Warm and dry, no open lesions or rashes  Neuro: There are no motor or sensory deficits in the 4 quadrant extremities   Rectal: deferred  Genitourinary: Lang catheter draining clear, yellow urine    Labs:     Recent Labs     03/14/21  0849 03/15/21  0305   WBC 10.7 11.3   RBC 2.56* 2.41*   HGB 8.5* 8.0*   HCT 28.3* 26.0*   .5* 107.9*   MCH 33.2 33.2   MCHC 30.0* 30.8*   RDW 15.8* 15.9*    343   MPV 9.8 10.5         Recent Labs     03/14/21  0849 03/15/21  0305   CREATININE 1.1 1.0       Imaging:   Narrative   EXAMINATION:   CT OF THE ABDOMEN AND PELVIS WITH CONTRAST 3/14/2021 10:50 am       TECHNIQUE:   CT of the abdomen and pelvis was performed with the administration of   intravenous contrast. Multiplanar reformatted images are provided for review. Dose modulation, iterative reconstruction, and/or weight based adjustment of   the mA/kV was utilized to reduce the radiation dose to as low as reasonably   achievable.       COMPARISON:   None.       HISTORY:   ORDERING SYSTEM PROVIDED HISTORY: abdominal distention, urinary retention   TECHNOLOGIST PROVIDED HISTORY:   Additional Contrast?->None   Reason for exam:->abdominal distention, urinary retention   Decision Support Exception->Emergency Medical Condition (MA)       FINDINGS:   There are strong streaking artifacts from the left hip prosthesis and from   the internal fixation for fracture of the right femur.  These causes loss of   detail in the pelvic floor cavity.  There is a Lang catheter in the bladder.    The bladder is not distended.  There appears to be some thickening of the   bladder wall.  The Lang is in proper position.  The prostate gland does not   appears to be enlarged.  Degenerative changes are seen throughout   the thoracic spine with degenerative changes in the facet joints bilaterally.       There is a acute/is subacute the fracture of the left pubic bone affecting is   body/superior ramus and inferior ramus.           Impression   1.  Presence of a acute/subacute fracture of the left pubic bone affecting   the body, superior ramus, and inferior ramus.       2.  strong streak artifact from the left hip prosthesis and from the ORIF for   proximal right fibular fracture of recently performed.       3.  Full bladder is not distended, there is a Brewster catheter in the bladder   lumen.  Mild the thickening of the bladder wall is seen.       4.  No obstructive uropathy.       5.  Pattern of constipation.  Uncomplicated diverticulosis seen throughout   the colon but there is no conspicuous acute diverticulitis.       6.  Normal distended gallbladder with small gallstone.  No dilatation of the   biliary tree pancreatic ductal system.                     Assessment/plan:  Urinary retention   UTI     Urine cx evaluating for gram + cocci, identification and sensitivities pending  Cont antibiotics per primary service. currently on Rocephin   CTAP reviewed, no evidence of obstructive uropathy, brewster correctly positioned within the bladder. Cont the brewster and do not recommend revisiting intermittent catheterization due to immobility. Leave indwelling until the patient is fully ambulatory. Discharge with Brewster catheter. Please call if questions or concerns.

## 2021-03-15 NOTE — CARE COORDINATION
Social Work discharge planning   Sw called Dara with [facility] to clarify which building pt is from. Geraldine Peters advised pt is from [facility] for Rehab, and that he may or may not have snf skilled time left there. Dara advised they will need precert for pt to return. LACEY already noted this earlier today. OLIVER started N17 with correct WC for Rehab at [facility] IF they have bed for pt at discharge and IF precert approved. Ambulance forms and envelope in pt's folder.    Electronically signed by Tanvi Jansen on 3/15/2021 at 1:15 PM

## 2021-03-15 NOTE — PROGRESS NOTES
Dr Rajan Dash on unit to see pt and aware of new consult    Dr Erica Hassan on unit to see pt and aware of new consult

## 2021-03-15 NOTE — CONSULTS
Department of Orthopedic Surgery  Physician Assistant Consult Note for Dr. Sandra Newman        Reason for Consult:  Right hip pain  Requesting Physician:  Dr. Fara Craig:  Hip pain    History Obtained From:  patient    HISTORY OF PRESENT ILLNESS:                The patient is a 80 y.o. male who presents with right hip pain. Patient had right hip IM nail procedure performed 2 weeks ago by Dr. Sandra Newman. Unsure of why he is readmitted. He has minimal hip pain. He is able to ambulate.     Past Medical History:        Diagnosis Date    High cholesterol     Hypertension     Hypothyroidism     Parkinson disease ([address]     Diagnosed in 2008 by Dr. Gustavo Lnad due to resting tremor    RBBB      Past Surgical History:        Procedure Laterality Date    FOOT SURGERY  1935    FOOT SURGERY      club foot repair-age 6     HERNIA REPAIR Left     groin repair     HIP FRACTURE SURGERY Right 3/3/2021    INTRAMEDULLARY NAIL HIP FIXATION RIGHT HIP   +++SYNTHES+++ performed by Karin Eric MD at [address] [place] [address] Left 1/29/2021    LEFT HIP HEMIARTHROPLASTY performed by Jesenia Johnson MD at [address] [place] [address] Right     5-10 years ago    TONSILLECTOMY AND ADENOIDECTOMY       Current Medications:   Current Facility-Administered Medications: ipratropium-albuterol (DUONEB) nebulizer solution 3 mL, 3 mL, Inhalation, Q4H WA  aspirin EC tablet 325 mg, 325 mg, Oral, Daily  bumetanide (BUMEX) tablet 1 mg, 1 mg, Oral, Daily  carbidopa-levodopa (SINEMET)  MG per tablet 2 tablet, 2 tablet, Oral, 4x Daily  docusate sodium (COLACE) capsule 100 mg, 100 mg, Oral, Daily  doxazosin (CARDURA) tablet 4 mg, 4 mg, Oral, Nightly  ferrous sulfate (IRON 325) tablet 325 mg, 325 mg, Oral, Daily with breakfast  levothyroxine (SYNTHROID) tablet 100 mcg, 100 mcg, Oral, Daily  lisinopril (PRINIVIL;ZESTRIL) tablet 10 mg, 10 mg, Oral, Daily  montelukast (SINGULAIR) tablet 10 mg, 10 mg, Oral, Nightly  polyethylene glycol (GLYCOLAX) packet 17 g, 17 g, Oral, Daily  potassium bicarb-citric acid (EFFER-K) effervescent tablet 40 mEq, 40 mEq, Oral, Daily  traZODone (DESYREL) tablet 50 mg, 50 mg, Oral, Nightly  sodium chloride flush 0.9 % injection 10 mL, 10 mL, Intravenous, 2 times per day  sodium chloride flush 0.9 % injection 10 mL, 10 mL, Intravenous, PRN  potassium chloride (KLOR-CON M) extended release tablet 40 mEq, 40 mEq, Oral, PRN **OR** potassium bicarb-citric acid (EFFER-K) effervescent tablet 40 mEq, 40 mEq, Oral, PRN **OR** potassium chloride 10 mEq/100 mL IVPB (Peripheral Line), 10 mEq, Intravenous, PRN  senna (SENOKOT) tablet 8.6 mg, 1 tablet, Oral, Daily PRN  acetaminophen (TYLENOL) tablet 650 mg, 650 mg, Oral, Q6H PRN **OR** acetaminophen (TYLENOL) suppository 650 mg, 650 mg, Rectal, Q6H PRN  cefTRIAXone (ROCEPHIN) 1,000 mg in sterile water 10 mL IV syringe, 1,000 mg, Intravenous, Q24H  calcium elemental (OSCAL) tablet 500 mg, 500 mg, Oral, Daily **AND** vitamin D3 (CHOLECALCIFEROL) tablet 400 Units, 400 Units, Oral, Daily  trimethobenzamide (TIGAN) injection 200 mg, 200 mg, Intramuscular, Q6H PRN  prochlorperazine (COMPAZINE) injection 10 mg, 10 mg, Intravenous, Q6H PRN  Allergies:  Sulfa antibiotics    Social History:   Not sifnificant  Family History:       Problem Relation Age of Onset    Heart Disease Mother     Cancer Father         lung    Breast Cancer Sister      REVIEW OF SYSTEMS:    Right hip    PHYSICAL EXAM:    VITALS:  /65   Pulse 61   Temp 97.6 °F (36.4 °C) (Oral)   Resp 16   Ht 5' 6\" (1.676 m)   Wt 173 lb 3.2 oz (78.6 kg)   SpO2 93%   BMI 27.96 kg/m²   Right hip incision clean, dry and intact, no drainage. Nontender to palpation, no pain with rotation.     DATA:    CT scan shows stable IM nail, pubic rami fracture not displaced    ASSESMENT:  Status post right IM nail placement for fracture  PLAN:    WBAT  PT for LE strengthening  ROM as tolerated  Follow up in office as scheduled with Dr. More Lopez

## 2021-03-15 NOTE — CONSULTS
of education: Not on file    Highest education level: Not on file   Occupational History    Not on file   Social Needs    Financial resource strain: Not on file    Food insecurity     Worry: Not on file     Inability: Not on file    Transportation needs     Medical: Not on file     Non-medical: Not on file   Tobacco Use    Smoking status: Former Smoker     Years: 10.00     Quit date: 1970     Years since quittin.2    Smokeless tobacco: Never Used   Substance and Sexual Activity    Alcohol use: No     Comment: Social    Drug use: No    Sexual activity: Never     Partners: Female   Lifestyle    Physical activity     Days per week: Not on file     Minutes per session: Not on file    Stress: Not on file   Relationships    Social connections     Talks on phone: Not on file     Gets together: Not on file     Attends Confucianism service: Not on file     Active member of club or organization: Not on file     Attends meetings of clubs or organizations: Not on file     Relationship status: Not on file    Intimate partner violence     Fear of current or ex partner: Not on file     Emotionally abused: Not on file     Physically abused: Not on file     Forced sexual activity: Not on file   Other Topics Concern    Not on file   Social History Narrative    Not on file       Allergies:   Allergies   Allergen Reactions    Sulfa Antibiotics      Hyperactivity        Current Medications:  Current Facility-Administered Medications   Medication Dose Route Frequency Provider Last Rate Last Admin    ipratropium-albuterol (DUONEB) nebulizer solution 3 mL  3 mL Inhalation Q4H KILEY Quan, DO   3 mL at 03/15/21 0936    aspirin EC tablet 325 mg  325 mg Oral Daily Cristian Zaragoza MD   325 mg at 03/15/21 0950    bumetanide (BUMEX) tablet 1 mg  1 mg Oral Daily Cristian Zaragoza MD   1 mg at 03/15/21 0950    carbidopa-levodopa (SINEMET)  MG per tablet 2 tablet  2 tablet Oral 4x Daily Cristian Zaragoza MD   2 tablet at 03/15/21 0950    docusate sodium (COLACE) capsule 100 mg  100 mg Oral Daily Joseph Orr MD   100 mg at 03/15/21 0950    doxazosin (CARDURA) tablet 4 mg  4 mg Oral Nightly Joseph Orr MD   4 mg at 03/14/21 2344    ferrous sulfate (IRON 325) tablet 325 mg  325 mg Oral Daily with breakfast Joseph Orr MD   325 mg at 03/15/21 0950    levothyroxine (SYNTHROID) tablet 100 mcg  100 mcg Oral Daily Joseph Orr MD        lisinopril (PRINIVIL;ZESTRIL) tablet 10 mg  10 mg Oral Daily Joseph Orr MD   10 mg at 03/15/21 0950    montelukast (SINGULAIR) tablet 10 mg  10 mg Oral Nightly Joseph Orr MD   10 mg at 03/14/21 2345    polyethylene glycol (GLYCOLAX) packet 17 g  17 g Oral Daily Joseph Orr MD   17 g at 03/15/21 0951    potassium bicarb-citric acid (EFFER-K) effervescent tablet 40 mEq  40 mEq Oral Daily Joseph Orr MD   40 mEq at 03/15/21 0951    traZODone (DESYREL) tablet 50 mg  50 mg Oral Nightly Joseph Orr MD   50 mg at 03/14/21 2345    sodium chloride flush 0.9 % injection 10 mL  10 mL Intravenous 2 times per day Joseph Orr MD   10 mL at 03/15/21 0951    sodium chloride flush 0.9 % injection 10 mL  10 mL Intravenous PRN Joseph Orr MD        potassium chloride (KLOR-CON M) extended release tablet 40 mEq  40 mEq Oral PRN Joseph Orr MD        Or   Vinod Leiva potassium bicarb-citric acid (EFFER-K) effervescent tablet 40 mEq  40 mEq Oral PRN Joseph Orr MD        Or   Vinod Leiva potassium chloride 10 mEq/100 mL IVPB (Peripheral Line)  10 mEq Intravenous PRN Joseph Orr MD        senna (SENOKOT) tablet 8.6 mg  1 tablet Oral Daily PRN Joseph Orr MD        acetaminophen (TYLENOL) tablet 650 mg  650 mg Oral Q6H PRN Joseph Orr MD        Or   Vinod Leiva acetaminophen (TYLENOL) suppository 650 mg  650 mg Rectal Q6H PRN Joseph Orr MD        cefTRIAXone (ROCEPHIN) 1,000 mg in sterile water 10 mL IV syringe  1,000 mg Intravenous Q24H Joseph Orr MD        calcium elemental (OSCAL) tablet 500 mg  500 mg Oral Daily Arlene Foster MD   500 mg at 03/15/21 0951    And    vitamin D3 (CHOLECALCIFEROL) tablet 400 Units  400 Units Oral Daily Arlene Foster MD   400 Units at 03/15/21 0951    trimethobenzamide (TIGAN) injection 200 mg  200 mg Intramuscular Q6H PRN Arlene Foster MD        prochlorperazine (COMPAZINE) injection 10 mg  10 mg Intravenous Q6H PRN Arlene Foster MD             Physical Exam:  /65   Pulse 61   Temp 97.6 °F (36.4 °C) (Oral)   Resp 16   Ht 5' 6\" (1.676 m)   Wt 173 lb 3.2 oz (78.6 kg)   SpO2 93%   BMI 27.96 kg/m²   Weight change: Wt Readings from Last 3 Encounters:   03/15/21 173 lb 3.2 oz (78.6 kg)   03/02/21 191 lb 12.8 oz (87 kg)   02/01/21 180 lb 3.2 oz (81.7 kg)         General: Awake, alert, oriented x3, no acute distress  HEENT: Unremarkable  Neck: No JVD or bruits. Cardiac: Regular rate and rhythm, normal S1 and S2, no extra heart sounds, murmurs, heaves, thrills  Resp: Lungs clear without wheezing or crackles. No accessory muscle use or retraction  Abdomen: soft, nontender, nondistended, no gross organomegaly or mass  Skin: Warm and dry, no cyanosis. Musculoskeletal: normal tone and strength in the upper and lower extremities bilaterally  Neuro: Grossly unremarkable  Psych: Cooperative, and normal affect    Intake/Output:    Intake/Output Summary (Last 24 hours) at 3/15/2021 1136  Last data filed at 3/15/2021 0603  Gross per 24 hour   Intake --   Output 900 ml   Net -900 ml     No intake/output data recorded. Laboratory Tests:  Lab Results   Component Value Date    CREATININE 1.0 03/15/2021    BUN 26 (H) 03/15/2021     03/15/2021    K 3.5 03/15/2021     03/15/2021    CO2 25 03/15/2021     No results for input(s): CKTOTAL, CKMB in the last 72 hours.     Invalid input(s): TROPONONI  No results found for: BNP  Lab Results   Component Value Date    WBC 11.3 03/15/2021    RBC 2.41 03/15/2021    HGB 8.0 03/15/2021    HCT 26.0 03/15/2021    .9 03/15/2021    MCH 33.2 03/15/2021    MCHC 30.8 03/15/2021    RDW 15.9 03/15/2021     03/15/2021    MPV 10.5 03/15/2021     Recent Labs     03/14/21  0849 03/15/21  0305   ALKPHOS 166* 171*   ALT <5 <5   AST 12 13   PROT 6.3* 5.8*   BILITOT 1.5* 0.8   LABALBU 3.2* 2.9*     Lab Results   Component Value Date    MG 2.1 03/15/2021     Lab Results   Component Value Date    PROTIME 18.7 03/03/2021    INR 1.7 03/03/2021     Lab Results   Component Value Date    TSH 7.060 01/31/2021     No components found for: CHLPL  Lab Results   Component Value Date    TRIG 72 09/24/2020    TRIG 113 10/21/2019     Lab Results   Component Value Date    HDL 36 09/24/2020    HDL 39 10/21/2019     Lab Results   Component Value Date    LDLCALC 84 09/24/2020    [address] 10/21/2019     Lab Results   Component Value Date    INR 1.7 03/03/2021       Admission ECG reviewed by me revealed sinus rhythm with bifascicular block. ASSESSMENT / PLAN:    #1.  Elevated troponin-nondiagnostic for plaque rupture and subsequent non-STEMI. Potential type II, or supply/demand mismatch, non-STEMI on the basis of UTI. Does not require inpatient stress testing. #2. Hypertension-blood pressure controlled. No new antihypertensives added. #3. Bifascicular block-no current indication for pacemaker but would avoid beta-blockers, rate slowing calcium channel blockers, or clonidine. #4. No further inpatient cardiac recommendations pending.           Electronically signed by Yobany Salazar MD on 3/15/2021 at 11:36 AM

## 2021-03-15 NOTE — DISCHARGE INSTR - COC
Continuity of Care Form    Patient Name: Bard Duarte   :  1930  MRN:  [id number]    Admit date:  3/14/2021  Discharge date:  3/18/21    Code Status Order: Full Code   Advance Directives:      Admitting Physician:  Yelena Shah MD  PCP: Armando Menon MD    Discharging Nurse: [facility] Unit/Room#: 9379/5447-I  Discharging Unit Phone Number: [phone]    Emergency Contact:   Extended Emergency Contact Information  Primary Emergency Contact: Liss Luevano  Address: [address] [place] [address] Phone: [phone]  Mobile Phone: [phone]  Relation: Spouse   needed? No  Secondary Emergency Contact: [address]  Home Phone: [phone]  Mobile Phone: [phone]  Relation: Grandchild  Preferred language: English   needed?  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 6     HERNIA REPAIR Left     groin repair     HIP FRACTURE SURGERY Right 3/3/2021    INTRAMEDULLARY NAIL HIP FIXATION RIGHT HIP   +++SYNTHES+++ performed by Jt Perez MD at [facility] Left 2021    LEFT HIP HEMIARTHROPLASTY performed by Mohit Milian MD at [address] [place] [address] Right     5-10 years ago   [facility]         Immunization History:   Immunization History   Administered Date(s) Administered    Influenza Virus Vaccine 10/19/2010, 2011, 10/29/2012, 10/17/2013, 2014, 10/09/2015, 2016    Influenza, MDCK Quadv, IM, PF (Flucelvax 4 yrs and older) 2017    Influenza, Quadv, IM, PF (6 mo and older Fluzone, Flulaval, Fluarix, and 3 yrs and older Afluria) 10/24/2018    Influenza, Triv, inactivated, subunit, adjuvanted, IM (Fluad 65 yrs and older) 2019    Pneumococcal Conjugate 13-valent ([id number]) 2015    Pneumococcal Polysaccharide ([id number]) 10/29/2012    Zoster Live (Zostavax) 11/14/2014    Zoster Recombinant (Shingrix) 08/15/2019, 02/06/2020       Active Problems:  Patient Active Problem List   Diagnosis Code    Hypertension I10    Parkinson disease ([facility] [address] [address] G20    Hypothyroidism E03.9    Stage 3 chronic kidney disease N18.30    Venous stasis dermatitis of both lower extremities I87.2    Closed fracture of neck of right femur ([facility] S72.001A    UTI (urinary tract infection) N39.0       Isolation/Infection:   Isolation            No Isolation          Patient Infection Status       Infection Onset Added Last Indicated Last Indicated By Review Planned Expiration Resolved Resolved By    None active    Resolved    COVID-19 Rule Out 03/03/21 03/03/21 03/03/21 Respiratory Panel, Molecular, with COVID-19 (Restricted: peds pts or suitable admitted adults) (Ordered)   03/03/21 Rule-Out Test Resulted    COVID-19 Rule Out 01/27/21 01/27/21 01/27/21 COVID-19 (Ordered)   01/27/21 Rule-Out Test Resulted            Nurse Assessment:  Last Vital Signs: /65   Pulse 61   Temp 97.6 °F (36.4 °C) (Oral)   Resp 16   Ht 5' 6\" (1.676 m)   Wt 173 lb 3.2 oz (78.6 kg)   SpO2 93%   BMI 27.96 kg/m²     Last documented pain score (0-10 scale): Pain Level: 0  Last Weight:   Wt Readings from Last 1 Encounters:   03/15/21 173 lb 3.2 oz (78.6 kg)     Mental Status:  oriented, alert and logical    IV Access:  - Midline right upper arm 3/18/21    Nursing Mobility/ADLs:  Walking   Assisted  Transfer  Assisted  Bathing  Assisted  Dressing  Assisted  Toileting  Assisted  Feeding  Assisted  Med Admin  Assisted  Med Delivery   whole    Wound Care Documentation and Therapy:        Elimination:  Continence:   · Bowel: No  · Bladder: brewster  Urinary Catheter:  Insertion Date: 3/14/21   Colostomy/Ileostomy/Ileal Conduit: No       Date of Last BM: 3/18/21    Intake/Output Summary (Last 24 hours) at 3/15/2021 1306  Last data filed at 3/15/2021 1236  Gross per 24 hour   Intake 60 ml Output 900 ml   Net -840 ml     I/O last 3 completed shifts:  In: -   Out: 900 [Urine:900]    Safety Concerns: At Risk for Falls    Impairments/Disabilities:      Vision    Nutrition Therapy:  Current Nutrition Therapy:   - Oral Diet:  General    Routes of Feeding: Oral  Liquids: No Restrictions  Daily Fluid Restriction: no  Last Modified Barium Swallow with Video (Video Swallowing Test): not done    Treatments at the Time of Hospital Discharge:   Respiratory Treatments: ***  Oxygen Therapy:  is not on home oxygen therapy. Ventilator:    - No ventilator support    Rehab Therapies: Physical Therapy and Occupational Therapy  Weight Bearing Status/Restrictions: No weight bearing restirctions  Other Medical Equipment (for information only, NOT a DME order):  {EQUIPMENT:[id number]}  Other Treatments: ***    Patient's personal belongings (please select all that are sent with patient):  Glasses, clothes and personal items    RN SIGNATURE:  Electronically signed by Ervin Oreilly RN on 3/18/21 at 3:32 PM EDT    CASE MANAGEMENT/SOCIAL WORK SECTION    Inpatient Status Date: ***    Readmission Risk Assessment Score:  Readmission Risk              Risk of Unplanned Readmission:        0           Discharging to Facility/ Agency   · Name: [address] [place] [address]  Address:   [address] [place] [address]         Phone: [phone]       Fax: [phone]          Dialysis Facility (if applicable)   · Name:  · Address:  · Dialysis Schedule:  · Phone:  · Fax:    / signature: Electronically signed by Zachary Hu on 3/15/21 at 1:07 PM EDT    PHYSICIAN SECTION    Prognosis: Fair    Condition at Discharge: Stable    Rehab Potential (if transferring to Rehab):  Fair    Recommended Labs or Other Treatments After Discharge: Consultant follow-ups    Physician Certification: I certify the above information and transfer of Isaias Anderson  is necessary for the continuing treatment of the diagnosis listed and that he requires [place] for less 30 days.      Update Admission H&P: No change in H&P    PHYSICIAN SIGNATURE:  Electronically signed by Adama Vera DO on 3/16/21 at 12:33 PM EDT

## 2021-03-15 NOTE — PROGRESS NOTES
Patient became more cooperative and was willing to take the scheduled PM medications at this time.     Electronically signed by Tip Avila on 3/14/21 at 11:47 PM EDT

## 2021-03-15 NOTE — H&P
Internal Medicine History & Physical     Name: Rusty Robins  : 1930  Chief Complaint: Altered Mental Status (from [facility] )  Primary Care Physician: Soco Erazo MD  Admission date: 3/14/2021  Date of service: 3/15/2021     History of Present Illness  Familia Marks is a 80y.o. year old male. He presented to the hospital from the nursing home with altered mental status and difficulty with urination. He has had a complex medical history recently. He broke one of his hips and had this repaired and went to rehab. At rehab he fell and broke the other hip and then came back to the hospital to have this repaired. He was sent back to the rehab facility. At the facility he was having difficulty with urination and they were doing intermittent catheterizations on him. He developed some altered mental status and for this reason they sent him into the hospital.  In the emergency department he found that he had a UTI. Antibiotics were started. Antifungal medications were started as well because Candida was seen in the urine. He is back to his baseline mental status. He is not requiring any oxygen. He denies any complaints whatsoever. He has a Lang catheter in place. Nothing particular makes his symptoms better or worse. There are no other associated symptoms. Overall symptoms are moderate in severity. There were no family or friends present at bedside. History is provided by the patient and review of the medical record. He is felt to be a fair historian. ED course:   Initial blood work and imaging studies performed. Admission recommended by ED physician.  Meds in ED consisted of the following: Sanjeevephin    Past Medical History:   Diagnosis Date    High cholesterol     Hypertension     Hypothyroidism     Parkinson disease ([facility] [address]     Diagnosed in  by Dr. Sarah Weathers due to resting tremor    RBBB        Past Surgical History:   Procedure Laterality Date    FOOT SURGERY      FOOT SURGERY club foot repair-age 6     HERNIA REPAIR Left     groin repair     HIP FRACTURE SURGERY Right 3/3/2021    INTRAMEDULLARY NAIL HIP FIXATION RIGHT HIP   +++SYNTHES+++ performed by Isaac Hollins MD at [facility] Left 1/29/2021    LEFT HIP HEMIARTHROPLASTY performed by Capri Manning MD at [address] [place] [address] Right     5-10 years ago    TONSILLECTOMY AND ADENOIDECTOMY         Family History   Problem Relation Age of Onset    Heart Disease Mother     Cancer Father         lung    Breast Cancer Sister        Social History  Patient lives at home with his wife. Employment: retired  Illicit drug use- denies  TOBACCO:   reports that he quit smoking about 50 years ago. He quit after 10.00 years of use. He has never used smokeless tobacco.  ETOH:   reports no history of alcohol use. Home Medications  Prior to Admission medications    Medication Sig Start Date End Date Taking?  Authorizing Provider   polyethylene glycol (GLYCOLAX) 17 g packet Take 17 g by mouth daily 3/4/21 4/3/21 Yes Hunter Quan, DO   calcium carbonate-vitamin D (CALTRATE) 600-400 MG-UNIT TABS per tab Take 1 tablet by mouth daily   Yes Historical Provider, MD   potassium chloride (KLOR-CON) 20 MEQ packet Take 40 mEq by mouth daily   Yes Historical Provider, MD   aspirin 325 MG EC tablet Take 1 tablet by mouth daily 2/1/21  Yes Hunter Quan DO   docusate sodium (COLACE, DULCOLAX) 100 MG CAPS Take 100 mg by mouth daily 2/1/21  Yes Hunter Quan, DO   ferrous sulfate (IRON 325) 325 (65 Fe) MG tablet Take 1 tablet by mouth daily (with breakfast) 2/1/21  Yes Hunter Quan DO   ipratropium-albuterol (DUONEB) 0.5-2.5 (3) MG/3ML SOLN nebulizer solution Inhale 3 mLs into the lungs every 4 hours (while awake) 2/1/21  Yes Hunter Quan DO   sennosides-docusate sodium (SENOKOT-S) 8.6-50 MG tablet Take 1 tablet by mouth 2 times daily 2/1/21  Yes Hunter Quan DO   bumetanide (BUMEX) 1 MG tablet Take 1 mg by mouth daily   Yes Historical Provider, MD   clobetasol (TEMOVATE) 0.05 % ointment Apply topically 2 times daily. 7/6/20  Yes Efrain Marie DPM   silver sulfADIAZINE (SILVADENE) 1 % cream Apply topically daily. 6/9/20  Yes Melisa Schroeder DO   levothyroxine (SYNTHROID) 25 MCG tablet Take 3 tabs PO Monday - Friday and 2 tabs PO Saturday - Sunday  Patient taking differently: 100 mcg  4/23/20 3/14/21 Yes LORIE Marsh CNP   traZODone (DESYREL) 50 MG tablet Take 1 tablet by mouth nightly 4/6/20 3/14/21 Yes LORIE Obrien CNP   lisinopril (PRINIVIL;ZESTRIL) 10 MG tablet Take 1 tablet by mouth daily 4/1/20  Yes Deepak Rosas MD   montelukast (SINGULAIR) 10 MG tablet Take 1 tablet by mouth nightly 2/1/21   Hunter Quan DO   carbidopa-levodopa (SINEMET)  MG per tablet Take 2 tablets by mouth 4 times daily 6/7/20   Historical Provider, MD   doxazosin (CARDURA) 4 MG tablet Take 1 tablet by mouth nightly 4/6/20   LORIE Obrien CNP   Misc. Devices MISC Dispense #2 compression stalkings  Dx: leg edema 12/24/17   Kevin Shmuel Fleming, DO   Red Yeast Rice 600 MG TABS Take by mouth 2 times daily     Historical Provider, MD       Allergies  Allergies   Allergen Reactions    Sulfa Antibiotics      Hyperactivity        Review of Systems  Please see HPI above. All bolded are positive. All un-bolded are negative.   Constitutional Symptoms: fever, chills, fatigue, generalized weakness, diaphoresis, increase in thirst, loss of appetite  Eyes: vision change   Ears, Nose, Mouth, Throat: hearing loss, nasal congestion, sores in the mouth  Cardiovascular: chest pain, chest heaviness, palpitations  Respiratory: shortness of breath, wheezing, coughing  Gastrointestinal: abdominal pain, nausea, vomiting, diarrhea, constipation, melena, hematochezia, hematemesis  Genitourinary: dysuria, hematuria, decrease in frequency  Musculoskeletal: lower extremity edema, myalgias, arthralgias, back pain  Integumentary: rashes, itching 03/15/2021     03/15/2021    CREATININE 1.0 03/15/2021    BUN 26 (H) 03/15/2021    CO2 25 03/15/2021    GLUCOSE 130 (H) 03/15/2021    ALT <5 03/15/2021    AST 13 03/15/2021    INR 1.7 03/03/2021     Lab Results   Component Value Date    TROPONINI 0.05 (H) 03/15/2021         Recent Radiological Studies:  CT ABDOMEN PELVIS W IV CONTRAST Additional Contrast? None   Final Result   1. Presence of a acute/subacute fracture of the left pubic bone affecting   the body, superior ramus, and inferior ramus. 2.  strong streak artifact from the left hip prosthesis and from the ORIF for   proximal right fibular fracture of recently performed. 3.  Full bladder is not distended, there is a Lang catheter in the bladder   lumen. Mild the thickening of the bladder wall is seen. 4.  No obstructive uropathy. 5.  Pattern of constipation. Uncomplicated diverticulosis seen throughout   the colon but there is no conspicuous acute diverticulitis. 6.  Normal distended gallbladder with small gallstone. No dilatation of the   biliary tree pancreatic ductal system. CT Head WO Contrast   Final Result   No acute intracranial abnormality. XR CHEST PORTABLE   Final Result   1. Resolving infiltrate within the right upper lobe. 2. Persistent small infiltrate seen within the retrocardiac region.              Assessment  Active Hospital Problems    Diagnosis    UTI (urinary tract infection) [N39.0]     Priority: High    Stage 3 chronic kidney disease [N18.30]    Venous stasis dermatitis of both lower extremities [I87.2]    Hypertension [I10]    Hypothyroidism [E03.9]    Parkinson disease ([address] [G20]       Patient Active Problem List    Diagnosis Date Noted    UTI (urinary tract infection) 03/14/2021     Priority: High    Closed fracture of neck of right femur ([address] 03/02/2021    Stage 3 chronic kidney disease 10/21/2019    Venous stasis dermatitis of both lower extremities 10/21/2019    Hypertension     Parkinson disease ([facility] [place] [address]     Hypothyroidism        Plan  UTI/metabolic encephalopathy/urinary retention: UA noted. Urine cx pending. IV Rocephin. Lang. Urology consultation. · Elevated troponin: Recheck still elevated. Cardiology consult. Telemetry. Anemia: Pending Fe/Ferritin/TIBC/vitamin B12/folate. Follow H&H.  · Recent right and left femoral neck fractures: Ortho following as an OP--sp right hemiarthroplasty 3/3 and sp left hemiarthroplasty 1/29. · Left pubis fx: Ortho consult. · Continue home medications  · PT/OT  · Follow labs  · DVT prophylaxis. · Please see orders for further management and care. ·  for discharge planning  · Discharge plan: Back to [facility] when medically stable    Hunter Quan DO  3/15/2021  11:17 AM    I can be reached through [employer]. NOTE:  This report was transcribed using voice recognition software. Every effort was made to ensure accuracy; however, inadvertent computerized transcription errors may be present.

## 2021-03-15 NOTE — CARE COORDINATION
OBS LOC. Pt. w/ intermittent confusion. On iv abx for UTI. Spoke w/ wife Doris Bar [phone]. Discharge planning discussed. From [facility]- per Marlene Rossist @ [place], pt is not a bedhold- will need insurance precert to return. Per wife, plan is for her  to return to [place] on discharge- stated she had concerns regarding visitation @ [place] that she was going to address with the facility. Nursing prompted for need of COVID testing for facility. Awaiting PT/OT evals.  Will follow Sylwia Mckeon

## 2021-03-15 NOTE — PROGRESS NOTES
Physical Therapy    Facility/Department: [address] INTERMEDIATE  Initial Assessment    NAME: Marek Fenton  : 1930  MRN: [id number]    Date of Service: 3/15/2021       REQUIRES PT FOLLOW UP: Yes       Patient Diagnosis(es): The primary encounter diagnosis was Urinary tract infection in male. Diagnoses of Metabolic encephalopathy, Elevated troponin, and Closed nondisplaced fracture of pelvis, unspecified part of pelvis, initial encounter [facility]) were also pertinent to this visit. has a past medical history of High cholesterol, Hypertension, Hypothyroidism, Parkinson disease ([facility] [address] and RBBB. has a past surgical history that includes Foot surgery (); shoulder surgery (Right); hernia repair (Left); Tonsillectomy and Adenoidectomy; Foot surgery; hip surgery (Left, 2021); and Hip fracture surgery (Right, 3/3/2021). Attending Provider:  Kenia Giang DO     Evaluating PT:  [facility]      Room #: 829   Diagnosis: UTI   Pertinent PMHx/PSHx:  Parkinson's disease, 21 L hip hemiarthroplasty  Procedure/Surgery:  3/3/21 R hip IT nail  Precautions:  WBAT BLE, falls      SUBJECTIVE:     Pt lives with wife in a 1 story home with 1+1 stairs and no rail to enter. Pt came in from [facility].       OBJECTIVE:    Initial Evaluation  Date: 3/15/2021 Treatment Short Term/ Long Term   Goals   Was pt agreeable to Eval/treatment? yes       Does pt have pain? None reported        Bed Mobility  supine to sit : max assist    Min assist    Transfers Sit to stand: MAX A   Stand to sit: MAX A   Stand pivot: NA   Min assist    Ambulation   4 feet x 1  With ww mod assist    50 feet with ww min assist    AM-PAC 6 Clicks 58/22          BLE AAROM WFL   BLE strength is grossly  3-/5.    Edema:  BLE  Balance: Standing with ww is mod/MAX A, high fall risk   Endurance: poor             Patient education  Pt educated on fall risk, hand placement during transfers.      Patient response to education:   Pt verbalized understanding Pt Therapeutic exercises 86268 minutes  []?  Neuromuscular reeducation 15998 ** minutes      [address] Number: [id number]

## 2021-03-15 NOTE — PROGRESS NOTES
Occupational Therapy  OCCUPATIONAL THERAPY INITIAL EVALUATION      Date:3/15/2021  Patient Name: Wanda Posey  MRN: [id number]  : 1930  Room: [address] [place] [address]    Referring Provider: Florencia Gustafson MD    Evaluating OT: Estelita Dudley OTR/L [id number]    AM-PAC Daily Activity Raw Score: 1324    Recommended Adaptive Equipment: TBD    Diagnosis: UTI. Pt presents to ED with AMS. Surgery:  21 L hip hemiarthroplasty, 3/3/21 R hip IT nailing  Pertinent Medical History: HTN, Parkinson's disease  Precautions:  Falls, FWBAT L LE, posterolateral hip precautions R LE     Home Living: Admitted from [place] [id number]. Per pt report staff assist in all ADLs. Completing functional mobility with [employer] and staff assist.    Pt lives with wife in a single story set up. [employer] for mobility. Pain Level: no reported or observable signs of pain    Cognition: A&O: 2/4. Pt is oriented to self and hospital. Disoriented to temporal concepts and situation. Problem solving:  poor   Judgement/safety:  poor     Functional Assessment:   Initial Eval Status  Date: 3/15/21 Treatment session:  Short Term Goals     Feeding SBA  After set up of breakfast tray  Set up   Grooming Min A  Set up   UB Dressing Mod A  Management of hospital gown  Set up   LB Dressing Dependent  Management of B socks  Mod A    Bathing Max A  Mod A   Toileting Max A  Incontinent of BM upon standing total assist in vilma care  Mod A   Bed Mobility  Supine to sit: Max A     Functional Transfers STS: Max A  Min A   Functional Mobility Max A with [employer]  Small steps bed to armchair  Min A during ADLs   Balance Sitting: fair/fair minus    Standing: poor at [employer]     Activity Tolerance Poor plus  standing durga x4-5 min with fair minus balance during self care tasks             Treatment: Patient educated on techniques for completion of ADL, safe functional transfers and functional mobility.   Patient required cues for follow through with proper hand/foot placement, pacing, safety, compensatory skills & safe participation in ADLs/IADLs     []Sensory re-education techniques to improve extremity awareness, maintain skin integrity and improve hand function                             []Visual/Perceptual retraining to improve body awareness and safety during transfers and ADLs  []Splinting/positioning needs to maintain joint/skin integrity and contracture prevention  [x]Therapeutic activity to improve functional performance during ADLs                                        [x]Therapeutic exercise to improve tolerance and functional strength for ADLs   [x]Balance retraining/tolerance tasks for facilitation of postural control with dynamic challenges during ADLs  []Neuromuscular re-education to facilitate righting/equilibrium reactions, midline orientation, scapular stability/mobility, normalize muscle tone and facilitate active functional movement                        []Delirium prevention/treatment    [x]Positioning to improve functional independence and decrease risk of skin breakdown  []Other:     Rehab Potential: Fair for established goals     Patient/Family Goal: To get home. Patient and/or family were instructed on functional diagnosis, prognosis/goals and OT plan of care. Pt verbalized questionable understanding. Upon arrival, patient supine in bed. At end of session, patient seated in armchair with call light and phone within reach, all lines and tubes intact. Pt would benefit from continued skilled OT to increase safety and independence with completion of ADL/IADL tasks for functional independence and quality of life. Bed/chair alarm: ON. Nursing staff notified of status and transfer.     Low Evaluation [id number]  Time In: 1005   Time Out: 1015      Evaluation time includes thorough review of current medical information, gathering information on past medical history/social history and prior level of function, completion of standardized testing/informal observation of tasks, assessment of data, and development of POC/Goals    Osgood Aicha OTR/L  [id number]

## 2021-03-16 LAB
ALBUMIN SERPL-MCNC: 3.2 G/DL (ref 3.5–5.2)
ALP BLD-CCNC: 179 U/L (ref 40–129)
ALT SERPL-CCNC: <5 U/L (ref 0–40)
ANION GAP SERPL CALCULATED.3IONS-SCNC: 8 MMOL/L (ref 7–16)
ANISOCYTOSIS: ABNORMAL
AST SERPL-CCNC: 11 U/L (ref 0–39)
BASOPHILS ABSOLUTE: 0.1 E9/L (ref 0–0.2)
BASOPHILS RELATIVE PERCENT: 0.9 % (ref 0–2)
BILIRUB SERPL-MCNC: 0.7 MG/DL (ref 0–1.2)
BUN BLDV-MCNC: 25 MG/DL (ref 8–23)
CALCIUM SERPL-MCNC: 8.5 MG/DL (ref 8.6–10.2)
CHLORIDE BLD-SCNC: 107 MMOL/L (ref 98–107)
CO2: 26 MMOL/L (ref 22–29)
CREAT SERPL-MCNC: 1 MG/DL (ref 0.7–1.2)
EOSINOPHILS ABSOLUTE: 0.91 E9/L (ref 0.05–0.5)
EOSINOPHILS RELATIVE PERCENT: 8.4 % (ref 0–6)
GFR AFRICAN AMERICAN: >60
GFR NON-AFRICAN AMERICAN: >60 ML/MIN/1.73
GLUCOSE BLD-MCNC: 111 MG/DL (ref 74–99)
HCT VFR BLD CALC: 29.4 % (ref 37–54)
HEMOGLOBIN: 8.5 G/DL (ref 12.5–16.5)
HYPOCHROMIA: ABNORMAL
IMMATURE GRANULOCYTES #: 0.12 E9/L
IMMATURE GRANULOCYTES %: 1.1 % (ref 0–5)
LYMPHOCYTES ABSOLUTE: 1.09 E9/L (ref 1.5–4)
LYMPHOCYTES RELATIVE PERCENT: 10 % (ref 20–42)
MCH RBC QN AUTO: 32.6 PG (ref 26–35)
MCHC RBC AUTO-ENTMCNC: 28.9 % (ref 32–34.5)
MCV RBC AUTO: 112.6 FL (ref 80–99.9)
MONOCYTES ABSOLUTE: 0.86 E9/L (ref 0.1–0.95)
MONOCYTES RELATIVE PERCENT: 7.9 % (ref 2–12)
NEUTROPHILS ABSOLUTE: 7.78 E9/L (ref 1.8–7.3)
NEUTROPHILS RELATIVE PERCENT: 71.7 % (ref 43–80)
OVALOCYTES: ABNORMAL
PDW BLD-RTO: 15.9 FL (ref 11.5–15)
PLATELET # BLD: 321 E9/L (ref 130–450)
PMV BLD AUTO: 10.5 FL (ref 7–12)
POIKILOCYTES: ABNORMAL
POLYCHROMASIA: ABNORMAL
POTASSIUM REFLEX MAGNESIUM: 4.1 MMOL/L (ref 3.5–5)
RBC # BLD: 2.61 E12/L (ref 3.8–5.8)
SODIUM BLD-SCNC: 141 MMOL/L (ref 132–146)
TEAR DROP CELLS: ABNORMAL
TOTAL CK: 15 U/L (ref 20–200)
TOTAL PROTEIN: 6.3 G/DL (ref 6.4–8.3)
WBC # BLD: 10.9 E9/L (ref 4.5–11.5)

## 2021-03-16 PROCEDURE — 6370000000 HC RX 637 (ALT 250 FOR IP): Performed by: INTERNAL MEDICINE

## 2021-03-16 PROCEDURE — 80053 COMPREHEN METABOLIC PANEL: CPT

## 2021-03-16 PROCEDURE — 2580000003 HC RX 258: Performed by: INTERNAL MEDICINE

## 2021-03-16 PROCEDURE — 1200000000 HC SEMI PRIVATE

## 2021-03-16 PROCEDURE — 97110 THERAPEUTIC EXERCISES: CPT

## 2021-03-16 PROCEDURE — 6360000002 HC RX W HCPCS: Performed by: INTERNAL MEDICINE

## 2021-03-16 PROCEDURE — 6360000002 HC RX W HCPCS: Performed by: SPECIALIST

## 2021-03-16 PROCEDURE — 2580000003 HC RX 258: Performed by: SPECIALIST

## 2021-03-16 PROCEDURE — 85025 COMPLETE CBC W/AUTO DIFF WBC: CPT

## 2021-03-16 PROCEDURE — 97530 THERAPEUTIC ACTIVITIES: CPT

## 2021-03-16 PROCEDURE — 94640 AIRWAY INHALATION TREATMENT: CPT

## 2021-03-16 PROCEDURE — 96376 TX/PRO/DX INJ SAME DRUG ADON: CPT

## 2021-03-16 PROCEDURE — 36415 COLL VENOUS BLD VENIPUNCTURE: CPT

## 2021-03-16 PROCEDURE — 82550 ASSAY OF CK (CPK): CPT

## 2021-03-16 RX ADMIN — CARBIDOPA AND LEVODOPA 2 TABLET: 25; 100 TABLET ORAL at 09:36

## 2021-03-16 RX ADMIN — LEVOTHYROXINE SODIUM 100 MCG: 100 TABLET ORAL at 05:01

## 2021-03-16 RX ADMIN — CALCIUM 500 MG: 500 TABLET ORAL at 09:36

## 2021-03-16 RX ADMIN — IPRATROPIUM BROMIDE AND ALBUTEROL SULFATE 3 ML: .5; 3 SOLUTION RESPIRATORY (INHALATION) at 08:25

## 2021-03-16 RX ADMIN — ASPIRIN 325 MG: 325 TABLET, COATED ORAL at 09:37

## 2021-03-16 RX ADMIN — MONTELUKAST SODIUM 10 MG: 10 TABLET, FILM COATED ORAL at 20:12

## 2021-03-16 RX ADMIN — CARBIDOPA AND LEVODOPA 2 TABLET: 25; 100 TABLET ORAL at 20:12

## 2021-03-16 RX ADMIN — SODIUM CHLORIDE 300 MG: 9 INJECTION, SOLUTION INTRAVENOUS at 15:28

## 2021-03-16 RX ADMIN — LISINOPRIL 10 MG: 10 TABLET ORAL at 09:37

## 2021-03-16 RX ADMIN — DOCUSATE SODIUM 100 MG: 100 CAPSULE, LIQUID FILLED ORAL at 11:47

## 2021-03-16 RX ADMIN — ACETAMINOPHEN 650 MG: 325 TABLET ORAL at 05:01

## 2021-03-16 RX ADMIN — POTASSIUM BICARBONATE 40 MEQ: 782 TABLET, EFFERVESCENT ORAL at 09:36

## 2021-03-16 RX ADMIN — Medication 10 ML: at 20:12

## 2021-03-16 RX ADMIN — Medication 10 ML: at 09:37

## 2021-03-16 RX ADMIN — DOXAZOSIN 4 MG: 4 TABLET ORAL at 20:12

## 2021-03-16 RX ADMIN — BUMETANIDE 1 MG: 1 TABLET ORAL at 09:36

## 2021-03-16 RX ADMIN — FERROUS SULFATE TAB 325 MG (65 MG ELEMENTAL FE) 325 MG: 325 (65 FE) TAB at 09:36

## 2021-03-16 RX ADMIN — CARBIDOPA AND LEVODOPA 2 TABLET: 25; 100 TABLET ORAL at 13:16

## 2021-03-16 RX ADMIN — IPRATROPIUM BROMIDE AND ALBUTEROL SULFATE 3 ML: .5; 3 SOLUTION RESPIRATORY (INHALATION) at 16:22

## 2021-03-16 RX ADMIN — CEFTRIAXONE 1000 MG: 1 INJECTION, POWDER, FOR SOLUTION INTRAMUSCULAR; INTRAVENOUS at 11:47

## 2021-03-16 RX ADMIN — TRAZODONE HYDROCHLORIDE 50 MG: 50 TABLET ORAL at 20:12

## 2021-03-16 RX ADMIN — CHOLECALCIFEROL TAB 10 MCG (400 UNIT) 400 UNITS: 10 TAB at 09:36

## 2021-03-16 RX ADMIN — IPRATROPIUM BROMIDE AND ALBUTEROL SULFATE 3 ML: .5; 3 SOLUTION RESPIRATORY (INHALATION) at 13:52

## 2021-03-16 RX ADMIN — IPRATROPIUM BROMIDE AND ALBUTEROL SULFATE 3 ML: .5; 3 SOLUTION RESPIRATORY (INHALATION) at 21:35

## 2021-03-16 RX ADMIN — CARBIDOPA AND LEVODOPA 2 TABLET: 25; 100 TABLET ORAL at 16:56

## 2021-03-16 RX ADMIN — POLYETHYLENE GLYCOL 3350 17 G: 17 POWDER, FOR SOLUTION ORAL at 09:36

## 2021-03-16 ASSESSMENT — PAIN SCALES - GENERAL
PAINLEVEL_OUTOF10: 0
PAINLEVEL_OUTOF10: 5

## 2021-03-16 ASSESSMENT — PAIN DESCRIPTION - ONSET: ONSET: ON-GOING

## 2021-03-16 ASSESSMENT — PAIN DESCRIPTION - LOCATION: LOCATION: HIP

## 2021-03-16 ASSESSMENT — PAIN - FUNCTIONAL ASSESSMENT: PAIN_FUNCTIONAL_ASSESSMENT: PREVENTS OR INTERFERES SOME ACTIVE ACTIVITIES AND ADLS

## 2021-03-16 ASSESSMENT — PAIN DESCRIPTION - PROGRESSION: CLINICAL_PROGRESSION: NOT CHANGED

## 2021-03-16 ASSESSMENT — PAIN DESCRIPTION - PAIN TYPE: TYPE: SURGICAL PAIN

## 2021-03-16 ASSESSMENT — PAIN DESCRIPTION - ORIENTATION: ORIENTATION: RIGHT

## 2021-03-16 NOTE — PROGRESS NOTES
[facility] Quality Flow/Interdisciplinary Rounds Progress Note        Quality Flow Rounds held on March 16, 2021    Disciplines Attending:  Bedside Nurse, ,  and Nursing Unit Leadership    Lamont Wilcox was admitted on 3/14/2021  8:15 AM    Anticipated Discharge Date:  Expected Discharge Date: 03/17/21    Disposition:    Sriram Score:  Sriram Scale Score: 14    Readmission Risk              Risk of Unplanned Readmission:        0           Discussed patient goal for the day, patient clinical progression, and barriers to discharge. The following Goal(s) of the Day/Commitment(s) have been identified:  bridge to PO antibiotics, discharge planning.        Margret Angelo  March 16, 2021

## 2021-03-16 NOTE — PROGRESS NOTES
Spoke to patients wife in detail about current health status.  Answered her questions and told her I will follow up with her tomorrow in regards to the patients discharge

## 2021-03-16 NOTE — CARE COORDINATION
INP LOC. ID consulted- iv Daptomycin initiated-await final ID plan. Mahesh Jonathan @ [facility] updated- they are unable to accept patient on Daptomycin.  Will follow Phan Barker

## 2021-03-16 NOTE — PROGRESS NOTES
Dr. Silvina Nova present in department, notified of new orders placed by Dr. Andrew George. LACEY Isaacs notified as well.

## 2021-03-16 NOTE — PROGRESS NOTES
Physical Therapy    Facility/Department: [address] INTERMEDIATE  Treatment note    NAME: Marilyn Quezada  : 1930  MRN: [id number]    Date of Service: 3/16/2021               Patient Diagnosis(es): The primary encounter diagnosis was Urinary tract infection in male. Diagnoses of Metabolic encephalopathy, Elevated troponin, and Closed nondisplaced fracture of pelvis, unspecified part of pelvis, initial encounter [facility]) were also pertinent to this visit. has a past medical history of High cholesterol, Hypertension, Hypothyroidism, Parkinson disease ([address] and RBBB. has a past surgical history that includes Foot surgery (); shoulder surgery (Right); hernia repair (Left); Tonsillectomy and Adenoidectomy; Foot surgery; hip surgery (Left, 2021); and Hip fracture surgery (Right, 3/3/2021). Attending Provider:  Karissa Wang DO     Evaluating PT:  Kingston Gamble      Room #: 381   Diagnosis: UTI   Pertinent PMHx/PSHx:  Parkinson's disease, 21 L hip hemiarthroplasty  Procedure/Surgery:  3/3/21 R hip IT nail  Precautions:  WBAT BLE, falls      SUBJECTIVE:     Pt lives with wife in a 1 story home with 1+1 stairs and no rail to enter. Pt came in from [facility].       OBJECTIVE:    Initial Evaluation  Date: 3/15/2021 Treatment  3/16/21 Short Term/ Long Term   Goals   Was pt agreeable to Eval/treatment? yes  yes     Does pt have pain? None reported   no complaints     Bed Mobility  supine to sit : max assist  Supine to sit: Max A  Sit to supine: Max A  Scooting: Max A seated to EOB and side to side supine in bed Min assist    Transfers Sit to stand: MAX A   Stand to sit: MAX A   Stand pivot: NA Sit to stand: Max A  Stand to sit; Max A  Stand Pivot. Pt declined.   Min assist    Ambulation   4 feet x 1  With ww mod assist  Side stepping 5 feet x 1 using WW for support Mod A for balance 50 feet with ww min assist    AM-PAC 6 Clicks           Pt is alert and able to follow instruction  Balance: poor side stepping up EOB using [employer] for support    Pt performed therapeutic exercise of the following: supine B ankle pumps AROM, heel slides, hip ABd and SAQ's AAROM x 20    Patient education  Pt was educated on exercise promoting circulation, ROM and strengthening, UE usage to assist with transfers, benefits of being OOB in a chair    Patient response to education:   Pt verbalized understanding Pt demonstrated skill Pt requires further education in this area   yes With exercise and transfers yes     ASSESSMENT:   Comments: Nurse ok with Rx, states while in the room Pt very difficult to get back to bed yesterday and required a lot of assistance. Pt agreed to rx, exercise performed. Pt then assisted to EOB, sat EOB Min/SBA for balance. Sit to stand performed x 2 with Mod/Max A for standing balance due to retroversion. Pt able to side step up EOB, refused to get into a chair, requested back to bed. Treatment: Pt practiced and was instructed in the following treatment: exercise, transfers    Pt was left in bed per request with call light in reach    Chair/bed alarm: bed alarm active    Time in 0944   Time out 1015   Total Treatment Time 31 minutes   CPT codes:     Therapeutic activities 92618 14 minutes   Therapeutic exercises 78150 17 minutes       Pt is making minimal progress toward established Physical Therapy goals as per brief functional mobility performed and exercise participation. Continue with physical therapy current plan of care.     Kiara Stoner PTA   License Number: [id number]

## 2021-03-16 NOTE — PROGRESS NOTES
Internal Medicine Progress Note    Patient's name: Brook Colbert  : 1930  Chief complaints (on day of admission): Altered Mental Status (from [facility] )  Admission date: 3/14/2021  Date of service: 3/16/2021   Room: [address] INTERMEDIATE  Primary care physician: Spring Marlow MD  Reason for visit: Follow-up for uti/urinary retention     Subjective  Carolina Grandchild was seen and examined. He was sitting in his chair. He was eating lunch. He denies new complaints. He is tolerating Lang catheter and seemed comfortable with using this on discharge at the rehab facility. There are no new issues or problems noted today. Review of Systems  There are no new complaints of chest pain, shortness of breath, abdominal pain, nausea, vomiting, diarrhea, constipation.     Hospital Medications  Current Facility-Administered Medications   Medication Dose Route Frequency Provider Last Rate Last Admin    ipratropium-albuterol (DUONEB) nebulizer solution 3 mL  3 mL Inhalation Q4H KILEY Quan DO   3 mL at 21 0825    aspirin EC tablet 325 mg  325 mg Oral Daily Shelly Browne MD   325 mg at 21 4152    bumetanide (BUMEX) tablet 1 mg  1 mg Oral Daily Shelly Browne MD   1 mg at 21 0936    carbidopa-levodopa (SINEMET)  MG per tablet 2 tablet  2 tablet Oral 4x Daily Shelly Browne MD   2 tablet at 21 6616    docusate sodium (COLACE) capsule 100 mg  100 mg Oral Daily Shelly Browne MD   100 mg at 21 1147    doxazosin (CARDURA) tablet 4 mg  4 mg Oral Nightly Shelly Browne MD   4 mg at 03/15/21 2041    ferrous sulfate (IRON 325) tablet 325 mg  325 mg Oral Daily with breakfast Shelly Browne MD   325 mg at 21 0936    levothyroxine (SYNTHROID) tablet 100 mcg  100 mcg Oral Daily Shelly Browne MD   100 mcg at 21 0501    lisinopril (PRINIVIL;ZESTRIL) tablet 10 mg  10 mg Oral Daily Shelly Browne MD   10 mg at 21 0937    montelukast (SINGULAIR) tablet 10 mg  10 mg Oral Nightly Gisella Santos MD   10 mg at 03/15/21 2041    polyethylene glycol (GLYCOLAX) packet 17 g  17 g Oral Daily Gisella Santos MD   17 g at 03/16/21 0936    potassium bicarb-citric acid (EFFER-K) effervescent tablet 40 mEq  40 mEq Oral Daily Gisella Santos MD   40 mEq at 03/16/21 0936    traZODone (DESYREL) tablet 50 mg  50 mg Oral Nightly Gisella Santos MD   50 mg at 03/15/21 2041    sodium chloride flush 0.9 % injection 10 mL  10 mL Intravenous 2 times per day Gisella Santos MD   10 mL at 03/16/21 9642    sodium chloride flush 0.9 % injection 10 mL  10 mL Intravenous PRN Gisella Santos MD        potassium chloride (KLOR-CON M) extended release tablet 40 mEq  40 mEq Oral PRN Gisella Santos MD   40 mEq at 03/15/21 2041    Or    potassium bicarb-citric acid (EFFER-K) effervescent tablet 40 mEq  40 mEq Oral PRN Gisella Santos MD        Or   [facility] potassium chloride 10 mEq/100 mL IVPB (Peripheral Line)  10 mEq Intravenous PRN Gisella Santos MD        senna (SENOKOT) tablet 8.6 mg  1 tablet Oral Daily PRN Gisella Santos MD        acetaminophen (TYLENOL) tablet 650 mg  650 mg Oral Q6H PRN Gisella Santos MD   650 mg at 03/16/21 0501    Or    acetaminophen (TYLENOL) suppository 650 mg  650 mg Rectal Q6H PRN Gisella Santos MD        cefTRIAXone (ROCEPHIN) 1,000 mg in sterile water 10 mL IV syringe  1,000 mg Intravenous Q24H Gisella Santos MD   1,000 mg at 03/16/21 1147    calcium elemental (OSCAL) tablet 500 mg  500 mg Oral Daily Gisella Santos MD   500 mg at 03/16/21 7252    And    vitamin D3 (CHOLECALCIFEROL) tablet 400 Units  400 Units Oral Daily Gisella Santos MD   400 Units at 03/16/21 0936    trimethobenzamide (TIGAN) injection 200 mg  200 mg Intramuscular Q6H PRN Gisella Santos MD        prochlorperazine (COMPAZINE) injection 10 mg  10 mg Intravenous Q6H PRN Gisella Santos MD           PRN Medications  sodium chloride flush, potassium chloride **OR** potassium alternative oral replacement **OR** potassium chloride, senna, acetaminophen **OR** acetaminophen, trimethobenzamide, prochlorperazine    Objective  Most Recent Recorded Vitals  /63   Pulse 74   Temp 98.5 °F (36.9 °C) (Oral)   Resp 18   Ht 5' 6\" (1.676 m)   Wt 173 lb 3.2 oz (78.6 kg)   SpO2 94%   BMI 27.96 kg/m²   I/O last 3 completed shifts: In: 61 [P.O.:60]  Out: 350 [Urine:350]  I/O this shift:  In: 180 [P.O.:180]  Out: 350 [Urine:350]    Physical Exam:  General: AAO to person/place/time/purpose, NAD, no labored breathing  Eyes: conjunctivae/corneas clear, sclera non icteric  Skin: color/texture/turgor normal, no rashes or lesions  Lungs: Some crackles noted  Heart: regular rate, regular rhythm, no murmur  Abdomen: soft, NT, bowel sounds normal, Lang catheter  Extremities: Postsurgical changes noted to the bilateral hips, no edema  Neurologic: cranial nerves 2-12 grossly intact, no slurred speech    Most Recent Labs  Lab Results   Component Value Date    WBC 10.9 03/16/2021    HGB 8.5 (L) 03/16/2021    HCT 29.4 (L) 03/16/2021     03/16/2021     03/16/2021    K 4.1 03/16/2021     03/16/2021    CREATININE 1.0 03/16/2021    BUN 25 (H) 03/16/2021    CO2 26 03/16/2021    GLUCOSE 111 (H) 03/16/2021    ALT <5 03/16/2021    AST 11 03/16/2021    INR 1.7 03/03/2021    TSH 7.060 (H) 01/31/2021       CT ABDOMEN PELVIS W IV CONTRAST Additional Contrast? None   Final Result   1. Presence of a acute/subacute fracture of the left pubic bone affecting   the body, superior ramus, and inferior ramus. 2.  strong streak artifact from the left hip prosthesis and from the ORIF for   proximal right fibular fracture of recently performed. 3.  Full bladder is not distended, there is a Lang catheter in the bladder   lumen. Mild the thickening of the bladder wall is seen. 4.  No obstructive uropathy. 5.  Pattern of constipation.   Uncomplicated diverticulosis seen throughout   the colon but there is no conspicuous acute

## 2021-03-16 NOTE — CONSULTS
[address] [place] [address] [facility]  Consultation Note     Admit Date: 3/14/2021  8:15 AM    Reason for Consult:   Discharge antibiotic recommendations for UTI    Attending Physician:  Alyce Trejo DO    HISTORY OF PRESENT ILLNESS:             The history is obtained from extensive review of available past medical records. The patient is a 80 y.o. male who was recently admitted to the hospital on 3/2/2021 with a closed fracture of the right femur neck. He underwent a right hemiarthroplasty on 3/3/2021. He was discharged on 3/5/2021. The patient was sent back to the ED on 3/14/2021 because he was having urinary retention and was being catheterized several times a day as his wife did not want a Lang catheter upon his return home. He was treated with Ceftriaxone. Urine cultures were showing Enterococcus avium and CoNS. ID was asked to see him in consultation and make recommendations for antibiotics.     Past Medical History:        Diagnosis Date    High cholesterol     Hypertension     Hypothyroidism     Parkinson disease ([address]     Diagnosed in 2008 by Dr. Bennie Millard due to resting tremor    RBBB      Past Surgical History:        Procedure Laterality Date    FOOT SURGERY  1935    FOOT SURGERY      club foot repair-age 6     HERNIA REPAIR Left     groin repair     HIP FRACTURE SURGERY Right 3/3/2021    INTRAMEDULLARY NAIL HIP FIXATION RIGHT HIP   +++SYNTHES+++ performed by Alethea Cunningham MD at . [facility] Left 1/29/2021    LEFT HIP HEMIARTHROPLASTY performed by Godfrey Antunez MD at [address] [place] [address] Right     5-10 years ago    TONSILLECTOMY AND ADENOIDECTOMY       Current Medications:   Scheduled Meds:   ipratropium-albuterol  3 mL Inhalation Q4H WA    aspirin  325 mg Oral Daily    bumetanide  1 mg Oral Daily    carbidopa-levodopa  2 tablet Oral 4x Daily    docusate sodium  100 mg Oral Daily    doxazosin  4 mg Oral Nightly    ferrous sulfate  325 mg Oral Daily with breakfast    levothyroxine  100 mcg Oral Daily    lisinopril  10 mg Oral Daily    montelukast  10 mg Oral Nightly    polyethylene glycol  17 g Oral Daily    potassium bicarb-citric acid  40 mEq Oral Daily    traZODone  50 mg Oral Nightly    sodium chloride flush  10 mL Intravenous 2 times per day    cefTRIAXone (ROCEPHIN) IV  1,000 mg Intravenous Q24H    calcium elemental  500 mg Oral Daily    And    vitamin D3  400 Units Oral Daily     Continuous Infusions:  PRN Meds:sodium chloride flush, potassium chloride **OR** potassium alternative oral replacement **OR** potassium chloride, senna, acetaminophen **OR** acetaminophen, trimethobenzamide, prochlorperazine    Allergies:  Sulfa antibiotics    Social History:   Social History     Socioeconomic History    Marital status:      Spouse name: Not on file    Number of children: Not on file    Years of education: Not on file    Highest education level: Not on file   Occupational History    Not on file   Social Needs    Financial resource strain: Not on file    Food insecurity     Worry: Not on file     Inability: Not on file    Transportation needs     Medical: Not on file     Non-medical: Not on file   Tobacco Use    Smoking status: Former Smoker     Years: 10.00     Quit date: 1970     Years since quittin.2    Smokeless tobacco: Never Used   Substance and Sexual Activity    Alcohol use: No     Comment: Social    Drug use: No    Sexual activity: Never     Partners: Female   Lifestyle    Physical activity     Days per week: Not on file     Minutes per session: Not on file    Stress: Not on file   Relationships    Social connections     Talks on phone: Not on file     Gets together: Not on file     Attends Judaism service: Not on file     Active member of club or organization: Not on file     Attends meetings of clubs or organizations: Not on file     Relationship status: Not on file    Intimate partner violence Fear of current or ex partner: Not on file     Emotionally abused: Not on file     Physically abused: Not on file     Forced sexual activity: Not on file   Other Topics Concern    Not on file   Social History Narrative    Not on file      Pets: Dog  Travel: None  The patient lives at home with his wife. He retired from driving a tanker truck    Family History:       Problem Relation Age of Onset    Heart Disease Mother     Cancer Father         lung    Breast Cancer Sister    . Otherwise non-pertinent to the chief complaint. REVIEW OF SYSTEMS:    Constitutional: Negative for fevers, chills, diaphoresis  Neurologic: Negative   Psychiatric: Negative  Rheumatologic: Negative   Endocrine: Negative  Hematologic: Negative  Immunologic: Negative  ENT: Negative  Respiratory: Negative   Cardiovascular: Negative  GI: As in the HPI  : Negative  Musculoskeletal: As in the HPI  Skin: No rashes. PHYSICAL EXAM:    Vitals:   /63   Pulse 74   Temp 98.5 °F (36.9 °C) (Oral)   Resp 18   Ht 5' 6\" (1.676 m)   Wt 173 lb 3.2 oz (78.6 kg)   SpO2 94%   BMI 27.96 kg/m²   Constitutional: The patient is awake, alert, and oriented. No distress. Hard of hearing. Skin: Warm and dry. No rashes were noted. HEENT: Eyes show round, and reactive pupils. No jaundice. Moist mucous membranes, no ulcerations, no thrush. Neck: Supple to movements. No lymphadenopathy. Chest: No use of accessory muscles to breathe. Symmetrical expansion. Auscultation reveals no wheezing, crackles, or rhonchi. Cardiovascular: S1 and S2 are rhythmic and regular. No murmurs appreciated. Abdomen: Positive bowel sounds to auscultation. Benign to palpation. No masses felt. No hepatosplenomegaly. Extremities: No clubbing, no cyanosis, no edema. Right hip surgical wound with staples. Lines: peripheral  Lang catheter with clear urine.     CBC+dif:  Recent Labs     03/14/21  0849 03/15/21  0305 03/16/21  0500   WBC 10.7 11.3 10.9   HGB 8.5* 8. 0* 8.5*   HCT 28.3* 26.0* 29.4*   .5* 107.9* 112.6*    343 321   NEUTROABS 8.38* 8.43* 7.78*     No results found for: CRP   No results found for: CRPHS  No results found for: SEDRATE  Lab Results   Component Value Date    ALT <5 03/16/2021    AST 11 03/16/2021    ALKPHOS 179 (H) 03/16/2021    BILITOT 0.7 03/16/2021     Lab Results   Component Value Date     03/16/2021    K 4.1 03/16/2021     03/16/2021    CO2 26 03/16/2021    BUN 25 03/16/2021    CREATININE 1.0 03/16/2021    GFRAA >60 03/16/2021    AGRATIO 1.1 12/18/2020    LABGLOM >60 03/16/2021    LABGLOM 39 01/14/2021    GLUCOSE 111 03/16/2021    GLUCOSE 123 01/14/2021    PROT 6.3 03/16/2021    LABALBU 3.2 03/16/2021    CALCIUM 8.5 03/16/2021    BILITOT 0.7 03/16/2021    ALKPHOS 179 03/16/2021    AST 11 03/16/2021    ALT <5 03/16/2021       Lab Results   Component Value Date    PROTIME 18.7 03/03/2021    INR 1.7 03/03/2021       Lab Results   Component Value Date    TSH 7.060 01/31/2021       Lab Results   Component Value Date    COLORU Yellow 03/14/2021    PHUR 6.0 03/14/2021    WBCUA PACKED 03/14/2021    RBCUA 1-3 03/14/2021    YEAST Present 03/03/2021    BACTERIA MODERATE 03/14/2021    CLARITYU Clear 03/14/2021    SPECGRAV 1.025 03/14/2021    LEUKOCYTESUR LARGE 03/14/2021    UROBILINOGEN 0.2 03/14/2021    BILIRUBINUR Negative 03/14/2021    BLOODU MODERATE 03/14/2021    GLUCOSEU Negative 03/14/2021       No results found for: HCO3, BE, O2SAT, PH, THGB, PCO2, PO2, TCO2  Radiology:  Noted    Microbiology:  Pending  No results for input(s): BC in the last 72 hours. Recent Labs     03/14/21  0849   ORG Enterococcus durans*  Staphylococcus coagulase-negative*     No results for input(s): BLOODCULT2 in the last 72 hours. No results for input(s): STREPNEUMAGU in the last 72 hours. No results for input(s): LP1UAG in the last 72 hours. No results for input(s): ASO in the last 72 hours.   No results for input(s): CULTRESP in the last 72 hours. No results for input(s): PROCAL in the last 72 hours. Assessment:  · Complicated UTI secondary to VRE and CoNS  · Urinary retention following right hip surgery  · Right hip femur fracture. Status post right hip hemiarthroplasty    Plan:    · Start Daptomycin for a minimum of 7 to 10 days  · Stop Ceftriaxone  · Check cultures, baseline ESR, CRP  · Contact isolation  · Baseline CK  · Will follow with you  · The patient will most likely need a PICC or a midline to be discharged    Thank you for having us see this patient in consultation. I will be discussing this case with the treating physicians.     Vero Ann  12:59 PM  3/16/2021

## 2021-03-17 LAB
ALBUMIN SERPL-MCNC: 3.1 G/DL (ref 3.5–5.2)
ALP BLD-CCNC: 174 U/L (ref 40–129)
ALT SERPL-CCNC: <5 U/L (ref 0–40)
ANION GAP SERPL CALCULATED.3IONS-SCNC: 8 MMOL/L (ref 7–16)
AST SERPL-CCNC: 11 U/L (ref 0–39)
BASOPHILS ABSOLUTE: 0.07 E9/L (ref 0–0.2)
BASOPHILS RELATIVE PERCENT: 0.7 % (ref 0–2)
BILIRUB SERPL-MCNC: 0.8 MG/DL (ref 0–1.2)
BUN BLDV-MCNC: 21 MG/DL (ref 8–23)
C-REACTIVE PROTEIN: 6.2 MG/DL (ref 0–0.4)
CALCIUM SERPL-MCNC: 8.3 MG/DL (ref 8.6–10.2)
CHLORIDE BLD-SCNC: 105 MMOL/L (ref 98–107)
CO2: 27 MMOL/L (ref 22–29)
CREAT SERPL-MCNC: 1 MG/DL (ref 0.7–1.2)
EOSINOPHILS ABSOLUTE: 0.99 E9/L (ref 0.05–0.5)
EOSINOPHILS RELATIVE PERCENT: 9.9 % (ref 0–6)
GFR AFRICAN AMERICAN: >60
GFR NON-AFRICAN AMERICAN: >60 ML/MIN/1.73
GLUCOSE BLD-MCNC: 111 MG/DL (ref 74–99)
HCT VFR BLD CALC: 27.1 % (ref 37–54)
HEMOGLOBIN: 8.3 G/DL (ref 12.5–16.5)
IMMATURE GRANULOCYTES #: 0.1 E9/L
IMMATURE GRANULOCYTES %: 1 % (ref 0–5)
LYMPHOCYTES ABSOLUTE: 1.18 E9/L (ref 1.5–4)
LYMPHOCYTES RELATIVE PERCENT: 11.8 % (ref 20–42)
MCH RBC QN AUTO: 33.5 PG (ref 26–35)
MCHC RBC AUTO-ENTMCNC: 30.6 % (ref 32–34.5)
MCV RBC AUTO: 109.3 FL (ref 80–99.9)
MONOCYTES ABSOLUTE: 0.87 E9/L (ref 0.1–0.95)
MONOCYTES RELATIVE PERCENT: 8.7 % (ref 2–12)
NEUTROPHILS ABSOLUTE: 6.78 E9/L (ref 1.8–7.3)
NEUTROPHILS RELATIVE PERCENT: 67.9 % (ref 43–80)
ORGANISM: ABNORMAL
ORGANISM: ABNORMAL
PDW BLD-RTO: 16 FL (ref 11.5–15)
PLATELET # BLD: 306 E9/L (ref 130–450)
PMV BLD AUTO: 10.6 FL (ref 7–12)
POTASSIUM REFLEX MAGNESIUM: 4.2 MMOL/L (ref 3.5–5)
RBC # BLD: 2.48 E12/L (ref 3.8–5.8)
SEDIMENTATION RATE, ERYTHROCYTE: 37 MM/HR (ref 0–15)
SODIUM BLD-SCNC: 140 MMOL/L (ref 132–146)
TOTAL PROTEIN: 6.2 G/DL (ref 6.4–8.3)
URINE CULTURE, ROUTINE: ABNORMAL
URINE CULTURE, ROUTINE: ABNORMAL
WBC # BLD: 10 E9/L (ref 4.5–11.5)

## 2021-03-17 PROCEDURE — 97530 THERAPEUTIC ACTIVITIES: CPT

## 2021-03-17 PROCEDURE — 86140 C-REACTIVE PROTEIN: CPT

## 2021-03-17 PROCEDURE — 2580000003 HC RX 258: Performed by: INTERNAL MEDICINE

## 2021-03-17 PROCEDURE — 6370000000 HC RX 637 (ALT 250 FOR IP): Performed by: INTERNAL MEDICINE

## 2021-03-17 PROCEDURE — 94640 AIRWAY INHALATION TREATMENT: CPT

## 2021-03-17 PROCEDURE — 2580000003 HC RX 258: Performed by: SPECIALIST

## 2021-03-17 PROCEDURE — 6360000002 HC RX W HCPCS: Performed by: SPECIALIST

## 2021-03-17 PROCEDURE — 85025 COMPLETE CBC W/AUTO DIFF WBC: CPT

## 2021-03-17 PROCEDURE — 97110 THERAPEUTIC EXERCISES: CPT

## 2021-03-17 PROCEDURE — 6360000002 HC RX W HCPCS: Performed by: INTERNAL MEDICINE

## 2021-03-17 PROCEDURE — 1200000000 HC SEMI PRIVATE

## 2021-03-17 PROCEDURE — 97535 SELF CARE MNGMENT TRAINING: CPT

## 2021-03-17 PROCEDURE — 80053 COMPREHEN METABOLIC PANEL: CPT

## 2021-03-17 PROCEDURE — 36415 COLL VENOUS BLD VENIPUNCTURE: CPT

## 2021-03-17 PROCEDURE — 85651 RBC SED RATE NONAUTOMATED: CPT

## 2021-03-17 RX ORDER — SODIUM CHLORIDE 0.9 % (FLUSH) 0.9 %
10 SYRINGE (ML) INJECTION PRN
Status: DISCONTINUED | OUTPATIENT
Start: 2021-03-17 | End: 2021-03-18 | Stop reason: HOSPADM

## 2021-03-17 RX ORDER — HEPARIN SODIUM (PORCINE) LOCK FLUSH IV SOLN 100 UNIT/ML 100 UNIT/ML
1 SOLUTION INTRAVENOUS EVERY 12 HOURS SCHEDULED
Status: DISCONTINUED | OUTPATIENT
Start: 2021-03-17 | End: 2021-03-18 | Stop reason: HOSPADM

## 2021-03-17 RX ORDER — DAPTOMYCIN 50 MG/ML
300 INJECTION, POWDER, LYOPHILIZED, FOR SOLUTION INTRAVENOUS DAILY
DISCHARGE
Start: 2021-03-17 | End: 2021-03-27

## 2021-03-17 RX ORDER — HEPARIN SODIUM (PORCINE) LOCK FLUSH IV SOLN 100 UNIT/ML 100 UNIT/ML
1 SOLUTION INTRAVENOUS PRN
Status: DISCONTINUED | OUTPATIENT
Start: 2021-03-17 | End: 2021-03-18 | Stop reason: HOSPADM

## 2021-03-17 RX ADMIN — POLYETHYLENE GLYCOL 3350 17 G: 17 POWDER, FOR SOLUTION ORAL at 08:49

## 2021-03-17 RX ADMIN — CHOLECALCIFEROL TAB 10 MCG (400 UNIT) 400 UNITS: 10 TAB at 08:49

## 2021-03-17 RX ADMIN — BUMETANIDE 1 MG: 1 TABLET ORAL at 08:49

## 2021-03-17 RX ADMIN — Medication 10 ML: at 08:49

## 2021-03-17 RX ADMIN — FERROUS SULFATE TAB 325 MG (65 MG ELEMENTAL FE) 325 MG: 325 (65 FE) TAB at 08:49

## 2021-03-17 RX ADMIN — CARBIDOPA AND LEVODOPA 2 TABLET: 25; 100 TABLET ORAL at 21:22

## 2021-03-17 RX ADMIN — LEVOTHYROXINE SODIUM 100 MCG: 100 TABLET ORAL at 05:00

## 2021-03-17 RX ADMIN — CARBIDOPA AND LEVODOPA 2 TABLET: 25; 100 TABLET ORAL at 18:11

## 2021-03-17 RX ADMIN — POTASSIUM BICARBONATE 40 MEQ: 782 TABLET, EFFERVESCENT ORAL at 08:49

## 2021-03-17 RX ADMIN — DOCUSATE SODIUM 100 MG: 100 CAPSULE, LIQUID FILLED ORAL at 08:49

## 2021-03-17 RX ADMIN — SODIUM CHLORIDE 300 MG: 9 INJECTION, SOLUTION INTRAVENOUS at 15:09

## 2021-03-17 RX ADMIN — IPRATROPIUM BROMIDE AND ALBUTEROL SULFATE 3 ML: .5; 3 SOLUTION RESPIRATORY (INHALATION) at 09:30

## 2021-03-17 RX ADMIN — ASPIRIN 325 MG: 325 TABLET, COATED ORAL at 08:49

## 2021-03-17 RX ADMIN — SODIUM CHLORIDE, PRESERVATIVE FREE 100 UNITS: 5 INJECTION INTRAVENOUS at 21:22

## 2021-03-17 RX ADMIN — DOXAZOSIN 4 MG: 4 TABLET ORAL at 21:22

## 2021-03-17 RX ADMIN — CARBIDOPA AND LEVODOPA 2 TABLET: 25; 100 TABLET ORAL at 13:09

## 2021-03-17 RX ADMIN — IPRATROPIUM BROMIDE AND ALBUTEROL SULFATE 3 ML: .5; 3 SOLUTION RESPIRATORY (INHALATION) at 16:34

## 2021-03-17 RX ADMIN — TRAZODONE HYDROCHLORIDE 50 MG: 50 TABLET ORAL at 21:22

## 2021-03-17 RX ADMIN — MONTELUKAST SODIUM 10 MG: 10 TABLET, FILM COATED ORAL at 21:22

## 2021-03-17 RX ADMIN — LISINOPRIL 10 MG: 10 TABLET ORAL at 08:49

## 2021-03-17 RX ADMIN — CALCIUM 500 MG: 500 TABLET ORAL at 08:49

## 2021-03-17 RX ADMIN — CARBIDOPA AND LEVODOPA 2 TABLET: 25; 100 TABLET ORAL at 08:49

## 2021-03-17 NOTE — CARE COORDINATION
Met with patient and spouse at bedside to discuss transition of care. Per [facility], they cannot accept d/t daptomycin. [address] are also unable to accommodate. Discussed further snf choices. Referral made to [address] [place] [address]. Referral called for their review    If accepted, they can take without insurance auth. Spoke to Mega Valiente with ID, and she relays ok to dc from ID pov once placement for antibiotics is arranged.

## 2021-03-17 NOTE — PROGRESS NOTES
[address] [place] [address] [facility]  Progress Note    SUBJECTIVE:  Chief Complaint   Patient presents with    Altered Mental Status     from [facility]      Patient is tolerating medications. He is up in the chair asking to get back to bed. No fevers, chills. Lang clear yellow urine. Review of systems:  As stated above in the chief complaint, otherwise negative. Medications:  Scheduled Meds:   daptomycin (CUBICIN) IVPB  300 mg Intravenous Q24H    ipratropium-albuterol  3 mL Inhalation Q4H WA    aspirin  325 mg Oral Daily    bumetanide  1 mg Oral Daily    carbidopa-levodopa  2 tablet Oral 4x Daily    docusate sodium  100 mg Oral Daily    doxazosin  4 mg Oral Nightly    ferrous sulfate  325 mg Oral Daily with breakfast    levothyroxine  100 mcg Oral Daily    lisinopril  10 mg Oral Daily    montelukast  10 mg Oral Nightly    polyethylene glycol  17 g Oral Daily    potassium bicarb-citric acid  40 mEq Oral Daily    traZODone  50 mg Oral Nightly    sodium chloride flush  10 mL Intravenous 2 times per day    calcium elemental  500 mg Oral Daily    And    vitamin D3  400 Units Oral Daily     Continuous Infusions:  PRN Meds:sodium chloride flush, potassium chloride **OR** potassium alternative oral replacement **OR** potassium chloride, senna, acetaminophen **OR** acetaminophen, trimethobenzamide, prochlorperazine    OBJECTIVE:  BP (!) 148/67   Pulse 67   Temp 97.9 °F (36.6 °C) (Oral)   Resp 16   Ht 5' 6\" (1.676 m)   Wt 173 lb 3.2 oz (78.6 kg)   SpO2 93%   BMI 27.96 kg/m²   Temp  Av.2 °F (36.8 °C)  Min: 97.4 °F (36.3 °C)  Max: 99.4 °F (37.4 °C)  Constitutional: The patient is up to chair. In mild distress because of pain in his leg & wanting to get back to bed. Skin: Warm and dry. No rashes were noted. HEENT: Round and reactive pupils. Moist mucous membranes. No ulcerations or thrush. Neck: Supple to movements. Chest: No respiratory distress.  Clear bilaterally. Cardiovascular: S1 and S2 are rhythmic and regular. No murmurs appreciated. Abdomen: Positive bowel sounds to auscultation. Benign to palpation. No masses felt. Extremities: No edema. Right hip surgical wound with staples intact - clean. Lines: peripheral  Lang catheter with clear yellow urine. Laboratory and Tests Review:  Lab Results   Component Value Date    WBC 10.0 03/17/2021    WBC 10.9 03/16/2021    WBC 11.3 03/15/2021    HGB 8.3 (L) 03/17/2021    HCT 27.1 (L) 03/17/2021    .3 (H) 03/17/2021     03/17/2021     Lab Results   Component Value Date    NEUTROABS 6.78 03/17/2021    NEUTROABS 7.78 (H) 03/16/2021    NEUTROABS 8.43 (H) 03/15/2021     No results found for: CRP  Lab Results   Component Value Date    ALT <5 03/17/2021    AST 11 03/17/2021    ALKPHOS 174 (H) 03/17/2021    BILITOT 0.8 03/17/2021     Lab Results   Component Value Date     03/17/2021    K 4.2 03/17/2021     03/17/2021    CO2 27 03/17/2021    BUN 21 03/17/2021    CREATININE 1.0 03/17/2021    CREATININE 1.0 03/16/2021    CREATININE 1.0 03/15/2021    GFRAA >60 03/17/2021    AGRATIO 1.1 12/18/2020    LABGLOM >60 03/17/2021    LABGLOM 39 01/14/2021    GLUCOSE 111 03/17/2021    GLUCOSE 123 01/14/2021    PROT 6.2 03/17/2021    LABALBU 3.1 03/17/2021    CALCIUM 8.3 03/17/2021    BILITOT 0.8 03/17/2021    ALKPHOS 174 03/17/2021    AST 11 03/17/2021    ALT <5 03/17/2021     No results found for: CRP  No results found for: [address]    Radiology:  noted    Microbiology:   Urine culture 3/14/2021 - VRE, CoNS    ASSESSMENT:  · Complicated UTI secondary to VRE and CoNS  · Urinary retention following right hip surgery  · Right hip femur fracture.   Status post right hip hemiarthroplasty    PLAN:  · Continue Daptomycin day 2 of 10  · Will need midline vs PICC  · Continue contact isolation  · Check final cultures  · Monitor labs - CK 15, WBC 10   · [place] cannot accept on Daptomycin - unable to use Linezolid because of Trazodone. Roberto Flight  9:19 AM  3/17/2021     Patient seen and examined. I had a face to face encounter with the patient. Agree with exam.  Assessment and plan as outlined above and directed by me. Addition and corrections were done as deemed appropriate. My exam and plan include: The patient is feeling better. He is tolerating current antibiotic. We will go ahead and order a midline for Daptomycin for a minimum of 10 days. This has been reconciled. He is to go to [facility]. We will see him as an outpatient. Spoke with wife.     Rhoda Runner  3/17/2021  2:10 PM

## 2021-03-17 NOTE — PROGRESS NOTES
[facility] Quality Flow/Interdisciplinary Rounds Progress Note        Quality Flow Rounds held on March 17, 2021    Disciplines Attending:  Bedside Nurse, ,  and Nursing Unit Leadership    Marek Fenton was admitted on 3/14/2021  8:15 AM    Anticipated Discharge Date:  Expected Discharge Date: 03/17/21    Disposition:    Sriram Score:  Sriram Scale Score: 14    Readmission Risk              Risk of Unplanned Readmission:        23           Discussed patient goal for the day, patient clinical progression, and barriers to discharge.   The following Goal(s) of the Day/Commitment(s) have been identified: discharge planning      Rolley Downer  March 17, 2021

## 2021-03-17 NOTE — PROGRESS NOTES
Physical Therapy    Facility/Department: [address] INTERMEDIATE  Treatment note    NAME: Dyan Mcmillan  : 1930  MRN: [id number]    Date of Service: 3/17/2021               Patient Diagnosis(es): The primary encounter diagnosis was Urinary tract infection in male. Diagnoses of Metabolic encephalopathy, Elevated troponin, and Closed nondisplaced fracture of pelvis, unspecified part of pelvis, initial encounter [facility]) were also pertinent to this visit. has a past medical history of High cholesterol, Hypertension, Hypothyroidism, Parkinson disease ([address] and RBBB. has a past surgical history that includes Foot surgery (); shoulder surgery (Right); hernia repair (Left); Tonsillectomy and Adenoidectomy; Foot surgery; hip surgery (Left, 2021); and Hip fracture surgery (Right, 3/3/2021). Attending Provider:  Bhanu Howard DO     Evaluating PT:  [facility]      Room #: 600   Diagnosis: UTI   Pertinent PMHx/PSHx:  Parkinson's disease, 21 L hip hemiarthroplasty  Procedure/Surgery:  3/3/21 R hip IT nail  Precautions:  WBAT BLE, falls      SUBJECTIVE:     Pt lives with wife in a 1 story home with 1+1 stairs and no rail to enter. [facility].       OBJECTIVE:    Initial Evaluation  Date: 3/15/2021 Treatment  3/17/21 Short Term/ Long Term   Goals   Was pt agreeable to Eval/treatment? yes  yes     Does pt have pain? None reported   no complaints     Bed Mobility  supine to sit : max assist  Supine to sit: Mod A  Sit to supine: NT  Scooting: Max A seated to EOB and side to side supine in bed Min assist    Transfers Sit to stand: MAX A   Stand to sit: MAX A   Stand pivot: NA Sit to stand: Mod A  Stand to sit; Max A  Stand Pivot.  Mod A using [employer] for support  Min assist    Ambulation   4 feet x 1  With ww mod assist  NT 50 feet with ww min assist    AM-PAC 6 Clicks 93/96 40/85          Pt is alert and able to follow instruction  Balance: poor side stepping up EOB using [employer] for support    Pt performed therapeutic exercise of the following: supine B ankle pumps AROM, heel slides, hip ABd and SAQ's AAROM x 20    Patient education  Pt was educated on exercise promoting circulation, ROM and strengthening, UE usage to assist with transfers, benefits of being OOB in a chair    Patient response to education:   Pt verbalized understanding Pt demonstrated skill Pt requires further education in this area   yes With exercise and transfers yes     ASSESSMENT:   Comments: Nurse ok with Rx. Pt found in bed, agreed to rx, exercise performed. Pt then assisted to EOB, sat EOB Min/SBA for balance. Sit to stand performed x 2 with Mod A for standing balance due to retroversion, Pt stood using [address] approx 90 seconds with Min/Mod A for balance while receiving hygiene care. Pivot bed to chair using [address] for support. Pt able to advance LEs during pivot transfer bed to chair displays poor balance. Pt fatigued after brief activity. Treatment: Pt practiced and was instructed in the following treatment: exercise, transfers    Pt was left in a bedside chair with call light in reach, waffle cushion in place    Chair/bed alarm: chair alarm active    Time in 0831   Time out 0856   Total Treatment Time 25 minutes   CPT codes:     Therapeutic activities 64577 10 minutes   Therapeutic exercises 15313 15 minutes       Pt is making fair progress toward established Physical Therapy goals as per transfer to a bedside chair today and exercise participation. Continue with physical therapy current plan of care.     Sue Ribera PTA   License Number: [id number]

## 2021-03-17 NOTE — PROGRESS NOTES
session pt was transferred to bed with alarm on, all lines and tubes intact and call light within reach. Treatment: Pt required cues for weight shifting and hand placement during transfers. Assistance with AD management/ weight shifting and cues to WB through ww using B UE provided to decrease difficulty with mobility. Pt was found soiled after being transferred back to bed therefore hygiene was performed at bed level. Mod cues to adhere to hip precautions required. Extended time required during all tasks. Education: AD management, transfer training and balance training all to maximize independence with ADLs. · Pt has made good progress towards set goals. Plan of Care: 2-5 days/week for 1-2 weeks PRN   [x]? ?ADL retraining/adaptive techniques and AE recommendations to increase functional independence within precautions                    [x]? ? Energy conservation techniques to improve tolerance for ADL/IADLs  [x]? ? Functional transfer/mobility training/DME recommendations for increased independence, safety and fall prevention         [x]? ?Patient/family education to increase safety and functional independence during daily routine          [x]? ? Environmental modifications for safe mobility and completion of ADLs                             []? ? Cognitive retraining to improve problem solving skills & safe participation in ADLs/IADLs     []? ?Sensory re-education techniques to improve extremity awareness, maintain skin integrity and improve hand function                             []? ? Visual/Perceptual retraining to improve body awareness and safety during transfers and ADLs  []? ? Splinting/positioning needs to maintain joint/skin integrity and contracture prevention  [x]? ? Therapeutic activity to improve functional performance during ADLs                                        [x]? ? Therapeutic exercise to improve tolerance and functional strength for ADLs   [x]? ? Balance retraining/tolerance tasks for facilitation of postural control with dynamic challenges during ADLs  []? ?Neuromuscular re-education to facilitate righting/equilibrium reactions, midline orientation, scapular stability/mobility, normalize muscle tone and facilitate active functional movement                        []? ? Delirium prevention/treatment    [x]? Positioning to improve functional independence and decrease risk of skin breakdown  []? ?Other:     Treatment Charges: Mins Units   Ther Ex  [id number]     Manual Therapy Keyshawn Aguirre [id number]   ADL/Home Mgt [id number] 23 1   Neuro Re-ed [id number]     Group Therapy      Orthotic manage/training  [id number]     Non-Billable Time         Total Time: 395 Mural[address] Lira JUÁREZ/L [id number]

## 2021-03-17 NOTE — PROGRESS NOTES
Internal Medicine Progress Note    Patient's name: Marilyn Quezada  : 1930  Chief complaints (on day of admission): Altered Mental Status (from [facility] )  Admission date: 3/14/2021  Date of service: 3/17/2021   Room: [address] INTERMEDIATE  Primary care physician: Julia Andrews MD  Reason for visit: Follow-up for uti/urinary retention     Subjective  Marie Hogan was seen and examined. He was resting comfortably in bed. He is tolerating antibiotics. He denies complaints or issues. I told him that he needs a PICC line to receive IV antibiotics and he seemed okay with this. He is not confused at all today. Review of Systems  There are no new complaints of chest pain, shortness of breath, abdominal pain, nausea, vomiting, diarrhea, constipation.     Hospital Medications  Current Facility-Administered Medications   Medication Dose Route Frequency Provider Last Rate Last Admin    DAPTOmycin (CUBICIN) 300 mg in sodium chloride 0.9 % 50 mL IVPB  300 mg Intravenous Q24H Jameel Cho MD   Stopped at 21 1558    ipratropium-albuterol (DUONEB) nebulizer solution 3 mL  3 mL Inhalation Q4H KILEY Quan DO   3 mL at 21 0930    aspirin EC tablet 325 mg  325 mg Oral Daily Lashell Britton MD   325 mg at 21 0849    bumetanide (BUMEX) tablet 1 mg  1 mg Oral Daily Lashell Britton MD   1 mg at 21 0849    carbidopa-levodopa (SINEMET)  MG per tablet 2 tablet  2 tablet Oral 4x Daily Lashell Britton MD   2 tablet at 21 1309    docusate sodium (COLACE) capsule 100 mg  100 mg Oral Daily Lashell Britton MD   100 mg at 21 0849    doxazosin (CARDURA) tablet 4 mg  4 mg Oral Nightly Lashell Britton MD   4 mg at 21    ferrous sulfate (IRON 325) tablet 325 mg  325 mg Oral Daily with breakfast Lashell Britton MD   325 mg at 21 0849    levothyroxine (SYNTHROID) tablet 100 mcg  100 mcg Oral Daily Lashell Britton MD   100 mcg at 21 0500    lisinopril (PRINIVIL;ZESTRIL) tablet 10 mg  10 mg Oral Daily Amanda Lin MD   10 mg at 03/17/21 0849    montelukast (SINGULAIR) tablet 10 mg  10 mg Oral Nightly Amanda Lin MD   10 mg at 03/16/21 2012    polyethylene glycol (GLYCOLAX) packet 17 g  17 g Oral Daily Amanda Lin MD   17 g at 03/17/21 0849    potassium bicarb-citric acid (EFFER-K) effervescent tablet 40 mEq  40 mEq Oral Daily Amanda Lin MD   40 mEq at 03/17/21 0849    traZODone (DESYREL) tablet 50 mg  50 mg Oral Nightly Amanda Lin MD   50 mg at 03/16/21 2012    sodium chloride flush 0.9 % injection 10 mL  10 mL Intravenous 2 times per day Amanda iLn MD   10 mL at 03/17/21 0849    sodium chloride flush 0.9 % injection 10 mL  10 mL Intravenous PRN Amanda Lin MD        potassium chloride (KLOR-CON M) extended release tablet 40 mEq  40 mEq Oral PRN Amanda Lin MD   40 mEq at 03/15/21 2041    Or    potassium bicarb-citric acid (EFFER-K) effervescent tablet 40 mEq  40 mEq Oral PRN Amanda Lin MD        Or   Waldo Bennett potassium chloride 10 mEq/100 mL IVPB (Peripheral Line)  10 mEq Intravenous PRN Amanda Lin MD        senna (SENOKOT) tablet 8.6 mg  1 tablet Oral Daily PRN Amanda Lin MD        acetaminophen (TYLENOL) tablet 650 mg  650 mg Oral Q6H PRN Amanda Lin MD   650 mg at 03/16/21 0501    Or    acetaminophen (TYLENOL) suppository 650 mg  650 mg Rectal Q6H PRN Amanda iLn MD        calcium elemental (OSCAL) tablet 500 mg  500 mg Oral Daily Amanda Lin MD   500 mg at 03/17/21 0849    And    vitamin D3 (CHOLECALCIFEROL) tablet 400 Units  400 Units Oral Daily Amanda Lin MD   400 Units at 03/17/21 0849    trimethobenzamide (TIGAN) injection 200 mg  200 mg Intramuscular Q6H PRN Amanda Lin MD        prochlorperazine (COMPAZINE) injection 10 mg  10 mg Intravenous Q6H PRN Amanda Lin MD           PRN Medications  sodium chloride flush, potassium chloride **OR** potassium alternative oral replacement **OR** potassium chloride, senna, acetaminophen **OR** acetaminophen, trimethobenzamide, prochlorperazine    Objective  Most Recent Recorded Vitals  BP (!) 148/67   Pulse 67   Temp 97.9 °F (36.6 °C) (Oral)   Resp 16   Ht 5' 6\" (1.676 m)   Wt 173 lb 3.2 oz (78.6 kg)   SpO2 93%   BMI 27.96 kg/m²   I/O last 3 completed shifts: In: 360 [P.O.:360]  Out: 950 [Urine:950]  No intake/output data recorded. Physical Exam:  General: AAO to person/place/time/purpose, NAD, no labored breathing  Eyes: conjunctivae/corneas clear, sclera non icteric  Skin: color/texture/turgor normal, no rashes or lesions  Lungs: Clear breath sounds bilaterally  Heart: regular rate, regular rhythm, no murmur  Abdomen: soft, NT, bowel sounds normal, Lang catheter  Extremities: Postsurgical changes noted to the bilateral hips, no edema  Neurologic: cranial nerves 2-12 grossly intact, no slurred speech    Most Recent Labs  Lab Results   Component Value Date    WBC 10.0 03/17/2021    HGB 8.3 (L) 03/17/2021    HCT 27.1 (L) 03/17/2021     03/17/2021     03/17/2021    K 4.2 03/17/2021     03/17/2021    CREATININE 1.0 03/17/2021    BUN 21 03/17/2021    CO2 27 03/17/2021    GLUCOSE 111 (H) 03/17/2021    ALT <5 03/17/2021    AST 11 03/17/2021    INR 1.7 03/03/2021    TSH 7.060 (H) 01/31/2021       CT ABDOMEN PELVIS W IV CONTRAST Additional Contrast? None   Final Result   1. Presence of a acute/subacute fracture of the left pubic bone affecting   the body, superior ramus, and inferior ramus. 2.  strong streak artifact from the left hip prosthesis and from the ORIF for   proximal right fibular fracture of recently performed. 3.  Full bladder is not distended, there is a Lang catheter in the bladder   lumen. Mild the thickening of the bladder wall is seen. 4.  No obstructive uropathy. 5.  Pattern of constipation. Uncomplicated diverticulosis seen throughout   the colon but there is no conspicuous acute diverticulitis.       6.  Normal

## 2021-03-17 NOTE — CARE COORDINATION
CM spoke with charge nurse. Plan is discharge to [place] [id number] when accepted on IV dapto. PT WILL NEED MIDLINE PLACED.

## 2021-03-18 VITALS
RESPIRATION RATE: 16 BRPM | DIASTOLIC BLOOD PRESSURE: 56 MMHG | OXYGEN SATURATION: 97 % | WEIGHT: 173.2 LBS | SYSTOLIC BLOOD PRESSURE: 109 MMHG | HEIGHT: 66 IN | HEART RATE: 76 BPM | TEMPERATURE: 98.4 F | BODY MASS INDEX: 27.83 KG/M2

## 2021-03-18 LAB
ALBUMIN SERPL-MCNC: 3 G/DL (ref 3.5–5.2)
ALP BLD-CCNC: 173 U/L (ref 40–129)
ALT SERPL-CCNC: <5 U/L (ref 0–40)
ANION GAP SERPL CALCULATED.3IONS-SCNC: 6 MMOL/L (ref 7–16)
AST SERPL-CCNC: 11 U/L (ref 0–39)
BASOPHILS ABSOLUTE: 0.07 E9/L (ref 0–0.2)
BASOPHILS RELATIVE PERCENT: 0.8 % (ref 0–2)
BILIRUB SERPL-MCNC: 0.7 MG/DL (ref 0–1.2)
BUN BLDV-MCNC: 23 MG/DL (ref 8–23)
CALCIUM SERPL-MCNC: 8.1 MG/DL (ref 8.6–10.2)
CHLORIDE BLD-SCNC: 106 MMOL/L (ref 98–107)
CO2: 28 MMOL/L (ref 22–29)
CREAT SERPL-MCNC: 1 MG/DL (ref 0.7–1.2)
EOSINOPHILS ABSOLUTE: 0.97 E9/L (ref 0.05–0.5)
EOSINOPHILS RELATIVE PERCENT: 10.5 % (ref 0–6)
GFR AFRICAN AMERICAN: >60
GFR NON-AFRICAN AMERICAN: >60 ML/MIN/1.73
GLUCOSE BLD-MCNC: 101 MG/DL (ref 74–99)
HCT VFR BLD CALC: 26.7 % (ref 37–54)
HEMOGLOBIN: 8.1 G/DL (ref 12.5–16.5)
IMMATURE GRANULOCYTES #: 0.1 E9/L
IMMATURE GRANULOCYTES %: 1.1 % (ref 0–5)
LYMPHOCYTES ABSOLUTE: 1.34 E9/L (ref 1.5–4)
LYMPHOCYTES RELATIVE PERCENT: 14.5 % (ref 20–42)
MCH RBC QN AUTO: 33.2 PG (ref 26–35)
MCHC RBC AUTO-ENTMCNC: 30.3 % (ref 32–34.5)
MCV RBC AUTO: 109.4 FL (ref 80–99.9)
METER GLUCOSE: 103 MG/DL (ref 74–99)
MONOCYTES ABSOLUTE: 0.86 E9/L (ref 0.1–0.95)
MONOCYTES RELATIVE PERCENT: 9.3 % (ref 2–12)
NEUTROPHILS ABSOLUTE: 5.91 E9/L (ref 1.8–7.3)
NEUTROPHILS RELATIVE PERCENT: 63.8 % (ref 43–80)
PDW BLD-RTO: 15.7 FL (ref 11.5–15)
PLATELET # BLD: 284 E9/L (ref 130–450)
PMV BLD AUTO: 10.6 FL (ref 7–12)
POTASSIUM REFLEX MAGNESIUM: 4 MMOL/L (ref 3.5–5)
RBC # BLD: 2.44 E12/L (ref 3.8–5.8)
SODIUM BLD-SCNC: 140 MMOL/L (ref 132–146)
TOTAL PROTEIN: 5.9 G/DL (ref 6.4–8.3)
WBC # BLD: 9.3 E9/L (ref 4.5–11.5)

## 2021-03-18 PROCEDURE — C1751 CATH, INF, PER/CENT/MIDLINE: HCPCS

## 2021-03-18 PROCEDURE — 6360000002 HC RX W HCPCS: Performed by: INTERNAL MEDICINE

## 2021-03-18 PROCEDURE — 6360000002 HC RX W HCPCS: Performed by: SPECIALIST

## 2021-03-18 PROCEDURE — 36410 VNPNXR 3YR/> PHY/QHP DX/THER: CPT

## 2021-03-18 PROCEDURE — 05HD33Z INSERTION OF INFUSION DEVICE INTO RIGHT CEPHALIC VEIN, PERCUTANEOUS APPROACH: ICD-10-PCS | Performed by: INTERNAL MEDICINE

## 2021-03-18 PROCEDURE — 76937 US GUIDE VASCULAR ACCESS: CPT

## 2021-03-18 PROCEDURE — 6370000000 HC RX 637 (ALT 250 FOR IP): Performed by: INTERNAL MEDICINE

## 2021-03-18 PROCEDURE — 85025 COMPLETE CBC W/AUTO DIFF WBC: CPT

## 2021-03-18 PROCEDURE — 2580000003 HC RX 258: Performed by: INTERNAL MEDICINE

## 2021-03-18 PROCEDURE — 2500000003 HC RX 250 WO HCPCS: Performed by: INTERNAL MEDICINE

## 2021-03-18 PROCEDURE — 2580000003 HC RX 258: Performed by: SPECIALIST

## 2021-03-18 PROCEDURE — 82962 GLUCOSE BLOOD TEST: CPT

## 2021-03-18 PROCEDURE — 94640 AIRWAY INHALATION TREATMENT: CPT

## 2021-03-18 PROCEDURE — 80053 COMPREHEN METABOLIC PANEL: CPT

## 2021-03-18 PROCEDURE — 36415 COLL VENOUS BLD VENIPUNCTURE: CPT

## 2021-03-18 PROCEDURE — 2700000000 HC OXYGEN THERAPY PER DAY

## 2021-03-18 RX ADMIN — CHOLECALCIFEROL TAB 10 MCG (400 UNIT) 400 UNITS: 10 TAB at 10:23

## 2021-03-18 RX ADMIN — BUMETANIDE 1 MG: 1 TABLET ORAL at 10:22

## 2021-03-18 RX ADMIN — DOCUSATE SODIUM 100 MG: 100 CAPSULE, LIQUID FILLED ORAL at 10:22

## 2021-03-18 RX ADMIN — LISINOPRIL 10 MG: 10 TABLET ORAL at 10:22

## 2021-03-18 RX ADMIN — LIDOCAINE HYDROCHLORIDE 0.25 ML: 10 INJECTION, SOLUTION EPIDURAL; INFILTRATION; INTRACAUDAL; PERINEURAL at 10:06

## 2021-03-18 RX ADMIN — IPRATROPIUM BROMIDE AND ALBUTEROL SULFATE 3 ML: .5; 3 SOLUTION RESPIRATORY (INHALATION) at 12:52

## 2021-03-18 RX ADMIN — CARBIDOPA AND LEVODOPA 2 TABLET: 25; 100 TABLET ORAL at 14:41

## 2021-03-18 RX ADMIN — CALCIUM 500 MG: 500 TABLET ORAL at 10:22

## 2021-03-18 RX ADMIN — CARBIDOPA AND LEVODOPA 2 TABLET: 25; 100 TABLET ORAL at 10:27

## 2021-03-18 RX ADMIN — IPRATROPIUM BROMIDE AND ALBUTEROL SULFATE 3 ML: .5; 3 SOLUTION RESPIRATORY (INHALATION) at 08:57

## 2021-03-18 RX ADMIN — POTASSIUM BICARBONATE 40 MEQ: 782 TABLET, EFFERVESCENT ORAL at 10:28

## 2021-03-18 RX ADMIN — ASPIRIN 325 MG: 325 TABLET, COATED ORAL at 10:23

## 2021-03-18 RX ADMIN — FERROUS SULFATE TAB 325 MG (65 MG ELEMENTAL FE) 325 MG: 325 (65 FE) TAB at 10:22

## 2021-03-18 RX ADMIN — LEVOTHYROXINE SODIUM 100 MCG: 100 TABLET ORAL at 06:04

## 2021-03-18 RX ADMIN — SODIUM CHLORIDE, PRESERVATIVE FREE 100 UNITS: 5 INJECTION INTRAVENOUS at 10:30

## 2021-03-18 RX ADMIN — Medication 10 ML: at 10:25

## 2021-03-18 RX ADMIN — SODIUM CHLORIDE 300 MG: 9 INJECTION, SOLUTION INTRAVENOUS at 14:43

## 2021-03-18 RX ADMIN — POLYETHYLENE GLYCOL 3350 17 G: 17 POWDER, FOR SOLUTION ORAL at 10:28

## 2021-03-18 ASSESSMENT — PAIN SCALES - GENERAL: PAINLEVEL_OUTOF10: 0

## 2021-03-18 NOTE — PROGRESS NOTES
Progress Note  Date:3/18/2021       MKSX:[id number]  Patient Name:Adan Larsen     YOB: 1930     Age:89 y.o. Patient seen for follow up of complicated UTI and Urinary retention. Patient this am sleeping. He arouses to voice. Does not answer questions and goes back to sleep. Nursing reports no issues. Did not receive picc line yesterday due to scheduling with IV team. Will have placed today. Awaiting precert for Continuing healthcare in [address] [place] [address]. Subjective    Subjective:  Symptoms:  (Unable to obtain. ). Review of Systems  Objective         Vitals Last 24 Hours:  TEMPERATURE:  Temp  Av °F (36.7 °C)  Min: 97.7 °F (36.5 °C)  Max: 98.4 °F (36.9 °C)  RESPIRATIONS RANGE: Resp  Av  Min: 16  Max: 16  PULSE OXIMETRY RANGE: SpO2  Av.4 %  Min: 88 %  Max: 97 %  PULSE RANGE: Pulse  Av.7  Min: 67  Max: 81  BLOOD PRESSURE RANGE: Systolic (75UXA), TSL:228 , Min:109 , OSX:895   ; Diastolic (77RRK), ESU:58, Min:56, Max:67    I/O (24Hr): Intake/Output Summary (Last 24 hours) at 3/18/2021 2380  Last data filed at 3/17/2021 1442  Gross per 24 hour   Intake --   Output 1300 ml   Net -1300 ml     Objective:  General Appearance:  Comfortable, in no acute distress and well-appearing. Vital signs: (most recent): Blood pressure (!) 109/56, pulse 76, temperature 98.4 °F (36.9 °C), temperature source Oral, resp. rate 16, height 5' 6\" (1.676 m), weight 173 lb 3.2 oz (78.6 kg), SpO2 97 %. Output: Producing urine (Lang catheter in place. ). Lungs:  Normal effort and normal respiratory rate. Breath sounds clear to auscultation. Heart: Normal rate. Regular rhythm. S1 normal.  No friction rub. Abdomen: Abdomen is soft and non-distended. Bowel sounds are normal.   There is no abdominal tenderness. There is no rebound tenderness. There is no guarding. Extremities: There is no dependent edema. Skin:  Warm and dry.       Labs/Imaging/Diagnostics    Labs:  CBC:  Recent Labs 03/16/21  0500 03/17/21  0420 03/18/21  0345   WBC 10.9 10.0 9.3   RBC 2.61* 2.48* 2.44*   HGB 8.5* 8.3* 8.1*   HCT 29.4* 27.1* 26.7*   .6* 109.3* 109.4*   RDW 15.9* 16.0* 15.7*    306 284     CHEMISTRIES:  Recent Labs     03/16/21  0500 03/17/21  0420 03/18/21  0345    140 140   K 4.1 4.2 4.0    105 106   CO2 26 27 28   BUN 25* 21 23   CREATININE 1.0 1.0 1.0   GLUCOSE 111* 111* 101*     PT/INR:No results for input(s): PROTIME, INR in the last 72 hours. APTT:No results for input(s): APTT in the last 72 hours. LIVER PROFILE:  Recent Labs     03/16/21  0500 03/17/21 0420 03/18/21  0345   AST 11 11 11   ALT <5 <5 <5   BILITOT 0.7 0.8 0.7   ALKPHOS 179* 174* 173*       Imaging Last 24 Hours:  No results found. Assessment//Plan           Hospital Problems           Last Modified POA    Hypertension 3/15/2021 Yes    Parkinson disease ([facility] [address] 3/15/2021 Yes    Hypothyroidism 3/15/2021 Yes    Stage 3 chronic kidney disease 3/15/2021 Yes    Venous stasis dermatitis of both lower extremities 3/15/2021 Yes    UTI (urinary tract infection) 3/15/2021 Yes        Assessment & Plan  1. Complicated UTI  2. Urinary retention  3. Metabolic Encephalopathy  4. Elevated Troponin   5. Anemia  6. Recent Right and left femoral neck fractures  7. Left pubis fracture    For PICC line today. Continue on Daptomycin per ID. Appreciate ID, Cardiology,  orthopedics and Urology input. Continue brewster catheter till more mobile. Will need stress test done as outpatient. Await precert to [facility] [place]. Weight bearing as tolerated as per Orthopedics.      Yadira Wayne DO    Electronically signed by Yadira Wayne DO on 3/18/21 at 6:14 AM EDT

## 2021-03-18 NOTE — CARE COORDINATION
Social Work / Discharge Planning : [facility] [phone]. RN Emory Merlin and spouse Kierra updated on discharge and time of transport. SW to follow.  Electronically signed by GERMÁN Barnett on 3/18/21 at 12:54 PM EDT

## 2021-03-18 NOTE — PROGRESS NOTES
[address] [place] [address] [facility]  Progress Note    Face to face encounter  SUBJECTIVE:  Chief Complaint   Patient presents with    Altered Mental Status     from [facility]      Patient is tolerating medications. He is getting washed up in bed. Doing well. No new issues overnight. No fevers, chills. No rash. Lang clear yellow urine. Review of systems:  As stated above in the chief complaint, otherwise negative. Medications:  Scheduled Meds:   heparin flush  1 mL Intravenous 2 times per day    daptomycin (CUBICIN) IVPB  300 mg Intravenous Q24H    ipratropium-albuterol  3 mL Inhalation Q4H WA    aspirin  325 mg Oral Daily    bumetanide  1 mg Oral Daily    carbidopa-levodopa  2 tablet Oral 4x Daily    docusate sodium  100 mg Oral Daily    doxazosin  4 mg Oral Nightly    ferrous sulfate  325 mg Oral Daily with breakfast    levothyroxine  100 mcg Oral Daily    lisinopril  10 mg Oral Daily    montelukast  10 mg Oral Nightly    polyethylene glycol  17 g Oral Daily    potassium bicarb-citric acid  40 mEq Oral Daily    traZODone  50 mg Oral Nightly    sodium chloride flush  10 mL Intravenous 2 times per day    calcium elemental  500 mg Oral Daily    And    vitamin D3  400 Units Oral Daily     Continuous Infusions:  PRN Meds:sodium chloride flush, heparin flush, sodium chloride flush, potassium chloride **OR** potassium alternative oral replacement **OR** potassium chloride, senna, acetaminophen **OR** acetaminophen, trimethobenzamide, prochlorperazine    OBJECTIVE:  BP (!) 109/56   Pulse 76   Temp 98.4 °F (36.9 °C) (Oral)   Resp 16   Ht 5' 6\" (1.676 m)   Wt 173 lb 3.2 oz (78.6 kg)   SpO2 97%   BMI 27.96 kg/m²   Temp  Av.1 °F (36.7 °C)  Min: 97.7 °F (36.5 °C)  Max: 98.4 °F (36.9 °C)  Constitutional: The patient is resting in bed- no distress. Skin: Warm and dry. No rashes were noted. HEENT: Round and reactive pupils. Moist mucous membranes.   No ulcerations or thrush. Neck: Supple to movements. Chest: No respiratory distress. Clear bilaterally. Cardiovascular: S1 and S2 are rhythmic and regular. No murmurs appreciated. Abdomen: Positive bowel sounds to auscultation. Benign to palpation. No masses felt. Extremities: No edema. Dry skin to lower extremities. Right hip surgical wound with staples intact - clean. Lines: right arm midline- 3/18/2021  Lang catheter with clear yellow urine. Laboratory and Tests Review:  Lab Results   Component Value Date    WBC 9.3 03/18/2021    WBC 10.0 03/17/2021    WBC 10.9 03/16/2021    HGB 8.1 (L) 03/18/2021    HCT 26.7 (L) 03/18/2021    .4 (H) 03/18/2021     03/18/2021     Lab Results   Component Value Date    NEUTROABS 5.91 03/18/2021    NEUTROABS 6.78 03/17/2021    NEUTROABS 7.78 (H) 03/16/2021     No results found for: CRPHS  Lab Results   Component Value Date    ALT <5 03/18/2021    AST 11 03/18/2021    ALKPHOS 173 (H) 03/18/2021    BILITOT 0.7 03/18/2021     Lab Results   Component Value Date     03/18/2021    K 4.0 03/18/2021     03/18/2021    CO2 28 03/18/2021    BUN 23 03/18/2021    CREATININE 1.0 03/18/2021    CREATININE 1.0 03/17/2021    CREATININE 1.0 03/16/2021    GFRAA >60 03/18/2021    AGRATIO 1.1 12/18/2020    LABGLOM >60 03/18/2021    LABGLOM 39 01/14/2021    GLUCOSE 101 03/18/2021    GLUCOSE 123 01/14/2021    PROT 5.9 03/18/2021    LABALBU 3.0 03/18/2021    CALCIUM 8.1 03/18/2021    BILITOT 0.7 03/18/2021    ALKPHOS 173 03/18/2021    AST 11 03/18/2021    ALT <5 03/18/2021     Lab Results   Component Value Date    CRP 6.2 (H) 03/17/2021     Lab Results   Component Value Date    SEDRATE 37 (H) 03/17/2021     Radiology:  noted    Microbiology:   Urine culture 3/14/2021 - VRE, CoNS    ASSESSMENT:  · Complicated UTI secondary to VRE and CoNS  · Urinary retention following right hip surgery  · Right hip femur fracture.   Status post right hip hemiarthroplasty    PLAN:  · Continue

## 2021-03-18 NOTE — CARE COORDINATION
COVID negative 3/15. On day 3 of 10 iv Daptomycin per ID- midline inserted today. Accepted @ [facility] [place]-awaiting insurance auth- still pending per Ingrid @ [facility]. Met w/ wife in room- updated. Will follow Sav Gruber     Addendum 12:45pm. Received insurance [facility] [employer] per [place]. SW to set up transport. Wife and patient notified of  @ 4pm. Wife notified that [facility] visiting is set up by appointment only.   Sav Gruber

## 2021-03-18 NOTE — DISCHARGE SUMMARY
Discharge Summary     Patient ID:  Dary Peters  [id number]  85 y.o. 8/12/1930 male  Akil Smith MD        Admit date: 3/14/2021    Discharge date and time:  3/18/2021  1:30 PM      Activity level: WBAT   Diet: Cardiac diet  Disposition: Skilled nursing facility  Condition on Discharge: Stable    Admit Diagnoses:   Patient Active Problem List   Diagnosis    Hypertension    Parkinson disease ([facility]    Hypothyroidism    Stage 3 chronic kidney disease    Venous stasis dermatitis of both lower extremities    Closed fracture of neck of right femur (HCC)    UTI (urinary tract infection)       Discharge Diagnoses: Active Problems:    Hypertension    Parkinson disease ([address]    Hypothyroidism    Stage 3 chronic kidney disease    Venous stasis dermatitis of both lower extremities    UTI (urinary tract infection)  Resolved Problems:    * No resolved hospital problems. *  1. Complicated UTI   2. Urinary retention   3. Metabolic Encephalopathy   4. Elevated Troponin   5. Anemia   6. Recent Right and left femoral neck fractures   7. Left pubis fracture      Consults:  IP CONSULT TO SOCIAL WORK  IP CONSULT TO ORTHOPEDIC SURGERY  IP CONSULT TO UROLOGY  IP CONSULT TO CARDIOLOGY  IP CONSULT TO INFECTIOUS DISEASES  IP CONSULT TO IV TEAM    Procedures: None    Hospital Course: Iva Oquendo is a 80y.o. year old male. He presented to the hospital from the nursing home with altered mental status and difficulty with urination. He has had a complex medical history recently. He broke one of his hips and had this repaired and went to rehab. At rehab he fell and broke the other hip and then came back to the hospital to have this repaired. He was sent back to the rehab facility. At the facility he was having difficulty with urination and they were doing intermittent catheterizations on him. He developed some altered mental status and for this reason they sent him into the hospital. In the emergency department he found that he had a UTI. Antibiotics were started. ID was consulted. He was changed to Daptomycin due to Enterococcus beaulieu and Coagulase negative staph. He was recommended by urology to continue brewster till more mobile. He was recommended for Skilled nursing facility. Due to daptomycin facility was changed to continuing health care in [address] [place] [address]. He is to follow up with his PCP in 1 week post discharge. Follow up with ID and urology as instructed. Discharge Exam:  General Appearance: Comfortable, in no acute distress and well-appearing. Vital signs: (most recent): Blood pressure (!) 109/56, pulse 76, temperature 98.4 °F (36.9 °C), temperature source Oral, resp. rate 16, height 5' 6\" (1.676 m), weight 173 lb 3.2 oz (78.6 kg), SpO2 97 %. Output: Producing urine (Brewster catheter in place. ). Lungs: Normal effort and normal respiratory rate. Breath sounds clear to auscultation. Heart: Normal rate. Regular rhythm. S1 normal. No friction rub. Abdomen: Abdomen is soft and non-distended. Bowel sounds are normal. There is no abdominal tenderness. There is no rebound tenderness. There is no guarding. Extremities: There is no dependent edema. Skin: Warm and dry. I/O last 3 completed shifts:  In: -   Out: 1300 [Urine:1300]  No intake/output data recorded.       LABS:  Recent Labs     03/16/21  0500 03/17/21  0420 03/18/21  0345    140 140   K 4.1 4.2 4.0    105 106   CO2 26 27 28   BUN 25* 21 23   CREATININE 1.0 1.0 1.0   GLUCOSE 111* 111* 101*   CALCIUM 8.5* 8.3* 8.1*       Recent Labs     03/16/21  0500 03/17/21  0420 03/18/21  0345   WBC 10.9 10.0 9.3   RBC 2.61* 2.48* 2.44*   HGB 8.5* 8.3* 8.1*   HCT 29.4* 27.1* 26.7*   .6* 109.3* 109.4*   MCH 32.6 33.5 33.2   MCHC 28.9* 30.6* 30.3*   RDW 15.9* 16.0* 15.7*    306 284   MPV 10.5 10.6 10.6       Recent Labs     03/18/21  0637   GLUMET 103*       Imaging:  Ct Head Wo Contrast    Result Date: 3/14/2021  EXAMINATION: CT OF THE HEAD WITHOUT CONTRAST 3/14/2021 10:50 am TECHNIQUE: CT of the head was performed without the administration of intravenous contrast. Dose modulation, iterative reconstruction, and/or weight based adjustment of the mA/kV was utilized to reduce the radiation dose to as low as reasonably achievable. COMPARISON: None. HISTORY: ORDERING SYSTEM PROVIDED HISTORY: ams TECHNOLOGIST PROVIDED HISTORY: Reason for exam:->ams Has a \"code stroke\" or \"stroke alert\" been called? ->No Decision Support Exception->Emergency Medical Condition (MA) FINDINGS: BRAIN/VENTRICLES: There is no acute intracranial hemorrhage, mass effect or midline shift. No abnormal extra-axial fluid collection. The gray-white differentiation is maintained without evidence of an acute infarct. There is no evidence of hydrocephalus. Peripheral CSF space ventricular system correlates with the age group. Atherosclerotic changes are seen in major intracranial arteries. ORBITS: The visualized portion of the orbits demonstrate no acute abnormality. SINUSES: The visualized paranasal sinuses and mastoid air cells demonstrate no acute abnormality. SOFT TISSUES/SKULL:  No acute abnormality of the visualized skull or soft tissues. No acute intracranial abnormality. Ct Abdomen Pelvis W Iv Contrast Additional Contrast? None    Result Date: 3/14/2021  EXAMINATION: CT OF THE ABDOMEN AND PELVIS WITH CONTRAST 3/14/2021 10:50 am TECHNIQUE: CT of the abdomen and pelvis was performed with the administration of intravenous contrast. Multiplanar reformatted images are provided for review. Dose modulation, iterative reconstruction, and/or weight based adjustment of the mA/kV was utilized to reduce the radiation dose to as low as reasonably achievable. COMPARISON: None.  HISTORY: ORDERING SYSTEM PROVIDED HISTORY: abdominal distention, urinary retention TECHNOLOGIST PROVIDED HISTORY: Additional Contrast?->None Reason for exam:->abdominal distention, urinary retention Decision Support both lower lobes with small posterior located the minimal by up pleural effusions. There are 2 contiguous small nodular densities in the right lung in between the right middle lobe right lower lobe more towards the right middle lobe. The this can be addition evaluate with full CT scan of the chest on more routine basis. The heart appears to be enlarged. Degenerative changes are seen throughout the thoracic spine with degenerative changes in the facet joints bilaterally. There is a acute/is subacute the fracture of the left pubic bone affecting is body/superior ramus and inferior ramus. 1.  Presence of a acute/subacute fracture of the left pubic bone affecting the body, superior ramus, and inferior ramus. 2.  strong streak artifact from the left hip prosthesis and from the ORIF for proximal right fibular fracture of recently performed. 3.  Full bladder is not distended, there is a Lang catheter in the bladder lumen. Mild the thickening of the bladder wall is seen. 4.  No obstructive uropathy. 5.  Pattern of constipation. Uncomplicated diverticulosis seen throughout the colon but there is no conspicuous acute diverticulitis. 6.  Normal distended gallbladder with small gallstone. No dilatation of the biliary tree pancreatic ductal system. Xr Chest Portable    Result Date: 3/14/2021  EXAMINATION: ONE XRAY VIEW OF THE CHEST 3/14/2021 9:02 am COMPARISON: 03/02/2021 HISTORY: ORDERING SYSTEM PROVIDED HISTORY: ams TECHNOLOGIST PROVIDED HISTORY: Reason for exam:->ams FINDINGS: There is resolving infiltrate seen within the right upper lobe. The right lower lobe is clear. Within the retrocardiac region there is a small infiltrate. Left upper lobe is clear. The heart is mildly enlarged. 1. Resolving infiltrate within the right upper lobe. 2. Persistent small infiltrate seen within the retrocardiac region.        Patient Instructions:   Current Discharge Medication List      START taking these medications Details   DAPTOmycin (CUBICIN) 500 MG injection Infuse 6 mLs intravenously daily for 10 days         CONTINUE these medications which have NOT CHANGED    Details   polyethylene glycol (GLYCOLAX) 17 g packet Take 17 g by mouth daily  Qty: 527 g, Refills: 1      calcium carbonate-vitamin D (CALTRATE) 600-400 MG-UNIT TABS per tab Take 1 tablet by mouth daily      potassium chloride (KLOR-CON) 20 MEQ packet Take 40 mEq by mouth daily      aspirin 325 MG EC tablet Take 1 tablet by mouth daily  Qty: 30 tablet, Refills: 0      docusate sodium (COLACE, DULCOLAX) 100 MG CAPS Take 100 mg by mouth daily  Qty:        ferrous sulfate (IRON 325) 325 (65 Fe) MG tablet Take 1 tablet by mouth daily (with breakfast)  Qty: 30 tablet, Refills: 0      ipratropium-albuterol (DUONEB) 0.5-2.5 (3) MG/3ML SOLN nebulizer solution Inhale 3 mLs into the lungs every 4 hours (while awake)  Qty: 360 mL      sennosides-docusate sodium (SENOKOT-S) 8.6-50 MG tablet Take 1 tablet by mouth 2 times daily  Qty:        bumetanide (BUMEX) 1 MG tablet Take 1 mg by mouth daily      clobetasol (TEMOVATE) 0.05 % ointment Apply topically 2 times daily. Qty: 60 g, Refills: 1      silver sulfADIAZINE (SILVADENE) 1 % cream Apply topically daily.   Qty: 400 g, Refills: 1      levothyroxine (SYNTHROID) 25 MCG tablet Take 3 tabs PO Monday - Friday and 2 tabs PO Saturday - Sunday  Qty: 216 tablet, Refills: 1    Associated Diagnoses: Hypothyroidism, unspecified type      traZODone (DESYREL) 50 MG tablet Take 1 tablet by mouth nightly  Qty: 30 tablet, Refills: 5    Associated Diagnoses: Insomnia, unspecified type      lisinopril (PRINIVIL;ZESTRIL) 10 MG tablet Take 1 tablet by mouth daily  Qty: 90 tablet, Refills: 3    Associated Diagnoses: Essential hypertension      montelukast (SINGULAIR) 10 MG tablet Take 1 tablet by mouth nightly  Qty: 30 tablet, Refills: 0      carbidopa-levodopa (SINEMET)  MG per tablet Take 2 tablets by mouth 4 times daily      doxazosin (CARDURA) 4 MG tablet Take 1 tablet by mouth nightly  Qty: 90 tablet, Refills: 1    Associated Diagnoses: Essential hypertension      Misc.  Devices MISC Dispense #2 compression stalkings  Dx: leg edema  Qty: 1 Device, Refills: 0      Red Yeast Rice 600 MG TABS Take by mouth 2 times daily                Note that more than 30 minutes was spent in preparing discharge papers, discussing discharge with patient, medication review, etc.      Signed:    Owen Garcia DO    Electronically signed by Owen Garcia DO on 3/18/2021 at 1:30 PM

## 2021-03-18 NOTE — PROGRESS NOTES
Physician Progress Note      Jazmyne Griggs  CSN #:                  [id number]  :                       1930  ADMIT DATE:       3/14/2021 8:15 AM  DISCH DATE:  RESPONDING  PROVIDER #:        Yamil CABRAL DO          QUERY TEXT:    Pt admitted with UTI. Pt noted to have intermittent catherization done at   nursing facility. If possible, please document in the progress notes and   discharge summary if you are evaluating and/or treating any of the following: The medical record reflects the following:  Risk Factors: intermittent catheterization  Clinical Indicators: per H&P \"At the facility he was having difficulty with   urination and they were doing intermittent catheterizations on him. \"  Urology   consult \"The facility has been straight cathing him multiple times per   day. Cher Workman Cont the brewster and do not recommend revisiting intermittent   catheterization due to immobility. \"  Treatment: abx, indwelling brewster catheter    Thank you,  Parker Sneed RN, CCDS  Clinical Documentation Improvement Specialist  [email or web address]  [phone]  Options provided:  -- UTI due to intermittent catheterization  -- UTI not due to intermittent catheterization  -- Other - I will add my own diagnosis  -- Disagree - Not applicable / Not valid  -- Disagree - Clinically unable to determine / Unknown  -- Refer to Clinical Documentation Reviewer    PROVIDER RESPONSE TEXT:    This patient has UTI due to intermittent catheterization.     Query created by: Alex Gillis on 3/17/2021 1:49 PM      Electronically signed by:  Sandra Holcomb DO 3/18/2021 9:42 AM

## 2021-03-18 NOTE — PROCEDURES
MIDLINE  Catheter insertion date 3/18/2021     Product Number:  Ref [facility] [id number]   Lot No: [id number]   Gauge: 17   Lumen: [id number],5NO   R Cephalic    Vein Diameter : 0.45cm   Upper arm circumference (10CM ABOVE AC): 27cm   Catheter Length : 15CM                    Internal Length: 13cm   Exposed Catheter Length: 2cm   Ultrasound Used:yes  : HERNANDEZ Gomez

## 2021-03-18 NOTE — PROGRESS NOTES
Call placed to Dr. Yulissa Olmstead re: discharge. Dr. Yulissa Olmstead returned call, aware that the patient will be picked up at  4pm to go to facility. States will put in discharge orders.

## 2021-04-14 PROBLEM — N39.0 UTI (URINARY TRACT INFECTION): Status: RESOLVED | Noted: 2021-03-14 | Resolved: 2021-04-14

## 2021-08-03 ENCOUNTER — TELEPHONE (OUTPATIENT)
Dept: FAMILY MEDICINE CLINIC | Age: 86
End: 2021-08-03

## 2021-08-03 DIAGNOSIS — R31.9 HEMATURIA, UNSPECIFIED TYPE: ICD-10-CM

## 2021-08-03 DIAGNOSIS — R31.9 HEMATURIA, UNSPECIFIED TYPE: Primary | ICD-10-CM

## 2021-08-03 LAB
AMORPHOUS: ABNORMAL
BACTERIA: ABNORMAL /HPF
BILIRUBIN URINE: NEGATIVE
BLOOD, URINE: ABNORMAL
CLARITY: ABNORMAL
COLOR: YELLOW
GLUCOSE URINE: NEGATIVE MG/DL
KETONES, URINE: NEGATIVE MG/DL
LEUKOCYTE ESTERASE, URINE: ABNORMAL
NITRITE, URINE: NEGATIVE
PH UA: 6 (ref 5–9)
PROTEIN UA: 30 MG/DL
RBC UA: ABNORMAL /HPF (ref 0–2)
SPECIFIC GRAVITY UA: 1.02 (ref 1–1.03)
UROBILINOGEN, URINE: 0.2 E.U./DL
WBC UA: >20 /HPF (ref 0–5)

## 2021-08-06 LAB
ORGANISM: ABNORMAL
URINE CULTURE, ROUTINE: ABNORMAL

## 2021-08-06 RX ORDER — LEVOFLOXACIN 500 MG/1
500 TABLET, FILM COATED ORAL DAILY
Qty: 10 TABLET | Refills: 0 | Status: SHIPPED | OUTPATIENT
Start: 2021-08-06 | End: 2021-08-16

## 2021-08-11 ENCOUNTER — OUTSIDE SERVICES (OUTPATIENT)
Dept: FAMILY MEDICINE CLINIC | Age: 86
End: 2021-08-11
Payer: MEDICARE

## 2021-08-11 DIAGNOSIS — G20 PARKINSON DISEASE (HCC): ICD-10-CM

## 2021-08-11 DIAGNOSIS — M25.562 CHRONIC PAIN OF BOTH KNEES: ICD-10-CM

## 2021-08-11 DIAGNOSIS — M54.50 CHRONIC BILATERAL LOW BACK PAIN WITHOUT SCIATICA: Primary | ICD-10-CM

## 2021-08-11 DIAGNOSIS — N18.30 STAGE 3 CHRONIC KIDNEY DISEASE, UNSPECIFIED WHETHER STAGE 3A OR 3B CKD (HCC): ICD-10-CM

## 2021-08-11 DIAGNOSIS — E03.9 HYPOTHYROIDISM, UNSPECIFIED TYPE: ICD-10-CM

## 2021-08-11 DIAGNOSIS — I87.2 VENOUS STASIS DERMATITIS OF BOTH LOWER EXTREMITIES: ICD-10-CM

## 2021-08-11 DIAGNOSIS — S72.001S CLOSED FRACTURE OF NECK OF RIGHT FEMUR, SEQUELA: ICD-10-CM

## 2021-08-11 DIAGNOSIS — M25.561 CHRONIC PAIN OF BOTH KNEES: ICD-10-CM

## 2021-08-11 DIAGNOSIS — I10 ESSENTIAL HYPERTENSION: ICD-10-CM

## 2021-08-11 DIAGNOSIS — G89.29 CHRONIC PAIN OF BOTH KNEES: ICD-10-CM

## 2021-08-11 DIAGNOSIS — G89.29 CHRONIC BILATERAL LOW BACK PAIN WITHOUT SCIATICA: Primary | ICD-10-CM

## 2021-08-11 NOTE — PROGRESS NOTES
8/11/2021    Jung Gave  8/12/1930    This resident is being seen today for acute evaluation visit. He is a resident who has long-term medical conditions including Parkinson's disease, stage III chronic kidney disease, venous stasis dermatitis, essential hypertension, hypothyroidism, closed fracture of the neck of the right femur, chronic low back pain, and chronic pain to the knees. Andres Peraltas He is a 80 y.o. male resident who is being seen today for pain to his lumbar area and to both knees. This pain is primarily at nighttime. He generally is taking 1000 mg of acetaminophen nightly. He is waking up several times during the night due to the pain the Tylenol is just not holding him through the night. He does state that when he gets up and moves around during the day that the pain does improve. .  Currently at this time he denies any complaints of chest pain or palpitations. Denies any headaches, any sore throat, coughing, or shortness of breath. No nausea, vomiting, constipation or diarrhea. No fever, or chills. No recent falls or syncopal events. Objective     Vital Signs: Stable    Physical examination:Skin is essentially warm and dry. HEENT unremarkable. Neck is supple. Heart regular rate and rhythm. No rubs, gallops or murmurs noted. Lungs are clear to auscultation. No evidence of rhonchi, rales, or wheezing. Abdomen is soft, supple and non-tender. Bowel sounds are noted x4 quadrants. No rigidity, guarding or rebound tenderness. Negative Alvarez's, negative McBurney's, negative Dorian's. He is tender over the lumbar vertebrae. There are arthritic changes noted about the knees bilaterally there is no evidence of effusion or increased warmth. Extremities; no true pitting edema. Pulses are adequate. No clubbing  or no cyanosis noted. Neurologically he  is alert and oriented x3. No evidence of paralysis or paresthesias noted.     Diagnoses and all orders for this visit:    Chronic bilateral low back pain

## 2021-08-30 ENCOUNTER — APPOINTMENT (OUTPATIENT)
Dept: CT IMAGING | Age: 86
DRG: 057 | End: 2021-08-30
Payer: MEDICARE

## 2021-08-30 ENCOUNTER — APPOINTMENT (OUTPATIENT)
Dept: GENERAL RADIOLOGY | Age: 86
DRG: 057 | End: 2021-08-30
Payer: MEDICARE

## 2021-08-30 ENCOUNTER — HOSPITAL ENCOUNTER (INPATIENT)
Age: 86
LOS: 9 days | Discharge: NURSING, REHAB OR LONG-TERM CARE | DRG: 057 | End: 2021-09-08
Attending: EMERGENCY MEDICINE | Admitting: INTERNAL MEDICINE
Payer: MEDICARE

## 2021-08-30 DIAGNOSIS — S72.001D CLOSED FRACTURE OF NECK OF RIGHT FEMUR WITH ROUTINE HEALING, SUBSEQUENT ENCOUNTER: ICD-10-CM

## 2021-08-30 DIAGNOSIS — J96.01 ACUTE RESPIRATORY FAILURE WITH HYPOXIA (HCC): ICD-10-CM

## 2021-08-30 DIAGNOSIS — G93.41 ACUTE METABOLIC ENCEPHALOPATHY: Primary | ICD-10-CM

## 2021-08-30 DIAGNOSIS — E03.9 HYPOTHYROIDISM, UNSPECIFIED TYPE: ICD-10-CM

## 2021-08-30 LAB
ALBUMIN SERPL-MCNC: 3.5 G/DL (ref 3.5–5.2)
ALP BLD-CCNC: 102 U/L (ref 40–129)
ALT SERPL-CCNC: <5 U/L (ref 0–40)
ANION GAP SERPL CALCULATED.3IONS-SCNC: 11 MMOL/L (ref 7–16)
AST SERPL-CCNC: 12 U/L (ref 0–39)
B.E.: 1.1 MMOL/L (ref -3–3)
BACTERIA: NORMAL /HPF
BASOPHILS ABSOLUTE: 0.07 E9/L (ref 0–0.2)
BASOPHILS RELATIVE PERCENT: 0.9 % (ref 0–2)
BILIRUB SERPL-MCNC: 0.8 MG/DL (ref 0–1.2)
BILIRUBIN URINE: NEGATIVE
BLOOD, URINE: ABNORMAL
BUN BLDV-MCNC: 22 MG/DL (ref 6–23)
CALCIUM SERPL-MCNC: 8.9 MG/DL (ref 8.6–10.2)
CHLORIDE BLD-SCNC: 98 MMOL/L (ref 98–107)
CLARITY: CLEAR
CO2: 27 MMOL/L (ref 22–29)
COHB: 1.2 % (ref 0–1.5)
COLOR: YELLOW
CREAT SERPL-MCNC: 1 MG/DL (ref 0.7–1.2)
CRITICAL: NORMAL
DATE ANALYZED: NORMAL
DATE OF COLLECTION: NORMAL
EKG ATRIAL RATE: 56 BPM
EKG P AXIS: 67 DEGREES
EKG P-R INTERVAL: 256 MS
EKG Q-T INTERVAL: 532 MS
EKG QRS DURATION: 154 MS
EKG QTC CALCULATION (BAZETT): 513 MS
EKG R AXIS: -79 DEGREES
EKG T AXIS: -3 DEGREES
EKG VENTRICULAR RATE: 56 BPM
EOSINOPHILS ABSOLUTE: 0.26 E9/L (ref 0.05–0.5)
EOSINOPHILS RELATIVE PERCENT: 3.5 % (ref 0–6)
GFR AFRICAN AMERICAN: >60
GFR NON-AFRICAN AMERICAN: >60 ML/MIN/1.73
GLUCOSE BLD-MCNC: 89 MG/DL (ref 74–99)
GLUCOSE URINE: NEGATIVE MG/DL
HCO3: 25.7 MMOL/L (ref 22–26)
HCT VFR BLD CALC: 31.5 % (ref 37–54)
HEMOGLOBIN: 10.7 G/DL (ref 12.5–16.5)
HHB: 3.3 % (ref 0–5)
IMMATURE GRANULOCYTES #: 0.02 E9/L
IMMATURE GRANULOCYTES %: 0.3 % (ref 0–5)
KETONES, URINE: NEGATIVE MG/DL
LAB: NORMAL
LACTIC ACID, SEPSIS: 1.3 MMOL/L (ref 0.5–1.9)
LEUKOCYTE ESTERASE, URINE: ABNORMAL
LIPASE: 16 U/L (ref 13–60)
LYMPHOCYTES ABSOLUTE: 0.81 E9/L (ref 1.5–4)
LYMPHOCYTES RELATIVE PERCENT: 11 % (ref 20–42)
Lab: NORMAL
MCH RBC QN AUTO: 32.3 PG (ref 26–35)
MCHC RBC AUTO-ENTMCNC: 34 % (ref 32–34.5)
MCV RBC AUTO: 95.2 FL (ref 80–99.9)
METHB: 0.2 % (ref 0–1.5)
MODE: NORMAL
MONOCYTES ABSOLUTE: 0.64 E9/L (ref 0.1–0.95)
MONOCYTES RELATIVE PERCENT: 8.7 % (ref 2–12)
NEUTROPHILS ABSOLUTE: 5.59 E9/L (ref 1.8–7.3)
NEUTROPHILS RELATIVE PERCENT: 75.6 % (ref 43–80)
NITRITE, URINE: NEGATIVE
O2 CONTENT: 16.2 ML/DL
O2 SATURATION: 96.7 % (ref 92–98.5)
O2HB: 95.3 % (ref 94–97)
OPERATOR ID: 7874
PATIENT TEMP: 37 C
PCO2: 40.7 MMHG (ref 35–45)
PDW BLD-RTO: 13.3 FL (ref 11.5–15)
PH BLOOD GAS: 7.42 (ref 7.35–7.45)
PH UA: 6 (ref 5–9)
PLATELET # BLD: 248 E9/L (ref 130–450)
PMV BLD AUTO: 10.8 FL (ref 7–12)
PO2: 90.7 MMHG (ref 75–100)
POTASSIUM REFLEX MAGNESIUM: 4.1 MMOL/L (ref 3.5–5)
PRO-BNP: 6931 PG/ML (ref 0–450)
PROCALCITONIN: 0.06 NG/ML (ref 0–0.08)
PROTEIN UA: NEGATIVE MG/DL
RBC # BLD: 3.31 E12/L (ref 3.8–5.8)
RBC UA: NORMAL /HPF (ref 0–2)
SARS-COV-2, NAAT: NOT DETECTED
SODIUM BLD-SCNC: 136 MMOL/L (ref 132–146)
SOURCE, BLOOD GAS: NORMAL
SPECIFIC GRAVITY UA: 1.01 (ref 1–1.03)
THB: 12 G/DL (ref 11.5–16.5)
TIME ANALYZED: 1455
TOTAL PROTEIN: 6.7 G/DL (ref 6.4–8.3)
TROPONIN, HIGH SENSITIVITY: 53 NG/L (ref 0–11)
TROPONIN, HIGH SENSITIVITY: 55 NG/L (ref 0–11)
TSH SERPL DL<=0.05 MIU/L-ACNC: 8.29 UIU/ML (ref 0.27–4.2)
UROBILINOGEN, URINE: 0.2 E.U./DL
WBC # BLD: 7.4 E9/L (ref 4.5–11.5)
WBC UA: NORMAL /HPF (ref 0–5)

## 2021-08-30 PROCEDURE — 93010 ELECTROCARDIOGRAM REPORT: CPT | Performed by: INTERNAL MEDICINE

## 2021-08-30 PROCEDURE — 99285 EMERGENCY DEPT VISIT HI MDM: CPT

## 2021-08-30 PROCEDURE — 93005 ELECTROCARDIOGRAM TRACING: CPT | Performed by: STUDENT IN AN ORGANIZED HEALTH CARE EDUCATION/TRAINING PROGRAM

## 2021-08-30 PROCEDURE — 96365 THER/PROPH/DIAG IV INF INIT: CPT

## 2021-08-30 PROCEDURE — 2060000000 HC ICU INTERMEDIATE R&B

## 2021-08-30 PROCEDURE — 6360000002 HC RX W HCPCS: Performed by: STUDENT IN AN ORGANIZED HEALTH CARE EDUCATION/TRAINING PROGRAM

## 2021-08-30 PROCEDURE — 80053 COMPREHEN METABOLIC PANEL: CPT

## 2021-08-30 PROCEDURE — 84145 PROCALCITONIN (PCT): CPT

## 2021-08-30 PROCEDURE — 83605 ASSAY OF LACTIC ACID: CPT

## 2021-08-30 PROCEDURE — 82805 BLOOD GASES W/O2 SATURATION: CPT

## 2021-08-30 PROCEDURE — 71045 X-RAY EXAM CHEST 1 VIEW: CPT

## 2021-08-30 PROCEDURE — 87040 BLOOD CULTURE FOR BACTERIA: CPT

## 2021-08-30 PROCEDURE — 87635 SARS-COV-2 COVID-19 AMP PRB: CPT

## 2021-08-30 PROCEDURE — 83690 ASSAY OF LIPASE: CPT

## 2021-08-30 PROCEDURE — 84484 ASSAY OF TROPONIN QUANT: CPT

## 2021-08-30 PROCEDURE — 2580000003 HC RX 258: Performed by: FAMILY MEDICINE

## 2021-08-30 PROCEDURE — 70450 CT HEAD/BRAIN W/O DYE: CPT

## 2021-08-30 PROCEDURE — 84443 ASSAY THYROID STIM HORMONE: CPT

## 2021-08-30 PROCEDURE — 81001 URINALYSIS AUTO W/SCOPE: CPT

## 2021-08-30 PROCEDURE — 83880 ASSAY OF NATRIURETIC PEPTIDE: CPT

## 2021-08-30 PROCEDURE — 6370000000 HC RX 637 (ALT 250 FOR IP): Performed by: FAMILY MEDICINE

## 2021-08-30 PROCEDURE — 85025 COMPLETE CBC W/AUTO DIFF WBC: CPT

## 2021-08-30 RX ORDER — ACETAMINOPHEN 500 MG
500 TABLET ORAL EVERY 8 HOURS PRN
Status: ON HOLD | COMMUNITY
End: 2022-10-18 | Stop reason: HOSPADM

## 2021-08-30 RX ORDER — HYDROXYZINE PAMOATE 25 MG/1
25 CAPSULE ORAL NIGHTLY
COMMUNITY

## 2021-08-30 RX ORDER — FERROUS SULFATE 325(65) MG
325 TABLET ORAL
Status: DISCONTINUED | OUTPATIENT
Start: 2021-08-31 | End: 2021-09-08 | Stop reason: HOSPADM

## 2021-08-30 RX ORDER — MONTELUKAST SODIUM 10 MG/1
10 TABLET ORAL NIGHTLY
Status: DISCONTINUED | OUTPATIENT
Start: 2021-08-30 | End: 2021-09-08 | Stop reason: HOSPADM

## 2021-08-30 RX ORDER — ONDANSETRON 4 MG/1
4 TABLET, ORALLY DISINTEGRATING ORAL EVERY 8 HOURS PRN
Status: DISCONTINUED | OUTPATIENT
Start: 2021-08-30 | End: 2021-08-30

## 2021-08-30 RX ORDER — DONEPEZIL HYDROCHLORIDE 10 MG/1
10 TABLET, FILM COATED ORAL NIGHTLY
Status: DISCONTINUED | OUTPATIENT
Start: 2021-08-30 | End: 2021-09-08 | Stop reason: HOSPADM

## 2021-08-30 RX ORDER — ONDANSETRON 2 MG/ML
4 INJECTION INTRAMUSCULAR; INTRAVENOUS EVERY 6 HOURS PRN
Status: DISCONTINUED | OUTPATIENT
Start: 2021-08-30 | End: 2021-08-30

## 2021-08-30 RX ORDER — TRAMADOL HYDROCHLORIDE 50 MG/1
100 TABLET ORAL NIGHTLY
Status: DISCONTINUED | OUTPATIENT
Start: 2021-08-30 | End: 2021-09-01

## 2021-08-30 RX ORDER — POLYETHYLENE GLYCOL 3350 17 G/17G
17 POWDER, FOR SOLUTION ORAL DAILY PRN
Status: DISCONTINUED | OUTPATIENT
Start: 2021-08-30 | End: 2021-09-08 | Stop reason: HOSPADM

## 2021-08-30 RX ORDER — HYDROXYZINE PAMOATE 25 MG/1
25 CAPSULE ORAL NIGHTLY
Status: DISCONTINUED | OUTPATIENT
Start: 2021-08-30 | End: 2021-09-03

## 2021-08-30 RX ORDER — LEVETIRACETAM 15 MG/ML
1500 INJECTION INTRAVASCULAR ONCE
Status: COMPLETED | OUTPATIENT
Start: 2021-08-30 | End: 2021-08-30

## 2021-08-30 RX ORDER — DONEPEZIL HYDROCHLORIDE 10 MG/1
10 TABLET, FILM COATED ORAL NIGHTLY
COMMUNITY

## 2021-08-30 RX ORDER — LEVOTHYROXINE SODIUM 0.03 MG/1
50 TABLET ORAL
COMMUNITY
End: 2022-08-30 | Stop reason: ALTCHOICE

## 2021-08-30 RX ORDER — MIDODRINE HYDROCHLORIDE 2.5 MG/1
2.5 TABLET ORAL EVERY 8 HOURS PRN
COMMUNITY

## 2021-08-30 RX ORDER — ACETAMINOPHEN 325 MG/1
650 TABLET ORAL EVERY 6 HOURS PRN
Status: DISCONTINUED | OUTPATIENT
Start: 2021-08-30 | End: 2021-09-08 | Stop reason: HOSPADM

## 2021-08-30 RX ORDER — ERGOCALCIFEROL 1.25 MG/1
50000 CAPSULE ORAL WEEKLY
COMMUNITY

## 2021-08-30 RX ORDER — SODIUM CHLORIDE 9 MG/ML
25 INJECTION, SOLUTION INTRAVENOUS PRN
Status: DISCONTINUED | OUTPATIENT
Start: 2021-08-30 | End: 2021-09-08 | Stop reason: HOSPADM

## 2021-08-30 RX ORDER — ACETAMINOPHEN 500 MG
1000 TABLET ORAL NIGHTLY
COMMUNITY

## 2021-08-30 RX ORDER — POTASSIUM CHLORIDE 20 MEQ/1
40 TABLET, EXTENDED RELEASE ORAL PRN
Status: DISCONTINUED | OUTPATIENT
Start: 2021-08-30 | End: 2021-09-08 | Stop reason: HOSPADM

## 2021-08-30 RX ORDER — ACETAMINOPHEN 650 MG/1
650 SUPPOSITORY RECTAL EVERY 6 HOURS PRN
Status: DISCONTINUED | OUTPATIENT
Start: 2021-08-30 | End: 2021-09-08 | Stop reason: HOSPADM

## 2021-08-30 RX ORDER — POTASSIUM CHLORIDE 7.45 MG/ML
10 INJECTION INTRAVENOUS PRN
Status: DISCONTINUED | OUTPATIENT
Start: 2021-08-30 | End: 2021-09-08 | Stop reason: HOSPADM

## 2021-08-30 RX ORDER — LIDOCAINE HYDROCHLORIDE 10 MG/ML
3.2 INJECTION, SOLUTION INFILTRATION; PERINEURAL NIGHTLY
Status: ON HOLD | COMMUNITY
End: 2021-09-07 | Stop reason: HOSPADM

## 2021-08-30 RX ORDER — MIDODRINE HYDROCHLORIDE 2.5 MG/1
2.5 TABLET ORAL EVERY 8 HOURS PRN
Status: DISCONTINUED | OUTPATIENT
Start: 2021-08-30 | End: 2021-09-08 | Stop reason: HOSPADM

## 2021-08-30 RX ORDER — LEVOTHYROXINE SODIUM 0.03 MG/1
75 TABLET ORAL
Status: ON HOLD | COMMUNITY
End: 2021-09-07 | Stop reason: HOSPADM

## 2021-08-30 RX ORDER — SODIUM CHLORIDE 0.9 % (FLUSH) 0.9 %
10 SYRINGE (ML) INJECTION PRN
Status: DISCONTINUED | OUTPATIENT
Start: 2021-08-30 | End: 2021-09-08 | Stop reason: HOSPADM

## 2021-08-30 RX ORDER — POLYETHYLENE GLYCOL 3350 17 G/17G
17 POWDER, FOR SOLUTION ORAL NIGHTLY
COMMUNITY

## 2021-08-30 RX ORDER — ACETAMINOPHEN 500 MG
500 TABLET ORAL EVERY 8 HOURS PRN
Status: DISCONTINUED | OUTPATIENT
Start: 2021-08-30 | End: 2021-08-30 | Stop reason: SDUPTHER

## 2021-08-30 RX ORDER — BUMETANIDE 1 MG/1
1 TABLET ORAL DAILY
Status: DISCONTINUED | OUTPATIENT
Start: 2021-08-31 | End: 2021-09-08 | Stop reason: HOSPADM

## 2021-08-30 RX ORDER — LEVOTHYROXINE SODIUM 0.05 MG/1
50 TABLET ORAL
Status: DISCONTINUED | OUTPATIENT
Start: 2021-09-04 | End: 2021-08-31

## 2021-08-30 RX ORDER — TRAZODONE HYDROCHLORIDE 50 MG/1
50 TABLET ORAL NIGHTLY
COMMUNITY

## 2021-08-30 RX ORDER — SODIUM CHLORIDE 0.9 % (FLUSH) 0.9 %
5-40 SYRINGE (ML) INJECTION EVERY 12 HOURS SCHEDULED
Status: DISCONTINUED | OUTPATIENT
Start: 2021-08-30 | End: 2021-09-08 | Stop reason: HOSPADM

## 2021-08-30 RX ORDER — TRAMADOL HYDROCHLORIDE 50 MG/1
100 TABLET ORAL NIGHTLY
COMMUNITY

## 2021-08-30 RX ORDER — LEVOTHYROXINE SODIUM 0.07 MG/1
75 TABLET ORAL
Status: DISCONTINUED | OUTPATIENT
Start: 2021-08-31 | End: 2021-08-31

## 2021-08-30 RX ORDER — ERTAPENEM 1 G/1
1000 INJECTION, POWDER, LYOPHILIZED, FOR SOLUTION INTRAMUSCULAR; INTRAVENOUS NIGHTLY
Status: ON HOLD | COMMUNITY
Start: 2021-08-25 | End: 2021-09-07 | Stop reason: HOSPADM

## 2021-08-30 RX ORDER — OYSTER SHELL CALCIUM WITH VITAMIN D 500; 200 MG/1; [IU]/1
1 TABLET, FILM COATED ORAL DAILY
Status: DISCONTINUED | OUTPATIENT
Start: 2021-08-31 | End: 2021-09-08 | Stop reason: HOSPADM

## 2021-08-30 RX ORDER — DOCUSATE SODIUM 100 MG/1
200 CAPSULE, LIQUID FILLED ORAL DAILY
Status: DISCONTINUED | OUTPATIENT
Start: 2021-08-31 | End: 2021-09-08 | Stop reason: HOSPADM

## 2021-08-30 RX ORDER — DOCUSATE SODIUM 100 MG/1
200 CAPSULE, LIQUID FILLED ORAL DAILY
COMMUNITY

## 2021-08-30 RX ADMIN — MONTELUKAST SODIUM 10 MG: 10 TABLET, FILM COATED ORAL at 22:08

## 2021-08-30 RX ADMIN — Medication 10 ML: at 22:09

## 2021-08-30 RX ADMIN — DONEPEZIL HYDROCHLORIDE 10 MG: 10 TABLET, FILM COATED ORAL at 22:08

## 2021-08-30 RX ADMIN — HYDROXYZINE PAMOATE 25 MG: 25 CAPSULE ORAL at 22:08

## 2021-08-30 RX ADMIN — LEVETIRACETAM 1500 MG: 15 INJECTION INTRAVENOUS at 12:34

## 2021-08-30 RX ADMIN — CARBIDOPA AND LEVODOPA 2 TABLET: 25; 100 TABLET ORAL at 22:08

## 2021-08-30 NOTE — ED NOTES
Bed: 25  Expected date:   Expected time:   Means of arrival:   Comments:  EMS     Jonathan Vanegas RN  08/30/21 [id number]

## 2021-08-30 NOTE — ED PROVIDER NOTES
This is a 80-year-old male with history of CKD, Parkinson's disease, presenting to the emergency department from nursing home for altered mental status, possible new onset seizure. EMS report that they were called for altered mental status thought to be related to UTI, increased tremors, according to chart review patient has been on ertapenem for the past 5 days, has history of multidrug-resistant organism. Unclear baseline. Patient has no history of seizures, EMS report that when they arrived he had a brief less than 1 minute shaking episode/possible seizure activity, this recurred 3 times total, about 30 seconds each time, he was given 2.5 mg of IV Versed. Chart review reveals patient was recently started on tramadol 9 days ago for chronic back pain. Patient reported to be mildly febrile, 100.4 °F per EMS. History severely limited due to patient's condition    The history is provided by medical records and the EMS personnel. The history is limited by the condition of the patient. Review of Systems   Unable to perform ROS: Mental status change        Physical Exam  Vitals and nursing note reviewed. Constitutional:       Appearance: He is not toxic-appearing or diaphoretic. Comments: Chronically ill-appearing, laying in bed, occasionally snoring, arousable   HENT:      Right Ear: External ear normal.      Left Ear: External ear normal.   Eyes:      General: No scleral icterus. Pupils: Pupils are equal, round, and reactive to light. Comments: Pupils 2 mm bilaterally, reactive to light   Cardiovascular:      Rate and Rhythm: Normal rate and regular rhythm. Pulses: Normal pulses. Heart sounds: Normal heart sounds. Pulmonary:      Effort: Pulmonary effort is normal. No respiratory distress. Breath sounds: Normal breath sounds. No wheezing or rales. Abdominal:      General: There is no distension. Tenderness: There is no abdominal tenderness. There is no guarding. Comments: Obese, soft, nontender   Musculoskeletal:         General: No swelling or deformity. Normal range of motion. Cervical back: Normal range of motion and neck supple. Right lower leg: No edema. Left lower leg: No edema. Skin:     General: Skin is warm and dry. Neurological:      General: No focal deficit present. Comments: Patient sleeping, responding to pain, breathing on his own. occasional course resting tremors. Rigidity of upper extremities. Psychiatric:         Mood and Affect: Mood normal.         Behavior: Behavior normal.         Thought Content: Thought content normal.         Judgment: Judgment normal.          Procedures     MDM  Number of Diagnoses or Management Options  Acute metabolic encephalopathy  Acute respiratory failure with hypoxia (HCC)  Hypothyroidism, unspecified type  Diagnosis management comments: This is a 79-year-old male with history of Parkinson's disease, presenting to the emergency department for altered mental status for the last several days. He is presenting from assisted living facility, has been increasingly confused and more drowsy over the last few days. Records from nursing home show that patient was started on ertapenem for suspected UTI. Patient reported to be mildly febrile by EMS, but patient afebrile here, mildly bradycardic, normotensive, requiring 2 to 3 L of oxygen via nasal cannula due to his drowsiness. Patient is arousable but sleeping. EMS reported shaking episode that they believed to be a seizure, he was given 2.5 mg of IV Versed prior to arrival in the emergency department, suspect some of the somnolence is related to this. Patient with normal lactate, do not suspect patient had a true seizure. Given concern for possible seizure, seizure pads were in place, patient was loaded with Keppra. Patient does have occasional episodes of tremor here in the emergency department, no seizure activity.   CT of the head unremarkable, work-up overall unremarkable, BNP slightly elevated but no overt evidence of fluid overload on chest x-ray or clinical exam.  No obvious source of infection, patient with no leukocytosis, no abdominal tenderness, no bacteria in the urine. Cardiac work-up reassuring, troponin mildly elevated but stable on repeat. Blood gas with no evidence of hypercapnea. Given patient's altered mental status, requirement for oxygen, patient will be admitted to the hospital for further monitoring, treatment, work-up. ED Course as of Aug 30 1739   Mon Aug 30, 2021   1401 Patient from [place] [address] Additional history provided by wife, he has been more confused and weak for last few days with intermittent shaking.    [RH]   26 Sees Dr. Harman Schulz for neurology in [place]    [RH]   1943 Additional information gathered from patient's wife in room. [RH]   0635 Patient currently on 3 liters o2 via NC, not normally on oxygen    [RH]   1525 EKG: This EKG is signed and interpreted by me. 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 [RH]   7738 Discussed case with Franco Eller NP, she agreed to admit patient under Dr. Alon Espinoza for AMS    [RH]      ED Course User Index  [RH] [address], DO     ED Course as of Aug 30 1739   [facility] Aug 30, 2021   1401 Patient from [place] [address] Additional history provided by wife, he has been more confused and weak for last few days with intermittent shaking.    [RH]   26 Sees Dr. Harman Schulz for neurology in [place]    [RH]   7289 Additional information gathered from patient's wife in room. [RH]   7841 Patient currently on 3 liters o2 via NC, not normally on oxygen    [RH]   1525 EKG: This EKG is signed and interpreted by me.     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 [RH]   6699 Discussed case with Brooke Oliver NP, she agreed to admit patient under Dr. Roberto Serrano for AMS    [RH]      ED Course User Index  [RH] [address], DO       --------------------------------------------- PAST HISTORY ---------------------------------------------  Past Medical History:  has a past medical history of High cholesterol, Hypertension, Hypothyroidism, Parkinson disease ([facility] [address] and RBBB. Past Surgical History:  has a past surgical history that includes Foot surgery (1935); shoulder surgery (Right); hernia repair (Left); Tonsillectomy and Adenoidectomy; Foot surgery; hip surgery (Left, 1/29/2021); Hip fracture surgery (Right, 3/3/2021); and Insert Midline Catheter (3/18/2021). Social History:  reports that he quit smoking about 50 years ago. He started smoking about 61 years ago. He quit after 10.00 years of use. He has never used smokeless tobacco. He reports that he does not drink alcohol and does not use drugs. Family History: family history includes Breast Cancer in his sister; Cancer in his father; Heart Disease in his mother. The patients home medications have been reviewed.     Allergies: Sulfa antibiotics    -------------------------------------------------- RESULTS -------------------------------------------------    LABS:  Results for orders placed or performed during the hospital encounter of 08/30/21   COVID-19, Rapid    Specimen: Nasopharyngeal Swab   Result Value Ref Range    SARS-CoV-2, NAAT Not Detected Not Detected   Lactate, Sepsis   Result Value Ref Range    Lactic Acid, Sepsis 1.3 0.5 - 1.9 mmol/L   CBC Auto Differential   Result Value Ref Range    WBC 7.4 4.5 - 11.5 E9/L    RBC 3.31 (L) 3.80 - 5.80 E12/L    Hemoglobin 10.7 (L) 12.5 - 16.5 g/dL Hematocrit 31.5 (L) 37.0 - 54.0 %    MCV 95.2 80.0 - 99.9 fL    MCH 32.3 26.0 - 35.0 pg    MCHC 34.0 32.0 - 34.5 %    RDW 13.3 11.5 - 15.0 fL    Platelets 586 103 - 612 E9/L    MPV 10.8 7.0 - 12.0 fL    Neutrophils % 75.6 43.0 - 80.0 %    Immature Granulocytes % 0.3 0.0 - 5.0 %    Lymphocytes % 11.0 (L) 20.0 - 42.0 %    Monocytes % 8.7 2.0 - 12.0 %    Eosinophils % 3.5 0.0 - 6.0 %    Basophils % 0.9 0.0 - 2.0 %    Neutrophils Absolute 5.59 1.80 - 7.30 E9/L    Immature Granulocytes # 0.02 E9/L    Lymphocytes Absolute 0.81 (L) 1.50 - 4.00 E9/L    Monocytes Absolute 0.64 0.10 - 0.95 E9/L    Eosinophils Absolute 0.26 0.05 - 0.50 E9/L    Basophils Absolute 0.07 0.00 - 0.20 E9/L   Comprehensive Metabolic Panel w/ Reflex to MG   Result Value Ref Range    Sodium 136 132 - 146 mmol/L    Potassium reflex Magnesium 4.1 3.5 - 5.0 mmol/L    Chloride 98 98 - 107 mmol/L    CO2 27 22 - 29 mmol/L    Anion Gap 11 7 - 16 mmol/L    Glucose 89 74 - 99 mg/dL    BUN 22 6 - 23 mg/dL    CREATININE 1.0 0.7 - 1.2 mg/dL    GFR Non-African American >60 >=60 mL/min/1.73    GFR African American >60     Calcium 8.9 8.6 - 10.2 mg/dL    Total Protein 6.7 6.4 - 8.3 g/dL    Albumin 3.5 3.5 - 5.2 g/dL    Total Bilirubin 0.8 0.0 - 1.2 mg/dL    Alkaline Phosphatase 102 40 - 129 U/L    ALT <5 0 - 40 U/L    AST 12 0 - 39 U/L   Troponin   Result Value Ref Range    Troponin, High Sensitivity 53 (H) 0 - 11 ng/L   Brain Natriuretic Peptide   Result Value Ref Range    Pro-BNP 6,931 (H) 0 - 450 pg/mL   Lipase   Result Value Ref Range    Lipase 16 13 - 60 U/L   Urinalysis, reflex to microscopic   Result Value Ref Range    Color, UA Yellow Straw/Yellow    Clarity, UA Clear Clear    Glucose, Ur Negative Negative mg/dL    Bilirubin Urine Negative Negative    Ketones, Urine Negative Negative mg/dL    Specific Gravity, UA 1.015 1.005 - 1.030    Blood, Urine TRACE-INTACT Negative    pH, UA 6.0 5.0 - 9.0    Protein, UA Negative Negative mg/dL    Urobilinogen, Urine 0.2 <2.0 E.U./dL    Nitrite, Urine Negative Negative    Leukocyte Esterase, Urine TRACE (A) Negative   TSH WITHOUT REFLEX   Result Value Ref Range    TSH 8.290 (H) 0.270 - 4.200 uIU/mL   Procalcitonin   Result Value Ref Range    Procalcitonin 0.06 0.00 - 0.08 ng/mL   Microscopic Urinalysis   Result Value Ref Range    WBC, UA 1-3 0 - 5 /HPF    RBC, UA 0-1 0 - 2 /HPF    Bacteria, UA NONE SEEN None Seen /HPF   Troponin   Result Value Ref Range    Troponin, High Sensitivity 55 (H) 0 - 11 ng/L   Blood Gas, Arterial   Result Value Ref Range    Date Analyzed [id number]     Time Analyzed 1455     Source: Blood Arterial     pH, Blood Gas 7.418 7.350 - 7.450    PCO2 40.7 35.0 - 45.0 mmHg    PO2 90.7 75.0 - 100.0 mmHg    HCO3 25.7 22.0 - 26.0 mmol/L    B.E. 1.1 -3.0 - 3.0 mmol/L    O2 Sat 96.7 92.0 - 98.5 %    O2Hb 95.3 94.0 - 97.0 %    COHb 1.2 0.0 - 1.5 %    MetHb 0.2 0.0 - 1.5 %    O2 Content 16.2 mL/dL    HHb 3.3 0.0 - 5.0 %    tHb (est) 12.0 11.5 - 16.5 g/dL    Mode NC-  3L     Date Of Collection      Time Collected      Pt Temp 37.0 C     ID [id number]     Lab [id number]     Critical(s) Notified . No Critical Values    EKG 12 Lead   Result Value Ref Range    Ventricular Rate 56 BPM    Atrial Rate 56 BPM    P-R Interval 256 ms    QRS Duration 154 ms    Q-T Interval 532 ms    QTc Calculation (Bazett) 513 ms    P Axis 67 degrees    R Axis -79 degrees    T Axis -3 degrees       RADIOLOGY:  XR CHEST PORTABLE   Final Result   Diminished lung volume with scattered areas of presumed scarring and or   atelectasis. PA and lateral views would be useful for further assessment, if   symptoms persist.         CT Head WO Contrast   Final Result   No acute intracranial abnormality.               ------------------------- NURSING NOTES AND VITALS REVIEWED ---------------------------  Date / Time Roomed:  8/30/2021 12:06 PM  ED Bed Assignment:  25/25    The nursing notes within the ED encounter and vital signs as below have been reviewed. Patient Vitals for the past 24 hrs:   BP Temp Pulse Resp SpO2 Height Weight   08/30/21 1732 (!) 143/61 98.5 °F (36.9 °C) 55 18 99 % -- --   08/30/21 1727 (!) 143/61 -- 52 -- 96 % -- --   08/30/21 1633 (!) 140/59 -- 55 18 98 % -- --   08/30/21 1548 (!) 137/59 -- 50 -- 97 % -- --   08/30/21 1507 (!) 156/79 -- 51 -- 97 % -- --   08/30/21 1424 (!) 158/91 -- 58 -- 96 % -- --   08/30/21 1356 133/75 -- 57 -- 98 % -- --   08/30/21 1302 (!) 132/56 -- 52 -- 96 % -- --   08/30/21 1215 -- -- -- -- 96 % -- --   08/30/21 1213 122/68 97.9 °F (36.6 °C) 60 18 (!) 88 % 5' 6\" (1.676 m) 173 lb 3.2 oz (78.6 kg)       Oxygen Saturation Interpretation: Improved after treatment    ------------------------------------------ PROGRESS NOTES ------------------------------------------  Re-evaluation(s):  See ED COURSE    Counseling:  I have spoken with the wife and discussed todays results, in addition to providing specific details for the plan of care and counseling regarding the diagnosis and prognosis. Their questions are answered at this time and they are agreeable with the plan of admission.    --------------------------------- ADDITIONAL PROVIDER NOTES ---------------------------------  Consultations:  See ED COURSE  This patient's ED course included: a personal history and physicial examination, re-evaluation prior to disposition, multiple bedside re-evaluations, IV medications, cardiac monitoring and continuous pulse oximetry    This patient has remained hemodynamically stable during their ED course. Diagnosis:  1. Acute metabolic encephalopathy New Problem   2. Hypothyroidism, unspecified type Inadequately Controlled   3. Acute respiratory failure with hypoxia (HCC) New Problem       Disposition:  Patient's disposition: Admit to telemetry  Patient's condition is stable.          [address] DO  Resident  08/30/21 4599

## 2021-08-30 NOTE — PROGRESS NOTES
Database complete. Medications reconciled by [facility]. Care plans and education initiated. Requires wheelchair. Parkinson specialist is Dr. Елена Dukes [facility] [place]. Pt needs assistance with feeds.

## 2021-08-31 LAB
ANION GAP SERPL CALCULATED.3IONS-SCNC: 11 MMOL/L (ref 7–16)
BUN BLDV-MCNC: 20 MG/DL (ref 6–23)
CALCIUM SERPL-MCNC: 9 MG/DL (ref 8.6–10.2)
CHLORIDE BLD-SCNC: 98 MMOL/L (ref 98–107)
CO2: 27 MMOL/L (ref 22–29)
CREAT SERPL-MCNC: 1 MG/DL (ref 0.7–1.2)
GFR AFRICAN AMERICAN: >60
GFR NON-AFRICAN AMERICAN: >60 ML/MIN/1.73
GLUCOSE BLD-MCNC: 71 MG/DL (ref 74–99)
HCT VFR BLD CALC: 33.5 % (ref 37–54)
HEMOGLOBIN: 11.1 G/DL (ref 12.5–16.5)
MCH RBC QN AUTO: 32.4 PG (ref 26–35)
MCHC RBC AUTO-ENTMCNC: 33.1 % (ref 32–34.5)
MCV RBC AUTO: 97.7 FL (ref 80–99.9)
PDW BLD-RTO: 13.1 FL (ref 11.5–15)
PLATELET # BLD: 238 E9/L (ref 130–450)
PMV BLD AUTO: 10.3 FL (ref 7–12)
POTASSIUM REFLEX MAGNESIUM: 3.9 MMOL/L (ref 3.5–5)
RBC # BLD: 3.43 E12/L (ref 3.8–5.8)
SODIUM BLD-SCNC: 136 MMOL/L (ref 132–146)
WBC # BLD: 9.2 E9/L (ref 4.5–11.5)

## 2021-08-31 PROCEDURE — 97161 PT EVAL LOW COMPLEX 20 MIN: CPT

## 2021-08-31 PROCEDURE — 2060000000 HC ICU INTERMEDIATE R&B

## 2021-08-31 PROCEDURE — 80048 BASIC METABOLIC PNL TOTAL CA: CPT

## 2021-08-31 PROCEDURE — 2700000000 HC OXYGEN THERAPY PER DAY

## 2021-08-31 PROCEDURE — 36415 COLL VENOUS BLD VENIPUNCTURE: CPT

## 2021-08-31 PROCEDURE — 2580000003 HC RX 258: Performed by: FAMILY MEDICINE

## 2021-08-31 PROCEDURE — 6360000002 HC RX W HCPCS: Performed by: FAMILY MEDICINE

## 2021-08-31 PROCEDURE — 6370000000 HC RX 637 (ALT 250 FOR IP): Performed by: NURSE PRACTITIONER

## 2021-08-31 PROCEDURE — 97165 OT EVAL LOW COMPLEX 30 MIN: CPT

## 2021-08-31 PROCEDURE — 85027 COMPLETE CBC AUTOMATED: CPT

## 2021-08-31 PROCEDURE — 6370000000 HC RX 637 (ALT 250 FOR IP): Performed by: FAMILY MEDICINE

## 2021-08-31 RX ORDER — LEVOTHYROXINE SODIUM 0.07 MG/1
75 TABLET ORAL DAILY
Status: DISCONTINUED | OUTPATIENT
Start: 2021-08-31 | End: 2021-09-08 | Stop reason: HOSPADM

## 2021-08-31 RX ADMIN — LEVOTHYROXINE SODIUM 75 MCG: 75 TABLET ORAL at 17:31

## 2021-08-31 RX ADMIN — ASPIRIN 325 MG: 325 TABLET, COATED ORAL at 10:11

## 2021-08-31 RX ADMIN — BUMETANIDE 1 MG: 1 TABLET ORAL at 10:11

## 2021-08-31 RX ADMIN — Medication 10 ML: at 21:38

## 2021-08-31 RX ADMIN — LEVOTHYROXINE SODIUM 75 MCG: 75 TABLET ORAL at 06:01

## 2021-08-31 RX ADMIN — FERROUS SULFATE TAB 325 MG (65 MG ELEMENTAL FE) 325 MG: 325 (65 FE) TAB at 10:11

## 2021-08-31 RX ADMIN — CARBIDOPA AND LEVODOPA 2 TABLET: 25; 100 TABLET ORAL at 21:37

## 2021-08-31 RX ADMIN — HYDROXYZINE PAMOATE 25 MG: 25 CAPSULE ORAL at 21:37

## 2021-08-31 RX ADMIN — DONEPEZIL HYDROCHLORIDE 10 MG: 10 TABLET, FILM COATED ORAL at 21:38

## 2021-08-31 RX ADMIN — CALCIUM CARBONATE-VITAMIN D TAB 500 MG-200 UNIT 1 TABLET: 500-200 TAB at 10:11

## 2021-08-31 RX ADMIN — Medication 10 ML: at 10:11

## 2021-08-31 RX ADMIN — CARBIDOPA AND LEVODOPA 2 TABLET: 25; 100 TABLET ORAL at 12:53

## 2021-08-31 RX ADMIN — CARBIDOPA AND LEVODOPA 2 TABLET: 25; 100 TABLET ORAL at 17:31

## 2021-08-31 RX ADMIN — TRAMADOL HYDROCHLORIDE 100 MG: 50 TABLET, FILM COATED ORAL at 21:37

## 2021-08-31 RX ADMIN — ENOXAPARIN SODIUM 40 MG: 40 INJECTION SUBCUTANEOUS at 10:11

## 2021-08-31 RX ADMIN — CARBIDOPA AND LEVODOPA 2 TABLET: 25; 100 TABLET ORAL at 10:12

## 2021-08-31 RX ADMIN — MONTELUKAST SODIUM 10 MG: 10 TABLET, FILM COATED ORAL at 21:37

## 2021-08-31 RX ADMIN — DOCUSATE SODIUM 200 MG: 100 CAPSULE ORAL at 10:11

## 2021-08-31 ASSESSMENT — PAIN SCALES - GENERAL: PAINLEVEL_OUTOF10: 0

## 2021-08-31 NOTE — PROGRESS NOTES
Call placed to wife Felicia Douglass with patient update. She requested that a physician call her grandson who is a physician. When Cathy LOUIS rounded, did request call be placed by her or Dr. Douglas Cote and Tj Sepulveda number provided.

## 2021-08-31 NOTE — H&P
7819  228[facility]  History and Physical      CHIEF COMPLAINT:    Chief Complaint   Patient presents with    Seizures     witnessed by ems, versed given by ems    Altered Mental Status        Patient of Aunjose Wilks DO presents with:  Acute metabolic encephalopathy    History of Present Illness:   Patient is a 80-year-old male with past medical history of Parkinson disease, hypertension, hypothyroidism, hyperlipidemia, right bundle branch block who presents with altered mental status. Staff at facility also reported noting an increase in patient's tremors. EMS witnessed an episode that lasted less than 1 minute of shaking, questionable seizure activity. This episode occurred 3 times total.  Patient was mildly febrile for EMS at 100.4. Has been afebrile since admission. Work-up essentially negative other than TSH noted to be 8.29, 7.0 at the end of January. Upon examination, patient is sitting up in chair. Patient is alert and oriented to person, place, and time however disoriented to situation. Spoke with patient's wife and grand son who is a physician. Both state that patient is more alert than yesterday however is still not back to baseline. Requesting neurology consult. Patient denies chest pain, shortness of breath, fever, chills, nausea, vomiting, or diarrhea. REVIEW OF SYSTEMS:  Pertinent negatives are above in HPI. 10 point ROS otherwise negative.       Past Medical History:   Diagnosis Date    High cholesterol     Hypertension     Hypothyroidism     Parkinson disease ([facility] [address]     Diagnosed in 2008 by Dr. 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6     HERNIA REPAIR Left     groin repair     HIP FRACTURE SURGERY Right 3/3/2021    INTRAMEDULLARY NAIL HIP FIXATION RIGHT HIP   +++SYNTHES+++ performed by Syed Delgado MD at [facility] Left 1/29/2021    LEFT HIP HEMIARTHROPLASTY performed by Sergio Sierra MD at [address] [place] [address] Avenue  3/18/2021         SHOULDER SURGERY Right     5-10 years ago    TONSILLECTOMY AND ADENOIDECTOMY         Medications Prior to Admission:    Medications Prior to Admission: donepezil (ARICEPT) 10 MG tablet, Take 10 mg by mouth nightly  traZODone (DESYREL) 50 MG tablet, Take 50 mg by mouth nightly   polyethylene glycol (MIRALAX) 17 g PACK packet, Take 17 g by mouth nightly  hydrOXYzine (VISTARIL) 25 MG capsule, Take 25 mg by mouth nightly  acetaminophen (TYLENOL) 500 MG tablet, Take 1,000 mg by mouth nightly *SEE OTHER ORDER*  levothyroxine (SYNTHROID) 25 MCG tablet, Take 75 mcg by mouth Five times weekly *[id number]-[id number]*  vitamin D (ERGOCALCIFEROL) 1.25 MG (50549 UT) CAPS capsule, Take 50,000 Units by mouth once a week *MONDAYS*  traMADol (ULTRAM) 50 MG tablet, Take 100 mg by mouth nightly.   levothyroxine (SYNTHROID) 25 MCG tablet, Take 50 mcg by mouth Twice a Week *SAT-SUN*  docusate sodium (COLACE) 100 MG capsule, Take 200 mg by mouth daily  ertapenem (INVANZ) 1 GM injection, Inject 1,000 mg into the muscle nightly  lidocaine 1 % injection, 3.2 mLs by Other route nightly *MIX WITH INVANZ 3.2ML OF LIDOCAINE*  acetaminophen (TYLENOL) 500 MG tablet, Take 500 mg by mouth every 8 hours as needed for Pain or Fever  midodrine (PROAMATINE) 2.5 MG tablet, Take 2.5 mg by mouth every 8 hours as needed (HTN)  calcium carbonate-vitamin D (CALTRATE) 600-400 MG-UNIT TABS per tab, Take 1 tablet by mouth daily  potassium chloride (KLOR-CON) 20 MEQ packet, Take 40 mEq by mouth daily  aspirin 325 MG EC tablet, Take 1 tablet by mouth daily  ferrous sulfate (IRON 325) 325 (65 Fe) MG tablet, Take 1 tablet by mouth daily (with breakfast)  montelukast (SINGULAIR) 10 MG tablet, Take 1 tablet by mouth nightly  bumetanide (BUMEX) 1 MG tablet, Take 1 mg by mouth daily  carbidopa-levodopa (SINEMET)  MG per tablet, Take 2 tablets by mouth 4 times daily    Note that the patient's home medications were reviewed and the above list is accurate to the best of my knowledge at the time of the exam.    Allergies:    Sulfa antibiotics    Social History:    reports that he quit smoking about 50 years ago. He started smoking about 61 years ago. He quit after 10.00 years of use. He has never used smokeless tobacco. He reports that he does not drink alcohol and does not use drugs. Family History:   family history includes Breast Cancer in his sister; Cancer in his father; Heart Disease in his mother. PHYSICAL EXAM:    Vitals:  BP (!) 162/62   Pulse 58   Temp 97.7 °F (36.5 °C)   Resp 16   Ht 5' 6\" (1.676 m)   Wt 173 lb 3.2 oz (78.6 kg)   SpO2 96%   BMI 27.96 kg/m²       General appearance: NAD, conversant  Eyes: Sclerae anicteric, PERRLA  HEENT: AT/NC, MMM  Neck: FROM, supple, no thyromegaly  Lymph: No cervical / supraclavicular lymphadenopathy  Lungs: Clear to auscultation, WOB normal  CV: RRR, no MRGs, no lower extremity edema  Abdomen: Soft, non-tender; no masses or HSM, +BS  Extremities: FROM without synovitis. No clubbing or cyanosis of the hands. Skin: no rash, induration, lesions, or ulcers  Psych: Calm and cooperative. Normal judgement and insight. Normal mood and affect. Neuro: Alert and interactive-oriented to person place and time however disoriented to situation, face symmetric, speech fluent. LABS:  All labs reviewed.   Of note:  CBC with Differential:    Lab Results   Component Value Date    WBC 9.2 08/31/2021    RBC 3.43 08/31/2021    HGB 11.1 08/31/2021    HCT 33.5 08/31/2021     08/31/2021    MCV 97.7 08/31/2021    MCH 32.4 08/31/2021    MCHC 33.1 08/31/2021    RDW 13.1 08/31/2021    NRBC 0.0 07/21/2017    LYMPHOPCT 11.0 08/30/2021    MONOPCT 8.7 08/30/2021    BASOPCT 0.9 08/30/2021    MONOSABS 0.64 08/30/2021    LYMPHSABS 0.81 08/30/2021    EOSABS 0.26 08/30/2021    BASOSABS 0.07 08/30/2021     CMP: Lab Results   Component Value Date     08/31/2021    K 3.9 08/31/2021    CL 98 08/31/2021    CO2 27 08/31/2021    BUN 20 08/31/2021    CREATININE 1.0 08/31/2021    GFRAA >60 08/31/2021    AGRATIO 1.1 12/18/2020    LABGLOM >60 08/31/2021    LABGLOM 39 01/14/2021    GLUCOSE 71 08/31/2021    GLUCOSE 123 01/14/2021    PROT 6.7 08/30/2021    LABALBU 3.5 08/30/2021    CALCIUM 9.0 08/31/2021    BILITOT 0.8 08/30/2021    ALKPHOS 102 08/30/2021    AST 12 08/30/2021    ALT <5 08/30/2021       Imaging:  I've personally reviewed the patient's CT head, CXR    EKG:  I've personally reviewed the patient's EKG:  Sinus bradycardia with first-degree block    Telemetry:  I've personally reviewed the patient's telemetry:  SB with first-degree    ASSESSMENT/PLAN:  Principal Problem:    Acute metabolic encephalopathy  Active Problems:    Parkinson disease ([address]  Resolved Problems:    * No resolved hospital problems. *      Acute change in mental status /?  Seizure  -History of Parkinson disease  -Work-up essentially negative-head CT unremarkable, no evidence of metabolic derangements,/infectious etiology  -Patient's wife states she sees some improvement in his mental status however still not back to baseline  -Follows with neurology in [facility], family requesting neurology evaluation here  -Will have inpatient neurology see patient   -Continue seizure precautions    Hypothyroidism  -TSH 8.29, previously 7.0 in January  -Increase Synthroid to 75 mcg daily      Medication for other comorbidities continue as appropriate dose adjustment as necessary. DVT prophylaxis  PT OT  Discharge planning-[facility]  Case discussed with attending and agreed upon plan of care. Code status: Full  Requires inpatient level of care  LORIE Lopez - CNP    3:39 PM  8/31/2021     Above note edited to reflect my thoughts    I personally saw, examined and provided care for the patient.  Radiographs, labs and medication list were reviewed by me independently. The case was discussed in detail and plans for care were established. Review of LORIE Kennedy CNP, documentation was conducted and revisions were made as appropriate directly by me. I agree with the above documented exam, problem list, and plan of care.      Tigre Mar MD  4:33 PM  8/31/2021

## 2021-08-31 NOTE — PROGRESS NOTES
Pharmacy Note    An order for Zofran has been discontinued for this patient due to the risk of QT prolongation. If an antiemetic is indicated for your patient, please consider use of Tigan or Compazine. Current QTc = 513  Please contact the Pharmacy with any questions.   Silas Ventura, [address] [place] [address]  8/30/2021  9:26 PM

## 2021-08-31 NOTE — CARE COORDINATION
Social work / Discharge Planning:       COVID NEGATIVE 8/30/21. RN and CNP updated social work that patient's wife is considering [facility]. Freedom of choice list provided. Will need follow up visit for [facility] choices.   Electronically signed by GERMÁN Vickers on 8/31/2021 at 1:13 PM

## 2021-08-31 NOTE — PROGRESS NOTES
P Quality Flow/Interdisciplinary Rounds Progress Note        Quality Flow Rounds held on August 31, 2021    Disciplines Attending:  Bedside Nurse, ,  and Nursing Unit Leadership    Rach Carrera was admitted on 8/30/2021 12:06 PM    Anticipated Discharge Date:  Expected Discharge Date: 09/02/21    Disposition:    Sriram Score:  Sriram Scale Score: 14    Readmission Risk              Risk of Unplanned Readmission:  19           Discussed patient goal for the day, patient clinical progression, and barriers to discharge. The following Goal(s) of the Day/Commitment(s) have been identified:  PT/OT to see, monitor for seizure activity, temps. Check for consults.       Rashard Strickland RN  August 31, 2021

## 2021-08-31 NOTE — PROGRESS NOTES
Occupational Therapy  OCCUPATIONAL THERAPY INITIAL EVALUATION      Date:2021  Patient Name: Iqra Caballero  MRN: [id number]  : 1930  [facility]    OCCUPATIONAL THERAPY INITIAL EVALUATION    [facility] [place] [facility], [place]         Date:2021                                                  Patient Name: Iqra Caballero    MRN: [id number]    : 1930    Room: -A      Evaluating OT: Manas Aguilar OTR/L   [id number]      Referring Arch Aver Learn, APRN - CNP    Specific Provider Orders/Date:OT eval and treat 2021      Diagnosis: Acute resp failure with hypoxia      Pertinent Medical History: Parkinson's disease, HTN, chronic back pain,      hip surgery 2021    Precautions:  Fall Risk, alarm      Assessment of current deficits    [x] Functional mobility  [x]ADLs  [x] Strength               [x]Cognition    [x] Functional transfers   [x] IADLs         [x] Safety Awareness   [x]Endurance    [] Fine Coordination              [x] Balance      [] Vision/perception   []Sensation     []Gross Motor Coordination  [] ROM  [] Delirium                   [] Motor Control     OT PLAN OF CARE   OT POC based on physician orders, patient diagnosis and results of clinical assessment    Frequency/Duration  2-4 days/wk for 2 weeks PRN   Specific OT Treatment Interventions to include:   ADL retraining/adapted techniques and AE recommendations to increase functional independence within precautions                    Energy conservation techniques to improve tolerance for selfcare routine   Functional transfer/mobility training/DME recommendations for increased independence, safety and fall prevention         Patient/family education to increase safety and functional independence             Environmental modifications for safe mobility and completion of ADLs                             Therapeutic activity to improve functional performance during ADLs. Therapeutic exercise to improve tolerance and functional strength for ADLs    Balance retraining/tolerance tasks for facilitation of postural control with dynamic challenges during ADLs . Positioning to improve functional independence  Cognitive retraining ex's to improve problem solving skills & safe participation in ADLs/IADLs       Recommended Adaptive Equipment: TBD     SOCIAL: admitted from SNF. PLOF???      Pain Level: no pain ;   Cognition: A&O: pleasant, following commands but confused    Memory:  Forgetful    Sequencing:  Fair -    Problem solving:  Fair -   Judgement/safety:  poor     Functional Assessment:  AM-PAC Daily Activity Raw Score: 12/24   Initial Eval Status  Date: 8/31/21 Treatment Status  Date: STGs = LTGs  Time frame: 10-14 days   Feeding Min A  Supervision    Grooming Mod A,seated   Decrease decrease standing balance  Min A    UB Dressing Mod A  Don/[facility] gown  Min A   LB Dressing Max A/Dependent   Mod A    Bathing Max A   Mod A    Toileting Dependent      Bed Mobility  Dependent   Supine <> sit   Mod a    Functional Transfers Max A x2  Sit-stand from bed   Posterior lean   Mod A    Functional Mobility Patient stood next to bed only   Decrease balance  Unable to initiate steps   Mod A  with good tolerance    Balance Sitting:     Static:  Esvin- EOB     Dynamic:Max A   Standing: Max A x2  SBA-sitting   Mod A - standing    Activity Tolerance Fair - with light activity   Fair+ with ADL activity    Visual/  Perceptual Glasses: reading                Hand Dominance right    AROM (PROM) Strength Additional Info:    RUE  WFL WFL good  and wfl FMC/dexterity noted during ADL tasks       LUE WFL WFl good  and wfl FMC/dexterity noted during ADL tasks       Hearing: WFL  Sensation:  No c/o numbness or tingling   Tone: WFL   Edema: none observed     Comments: Upon arrival patient lying in bed .   At end of session, patient returned to bed  with call light and phone within reach, all lines and tubes intact. *ALARM ON      Overall patient demonstrated  decreased independence and safety during completion of ADL/functional transfer/mobility tasks. Pt would benefit from continued skilled OT to increase safety and independence with completion of ADL/IADL tasks for functional independence and quality of life. Rehab Potential: fair +for established goals     Patient / Family Goal: none stated       Patient and/or family were instructed on functional diagnosis, prognosis/goals and OT plan of care. Demonstrated poor  understanding. Eval Complexity: Low    Time In: 0824  Time Out: 0840      Min Units   OT Eval Low 97165 x  1   OT Eval Medium 53697      OT Eval High 83827      OT Re-Eval [id number]       Therapeutic Ex [id number]       Therapeutic Activities 07856       ADL/Self Care 19247       Orthotic Management 54779       Manual 19267     Neuro Re-Ed 86306       Non-Billable Time          Evaluation Time additionally includes thorough review of current medical information, gathering information on past medical history/social history and prior level of function, interpretation of standardized testing/informal observation of tasks, assessment of data and development of plan of care and goals.             Scarlet Schirmer  OTR/L  OT [id number]

## 2021-08-31 NOTE — PROGRESS NOTES
Physical Therapy    Facility/Department: [facility] MED SURG  Initial Assessment    NAME: Richard Santana  : 1930  MRN: [id number]    Date of Service: 2021       REQUIRES PT FOLLOW UP: Yes       Patient Diagnosis(es): The primary encounter diagnosis was Acute metabolic encephalopathy. Diagnoses of Hypothyroidism, unspecified type and Acute respiratory failure with hypoxia ([address] were also pertinent to this visit. has a past medical history of High cholesterol, Hypertension, Hypothyroidism, Parkinson disease ([address] and RBBB. has a past surgical history that includes Foot surgery (); shoulder surgery (Right); hernia repair (Left); Tonsillectomy and Adenoidectomy; Foot surgery; hip surgery (Left, 2021); Hip fracture surgery (Right, 3/3/2021); and Insert Midline Catheter (3/18/2021). Evaluating Therapist: Yin Beckford PT     Referring Provider:  LORIE Han CNP    PT order : PT eval and treat     Room #:  607   DIAGNOSIS:  Acute resp failure. Additional Pertinent History: presented form NH with AMS  PRECAUTIONS:  Falls     Social:  Pt admitted from SNF   Prior to admission : pt unable to report     Initial Evaluation  Date: 2021  Treatment      Short Term/ Long Term   Goals   Was pt agreeable to Eval/treatment? yes      Does pt have pain?  none reported      Bed Mobility  Rolling: NT   Supine to sit:  Dep assist   Sit to supine:  NT   Scooting:  Dep assist    mod assist    Transfers Sit to stand:  Max assist x 2   Stand to sit:  Max assist x2   Stand pivot:  Max assist x 2    mod assist    Ambulation   NT, pt unable    20  feet with  ww  with  Mod assist    LE ROM  AAROM WFL      LE strength  3-/ 5       AM- PAC RAW score              Pt is alert and Oriented x  2, grossly      Balance:  Max assist sitting EOB, dep assist in stand . High fall risk due to weakness, poor balance, and extensive assist required with mobility     Endurance: decreased   Bed/Chair alarm:   Yes ASSESSMENT  Pt displays functional ability as noted in the objective portion of this evaluation. Conditions Requiring Skilled Therapeutic Intervention:    [x]Decreased strength     [x]Decreased ROM  []Decreased functional mobility  [x]Decreased balance   [x]Decreased endurance   [x]Decreased posture  []Decreased sensation  [x]Decreased coordination   []Decreased vision  [x]Decreased safety awareness   []Increased pain       Treatment/Education:    Pt in bed upon arrival; agreeable to PT. Mobility as above. Pt required cues for technique with all mobility, but unable to follow most of the time. Severe posterior lean in sit. Pt unable to get LEs underneath him in stand. Needed physical assist to bed knees and place LEs on floor for transfer. RN informed of difficult transfer and rec use of Stedy    Pt educated on fall risk,  Safety with mobility        Patient response to education:   Pt verbalized understanding Pt demonstrated skill Pt requires further education in this area   no no  yes        Comments:  Pt left in recliner after session, with call light in reach. Waffle cushion in chair       Rehab potential is Good for reaching above PT goals. Pts/ family goals   1. None stated     Patient and or family understand(s) diagnosis, prognosis, and plan of care. - no    PLAN  PT care will be provided in accordance with the objectives noted above. Whenever appropriate, clear delegation orders will be provided for nursing staff. Exercises and functional mobility practice will be used as well as appropriate assistive devices or modalities to obtain goals. Patient and family education will also be administered as needed.         PLAN OF CARE:    Current Treatment Recommendations     [x] Strengthening to improve independence with functional mobility   [] ROM to improve independence with functional mobility   [x] Balance Training to improve static/dynamic balance and to reduce fall risk  [x] Endurance Training to improve activity tolerance during functional mobility   [x] Transfer Training to improve safety and independence with all functional transfers   [x] Gait Training to improve gait mechanics, endurance and assess need for appropriate assistive device  [] Stair Training in preparation for safe discharge home and/or into the community   [x] Positioning to prevent skin breakdown and contractures  [x] Safety and Education Training   [x] Patient/Caregiver Education   [] HEP  [] Other     Frequency of treatments will be 2-5x/week x  7-14 days. Time in: 0829   Time out: 0843      Evaluation Time includes thorough review of current medical information, gathering information on past medical history/social history and prior level of function, completion of standardized testing/informal observation of tasks, assessment of data and education on plan of care and goals.     CPT codes:  [x] Low Complexity PT evaluation 73239  [] Moderate Complexity PT evaluation 05854  [] High Complexity PT evaluation 65993  [] PT Re-evaluation 75905  [] Gait training 91089  minutes  [] Therapeutic activities 01330  minutes  [] Therapeutic exercises 59228  minutes  [] Neuromuscular reeducation 38042  minutes       Isa Sr number:  PT 1139

## 2021-08-31 NOTE — PROGRESS NOTES
Attempted to send out Neurology consult.   No coverage after 3pm.  Will after send consult tomorrow AM.

## 2021-09-01 PROCEDURE — 6370000000 HC RX 637 (ALT 250 FOR IP): Performed by: FAMILY MEDICINE

## 2021-09-01 PROCEDURE — 6370000000 HC RX 637 (ALT 250 FOR IP): Performed by: NURSE PRACTITIONER

## 2021-09-01 PROCEDURE — 99222 1ST HOSP IP/OBS MODERATE 55: CPT | Performed by: PSYCHIATRY & NEUROLOGY

## 2021-09-01 PROCEDURE — 6360000002 HC RX W HCPCS: Performed by: FAMILY MEDICINE

## 2021-09-01 PROCEDURE — 6370000000 HC RX 637 (ALT 250 FOR IP): Performed by: INTERNAL MEDICINE

## 2021-09-01 PROCEDURE — 2700000000 HC OXYGEN THERAPY PER DAY

## 2021-09-01 PROCEDURE — 2060000000 HC ICU INTERMEDIATE R&B

## 2021-09-01 PROCEDURE — 2580000003 HC RX 258: Performed by: FAMILY MEDICINE

## 2021-09-01 RX ORDER — CARBIDOPA/LEVODOPA 25MG-250MG
1 TABLET ORAL 4 TIMES DAILY
Status: DISCONTINUED | OUTPATIENT
Start: 2021-09-01 | End: 2021-09-08 | Stop reason: HOSPADM

## 2021-09-01 RX ORDER — AMLODIPINE BESYLATE 5 MG/1
5 TABLET ORAL DAILY
Status: DISCONTINUED | OUTPATIENT
Start: 2021-09-01 | End: 2021-09-08 | Stop reason: HOSPADM

## 2021-09-01 RX ORDER — TRAMADOL HYDROCHLORIDE 50 MG/1
50 TABLET ORAL NIGHTLY
Status: DISCONTINUED | OUTPATIENT
Start: 2021-09-02 | End: 2021-09-08 | Stop reason: HOSPADM

## 2021-09-01 RX ADMIN — ENOXAPARIN SODIUM 40 MG: 40 INJECTION SUBCUTANEOUS at 08:49

## 2021-09-01 RX ADMIN — CARBIDOPA AND LEVODOPA 2 TABLET: 25; 100 TABLET ORAL at 20:07

## 2021-09-01 RX ADMIN — ASPIRIN 325 MG: 325 TABLET, COATED ORAL at 08:49

## 2021-09-01 RX ADMIN — MONTELUKAST SODIUM 10 MG: 10 TABLET, FILM COATED ORAL at 20:07

## 2021-09-01 RX ADMIN — DONEPEZIL HYDROCHLORIDE 10 MG: 10 TABLET, FILM COATED ORAL at 20:07

## 2021-09-01 RX ADMIN — CALCIUM CARBONATE-VITAMIN D TAB 500 MG-200 UNIT 1 TABLET: 500-200 TAB at 08:49

## 2021-09-01 RX ADMIN — HYDROXYZINE PAMOATE 25 MG: 25 CAPSULE ORAL at 20:07

## 2021-09-01 RX ADMIN — Medication 10 ML: at 20:07

## 2021-09-01 RX ADMIN — CARBIDOPA AND LEVODOPA 2 TABLET: 25; 100 TABLET ORAL at 16:11

## 2021-09-01 RX ADMIN — DOCUSATE SODIUM 200 MG: 100 CAPSULE ORAL at 08:49

## 2021-09-01 RX ADMIN — CARBIDOPA AND LEVODOPA 2 TABLET: 25; 100 TABLET ORAL at 12:30

## 2021-09-01 RX ADMIN — BUMETANIDE 1 MG: 1 TABLET ORAL at 08:49

## 2021-09-01 RX ADMIN — FERROUS SULFATE TAB 325 MG (65 MG ELEMENTAL FE) 325 MG: 325 (65 FE) TAB at 08:49

## 2021-09-01 RX ADMIN — CARBIDOPA AND LEVODOPA 2 TABLET: 25; 100 TABLET ORAL at 08:49

## 2021-09-01 RX ADMIN — TRAMADOL HYDROCHLORIDE 100 MG: 50 TABLET, FILM COATED ORAL at 20:07

## 2021-09-01 RX ADMIN — LEVOTHYROXINE SODIUM 75 MCG: 75 TABLET ORAL at 06:28

## 2021-09-01 RX ADMIN — Medication 10 ML: at 08:49

## 2021-09-01 RX ADMIN — AMLODIPINE BESYLATE 5 MG: 5 TABLET ORAL at 17:40

## 2021-09-01 ASSESSMENT — PAIN SCALES - GENERAL
PAINLEVEL_OUTOF10: 0

## 2021-09-01 NOTE — PLAN OF CARE
Problem: Falls - Risk of:  Goal: Will remain free from falls  Description: Will remain free from falls  9/1/2021 1032 by Elham Black RN  Outcome: Met This Shift  9/1/2021 0456 by Aurelia Mena RN  Outcome: Ongoing  Goal: Absence of physical injury  Description: Absence of physical injury  9/1/2021 1032 by Elham Black RN  Outcome: Met This Shift  9/1/2021 0456 by Aurelia Mena RN  Outcome: Ongoing     Problem: Skin Integrity:  Goal: Will show no infection signs and symptoms  Description: Will show no infection signs and symptoms  9/1/2021 1032 by Elham Black RN  Outcome: Met This Shift  9/1/2021 0456 by Aurelia Mena RN  Outcome: Ongoing  Goal: Absence of new skin breakdown  Description: Absence of new skin breakdown  9/1/2021 1032 by Elham Black RN  Outcome: Met This Shift  9/1/2021 0456 by Aurelia Mena RN  Outcome: Ongoing     Problem: Pain:  Goal: Pain level will decrease  Description: Pain level will decrease  9/1/2021 1032 by Elham Black RN  Outcome: Met This Shift  9/1/2021 0456 by Aurelia Mena RN  Outcome: Ongoing  Goal: Control of acute pain  Description: Control of acute pain  9/1/2021 1032 by Elham Black RN  Outcome: Met This Shift  9/1/2021 0456 by Aurelia Mena RN  Outcome: Ongoing  Goal: Control of chronic pain  Description: Control of chronic pain  9/1/2021 1032 by Elham Black RN  Outcome: Met This Shift  9/1/2021 0456 by Aurelia Mena RN  Outcome: Ongoing     Problem: HH FLUID RETENTION-CHF  Goal: Absence of fluid overload signs and symptoms  9/1/2021 1032 by Elham Black RN  Outcome: Met This Shift  9/1/2021 0456 by Aurelia Mena RN  Outcome: Ongoing     Problem: Mental Status - Impaired:  Goal: Mental status will improve  Description: Mental status will improve  9/1/2021 1032 by Elham Black RN  Outcome: Met This Shift  9/1/2021 0456 by Aurelia Rajesh, RN  Outcome: Ongoing     Problem: Mental Status - Impaired:  Goal: Mental status restored to baseline  9/1/2021 1032 by Elham Black, RN  Outcome: Not Met This Shift  9/1/2021 0456 by Aurelia Mena RN  Outcome: Ongoing     Problem: Mobility - Impaired  Goal: Mobility/activity is maintained at optimum level for patient  9/1/2021 1032 by Elham Black RN  Outcome: Not Met This Shift  9/1/2021 0456 by Aurelia Mena RN  Outcome: Ongoing

## 2021-09-01 NOTE — CARE COORDINATION
CASE MANAGEMENT. .. Kedar Garcia Per [facility], patient is not hospice appropriate, but is appropriate for palliative care. Palliative Care orders obtained for discharge and faxed to [facility] at [phone]. Will follow. IN ADDENDUM. ... Updated wife on the above. She is agreeable to the plans. Spoke with Franklyn Chandler at [address] [facility]. Updated her on plans and she is also agreeable. Ximena from [facility] [phone] requests to be notified when patient discharges. States she will call Mrs Abdulkadir Casas to address any questions.

## 2021-09-01 NOTE — PROGRESS NOTES
Subjective: The patient is awake and alert, resting in bed  No acute events overnight. Denies chest pain, angina, SOB     Objective:    BP (!) 137/52   Pulse 66   Temp 98 °F (36.7 °C) (Oral)   Resp 18   Ht 5' 6\" (1.676 m)   Wt 172 lb 9.6 oz (78.3 kg)   SpO2 96%   BMI 27.86 kg/m²     In: -   Out: 2250   In: -   Out: 2250 [Urine:2250]    General appearance: NAD, conversant  HEENT: AT/NC, MMM  Neck: FROM, supple  Lungs: Clear to auscultation  CV: RRR, no MRGs  Vasc: Radial pulses 2+  Abdomen: Soft, non-tender; no masses or HSM  Extremities: No peripheral edema or digital cyanosis  Skin: no rash, lesions or ulcers  Psych: Alert and oriented to person, place and time, however confusion noted, disoriented t situationo   Neuro: Alert and interactive     Recent Labs     08/30/21  1219 08/31/21  0325   WBC 7.4 9.2   HGB 10.7* 11.1*   HCT 31.5* 33.5*    238       Recent Labs     08/30/21  1219 08/31/21  0325    136   K 4.1 3.9   CL 98 98   CO2 27 27   BUN 22 20   CREATININE 1.0 1.0   CALCIUM 8.9 9.0       Assessment:    Principal Problem:    Acute metabolic encephalopathy  Active Problems:    Parkinson disease (HCC)  Resolved Problems:    * No resolved hospital problems.  *      Plan:  Acute change in mental status /?  Seizure  -History of Parkinson disease  -Work-up essentially negative-head CT unremarkable, no evidence of metabolic derangements,/infectious etiology  -Patient's wife states she sees some improvement in his mental status however still not back to baseline  -Follows with neurology in [facility], family requesting neurology evaluation here  -Awaiting neurology evaluation  -Continue seizure precautions     Hypothyroidism  -TSH 8.29, previously 7.0 in January  -Increase Synthroid to 75 mcg daily-await acceptance    Hypertension-  add low-dose Norvasc for better control     Medication for other comorbidities continue as appropriate dose adjustment as necessary.     DVT prophylaxis  PT OT  Discharge planning-JOSÉ MIGUEL, referral sent  Case discussed with attending and agreed upon plan of care    LORIE Kent CNP  2:50 PM  9/1/2021     Above note edited to reflect my thoughts    I personally saw, examined and provided care for the patient. Radiographs, labs and medication list were reviewed by me independently. The case was discussed in detail and plans for care were established. Review of LORIE Kent CNP, documentation was conducted and revisions were made as appropriate directly by me. I agree with the above documented exam, problem list, and plan of care.      Marie Malik MD  5:29 PM  9/1/2021

## 2021-09-01 NOTE — CARE COORDINATION
CASE MANAGEMENT. .. Spoke with Mrs Brendan Meek over the phone regarding discharge plans. She has decided to have patient return to [facility] [address] with Hospice. She is requesting [facility]. Referral called to Lorraine Frausto at [phone] and information faxed to [phone]. Await pcp and neuro input today. Will cont to follow. The Plan for Transition of Care is related to the following treatment goals: discharge planning    The Patient and/or patient representative was provided with a choice of provider and agrees   with the discharge plan. [x] Yes [] No    Freedom of choice list was provided with basic dialogue that supports the patient's individualized plan of care/goals, treatment preferences and shares the quality data associated with the providers.  [x] Yes [] No

## 2021-09-01 NOTE — PROGRESS NOTES
P Quality Flow/Interdisciplinary Rounds Progress Note        Quality Flow Rounds held on September 1, 2021    Disciplines Attending:  Bedside Nurse, ,  and Nursing Unit Leadership    Mil Sanchez was admitted on 8/30/2021 12:06 PM    Anticipated Discharge Date:  Expected Discharge Date: 09/02/21    Disposition:    Sriram Score:  Sriram Scale Score: 14    Readmission Risk              Risk of Unplanned Readmission:  19           Discussed patient goal for the day, patient clinical progression, and barriers to discharge. The following Goal(s) of the Day/Commitment(s) have been identified:  Family to provide SNF choices today.       Melissa Pond RN  September 1, 2021

## 2021-09-01 NOTE — CONSULTS
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 6     HERNIA REPAIR Left     groin repair     HIP FRACTURE SURGERY Right 3/3/2021    INTRAMEDULLARY NAIL HIP FIXATION RIGHT HIP   +++SYNTHES+++ performed by Tobi Hawk MD at [facility] Left 2021    LEFT HIP HEMIARTHROPLASTY performed by Andrea Christopher MD at [address] [place] [address]  3/18/2021         SHOULDER SURGERY Right     5-10 years ago    TONSILLECTOMY AND ADENOIDECTOMY         Social History:  Social History     Tobacco Use   Smoking Status Former Smoker    Years: 10.00    Start date: 1960   [employer] Quit date: 1970    Years since quittin.7   Smokeless Tobacco Never Used     Social History     Substance and Sexual Activity   Alcohol Use No    Comment: Social     Social History     Substance and Sexual Activity   Drug Use Never         Family History:       Problem Relation Age of Onset    Heart Disease Mother     Cancer Father         lung    Breast Cancer Sister        Review of Systems:  EYE:  No recent visual changes  ENT:  negative for dysphagia  RESPIRATORY:  negative for dyspnea  CARDIOVASCULAR:  negative for chest pain  GASTROINTESTINAL:  negative for nausea  HEMATOLOGIC/LYMPHATIC:  negative for unusual bleeding  MUSCULOSKELETAL: Chronic low back pain  BEHAVIOR/PSYCH: Admits hallucinations  SKIN: negative for rash  GENITOURINARY: Was being treated for urinary tract infection  NEUROLOGIC: No focal weakness or numbness    Allergies:     Allergies   Allergen Reactions    Sulfa Antibiotics      Hyperactivity         Current Medications:   levothyroxine (SYNTHROID) tablet 75 mcg, Daily  aspirin EC tablet 325 mg, Daily  bumetanide (BUMEX) tablet 1 mg, Daily  calcium-vitamin D (OSCAL-500) 500-200 MG-UNIT per tablet 1 tablet, Daily  carbidopa-levodopa (SINEMET)  MG per tablet 2 tablet, 4x Daily  docusate sodium (COLACE) capsule 200 mg, Daily  donepezil (ARICEPT) tablet 10 mg, Nightly  ferrous sulfate (IRON 325) tablet 325 mg, Daily with breakfast  hydrOXYzine (VISTARIL) capsule 25 mg, Nightly  midodrine (PROAMATINE) tablet 2.5 mg, Q8H PRN  montelukast (SINGULAIR) tablet 10 mg, Nightly  traMADol (ULTRAM) tablet 100 mg, Nightly  sodium chloride flush 0.9 % injection 5-40 mL, 2 times per day  sodium chloride flush 0.9 % injection 10 mL, PRN  0.9 % sodium chloride infusion, PRN  enoxaparin (LOVENOX) injection 40 mg, Daily  polyethylene glycol (GLYCOLAX) packet 17 g, Daily PRN  acetaminophen (TYLENOL) tablet 650 mg, Q6H PRN   Or  acetaminophen (TYLENOL) suppository 650 mg, Q6H PRN  potassium chloride (KLOR-CON M) extended release tablet 40 mEq, PRN   Or  potassium bicarb-citric acid (EFFER-K) effervescent tablet 40 mEq, PRN   Or  potassium chloride 10 mEq/100 mL IVPB (Peripheral Line), PRN       Medications Prior to Admission: donepezil (ARICEPT) 10 MG tablet, Take 10 mg by mouth nightly  traZODone (DESYREL) 50 MG tablet, Take 50 mg by mouth nightly   polyethylene glycol (MIRALAX) 17 g PACK packet, Take 17 g by mouth nightly  hydrOXYzine (VISTARIL) 25 MG capsule, Take 25 mg by mouth nightly  acetaminophen (TYLENOL) 500 MG tablet, Take 1,000 mg by mouth nightly *SEE OTHER ORDER*  levothyroxine (SYNTHROID) 25 MCG tablet, Take 75 mcg by mouth Five times weekly *[id number]-[id number]*  vitamin D (ERGOCALCIFEROL) 1.25 MG (31779 UT) CAPS capsule, Take 50,000 Units by mouth once a week **  traMADol (ULTRAM) 50 MG tablet, Take 100 mg by mouth nightly.   levothyroxine (SYNTHROID) 25 MCG tablet, Take 50 mcg by mouth Twice a Week *SAT-SUN*  docusate sodium (COLACE) 100 MG capsule, Take 200 mg by mouth daily  [] ertapenem (INVANZ) 1 GM injection, Inject 1,000 mg into the muscle nightly  lidocaine 1 % injection, 3.2 mLs by Other route nightly *MIX WITH INVANZ 3.2ML OF LIDOCAINE*  acetaminophen (TYLENOL) 500 MG tablet, Take 500 mg by mouth every 8 hours as needed for Pain or Fever  midodrine (PROAMATINE) 2.5 MG tablet, Take 2.5 mg by mouth every 8 hours as needed (HTN)  calcium carbonate-vitamin D (CALTRATE) 600-400 MG-UNIT TABS per tab, Take 1 tablet by mouth daily  potassium chloride (KLOR-CON) 20 MEQ packet, Take 40 mEq by mouth daily  aspirin 325 MG EC tablet, Take 1 tablet by mouth daily  ferrous sulfate (IRON 325) 325 (65 Fe) MG tablet, Take 1 tablet by mouth daily (with breakfast)  montelukast (SINGULAIR) 10 MG tablet, Take 1 tablet by mouth nightly  bumetanide (BUMEX) 1 MG tablet, Take 1 mg by mouth daily  carbidopa-levodopa (SINEMET)  MG per tablet, Take 2 tablets by mouth 4 times daily    Physical Exam:  BP (!) 137/52   Pulse 66   Temp 98 °F (36.7 °C) (Oral)   Resp 18   Ht 5' 6\" (1.676 m)   Wt 172 lb 9.6 oz (78.3 kg)   SpO2 96%   BMI 27.86 kg/m²  I Body mass index is 27.86 kg/m². I   Wt Readings from Last 1 Encounters:   09/01/21 172 lb 9.6 oz (78.3 kg)        GENERAL: Pleasant cooperative 54-year-old male who interacted brightly and joked with me. Due to contact isolation did not check heart and carotids, fundi, or deep tendon reflexes so equipment would not have to be brought into the room. NEUROLOGIC:  Level of Alertness: alert  Orientation: oriented to person, place, year, and gave month as August though this is September 1  Memory and [employer] of Knowledge: Name the president. Thought his doctors name with Dr. Chilo Cabrera though I am not sure if he might have been referring to a different doctor than the one at his facility  Attention/Concentration: normal  Language: no aphasia  Cranial Nerves: pupils are equal; extraocular muscles intact; facial strength and sensation are intact; hearing is intact to soft voice; the palate raises midline, and the tongue protrudes midline; shoulder shrug is symmetric  Motor Exam: Tone moderately to severely increased. Strength is MRC grade 5 in all extremities bilaterally.   Sensory: Sensory symmetric to light touch  Coordination: Markedly decreased amplitude and decreased speed of rapid alternating movements  Abnormal movements: Tremor which is at this point palpable more than visible  Station and gait: I did not feel I could safely get him up    Diagnostics:  CBC:   Lab Results   Component Value Date    WBC 9.2 08/31/2021    RBC 3.43 08/31/2021    HGB 11.1 08/31/2021    HCT 33.5 08/31/2021    MCV 97.7 08/31/2021    MCH 32.4 08/31/2021    MCHC 33.1 08/31/2021    RDW 13.1 08/31/2021     08/31/2021    MPV 10.3 08/31/2021     CMP:    Lab Results   Component Value Date     08/31/2021    K 3.9 08/31/2021    CL 98 08/31/2021    CO2 27 08/31/2021    BUN 20 08/31/2021    CREATININE 1.0 08/31/2021    GFRAA >60 08/31/2021    AGRATIO 1.1 12/18/2020    LABGLOM >60 08/31/2021    LABGLOM 39 01/14/2021    GLUCOSE 71 08/31/2021    GLUCOSE 123 01/14/2021    PROT 6.7 08/30/2021    LABALBU 3.5 08/30/2021    CALCIUM 9.0 08/31/2021    BILITOT 0.8 08/30/2021    ALKPHOS 102 08/30/2021    AST 12 08/30/2021    ALT <5 08/30/2021     PT/INR:    Lab Results   Component Value Date    PROTIME 18.7 03/03/2021    INR 1.7 03/03/2021     pH on admission 7. 41H  Lactate on admission 1.3    CT brain images reviewed: Unremarkable for age      Electronically signed by Sissy Pham DO on 9/1/2021 at 3:44 PM

## 2021-09-02 ENCOUNTER — APPOINTMENT (OUTPATIENT)
Dept: NEUROLOGY | Age: 86
DRG: 057 | End: 2021-09-02
Payer: MEDICARE

## 2021-09-02 LAB
ANION GAP SERPL CALCULATED.3IONS-SCNC: 11 MMOL/L (ref 7–16)
BASOPHILS ABSOLUTE: 0.07 E9/L (ref 0–0.2)
BASOPHILS RELATIVE PERCENT: 0.9 % (ref 0–2)
BUN BLDV-MCNC: 25 MG/DL (ref 6–23)
CALCIUM SERPL-MCNC: 8.9 MG/DL (ref 8.6–10.2)
CHLORIDE BLD-SCNC: 100 MMOL/L (ref 98–107)
CO2: 28 MMOL/L (ref 22–29)
CREAT SERPL-MCNC: 1 MG/DL (ref 0.7–1.2)
EOSINOPHILS ABSOLUTE: 0.09 E9/L (ref 0.05–0.5)
EOSINOPHILS RELATIVE PERCENT: 1.2 % (ref 0–6)
GFR AFRICAN AMERICAN: >60
GFR NON-AFRICAN AMERICAN: >60 ML/MIN/1.73
GLUCOSE BLD-MCNC: 100 MG/DL (ref 74–99)
HCT VFR BLD CALC: 35.1 % (ref 37–54)
HEMOGLOBIN: 11.7 G/DL (ref 12.5–16.5)
IMMATURE GRANULOCYTES #: 0.02 E9/L
IMMATURE GRANULOCYTES %: 0.3 % (ref 0–5)
LYMPHOCYTES ABSOLUTE: 0.96 E9/L (ref 1.5–4)
LYMPHOCYTES RELATIVE PERCENT: 12.8 % (ref 20–42)
MCH RBC QN AUTO: 31.6 PG (ref 26–35)
MCHC RBC AUTO-ENTMCNC: 33.3 % (ref 32–34.5)
MCV RBC AUTO: 94.9 FL (ref 80–99.9)
MONOCYTES ABSOLUTE: 0.77 E9/L (ref 0.1–0.95)
MONOCYTES RELATIVE PERCENT: 10.3 % (ref 2–12)
NEUTROPHILS ABSOLUTE: 5.59 E9/L (ref 1.8–7.3)
NEUTROPHILS RELATIVE PERCENT: 74.5 % (ref 43–80)
PDW BLD-RTO: 13 FL (ref 11.5–15)
PLATELET # BLD: 251 E9/L (ref 130–450)
PMV BLD AUTO: 10.5 FL (ref 7–12)
POTASSIUM SERPL-SCNC: 3.5 MMOL/L (ref 3.5–5)
RBC # BLD: 3.7 E12/L (ref 3.8–5.8)
SODIUM BLD-SCNC: 139 MMOL/L (ref 132–146)
WBC # BLD: 7.5 E9/L (ref 4.5–11.5)

## 2021-09-02 PROCEDURE — 95819 EEG AWAKE AND ASLEEP: CPT

## 2021-09-02 PROCEDURE — 6370000000 HC RX 637 (ALT 250 FOR IP): Performed by: PSYCHIATRY & NEUROLOGY

## 2021-09-02 PROCEDURE — 6370000000 HC RX 637 (ALT 250 FOR IP): Performed by: INTERNAL MEDICINE

## 2021-09-02 PROCEDURE — 36415 COLL VENOUS BLD VENIPUNCTURE: CPT

## 2021-09-02 PROCEDURE — 97530 THERAPEUTIC ACTIVITIES: CPT

## 2021-09-02 PROCEDURE — 80048 BASIC METABOLIC PNL TOTAL CA: CPT

## 2021-09-02 PROCEDURE — 4A10X4Z MONITORING OF CENTRAL NERVOUS ELECTRICAL ACTIVITY, EXTERNAL APPROACH: ICD-10-PCS | Performed by: INTERNAL MEDICINE

## 2021-09-02 PROCEDURE — 95816 EEG AWAKE AND DROWSY: CPT | Performed by: PSYCHIATRY & NEUROLOGY

## 2021-09-02 PROCEDURE — 6370000000 HC RX 637 (ALT 250 FOR IP): Performed by: NURSE PRACTITIONER

## 2021-09-02 PROCEDURE — 85025 COMPLETE CBC W/AUTO DIFF WBC: CPT

## 2021-09-02 PROCEDURE — 2060000000 HC ICU INTERMEDIATE R&B

## 2021-09-02 PROCEDURE — 6370000000 HC RX 637 (ALT 250 FOR IP): Performed by: FAMILY MEDICINE

## 2021-09-02 PROCEDURE — 2580000003 HC RX 258: Performed by: FAMILY MEDICINE

## 2021-09-02 PROCEDURE — 2700000000 HC OXYGEN THERAPY PER DAY

## 2021-09-02 PROCEDURE — 6360000002 HC RX W HCPCS: Performed by: FAMILY MEDICINE

## 2021-09-02 RX ADMIN — DOCUSATE SODIUM 200 MG: 100 CAPSULE ORAL at 10:07

## 2021-09-02 RX ADMIN — Medication 10 ML: at 10:07

## 2021-09-02 RX ADMIN — CARBIDOPA AND LEVODOPA 1 TABLET: 25; 250 TABLET ORAL at 17:38

## 2021-09-02 RX ADMIN — DONEPEZIL HYDROCHLORIDE 10 MG: 10 TABLET, FILM COATED ORAL at 21:26

## 2021-09-02 RX ADMIN — FERROUS SULFATE TAB 325 MG (65 MG ELEMENTAL FE) 325 MG: 325 (65 FE) TAB at 10:07

## 2021-09-02 RX ADMIN — Medication 10 ML: at 21:26

## 2021-09-02 RX ADMIN — ENOXAPARIN SODIUM 40 MG: 40 INJECTION SUBCUTANEOUS at 10:08

## 2021-09-02 RX ADMIN — AMLODIPINE BESYLATE 5 MG: 5 TABLET ORAL at 10:07

## 2021-09-02 RX ADMIN — CARBIDOPA AND LEVODOPA 1 TABLET: 25; 250 TABLET ORAL at 13:57

## 2021-09-02 RX ADMIN — TRAMADOL HYDROCHLORIDE 50 MG: 50 TABLET, FILM COATED ORAL at 21:25

## 2021-09-02 RX ADMIN — CALCIUM CARBONATE-VITAMIN D TAB 500 MG-200 UNIT 1 TABLET: 500-200 TAB at 10:07

## 2021-09-02 RX ADMIN — ASPIRIN 325 MG: 325 TABLET, COATED ORAL at 10:07

## 2021-09-02 RX ADMIN — MONTELUKAST SODIUM 10 MG: 10 TABLET, FILM COATED ORAL at 21:25

## 2021-09-02 RX ADMIN — CARBIDOPA AND LEVODOPA 1 TABLET: 25; 250 TABLET ORAL at 06:21

## 2021-09-02 RX ADMIN — HYDROXYZINE PAMOATE 25 MG: 25 CAPSULE ORAL at 21:25

## 2021-09-02 RX ADMIN — LEVOTHYROXINE SODIUM 75 MCG: 75 TABLET ORAL at 06:21

## 2021-09-02 RX ADMIN — BUMETANIDE 1 MG: 1 TABLET ORAL at 10:07

## 2021-09-02 RX ADMIN — CARBIDOPA AND LEVODOPA 1 TABLET: 25; 250 TABLET ORAL at 10:07

## 2021-09-02 ASSESSMENT — PAIN SCALES - GENERAL
PAINLEVEL_OUTOF10: 0
PAINLEVEL_OUTOF10: 0

## 2021-09-02 NOTE — PROGRESS NOTES
Upon skin assessment earlier today, when turned pt to assess wound on back, large amount of urine was noted on his bed pad. Little in the brewster bag noted. Balloon deflated, 10 ml returned, repositioned catheter, re-inflated balloon. Possible bladder spasms. Will continue to monitor. Wife at bedside, updated via phone earlier, neurologist did speak with her via phone. Portable EEG being performed now. Pt is still sleepy during the day, awake at night. He is very stiff when trying to move him; tremors prevent him from eating himself. She feels that he is still not well enough to go back to assisted living at this point. Will f/u tomorrow after EEG is read and input from neurology.

## 2021-09-02 NOTE — PROGRESS NOTES
Subjective: The patient is awake and alert, resting in bed. Alert to self. No acute events overnight. Denies chest pain, angina, SOB     Objective:    BP (!) 140/65   Pulse 62   Temp 98.3 °F (36.8 °C) (Oral)   Resp 14   Ht 5' 6\" (1.676 m)   Wt 172 lb 9.6 oz (78.3 kg)   SpO2 92%   BMI 27.86 kg/m²     In: -   Out: 420   In: -   Out: 420 [Urine:420]    General appearance: NAD, conversant  HEENT: AT/NC, MMM  Neck: FROM, supple  Lungs: Clear to auscultation  CV: RRR, no MRGs  Vasc: Radial pulses 2+  Abdomen: Soft, non-tender; no masses or HSM  Extremities: No peripheral edema or digital cyanosis  Skin: no rash, lesions or ulcers  Psych: Alert and oriented to person, place and time, however confusion noted, disoriented t situationo   Neuro: Alert and interactive     Recent Labs     08/31/21  0325 09/02/21  0630   WBC 9.2 7.5   HGB 11.1* 11.7*   HCT 33.5* 35.1*    251       Recent Labs     08/31/21  0325 09/02/21  0630    139   K 3.9 3.5   CL 98 100   CO2 27 28   BUN 20 25*   CREATININE 1.0 1.0   CALCIUM 9.0 8.9       Assessment:    Principal Problem:    Acute metabolic encephalopathy  Active Problems:    Parkinson disease ([facility] [address]  Resolved Problems:    * No resolved hospital problems.  *      Plan:  Acute change in mental status /?  Seizure  -History of Parkinson disease  -Work-up essentially negative-head CT unremarkable, no evidence of metabolic derangements,/infectious etiology  -Patient's wife states she sees some improvement in his mental status however still not back to baseline  -Follows with neurology in [facility], family requesting neurology evaluation here  -Neurology consulted- EEG pending   -Continue seizure precautions     Hypothyroidism  -TSH 8.29, previously 7.0 in January  -Increase Synthroid to 75 mcg daily-repeat TSH level in 6 weeks with PCP    Hypertension-  add low-dose Norvasc for better control- 's prior to medication administration.     Medication for other comorbidities continue as appropriate dose adjustment as necessary.     DVT prophylaxis  PT OT  Discharge planning-JOSÉ MIGUEL, referral sent  Case discussed with attending and agreed upon plan of care    Ena Councilman, APRN - CNP  1:31 PM  9/2/2021     Above note edited to reflect my thoughts     I personally saw, examined and provided care for the patient. Radiographs, labs and medication list were reviewed by me independently. The case was discussed in detail and plans for care were established. Review of Ena Councilman, APRN-CNP   , documentation was conducted and revisions were made as appropriate directly by me. I agree with the above documented exam, problem list, and plan of care.      Rebekah Knowles MD  5:08 PM  9/2/2021

## 2021-09-02 NOTE — PROGRESS NOTES
Physical Therapy  Facility/Department: [facility] MED SURG  Daily Treatment Note  NAME: Maurizio Cunha  : 1930  MRN: [id number]    Date of Service: 2021    Patient Diagnosis(es): The primary encounter diagnosis was Acute metabolic encephalopathy. Diagnoses of Hypothyroidism, unspecified type and Acute respiratory failure with hypoxia ([address] were also pertinent to this visit. has a past medical history of High cholesterol, Hypertension, Hypothyroidism, Parkinson disease ([address] and RBBB. has a past surgical history that includes Foot surgery (); shoulder surgery (Right); hernia repair (Left); Tonsillectomy and Adenoidectomy; Foot surgery; hip surgery (Left, 2021); Hip fracture surgery (Right, 3/3/2021); and Insert Midline Catheter (3/18/2021). Evaluating Therapist: Karina Bruce PT      Referring Provider:  LORIE Gracia - CNP     PT order : PT eval and treat      Room #:  607   DIAGNOSIS:  Acute resp failure. Additional Pertinent History: presented form NH with AMS  PRECAUTIONS:  Falls      Social:  Pt admitted from SNF   Prior to admission : pt unable to report       Initial Evaluation  Date: 2021  Treatment      Short Term/ Long Term   Goals   Was pt agreeable to Eval/treatment? yes   yes     Does pt have pain?  none reported  None reported      Bed Mobility  Rolling: NT   Supine to sit:  Dep assist   Sit to supine:  NT   Scooting:  Dep assist   rolling:  Dep A  Supine to sit:  Dep A x 2  Sit to supine:  Dep a x 2  Scooting:  Dep A x 2 to be repositioned back into bed.    mod assist    Transfers Sit to stand:  Max assist x 2   Stand to sit:  Max assist x2   Stand pivot:  Max assist x 2  Attempted but unable to complete transfer for safety. See comments.    mod assist    Ambulation   NT, pt unable   pt unable  20  feet with  ww  with  Mod assist    LE ROM  AAROM WFL        LE strength  3-/ 5         AM- PAC RAW score            Patient education  Pt educated on PT objectives during treatment session, posture while sitting EOB, safety during mobility. Patient response to education:   Pt verbalized understanding Pt demonstrated skill Pt requires further education in this area       yes     ASSESSMENT:    Comments:  Pt found and left in bed with call light in reach and bed alarm on. Pt very rigid throughout his trunk. Max A required to place B LE on the ground and keep them on the ground when sitting EOB. Sit to stand attempted but when cued pt for proper body mechanics, pt with strong posterior lean and his feet came up off the floor. Was unable to get pt to sit upright again and for safety reason, pt was assisted back into bed. Pt required Mod to Min A for sitting balance on EOB. Treatment:  Patient practiced and was instructed in the following treatment:    Functional mobility performed as documented above. PLAN:    Patient is making fair progress towards established goals. Will continue with current POC.      Time in  0924  Time out  0941    Total Treatment Time  17 minutes     CPT codes:    [x] Therapeutic activities 97836 17minutes  [] Therapeutic exercises 80730  minutes      [facility], [address]

## 2021-09-02 NOTE — PROGRESS NOTES
during ADLs .       Positioning to improve functional independence  Cognitive retraining ex's to improve problem solving skills & safe participation in ADLs/IADLs        Recommended Adaptive Equipment: TBD      SOCIAL: admitted from SNF. PLOF? ??        Pain Level: no c/o pain   Cognition: Alert and conversing with cues for safety provided               Functional Assessment:  AM-PAC Daily Activity Raw Score: 11/24    Initial Eval Status  Date: 8/31/21 Treatment Status  Date:9/2/21 STGs = LTGs  Time frame: 10-14 days   Feeding Min A   Supervision    Grooming Mod A,seated   Decrease decrease standing balance   Min A    UB Dressing Mod A  Don/[facility] gown   Min A   LB Dressing Max A/Dependent  Dependent  To don B socks  Mod A    Bathing Max A    Mod A    Toileting Dependent        Bed Mobility  Dependent   Supine <> sit  Supine<> sit: Dep A x2  Mod a    Functional Transfers Max A x2  Sit-stand from bed   Posterior lean  STS: NT.  Transfer attempted but pt became fearful and exhibited strong posterior lean  Mod A    Functional Mobility Patient stood next to bed only   Decrease balance  Unable to initiate steps    Mod A  with good tolerance    Balance Sitting:     Static:  Esvin- EOB     Dynamic:Max A   Standing: Max A x2 Sitting: Mod -Max A  SBA-sitting   Mod A - standing    Activity Tolerance Fair - with light activity  Poor+  Fair+ with ADL activity    Visual/  Perceptual Glasses: reading                          Treatment: Upon arrival pt was supine in bed. Instructed pt on functional dynamic sitting balance tasks at EOB with mod vc, tactile cues and demos for facilitation of task required. Instructed opt on active assisted weight shifting exercises to decrease posterior lean to improve safety while seated EOB. At end of session pt was assisted to bed ith alarm on, all lines and tubes intact and call light within reach.      Education: Balance training and benefits of EOB activity to improve independence with ADLs    · Pt has made limited progress towards set goals.        Treatment Charges: Mins Units   Ther Ex  [id number]     Manual Therapy Keyshawn Aguirre [id number] [id number]   ADL/Home Mgt [id number]     Neuro Re-ed [id number]     Group Therapy      Orthotic manage/training  [id number]     Non-Billable Time         Total Time: [address]Z/L [id number]

## 2021-09-02 NOTE — PROGRESS NOTES
Message sent to Dr Nabor rFankel regarding her progress note from yesterday regarding ordering an EEG. States that she does want this ordered. Perfect Serve Message sent to Dr Nabor Frankel \"Wife called, was concerned about Sinemet dosage. He was taking 2 tabs 25/100 QID at home. He is currently ordered 1 tab 25/250 QID. She would like to speak with you if you can call her Sadie Timmons [phone]. The wife also provided his Neurologist, Dr. Nikos Callaway, phone number if you would like to speak with him [phone]. \" No new orders at this time.  Electronically signed by Lesly Betts RN on 9/2/2021 at 11:00 am.

## 2021-09-02 NOTE — PROCEDURES
ELECTROENCEPHALOGRAPHY REPORT     PATIENT NAME: Richard Santana   DATE:9/2/2021    MEDICAL RECORD NO: [id number]   ROOM NO: 8820/2790-E   ACCOUNT NO: [de-identified]   REFERRING DOCTOR: Christin Abreu MD     CLINICAL HISTORY:  This is a 80 y.o. male with Parkinson's disease who had some episodes of shaking which were felt to possibly have been seizures. PMH:   has a past medical history of High cholesterol, Hypertension, Hypothyroidism, Parkinson disease ([address] and RBBB. MEDICATIONS: amLODIPine (NORVASC) tablet 5 mg, Daily  carbidopa-levodopa (SINEMET)  MG per tablet 1 tablet, 4x Daily  traMADol (ULTRAM) tablet 50 mg, Nightly  levothyroxine (SYNTHROID) tablet 75 mcg, Daily  aspirin EC tablet 325 mg, Daily  bumetanide (BUMEX) tablet 1 mg, Daily  calcium-vitamin D (OSCAL-500) 500-200 MG-UNIT per tablet 1 tablet, Daily  docusate sodium (COLACE) capsule 200 mg, Daily  donepezil (ARICEPT) tablet 10 mg, Nightly  ferrous sulfate (IRON 325) tablet 325 mg, Daily with breakfast  hydrOXYzine (VISTARIL) capsule 25 mg, Nightly  midodrine (PROAMATINE) tablet 2.5 mg, Q8H PRN  montelukast (SINGULAIR) tablet 10 mg, Nightly  sodium chloride flush 0.9 % injection 5-40 mL, 2 times per day  sodium chloride flush 0.9 % injection 10 mL, PRN  0.9 % sodium chloride infusion, PRN  enoxaparin (LOVENOX) injection 40 mg, Daily  polyethylene glycol (GLYCOLAX) packet 17 g, Daily PRN  acetaminophen (TYLENOL) tablet 650 mg, Q6H PRN   Or  acetaminophen (TYLENOL) suppository 650 mg, Q6H PRN  potassium chloride (KLOR-CON M) extended release tablet 40 mEq, PRN   Or  potassium bicarb-citric acid (EFFER-K) effervescent tablet 40 mEq, PRN   Or  potassium chloride 10 mEq/100 mL IVPB (Peripheral Line), PRN      DESCRIPTION OF RECORD:    Waking background consisted of 7 Hertz theta activity, which was bilaterally symmetrical, and occipitally predominant.   A moderate amount of beta activity was admixed. Background activity attenuated only minimally with eye opening. Sleep was not achieved during the recording. There were no areas of focal asymmetry. No epileptiform activity was seen. Episodic shaking was noted by the technician, but this was not associated with any changes in the EEG. No abnormalities were noted on the EKG lead. IMPRESSION: This is a mildly abnormal EEG due to a slight degree of diffuse slowing of the background activity. This is consistent with an encephalopathic process of a generalized nature and can be seen in dementia among other things. No seizure activity was seen.         Eric Morales DO   9/2/2021   4:21 PM

## 2021-09-03 LAB
BACTERIA: ABNORMAL /HPF
BILIRUBIN URINE: NEGATIVE
BLOOD, URINE: ABNORMAL
CLARITY: CLEAR
COLOR: YELLOW
GLUCOSE URINE: NEGATIVE MG/DL
KETONES, URINE: NEGATIVE MG/DL
LEUKOCYTE ESTERASE, URINE: ABNORMAL
NITRITE, URINE: NEGATIVE
PH UA: 6 (ref 5–9)
PROTEIN UA: ABNORMAL MG/DL
RBC UA: ABNORMAL /HPF (ref 0–2)
SPECIFIC GRAVITY UA: 1.02 (ref 1–1.03)
UROBILINOGEN, URINE: 0.2 E.U./DL
WBC UA: ABNORMAL /HPF (ref 0–5)

## 2021-09-03 PROCEDURE — 2700000000 HC OXYGEN THERAPY PER DAY

## 2021-09-03 PROCEDURE — 87088 URINE BACTERIA CULTURE: CPT

## 2021-09-03 PROCEDURE — 6370000000 HC RX 637 (ALT 250 FOR IP): Performed by: INTERNAL MEDICINE

## 2021-09-03 PROCEDURE — 2060000000 HC ICU INTERMEDIATE R&B

## 2021-09-03 PROCEDURE — 6370000000 HC RX 637 (ALT 250 FOR IP): Performed by: FAMILY MEDICINE

## 2021-09-03 PROCEDURE — 6360000002 HC RX W HCPCS: Performed by: INTERNAL MEDICINE

## 2021-09-03 PROCEDURE — 2580000003 HC RX 258: Performed by: INTERNAL MEDICINE

## 2021-09-03 PROCEDURE — 6370000000 HC RX 637 (ALT 250 FOR IP): Performed by: PSYCHIATRY & NEUROLOGY

## 2021-09-03 PROCEDURE — 6370000000 HC RX 637 (ALT 250 FOR IP): Performed by: NURSE PRACTITIONER

## 2021-09-03 PROCEDURE — 99232 SBSQ HOSP IP/OBS MODERATE 35: CPT | Performed by: PSYCHIATRY & NEUROLOGY

## 2021-09-03 PROCEDURE — 6360000002 HC RX W HCPCS: Performed by: FAMILY MEDICINE

## 2021-09-03 PROCEDURE — 81001 URINALYSIS AUTO W/SCOPE: CPT

## 2021-09-03 PROCEDURE — 2580000003 HC RX 258: Performed by: FAMILY MEDICINE

## 2021-09-03 RX ORDER — ZOLPIDEM TARTRATE 5 MG/1
2.5 TABLET ORAL NIGHTLY PRN
Status: DISCONTINUED | OUTPATIENT
Start: 2021-09-03 | End: 2021-09-08 | Stop reason: HOSPADM

## 2021-09-03 RX ORDER — SODIUM CHLORIDE, SODIUM LACTATE, POTASSIUM CHLORIDE, CALCIUM CHLORIDE 600; 310; 30; 20 MG/100ML; MG/100ML; MG/100ML; MG/100ML
INJECTION, SOLUTION INTRAVENOUS CONTINUOUS
Status: DISCONTINUED | OUTPATIENT
Start: 2021-09-03 | End: 2021-09-04

## 2021-09-03 RX ADMIN — FERROUS SULFATE TAB 325 MG (65 MG ELEMENTAL FE) 325 MG: 325 (65 FE) TAB at 09:53

## 2021-09-03 RX ADMIN — CARBIDOPA AND LEVODOPA 1 TABLET: 25; 250 TABLET ORAL at 07:20

## 2021-09-03 RX ADMIN — WATER 1000 MG: 1 INJECTION INTRAMUSCULAR; INTRAVENOUS; SUBCUTANEOUS at 20:19

## 2021-09-03 RX ADMIN — CALCIUM CARBONATE-VITAMIN D TAB 500 MG-200 UNIT 1 TABLET: 500-200 TAB at 09:52

## 2021-09-03 RX ADMIN — SODIUM CHLORIDE, POTASSIUM CHLORIDE, SODIUM LACTATE AND CALCIUM CHLORIDE: 600; 310; 30; 20 INJECTION, SOLUTION INTRAVENOUS at 20:35

## 2021-09-03 RX ADMIN — Medication 10 ML: at 20:35

## 2021-09-03 RX ADMIN — CARBIDOPA AND LEVODOPA 1 TABLET: 25; 250 TABLET ORAL at 09:53

## 2021-09-03 RX ADMIN — ASPIRIN 325 MG: 325 TABLET, COATED ORAL at 09:53

## 2021-09-03 RX ADMIN — AMLODIPINE BESYLATE 5 MG: 5 TABLET ORAL at 09:53

## 2021-09-03 RX ADMIN — TRAMADOL HYDROCHLORIDE 50 MG: 50 TABLET, FILM COATED ORAL at 20:18

## 2021-09-03 RX ADMIN — CARBIDOPA AND LEVODOPA 1 TABLET: 25; 250 TABLET ORAL at 20:18

## 2021-09-03 RX ADMIN — LEVOTHYROXINE SODIUM 75 MCG: 75 TABLET ORAL at 05:29

## 2021-09-03 RX ADMIN — DOCUSATE SODIUM 200 MG: 100 CAPSULE ORAL at 09:52

## 2021-09-03 RX ADMIN — ENOXAPARIN SODIUM 40 MG: 40 INJECTION SUBCUTANEOUS at 09:53

## 2021-09-03 RX ADMIN — MONTELUKAST SODIUM 10 MG: 10 TABLET, FILM COATED ORAL at 20:18

## 2021-09-03 RX ADMIN — Medication 10 ML: at 09:52

## 2021-09-03 RX ADMIN — DONEPEZIL HYDROCHLORIDE 10 MG: 10 TABLET, FILM COATED ORAL at 20:19

## 2021-09-03 RX ADMIN — BUMETANIDE 1 MG: 1 TABLET ORAL at 09:52

## 2021-09-03 ASSESSMENT — PAIN SCALES - GENERAL
PAINLEVEL_OUTOF10: 0
PAINLEVEL_OUTOF10: 3
PAINLEVEL_OUTOF10: 0

## 2021-09-03 NOTE — PROGRESS NOTES
Neurology Progress Note    Patient:  Riya Gray      Unit/Bed:0607/0607-A    YOB: 1930    MRN: [id number]     Acct: [de-identified]     Admit date: 8/30/2021    Chief Complaint   Patient presents with    Seizures     witnessed by ems, versed given by ems    Altered Mental Status     Patient Seen, Chart, Physician notes, Labs, Radiology studies reviewed. Subjective: The patient's mood is good and he has no complaints. He denies headaches or focal weakness or numbness. Earlier the patient's nurse noted he was more confused, yelling out, picking at things, and occasionally jerking. Past, Family, Social History unchanged from admission. Diet:  ADULT DIET; Regular    Medications:  Scheduled Meds:   amLODIPine  5 mg Oral Daily    carbidopa-levodopa  1 tablet Oral 4x Daily    traMADol  50 mg Oral Nightly    levothyroxine  75 mcg Oral Daily    aspirin  325 mg Oral Daily    bumetanide  1 mg Oral Daily    calcium-vitamin D  1 tablet Oral Daily    docusate sodium  200 mg Oral Daily    donepezil  10 mg Oral Nightly    ferrous sulfate  325 mg Oral Daily with breakfast    montelukast  10 mg Oral Nightly    sodium chloride flush  5-40 mL IntraVENous 2 times per day    enoxaparin  40 mg SubCUTAneous Daily     Continuous Infusions:   sodium chloride       PRN Meds:midodrine, sodium chloride flush, sodium chloride, polyethylene glycol, acetaminophen **OR** acetaminophen, potassium chloride **OR** potassium alternative oral replacement **OR** potassium chloride    Objective:    Vitals: /72   Pulse 70   Temp 99.8 °F (37.7 °C) (Axillary)   Resp 16   Ht 5' 6\" (1.676 m)   Wt 172 lb 8.2 oz (78.3 kg)   SpO2 94%   BMI 27.84 kg/m²   Physical Exam:  Alert. Oriented to month and year as well as person, but not to place. Thinks he is in [place]. Speech is very slurred. Pleasant and cooperative for me and follow commands well.   Unable to repeat a series of 5 digits so he can repeat a fluid is seen. ORBITS: A prosthetic lens is seen in the left globe. The orbits are otherwise unremarkable. SINUSES: Minimal mucosal thickening of the base of the right maxillary sinus. The remainder of the visualized paranasal sinuses and the mastoids are grossly clear. SOFT TISSUES/SKULL:  No acute abnormality of the visualized skull or soft tissues. No acute intracranial abnormality. XR CHEST PORTABLE    Result Date: 8/30/2021  EXAMINATION: ONE XRAY VIEW OF THE CHEST 8/30/2021 2:00 pm COMPARISON: 03/14/2021 HISTORY: ORDERING SYSTEM PROVIDED HISTORY: Altered mental status, seizure, concern for aspiration TECHNOLOGIST PROVIDED HISTORY: Reason for exam:->Altered mental status, seizure, concern for aspiration FINDINGS: EKG leads overlie the chest.  Cardiac silhouette is normal in size. No pneumothorax. Lung volumes are diminished. Scattered areas of presumed scarring and or atelectasis appear similar to previous. No definite new areas of consolidation or effusion. Degenerative changes are present in the spine and shoulders. Diminished lung volume with scattered areas of presumed scarring and or atelectasis. PA and lateral views would be useful for further assessment, if symptoms persist.                  Assessment:    Principal Problem:    Acute metabolic encephalopathy  Active Problems:    Parkinson disease ([facility] [address]  Resolved Problems:    * No resolved hospital problems. *    When I first encountered this patient his cognitive difficulties appear to be consistent with very mild dementia. Today he appears to have an acute encephalopathy superimposed on this. Reason for this is not apparent. I would be concerned about infection with a low-grade temperature elevation. We will also rule out metabolic encephalopathy. Vistaril as listed as a home medication but its anticholinergic effects could cause some confusion. I do not see any other medications to blame. I had increased his Sinemet slightly 2 days ago, but I do not think this is likely to have been a factor in confusion. Plan:  I have stopped the Vistaril. Recheck labs including CBC, CMP, magnesium, and ammonia levels. Consider further evaluation and treatment for continued or recurrent infection. Again, I do not think the increase in the Sinemet is the reason for the confusion, but if his wife is worried about this we can go back to his prior dosing. I did discuss his Parkinson's treatment with her yesterday by phone. Unfortunately I am off until Monday. For urgent neurological issues over the weekend he will have to be referred downtown.     Electronically signed by Madison Lopez DO on 9/3/2021 at 1:43 PM    Neurology

## 2021-09-03 NOTE — CARE COORDINATION
Reached out to patient's wife again to discuss dc planning. She relays she wants to speak to a practitioner/physician regarding patient's plan of care. I spoke to HERIBERTO Lui, who says she will reach out to wife directly. Wife and I also discussed SNF options. SNF list offered---she requests referral be called to [facility]. Referral called. They can accept, but [facility] scores are too low to meet click 6. They will submit precert today.   Updated wife

## 2021-09-03 NOTE — PROGRESS NOTES
Subjective: The patient is awake and alert, resting in bed. Alert to self. No acute events overnight. Appears confused and agitated    Objective:    /72   Pulse 70   Temp 99.8 °F (37.7 °C) (Axillary)   Resp 16   Ht 5' 6\" (1.676 m)   Wt 172 lb 8.2 oz (78.3 kg)   SpO2 94%   BMI 27.84 kg/m²     In: 150 [P.O.:150]  Out: 1150   In: 150   Out: 1150 [Urine:1150]    General appearance: NAD, conversant  HEENT: AT/NC, MMM  Neck: FROM, supple  Lungs: Clear to auscultation  CV: RRR, no MRGs  Vasc: Radial pulses 2+  Abdomen: Soft, non-tender; no masses or HSM  Extremities: No peripheral edema or digital cyanosis  Skin: no rash, lesions or ulcers  Psych: Alert and oriented to person, place and time, however confusion noted, disoriented to situation. Neuro: Alert and interactive     Recent Labs     09/02/21  0630   WBC 7.5   HGB 11.7*   HCT 35.1*          Recent Labs     09/02/21  0630      K 3.5      CO2 28   BUN 25*   CREATININE 1.0   CALCIUM 8.9       Assessment:    Principal Problem:    Acute metabolic encephalopathy  Active Problems:    Parkinson disease ([facility] [address] [facility]  Resolved Problems:    * No resolved hospital problems.  *      Plan:  Acute change in mental status /?  Seizure  -History of Parkinson disease  -Work-up essentially negative-head CT unremarkable, no evidence of metabolic derangements,/infectious etiology  -Patient's wife states she sees some improvement in his mental status however still not back to baseline  -Follows with neurology in [place], family requesting neurology evaluation here  -Neurology consulted- EEG negative for seizure activity, +mild encephalopathy.     Hypothyroidism  -TSH 8.29, previously 7.0 in January  -Increase Synthroid to 75 mcg daily-repeat TSH level in 6 weeks with PCP    Hypertension-  add low-dose Norvasc for better control- 's prior to medication administration.     Medication for other comorbidities continue as appropriate dose adjustment as necessary.     DVT prophylaxis  PT OT  Discharge planning-[facility], referral sent, patient unable to go back to [place], will need [facility] on discharge. Case discussed with attending and agreed upon plan of care    LORIE Guzman CNP  8:56 AM  9/3/2021     I had a long discussion with wife at bedside  Explained to her that his change in mentation is multifactorial  We will get a UA and urine culture and trial IV Rocephin  Mostly he needs to be back in a familiar environment in order to avoid further agitation  Ambien for sleep  Hopeful for discharge at some point over the weekend  Pre-CERT is pending will likely not come in through the weekend    I personally saw, examined and provided care for the patient. Radiographs, labs and medication list were reviewed by me independently. The case was discussed in detail and plans for care were established. Review of [address] [place] [address], documentation was conducted and revisions were made as appropriate directly by me. I agree with the above documented exam, problem list, and plan of care.      Deidre Lopez MD  5:27 PM  9/3/2021

## 2021-09-03 NOTE — CARE COORDINATION
Social Work discharge [facility] completed and placed in envelope for snf already in folder.   Electronically signed by Mendel Maduro on 9/3/2021 at 3:05 PM

## 2021-09-03 NOTE — PROGRESS NOTES
Physical Therapy  Facility/Department: [facility] MED SURG  Treatment Note  NAME: Julieta Moralez  : 1930  MRN: [id number]    Date of Service: 9/3/2021    Patient Diagnosis(es): The primary encounter diagnosis was Acute metabolic encephalopathy. Diagnoses of Hypothyroidism, unspecified type and Acute respiratory failure with hypoxia ([facility] [address] were also pertinent to this visit. has a past medical history of High cholesterol, Hypertension, Hypothyroidism, Parkinson disease ([facility] [address] and RBBB. has a past surgical history that includes Foot surgery (); shoulder surgery (Right); hernia repair (Left); Tonsillectomy and Adenoidectomy; Foot surgery; hip surgery (Left, 2021); Hip fracture surgery (Right, 3/3/2021); and Insert Midline Catheter (3/18/2021). Evaluating Therapist: Kristy Guadalupe PT      Referring Provider:  LORIE Casiano CNP     PT order : PT eval and treat      Room #:  607   DIAGNOSIS:  Acute resp failure. Additional Pertinent History: presented form NH with AMS  PRECAUTIONS:  Falls      Social:  Pt admitted from SNF   Prior to admission : pt unable to report       Initial Evaluation  Date: 2021  Treatment     9/3 Short Term/ Long Term   Goals   Was pt agreeable to Eval/treatment? yes   yes     Does pt have pain?  none reported  None reported      Bed Mobility  Rolling: NT   Supine to sit:  Dep assist   Sit to supine:  NT   Scooting:  Dep assist  Rolling:  Dep A  Supine to sit:  Dep A   Sit to supine:  Dep A  Scooting:  Dep A x 2 to be repositioned back into bed.    mod assist    Transfers Sit to stand:  Max assist x 2   Stand to sit:  Max assist x2   Stand pivot:  Max assist x 2  NT. See comments.    mod assist    Ambulation   NT, pt unable   pt unable  20  feet with  ww  with  Mod assist    LE ROM  AAROM WFL        LE strength  3-/ 5         AM- PAC RAW score              Pt is awake, at times appeared confused, disoriented to place and time, was able to agree to rx and attempt sitting EOB   Balance: poor sitting EOB    Pt performed therapeutic exercise of the following: NT    Patient education  Pt was educated on participating with bed mobility    Patient response to education:   Pt verbalized understanding Pt demonstrated skill Pt requires further education in this area   no no yes     ASSESSMENT:   Comments: Nurse ok with Rx. Pt found in bed, agreed to rx, much discussion occurred promoting orientation to place and time. Assisted Pt to EOB, Pt sat EOB approx 20 seconds with Dep A, body wide tremors displayed while EOB, Pt to supine for safety. Nurse then called to the room, states tremors possibly due to medication. Pt then re positioned in bed, remained supine with LEs elevated. Treatment: Pt practiced and was instructed in the following treatment: bed mobility    Pt was left in bed with call light in reach    Chair/bed alarm: bed alarm active    Time in 1446   Time out 1500   Total Treatment Time 14 minutes   CPT codes:     Therapeutic activities 13194 14 minutes   Therapeutic exercises 65897 0 minutes       Pt is showing declining progress toward established Physical Therapy goals, presently unable to tolerate any EOB activity. Continue with physical therapy current plan of care.     Chava Mathur PTA   License Number: [id number]

## 2021-09-03 NOTE — DISCHARGE INSTR - COC
Continuity of Care Form    Patient Name: Tran Membreno   :  1930  MRN:  [id number]    Admit date:  2021  Discharge date: 2021    Code Status Order: Full Code   Advance Directives:      Admitting Physician:  Mauricio Metz MD  PCP: Gail Esparza DO    Discharging Nurse: [facility] Unit/Room#: 5292/7219-X  Discharging Unit Phone Number: [phone]    Emergency Contact:   Extended Emergency Contact Information  Primary Emergency Contact: Liss Luevano  Address: [address] [place] [address] Phone: [phone]  Mobile Phone: [phone]  Relation: Spouse   needed? No  Secondary Emergency Contact: [address]  [place] Phone: [phone]  Mobile Phone: [phone]  Relation: Grandchild  Preferred language: English   needed?  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 2021    LEFT HIP HEMIARTHROPLASTY performed by Lucita Akers MD at [address] [place] [address]  3/18/2021         SHOULDER SURGERY Right     5-10 years ago   [address] 76         Immunization History:   Immunization History   Administered Date(s) Administered    Influenza Virus Vaccine 10/19/2010, 2011, 10/29/2012, 10/17/2013, 2014, 10/09/2015, 2016    Influenza, MDCK Quadv, IM, PF (Flucelvax 2 yrs and older) 2017    Influenza, Quadv, IM, PF (6 mo and older Fluzone, Flulaval, Fluarix, and 3 yrs and older Afluria) 10/24/2018    Influenza, Triv, inactivated, subunit, adjuvanted, IM (Fluad 65 yrs and older) 2019    Pneumococcal Conjugate 13-valent (Lindbergh Lot) 2015    Pneumococcal Polysaccharide (Phgnixedo99) 10/29/2012    Zoster Live (Zostavax) 11/14/2014    Zoster Recombinant (Shingrix) 08/15/2019, 02/06/2020       Active Problems:  Patient Active Problem List   Diagnosis Code    Hypertension I10    Parkinson disease ([facility] [address] G20    Hypothyroidism E03.9    Stage 3 chronic kidney disease N18.30    Venous stasis dermatitis of both lower extremities I87.2    Closed fracture of neck of right femur ([facility] [address] S72.001A    Acute metabolic encephalopathy U04.99       Isolation/Infection:   Isolation            Contact          Patient Infection Status       Infection Onset Added Last Indicated Last Indicated By Review Planned Expiration Resolved Resolved By    ESBL (Extended Spectrum Beta Lactamase)  08/31/21 08/31/21 Bernardo Sweeney RN        Klebsiella pneumoniae urine 8/3/2021    MDRO (multi-drug resistant organism) 08/03/21 08/05/21 08/03/21 Culture, Urine        Klebsiella pneumoniae urine 8/3/2021    VRE  03/16/21 03/16/21 Bernardo Sweeney RN        +MDRO Enterococcus durans urine 3/14/2021    Resolved    COVID-19 Rule Out 03/03/21 03/03/21 03/03/21 Respiratory Panel, Molecular, with COVID-19 (Restricted: peds pts or suitable admitted adults) (Ordered)   03/03/21 Rule-Out Test Resulted    COVID-19 Rule Out 01/27/21 01/27/21 01/27/21 COVID-19 (Ordered)   01/27/21 Rule-Out Test Resulted            Nurse Assessment:  Last Vital Signs: /72   Pulse 70   Temp 99.8 °F (37.7 °C) (Axillary)   Resp 16   Ht 5' 6\" (1.676 m)   Wt 172 lb 8.2 oz (78.3 kg)   SpO2 94%   BMI 27.84 kg/m²     Last documented pain score (0-10 scale): Pain Level: 0  Last Weight:   Wt Readings from Last 1 Encounters:   09/03/21 172 lb 8.2 oz (78.3 kg)     Mental Status:  disoriented and alert to self only    IV Access:  - None    Nursing Mobility/ADLs:  Walking   Dependent  Transfer  Dependent  Bathing  Dependent  Dressing  Dependent  Toileting  Dependent  Feeding  Dependent  Med Admin  Dependent  Med Delivery   crushed    Wound Care Documentation and Therapy:  Wound 08/30/21 Back Mid (Active)   Dressing Status Clean;Dry; Intact 09/03/21 0830   Wound Cleansed Cleansed with saline 09/03/21 0020   Dressing/Treatment Dry dressing; Foam 09/03/21 0020   Dressing Change Due 09/03/21 09/03/21 0020   Wound Length (cm) 1.4 cm 09/01/21 1222   Wound Width (cm) 2 cm 09/01/21 1222   Wound Depth (cm) 0 cm 09/01/21 1222   Wound Surface Area (cm^2) 2.8 cm^2 09/01/21 1222   Change in Wound Size % (l*w) 0 09/01/21 1222   Wound Volume (cm^3) 0 cm^3 09/01/21 1222   Wound Assessment Other (Comment) 09/01/21 2000   Drainage Amount None 09/03/21 0020   Drainage Description Serosanguinous 08/30/21 2257   Odor None 08/30/21 2257   Number of days: 3       Wound 08/30/21 Other (Comment) Right;Dorsal (Active)   Dressing Status Clean;Dry; Intact 09/03/21 0830   Wound Cleansed Cleansed with saline 09/03/21 0020   Dressing/Treatment Gauze dressing/dressing sponge 09/03/21 0020   Dressing Change Due 09/03/21 09/03/21 0020   Wound Length (cm) 1.2 cm 09/01/21 1222   Wound Width (cm) 1.2 cm 09/01/21 1222   Wound Depth (cm) 0 cm 09/01/21 1222   Wound Surface Area (cm^2) 1.44 cm^2 09/01/21 1222   Change in Wound Size % (l*w) 31.43 09/01/21 1222   Wound Volume (cm^3) 0 cm^3 09/01/21 1222   Wound Assessment Pink/red 09/03/21 0020   Drainage Amount None 09/03/21 0020   Drainage Description Serous 09/01/21 1222   Odor None 09/03/21 0020   Yulia-wound Assessment Intact 09/03/21 0020   Number of days: 3       Wound 08/30/21 Other (Comment) Right;Dorsal THIS IS A DUPLICATE WOUND!!!!!!  SEE ABOVE (Active)   Number of days: 3        Elimination:  Continence:   · Bowel: No  · Bladder: No  Urinary Catheter: {Urinary Catheter:[id number]}   Colostomy/Ileostomy/Ileal Conduit: No       Date of Last BM: 9/8/2021      Intake/Output Summary (Last 24 hours) at 9/3/2021 1455  Last data filed at 9/3/2021 0910  Gross per 24 hour   Intake 390 ml   Output 1150 ml   Net -760 ml I/O last 3 completed shifts: In: 390 [P.O.:390]  Out: 1150 [Urine:1150]    Safety Concerns: At Risk for Falls    Impairments/Disabilities:      Vision    Nutrition Therapy:  Current Nutrition Therapy:   [address] JOSEFINA Diet List:[id number]}    Routes of Feeding: Oral  Liquids: No Restrictions  Daily Fluid Restriction: no  Last Modified Barium Swallow with Video (Video Swallowing Test): not done    Treatments at the Time of Hospital Discharge:   Respiratory Treatments: ***  Oxygen Therapy:  is on oxygen at 2 L/min per nasal cannula. Ventilator:    - No ventilator support    Rehab Therapies: Physical Therapy and Occupational Therapy  Weight Bearing Status/Restrictions: No weight bearing restirctions  Other Medical Equipment (for information only, NOT a DME order):  hospital bed  Other Treatments: ***    Patient's personal belongings (please select all that are sent with patient):  {CHP DME Belongings:[id number]}    RN SIGNATURE:  {Esignature:[id number]}    CASE MANAGEMENT/SOCIAL WORK SECTION    Inpatient Status Date: ***    Readmission Risk Assessment Score:  Readmission Risk              Risk of Unplanned Readmission:  22           Discharging to Facility/ Agency   · [address]  · [address] [place] [address]  · [address]  ·  [phone]  · Fax [phone]    Dialysis Facility (if applicable)   · Name:  · Address:  · Dialysis Schedule:  · Phone:  · Fax:    / signature: {Esignature:[id number]}    PHYSICIAN SECTION    Prognosis: Good    Condition at Discharge: Stable    Rehab Potential (if transferring to Rehab): Good    Recommended Labs or Other Treatments After Discharge: BMP and CBC in 1 week    Physician Certification: I certify the above information and transfer of Madhavi Miller  is necessary for the continuing treatment of the diagnosis listed and that he requires [facility] for greater 30 days.      Update Admission H&P: No change in H&P    PHYSICIAN SIGNATURE:  Electronically signed by LORIE Smith CNP on 9/7/21 at 11:42 AM EDT

## 2021-09-03 NOTE — PROGRESS NOTES
Patient's mental status this am is a decline from yesterday am.  He is not oriented but to person this am.  He yells out loud with patient care. He is picking his equipment and gown with his hands constantly. His tremors are consistent, with occasional large jerky movements of his body. He states he is falling off of the bed, when he is in the center of the bed. He did allow the PCT to feed him. He took his meds yesterday am fine with some pudding. This am is was chewing his medicine, unable to redirect him to just swallow them with the pudding; he kept chewing them. While discussing shift change report, the night nurse stated he had some periods where he seemed oriented somewhat and would converse (as he did yesterday) and then he would become confused and begin yelling out loud again. He is a total care patient, resists turning, cleaning, he makes himself fall straight back when attempting to sit him up. His wife wants him to return to the assisted living facility (private pay), however, the director of the facility called yesterday for an update, she stated he would need more improvement mentally and physically to return there. His wife is fearful to place him somewhere new. Charge nurse aware.

## 2021-09-03 NOTE — CARE COORDINATION
CASE MANAGEMENT. .. Lani Graven [facility] Noted patient will discharge to [address] [place] [address]. Precert was started. N 17 and forms in chart. Will follow.

## 2021-09-03 NOTE — CARE COORDINATION
Called and spoke to Jack Zavala at [place] [address], and she confirms that they have changed their minds and no longer can accept patient at time of discharge    Called and left a voicemail for patient's spouse to discuss SNF options.

## 2021-09-04 LAB
ALBUMIN SERPL-MCNC: 4.1 G/DL (ref 3.5–5.2)
ALP BLD-CCNC: 108 U/L (ref 40–129)
ALT SERPL-CCNC: <5 U/L (ref 0–40)
AMMONIA: 23 UMOL/L (ref 16–60)
ANION GAP SERPL CALCULATED.3IONS-SCNC: 18 MMOL/L (ref 7–16)
AST SERPL-CCNC: 13 U/L (ref 0–39)
BASOPHILS ABSOLUTE: 0.1 E9/L (ref 0–0.2)
BASOPHILS RELATIVE PERCENT: 0.8 % (ref 0–2)
BILIRUB SERPL-MCNC: 0.9 MG/DL (ref 0–1.2)
BLOOD CULTURE, ROUTINE: NORMAL
BUN BLDV-MCNC: 30 MG/DL (ref 6–23)
CALCIUM SERPL-MCNC: 9.6 MG/DL (ref 8.6–10.2)
CHLORIDE BLD-SCNC: 105 MMOL/L (ref 98–107)
CO2: 25 MMOL/L (ref 22–29)
CREAT SERPL-MCNC: 1.2 MG/DL (ref 0.7–1.2)
CULTURE, BLOOD 2: NORMAL
EOSINOPHILS ABSOLUTE: 0.14 E9/L (ref 0.05–0.5)
EOSINOPHILS RELATIVE PERCENT: 1.1 % (ref 0–6)
GFR AFRICAN AMERICAN: >60
GFR NON-AFRICAN AMERICAN: 57 ML/MIN/1.73
GLUCOSE BLD-MCNC: 99 MG/DL (ref 74–99)
HCT VFR BLD CALC: 39.3 % (ref 37–54)
HEMOGLOBIN: 12.9 G/DL (ref 12.5–16.5)
IMMATURE GRANULOCYTES #: 0.05 E9/L
IMMATURE GRANULOCYTES %: 0.4 % (ref 0–5)
LYMPHOCYTES ABSOLUTE: 1.16 E9/L (ref 1.5–4)
LYMPHOCYTES RELATIVE PERCENT: 9.1 % (ref 20–42)
MAGNESIUM: 1.8 MG/DL (ref 1.6–2.6)
MCH RBC QN AUTO: 31.4 PG (ref 26–35)
MCHC RBC AUTO-ENTMCNC: 32.8 % (ref 32–34.5)
MCV RBC AUTO: 95.6 FL (ref 80–99.9)
MONOCYTES ABSOLUTE: 1.01 E9/L (ref 0.1–0.95)
MONOCYTES RELATIVE PERCENT: 7.9 % (ref 2–12)
NEUTROPHILS ABSOLUTE: 10.33 E9/L (ref 1.8–7.3)
NEUTROPHILS RELATIVE PERCENT: 80.7 % (ref 43–80)
PDW BLD-RTO: 12.9 FL (ref 11.5–15)
PLATELET # BLD: 302 E9/L (ref 130–450)
PMV BLD AUTO: 10.4 FL (ref 7–12)
POTASSIUM SERPL-SCNC: 3.2 MMOL/L (ref 3.5–5)
RBC # BLD: 4.11 E12/L (ref 3.8–5.8)
SODIUM BLD-SCNC: 148 MMOL/L (ref 132–146)
TOTAL PROTEIN: 7.6 G/DL (ref 6.4–8.3)
WBC # BLD: 12.8 E9/L (ref 4.5–11.5)

## 2021-09-04 PROCEDURE — 6370000000 HC RX 637 (ALT 250 FOR IP): Performed by: INTERNAL MEDICINE

## 2021-09-04 PROCEDURE — 6360000002 HC RX W HCPCS: Performed by: INTERNAL MEDICINE

## 2021-09-04 PROCEDURE — 36415 COLL VENOUS BLD VENIPUNCTURE: CPT

## 2021-09-04 PROCEDURE — 85025 COMPLETE CBC W/AUTO DIFF WBC: CPT

## 2021-09-04 PROCEDURE — 6370000000 HC RX 637 (ALT 250 FOR IP): Performed by: NURSE PRACTITIONER

## 2021-09-04 PROCEDURE — 6370000000 HC RX 637 (ALT 250 FOR IP): Performed by: PSYCHIATRY & NEUROLOGY

## 2021-09-04 PROCEDURE — 82140 ASSAY OF AMMONIA: CPT

## 2021-09-04 PROCEDURE — 83735 ASSAY OF MAGNESIUM: CPT

## 2021-09-04 PROCEDURE — 6360000002 HC RX W HCPCS: Performed by: FAMILY MEDICINE

## 2021-09-04 PROCEDURE — 80053 COMPREHEN METABOLIC PANEL: CPT

## 2021-09-04 PROCEDURE — 2580000003 HC RX 258: Performed by: FAMILY MEDICINE

## 2021-09-04 PROCEDURE — 2060000000 HC ICU INTERMEDIATE R&B

## 2021-09-04 PROCEDURE — 51702 INSERT TEMP BLADDER CATH: CPT

## 2021-09-04 PROCEDURE — 6370000000 HC RX 637 (ALT 250 FOR IP): Performed by: FAMILY MEDICINE

## 2021-09-04 PROCEDURE — 2580000003 HC RX 258: Performed by: INTERNAL MEDICINE

## 2021-09-04 PROCEDURE — 2700000000 HC OXYGEN THERAPY PER DAY

## 2021-09-04 RX ORDER — DEXTROSE MONOHYDRATE 50 MG/ML
INJECTION, SOLUTION INTRAVENOUS CONTINUOUS
Status: DISCONTINUED | OUTPATIENT
Start: 2021-09-04 | End: 2021-09-08 | Stop reason: HOSPADM

## 2021-09-04 RX ADMIN — CARBIDOPA AND LEVODOPA 1 TABLET: 25; 250 TABLET ORAL at 09:39

## 2021-09-04 RX ADMIN — BUMETANIDE 1 MG: 1 TABLET ORAL at 09:39

## 2021-09-04 RX ADMIN — DONEPEZIL HYDROCHLORIDE 10 MG: 10 TABLET, FILM COATED ORAL at 19:56

## 2021-09-04 RX ADMIN — CALCIUM CARBONATE-VITAMIN D TAB 500 MG-200 UNIT 1 TABLET: 500-200 TAB at 09:39

## 2021-09-04 RX ADMIN — DEXTROSE MONOHYDRATE: 50 INJECTION, SOLUTION INTRAVENOUS at 09:40

## 2021-09-04 RX ADMIN — CARBIDOPA AND LEVODOPA 1 TABLET: 25; 250 TABLET ORAL at 17:36

## 2021-09-04 RX ADMIN — Medication 10 ML: at 19:56

## 2021-09-04 RX ADMIN — WATER 1000 MG: 1 INJECTION INTRAMUSCULAR; INTRAVENOUS; SUBCUTANEOUS at 17:37

## 2021-09-04 RX ADMIN — Medication 10 ML: at 09:40

## 2021-09-04 RX ADMIN — TRAMADOL HYDROCHLORIDE 50 MG: 50 TABLET, FILM COATED ORAL at 19:26

## 2021-09-04 RX ADMIN — DOCUSATE SODIUM 200 MG: 100 CAPSULE ORAL at 09:39

## 2021-09-04 RX ADMIN — CARBIDOPA AND LEVODOPA 1 TABLET: 25; 250 TABLET ORAL at 19:26

## 2021-09-04 RX ADMIN — ENOXAPARIN SODIUM 40 MG: 40 INJECTION SUBCUTANEOUS at 09:39

## 2021-09-04 RX ADMIN — CARBIDOPA AND LEVODOPA 1 TABLET: 25; 250 TABLET ORAL at 14:03

## 2021-09-04 RX ADMIN — MONTELUKAST SODIUM 10 MG: 10 TABLET, FILM COATED ORAL at 19:25

## 2021-09-04 RX ADMIN — LEVOTHYROXINE SODIUM 75 MCG: 75 TABLET ORAL at 05:57

## 2021-09-04 RX ADMIN — ASPIRIN 325 MG: 325 TABLET, COATED ORAL at 09:39

## 2021-09-04 RX ADMIN — AMLODIPINE BESYLATE 5 MG: 5 TABLET ORAL at 09:39

## 2021-09-04 RX ADMIN — CARBIDOPA AND LEVODOPA 1 TABLET: 25; 250 TABLET ORAL at 05:57

## 2021-09-04 RX ADMIN — FERROUS SULFATE TAB 325 MG (65 MG ELEMENTAL FE) 325 MG: 325 (65 FE) TAB at 09:40

## 2021-09-04 ASSESSMENT — PAIN SCALES - GENERAL
PAINLEVEL_OUTOF10: 0
PAINLEVEL_OUTOF10: 2

## 2021-09-04 ASSESSMENT — PAIN SCALES - PAIN ASSESSMENT IN ADVANCED DEMENTIA (PAINAD)
BODYLANGUAGE: 0
NEGVOCALIZATION: 0
BODYLANGUAGE: 0
NEGVOCALIZATION: 0
CONSOLABILITY: 0
BREATHING: 0
CONSOLABILITY: 0
TOTALSCORE: 0
TOTALSCORE: 0
NEGVOCALIZATION: 0
CONSOLABILITY: 0
BODYLANGUAGE: 0
BREATHING: 0
BREATHING: 0
FACIALEXPRESSION: 0
FACIALEXPRESSION: 0
BREATHING: 0
CONSOLABILITY: 0
NEGVOCALIZATION: 0
TOTALSCORE: 0
TOTALSCORE: 0
FACIALEXPRESSION: 0
FACIALEXPRESSION: 0
BODYLANGUAGE: 0

## 2021-09-04 NOTE — PROGRESS NOTES
P Quality Flow/Interdisciplinary Rounds Progress Note        Quality Flow Rounds held on September 4, 2021    Disciplines Attending:  Bedside Nurse, ,  and Nursing Unit Leadership    Shelly Lr was admitted on 8/30/2021 12:06 PM    Anticipated Discharge Date:  Expected Discharge Date: 09/02/21    Disposition:    Sriram Score:  Sriram Scale Score: 11    Readmission Risk              Risk of Unplanned Readmission:  21           Discussed patient goal for the day, patient clinical progression, and barriers to discharge. The following Goal(s) of the Day/Commitment(s) have been identified: Continues to receive IV fluids and  Rocephin IV Q 24 hours, monitor vitals and lab values, check Medical plan, discharge plan to [facility].       Inés Farley RN  September 4, 2021

## 2021-09-04 NOTE — PROGRESS NOTES
Patient's wife requesting to speak with Dr. Vidya Hernandez. LORIE Kumar who is with Dr. Vidya Hernandez notified of wife's request via [address] [place] [address].

## 2021-09-04 NOTE — PROGRESS NOTES
Subjective: The patient is awake and alert X3   No acute events overnight. Appears content smiling and speaking to me on exam.    Objective:    BP (!) 159/59   Pulse 71   Temp 97.3 °F (36.3 °C) (Infrared)   Resp 18   Ht 5' 6\" (1.676 m)   Wt 164 lb 11.2 oz (74.7 kg)   SpO2 91%   BMI 26.58 kg/m²     In: -   Out: 250   In: -   Out: 250 [Urine:250]    General appearance: NAD, conversant, pleasant  HEENT: AT/NC, MMM  Neck: FROM, supple  Lungs: Clear to auscultation  CV: RRR, no MRGs  Vasc: Radial pulses 2+  Abdomen: Soft, non-tender; no masses or HSM  Extremities: No peripheral edema or digital cyanosis, tremors  Skin: no rash, lesions or ulcers  Psych: Alert and oriented to person, place and time  Neuro: Alert and interactive     Recent Labs     09/02/21  0630 09/04/21  0523   WBC 7.5 12.8*   HGB 11.7* 12.9   HCT 35.1* 39.3    302       Recent Labs     09/02/21  0630 09/04/21  0523    148*   K 3.5 3.2*    105   CO2 28 25   BUN 25* 30*   CREATININE 1.0 1.2   CALCIUM 8.9 9.6       Assessment:    Principal Problem:    Acute metabolic encephalopathy  Active Problems:    Parkinson disease (HCC)    Urinary tract infection  Resolved Problems:    * No resolved hospital problems.  *      Plan:  Acute change in mental status /?  Seizure  -History of Parkinson disease  -Work-up essentially negative-head CT unremarkable, no evidence of metabolic derangements,/infectious etiology  -Patient's wife states she sees some improvement in his mental status however still not back to baseline  -Follows with neurology in [facility], family requesting neurology evaluation here  -Neurology consulted- EEG negative for seizure activity, +mild encephalopathy. - Does not want to increase his parkinson's medications at this time.      Hypothyroidism  -TSH 8.29, previously 7.0 in January  -Increase Synthroid to 75 mcg daily-repeat TSH level in 6 weeks with PCP    Hypertension-  add low-dose Norvasc for better control- BP 140's prior to medication administration    UTI  -Monitor culture/WBC 12.8 today/temps  -Rocephin 1 gm every 24 hours  -Add doxycycline  -Check chest x-ray and pro Abdiel    Medication for other comorbidities continue as appropriate dose adjustment as necessary.     DVT prophylaxis  PT OT  Discharge planning- Pre cert pending. Hopeful for Monday discharge. Margaux Garcia, APRN - CNP  10:32 AM  9/4/2021     Above note edited to reflect my thoughts     I personally saw, examined and provided care for the patient. Radiographs, labs and medication list were reviewed by me independently. The case was discussed in detail and plans for care were established. Review of [address] [place] [address], documentation was conducted and revisions were made as appropriate directly by me. I agree with the above documented exam, problem list, and plan of care.      Deidre Lopez MD  3:11 PM  9/4/2021

## 2021-09-05 ENCOUNTER — APPOINTMENT (OUTPATIENT)
Dept: GENERAL RADIOLOGY | Age: 86
DRG: 057 | End: 2021-09-05
Payer: MEDICARE

## 2021-09-05 LAB
ANION GAP SERPL CALCULATED.3IONS-SCNC: 12 MMOL/L (ref 7–16)
BASOPHILS ABSOLUTE: 0.08 E9/L (ref 0–0.2)
BASOPHILS RELATIVE PERCENT: 0.8 % (ref 0–2)
BUN BLDV-MCNC: 37 MG/DL (ref 6–23)
CALCIUM SERPL-MCNC: 9.5 MG/DL (ref 8.6–10.2)
CHLORIDE BLD-SCNC: 104 MMOL/L (ref 98–107)
CO2: 27 MMOL/L (ref 22–29)
CREAT SERPL-MCNC: 1.1 MG/DL (ref 0.7–1.2)
EOSINOPHILS ABSOLUTE: 0.18 E9/L (ref 0.05–0.5)
EOSINOPHILS RELATIVE PERCENT: 1.8 % (ref 0–6)
GFR AFRICAN AMERICAN: >60
GFR NON-AFRICAN AMERICAN: >60 ML/MIN/1.73
GLUCOSE BLD-MCNC: 124 MG/DL (ref 74–99)
HCT VFR BLD CALC: 37.5 % (ref 37–54)
HEMOGLOBIN: 12.1 G/DL (ref 12.5–16.5)
IMMATURE GRANULOCYTES #: 0.02 E9/L
IMMATURE GRANULOCYTES %: 0.2 % (ref 0–5)
LYMPHOCYTES ABSOLUTE: 1.67 E9/L (ref 1.5–4)
LYMPHOCYTES RELATIVE PERCENT: 17.1 % (ref 20–42)
MCH RBC QN AUTO: 31 PG (ref 26–35)
MCHC RBC AUTO-ENTMCNC: 32.3 % (ref 32–34.5)
MCV RBC AUTO: 96.2 FL (ref 80–99.9)
MONOCYTES ABSOLUTE: 1.14 E9/L (ref 0.1–0.95)
MONOCYTES RELATIVE PERCENT: 11.7 % (ref 2–12)
NEUTROPHILS ABSOLUTE: 6.66 E9/L (ref 1.8–7.3)
NEUTROPHILS RELATIVE PERCENT: 68.4 % (ref 43–80)
PDW BLD-RTO: 13.2 FL (ref 11.5–15)
PLATELET # BLD: 288 E9/L (ref 130–450)
PMV BLD AUTO: 10.6 FL (ref 7–12)
POTASSIUM SERPL-SCNC: 2.9 MMOL/L (ref 3.5–5)
PROCALCITONIN: 0.09 NG/ML (ref 0–0.08)
RBC # BLD: 3.9 E12/L (ref 3.8–5.8)
SODIUM BLD-SCNC: 143 MMOL/L (ref 132–146)
WBC # BLD: 9.8 E9/L (ref 4.5–11.5)

## 2021-09-05 PROCEDURE — 97530 THERAPEUTIC ACTIVITIES: CPT

## 2021-09-05 PROCEDURE — 2700000000 HC OXYGEN THERAPY PER DAY

## 2021-09-05 PROCEDURE — 2580000003 HC RX 258: Performed by: INTERNAL MEDICINE

## 2021-09-05 PROCEDURE — 85025 COMPLETE CBC W/AUTO DIFF WBC: CPT

## 2021-09-05 PROCEDURE — 6370000000 HC RX 637 (ALT 250 FOR IP): Performed by: NURSE PRACTITIONER

## 2021-09-05 PROCEDURE — 6370000000 HC RX 637 (ALT 250 FOR IP): Performed by: INTERNAL MEDICINE

## 2021-09-05 PROCEDURE — 80048 BASIC METABOLIC PNL TOTAL CA: CPT

## 2021-09-05 PROCEDURE — 2060000000 HC ICU INTERMEDIATE R&B

## 2021-09-05 PROCEDURE — 2580000003 HC RX 258: Performed by: FAMILY MEDICINE

## 2021-09-05 PROCEDURE — 6360000002 HC RX W HCPCS: Performed by: FAMILY MEDICINE

## 2021-09-05 PROCEDURE — 6370000000 HC RX 637 (ALT 250 FOR IP): Performed by: PSYCHIATRY & NEUROLOGY

## 2021-09-05 PROCEDURE — 6360000002 HC RX W HCPCS: Performed by: INTERNAL MEDICINE

## 2021-09-05 PROCEDURE — 71045 X-RAY EXAM CHEST 1 VIEW: CPT

## 2021-09-05 PROCEDURE — 84145 PROCALCITONIN (PCT): CPT

## 2021-09-05 PROCEDURE — 36415 COLL VENOUS BLD VENIPUNCTURE: CPT

## 2021-09-05 PROCEDURE — 6370000000 HC RX 637 (ALT 250 FOR IP): Performed by: FAMILY MEDICINE

## 2021-09-05 RX ADMIN — FERROUS SULFATE TAB 325 MG (65 MG ELEMENTAL FE) 325 MG: 325 (65 FE) TAB at 08:37

## 2021-09-05 RX ADMIN — CARBIDOPA AND LEVODOPA 1 TABLET: 25; 250 TABLET ORAL at 06:05

## 2021-09-05 RX ADMIN — POTASSIUM CHLORIDE 10 MEQ: 10 INJECTION, SOLUTION INTRAVENOUS at 14:45

## 2021-09-05 RX ADMIN — POTASSIUM CHLORIDE 10 MEQ: 10 INJECTION, SOLUTION INTRAVENOUS at 16:35

## 2021-09-05 RX ADMIN — CARBIDOPA AND LEVODOPA 1 TABLET: 25; 250 TABLET ORAL at 08:37

## 2021-09-05 RX ADMIN — LEVOTHYROXINE SODIUM 75 MCG: 75 TABLET ORAL at 06:05

## 2021-09-05 RX ADMIN — WATER 1000 MG: 1 INJECTION INTRAMUSCULAR; INTRAVENOUS; SUBCUTANEOUS at 20:14

## 2021-09-05 RX ADMIN — POTASSIUM CHLORIDE 10 MEQ: 10 INJECTION, SOLUTION INTRAVENOUS at 13:37

## 2021-09-05 RX ADMIN — CARBIDOPA AND LEVODOPA 1 TABLET: 25; 250 TABLET ORAL at 14:45

## 2021-09-05 RX ADMIN — CARBIDOPA AND LEVODOPA 1 TABLET: 25; 250 TABLET ORAL at 18:12

## 2021-09-05 RX ADMIN — CALCIUM CARBONATE-VITAMIN D TAB 500 MG-200 UNIT 1 TABLET: 500-200 TAB at 08:37

## 2021-09-05 RX ADMIN — BUMETANIDE 1 MG: 1 TABLET ORAL at 08:37

## 2021-09-05 RX ADMIN — POTASSIUM CHLORIDE 10 MEQ: 10 INJECTION, SOLUTION INTRAVENOUS at 20:14

## 2021-09-05 RX ADMIN — DONEPEZIL HYDROCHLORIDE 10 MG: 10 TABLET, FILM COATED ORAL at 20:15

## 2021-09-05 RX ADMIN — POTASSIUM CHLORIDE 10 MEQ: 10 INJECTION, SOLUTION INTRAVENOUS at 21:47

## 2021-09-05 RX ADMIN — Medication 10 ML: at 20:16

## 2021-09-05 RX ADMIN — POTASSIUM CHLORIDE 10 MEQ: 10 INJECTION, SOLUTION INTRAVENOUS at 18:12

## 2021-09-05 RX ADMIN — TRAMADOL HYDROCHLORIDE 50 MG: 50 TABLET, FILM COATED ORAL at 20:15

## 2021-09-05 RX ADMIN — AMLODIPINE BESYLATE 5 MG: 5 TABLET ORAL at 08:37

## 2021-09-05 RX ADMIN — ASPIRIN 325 MG: 325 TABLET, COATED ORAL at 08:37

## 2021-09-05 RX ADMIN — ENOXAPARIN SODIUM 40 MG: 40 INJECTION SUBCUTANEOUS at 08:37

## 2021-09-05 RX ADMIN — MONTELUKAST SODIUM 10 MG: 10 TABLET, FILM COATED ORAL at 20:15

## 2021-09-05 RX ADMIN — DOCUSATE SODIUM 200 MG: 100 CAPSULE ORAL at 08:37

## 2021-09-05 ASSESSMENT — PAIN SCALES - PAIN ASSESSMENT IN ADVANCED DEMENTIA (PAINAD)
BODYLANGUAGE: 0
TOTALSCORE: 0
NEGVOCALIZATION: 0
FACIALEXPRESSION: 0
TOTALSCORE: 0
BREATHING: 0
BREATHING: 0
CONSOLABILITY: 0
CONSOLABILITY: 0
BODYLANGUAGE: 0
NEGVOCALIZATION: 0
FACIALEXPRESSION: 0

## 2021-09-05 ASSESSMENT — PAIN SCALES - GENERAL
PAINLEVEL_OUTOF10: 0
PAINLEVEL_OUTOF10: 0

## 2021-09-05 NOTE — PROGRESS NOTES
control-optimally controlled currently    UTI  -/WBC normal at 9.8/temps unremarkable  -Rocephin 1 gm every 24 hours continued pending culture results-Corynebacterium  -Check chest x-ray and pro Abdiel -only minimally elevated, chest x-ray pending    Medication for other comorbidities continue as appropriate dose adjustment as necessary.     DVT prophylaxis  PT OT-very poor impact scores 6/24 then 7/24  Discharge planning- Pre cert pending. Hopeful for Monday discharge. Juan Maki, APRN - CNP  1:06 PM  9/5/2021     Above note edited to reflect my thoughts     I personally saw, examined and provided care for the patient. Radiographs, labs and medication list were reviewed by me independently. The case was discussed in detail and plans for care were established. Review of [address] [place] [address], documentation was conducted and revisions were made as appropriate directly by me. I agree with the above documented exam, problem list, and plan of care.      Royal Checo MD  2:30 PM  9/5/2021

## 2021-09-05 NOTE — PROGRESS NOTES
9/5/2021  4:34 PM           RD Nutrition Re-Screen/LOS Note    Pt admit from NH 2/2 AMS poss seizure. PMH= Parkinson's, Hip fx surgery earlier this year. Mentation improving since admit. Eating 25-75% at meals on average. No open wounds or PI documented. No significant wt changes (loss with -fluid balance 5L since admit). Will add ONS and continue inpatient monitoring.  (Discharge planning in progress for pt back to [facility])      Electronically signed by Alyson Dukes RD, CNSC, LD on 9/5/21 at 4:34 PM EDT    Contact: [phone]

## 2021-09-05 NOTE — PROGRESS NOTES
Physician Progress Note      Annmarie Dominguez  [facility] #:                  [id number]  :                       1930  ADMIT DATE:       2021 12:06 PM  [address] [facility] DATE:  RESPONDING  PROVIDER #:        LUDMILA Myrick MD          QUERY TEXT:    Pt admitted with altered mental status. Pt noted to have Parkinson's disease. If possible, please document in progress notes and discharge summary the   relationship, if any, between acute AMS and Parkinson's disease. The medical record reflects the following:  Risk Factors: Parkinson's disease  Clinical Indicators: per neuro \". Joaquim Jacobson This is a mildly abnormal EEG due to a   slight degree of diffuse slowing of the background activity. This is   consistent with an encephalopathic process of a generalized nature and can be   seen in dementia among other things. No seizure activity was seen. Joaquim Jacobson \", per IM   \". Joaquim Jacobson Acute? change in mental status?/? ? ? Seizure. Joaquim Jacobson History of Parkinson disease  -Work-up essentially negative-head CT unremarkable, no evidence of metabolic   derangements,/infectious etiology. Joaquim Jacobson \", per nursing 9/3\". Joaquim Riverari He took his meds   yesterday am fine with some pudding. This am is was chewing his medicine,   unable to redirect him to just swallow them with the pudding; he kept chewing   them. While discussing shift change report, the night nurse stated he had   some periods where he seemed oriented somewhat and would converse (as he did   yesterday) and then he would become confused and begin yelling out loud   again. Joaquim Jacobson \"  Treatment: Sinemet increased per neuro    Thank you,  April Ayde Batres, RN, BSN, CDIS  Clinical Documentation Improvement  [email or web address]  Options provided:  -- Altered mental status due to Parkinson's disease, metabolic encephalopathy   ruled out  -- Altered mental status related to other, please specify, please specify.   -- Other - I will add my own diagnosis  -- Disagree - Not applicable / Not valid  -- Disagree - Clinically unable to determine / Unknown  -- Refer to Clinical Documentation Reviewer    PROVIDER RESPONSE TEXT:    Metabolic encephalopathy has been ruled out, altered mental status is due to   Parkinson's disease.     Query created by: Matt Talavera on 9/3/2021 11:42 AM      Electronically signed by:  LUDMILA Pryor MD 9/5/2021 10:41 AM

## 2021-09-05 NOTE — PROGRESS NOTES
Physical Therapy  Facility/Department: [facility] MED SURG  Treatment Note  NAME: Tina Elizabeth  : 1930  MRN: [id number]    Date of Service: 2021    Patient Diagnosis(es): The primary encounter diagnosis was Acute metabolic encephalopathy. Diagnoses of Hypothyroidism, unspecified type and Acute respiratory failure with hypoxia ([facility] [address] were also pertinent to this visit. has a past medical history of High cholesterol, Hypertension, Hypothyroidism, Parkinson disease ([facility] [address] and RBBB. has a past surgical history that includes Foot surgery (); shoulder surgery (Right); hernia repair (Left); Tonsillectomy and Adenoidectomy; Foot surgery; hip surgery (Left, 2021); Hip fracture surgery (Right, 3/3/2021); and Insert Midline Catheter (3/18/2021). Evaluating Therapist: Narda Candelaria PT      Referring Provider:  LORIE Ruiz - CNP     PT order : PT eval and treat      Room #:  607   DIAGNOSIS:  Acute resp failure. Additional Pertinent History: presented form NH with AMS  PRECAUTIONS:  Falls      Social:  Pt admitted from SNF   Prior to admission : pt unable to report       Initial Evaluation  Date: 2021  Treatment     21 Short Term/ Long Term   Goals   Was pt agreeable to Eval/treatment? yes   yes     Does pt have pain?  none reported  None reported      Bed Mobility  Rolling: NT   Supine to sit:  Dep assist   Sit to supine:  NT   Scooting:  Dep assist  Rolling:  NT  Supine to sit: Max A   Sit to supine:  Dep A  Scooting:  Dep A x 2 to be repositioned back into bed.    mod assist    Transfers Sit to stand:  Max assist x 2   Stand to sit:  Max assist x2   Stand pivot:  Max assist x 2  NT. See comments.    mod assist    Ambulation   NT, pt unable   pt unable  20  feet with  ww  with  Mod assist    LE ROM  AAROM WFL        LE strength  3-/ 5         AM- PAC RAW score              Pt is awake, agreed to rx, was able to follow instruction today, at times confused to place and time  Balance: poor sitting EOB    Pt performed therapeutic exercise of the following: NT    Patient education  Pt was educated on participating with sitting balance    Patient response to education:   Pt verbalized understanding Pt demonstrated skill Pt requires further education in this area   no no yes     ASSESSMENT:   Comments: Nurse ok with Rx. Pt found in bed, agreed to rx. Pt assisted to the EOB, sat EOB a prolonged time with Max A for balance. Noted intermittent body wide tremors as per last Rx but with less severity and frequency, trunk extension noted each tremor. Pt required his LEs to be held in place while EOB to prevent sliding off the EOB, trunk exercise performed to promote sitting balance, prompt required for UE usage to assist with sitting balance. Transfers presently unsafe to perform due to strong extensor tone with tremors and poor balance   Treatment: Pt practiced and was instructed in the following treatment: sitting balance    Pt was left in bed with call light in reach    Chair/bed alarm: bed alarm active    Time in 0800  Time out 0824   Total Treatment Time 24 minutes   CPT codes:     Therapeutic activities 05489 12 minutes   Therapeutic exercises 65885 0 minutes       Pt is making minimal progress toward established Physical Therapy goals, as per tolerance to EOB activity. Continue with physical therapy current plan of care.     Awa Marcos PTA   License Number: [id number]

## 2021-09-06 LAB
ANION GAP SERPL CALCULATED.3IONS-SCNC: 11 MMOL/L (ref 7–16)
BASOPHILS ABSOLUTE: 0.06 E9/L (ref 0–0.2)
BASOPHILS RELATIVE PERCENT: 0.5 % (ref 0–2)
BUN BLDV-MCNC: 28 MG/DL (ref 6–23)
CALCIUM SERPL-MCNC: 8.4 MG/DL (ref 8.6–10.2)
CHLORIDE BLD-SCNC: 97 MMOL/L (ref 98–107)
CO2: 28 MMOL/L (ref 22–29)
CREAT SERPL-MCNC: 0.8 MG/DL (ref 0.7–1.2)
EOSINOPHILS ABSOLUTE: 0.55 E9/L (ref 0.05–0.5)
EOSINOPHILS RELATIVE PERCENT: 4.2 % (ref 0–6)
GFR AFRICAN AMERICAN: >60
GFR NON-AFRICAN AMERICAN: >60 ML/MIN/1.73
GLUCOSE BLD-MCNC: 117 MG/DL (ref 74–99)
HCT VFR BLD CALC: 34.8 % (ref 37–54)
HEMOGLOBIN: 11.4 G/DL (ref 12.5–16.5)
IMMATURE GRANULOCYTES #: 0.04 E9/L
IMMATURE GRANULOCYTES %: 0.3 % (ref 0–5)
LYMPHOCYTES ABSOLUTE: 1.17 E9/L (ref 1.5–4)
LYMPHOCYTES RELATIVE PERCENT: 8.9 % (ref 20–42)
MCH RBC QN AUTO: 31.5 PG (ref 26–35)
MCHC RBC AUTO-ENTMCNC: 32.8 % (ref 32–34.5)
MCV RBC AUTO: 96.1 FL (ref 80–99.9)
MONOCYTES ABSOLUTE: 1.02 E9/L (ref 0.1–0.95)
MONOCYTES RELATIVE PERCENT: 7.8 % (ref 2–12)
NEUTROPHILS ABSOLUTE: 10.27 E9/L (ref 1.8–7.3)
NEUTROPHILS RELATIVE PERCENT: 78.3 % (ref 43–80)
ORGANISM: ABNORMAL
PDW BLD-RTO: 12.9 FL (ref 11.5–15)
PLATELET # BLD: 262 E9/L (ref 130–450)
PMV BLD AUTO: 11 FL (ref 7–12)
POTASSIUM SERPL-SCNC: 3.3 MMOL/L (ref 3.5–5)
RBC # BLD: 3.62 E12/L (ref 3.8–5.8)
SODIUM BLD-SCNC: 136 MMOL/L (ref 132–146)
URINE CULTURE, ROUTINE: ABNORMAL
WBC # BLD: 13.1 E9/L (ref 4.5–11.5)

## 2021-09-06 PROCEDURE — 36415 COLL VENOUS BLD VENIPUNCTURE: CPT

## 2021-09-06 PROCEDURE — 2060000000 HC ICU INTERMEDIATE R&B

## 2021-09-06 PROCEDURE — 6370000000 HC RX 637 (ALT 250 FOR IP): Performed by: NURSE PRACTITIONER

## 2021-09-06 PROCEDURE — 6360000002 HC RX W HCPCS: Performed by: INTERNAL MEDICINE

## 2021-09-06 PROCEDURE — 99231 SBSQ HOSP IP/OBS SF/LOW 25: CPT | Performed by: PSYCHIATRY & NEUROLOGY

## 2021-09-06 PROCEDURE — 6370000000 HC RX 637 (ALT 250 FOR IP): Performed by: PSYCHIATRY & NEUROLOGY

## 2021-09-06 PROCEDURE — 6370000000 HC RX 637 (ALT 250 FOR IP): Performed by: FAMILY MEDICINE

## 2021-09-06 PROCEDURE — 2580000003 HC RX 258: Performed by: FAMILY MEDICINE

## 2021-09-06 PROCEDURE — 85025 COMPLETE CBC W/AUTO DIFF WBC: CPT

## 2021-09-06 PROCEDURE — 2580000003 HC RX 258: Performed by: INTERNAL MEDICINE

## 2021-09-06 PROCEDURE — 6360000002 HC RX W HCPCS: Performed by: FAMILY MEDICINE

## 2021-09-06 PROCEDURE — 2700000000 HC OXYGEN THERAPY PER DAY

## 2021-09-06 PROCEDURE — 80048 BASIC METABOLIC PNL TOTAL CA: CPT

## 2021-09-06 RX ADMIN — TRAMADOL HYDROCHLORIDE 50 MG: 50 TABLET, FILM COATED ORAL at 19:54

## 2021-09-06 RX ADMIN — CARBIDOPA AND LEVODOPA 1 TABLET: 25; 250 TABLET ORAL at 18:22

## 2021-09-06 RX ADMIN — ENOXAPARIN SODIUM 40 MG: 40 INJECTION SUBCUTANEOUS at 10:38

## 2021-09-06 RX ADMIN — DOCUSATE SODIUM 200 MG: 100 CAPSULE ORAL at 10:38

## 2021-09-06 RX ADMIN — LEVOTHYROXINE SODIUM 75 MCG: 75 TABLET ORAL at 06:13

## 2021-09-06 RX ADMIN — ASPIRIN 325 MG: 325 TABLET, COATED ORAL at 10:38

## 2021-09-06 RX ADMIN — FERROUS SULFATE TAB 325 MG (65 MG ELEMENTAL FE) 325 MG: 325 (65 FE) TAB at 10:38

## 2021-09-06 RX ADMIN — Medication 10 ML: at 19:55

## 2021-09-06 RX ADMIN — DONEPEZIL HYDROCHLORIDE 10 MG: 10 TABLET, FILM COATED ORAL at 19:54

## 2021-09-06 RX ADMIN — CARBIDOPA AND LEVODOPA 1 TABLET: 25; 250 TABLET ORAL at 15:03

## 2021-09-06 RX ADMIN — WATER 1000 MG: 1 INJECTION INTRAMUSCULAR; INTRAVENOUS; SUBCUTANEOUS at 19:55

## 2021-09-06 RX ADMIN — CALCIUM CARBONATE-VITAMIN D TAB 500 MG-200 UNIT 1 TABLET: 500-200 TAB at 10:38

## 2021-09-06 RX ADMIN — BUMETANIDE 1 MG: 1 TABLET ORAL at 10:38

## 2021-09-06 RX ADMIN — MONTELUKAST SODIUM 10 MG: 10 TABLET, FILM COATED ORAL at 19:54

## 2021-09-06 RX ADMIN — CARBIDOPA AND LEVODOPA 1 TABLET: 25; 250 TABLET ORAL at 06:13

## 2021-09-06 RX ADMIN — Medication 10 ML: at 10:38

## 2021-09-06 RX ADMIN — CARBIDOPA AND LEVODOPA 1 TABLET: 25; 250 TABLET ORAL at 10:38

## 2021-09-06 ASSESSMENT — PAIN SCALES - PAIN ASSESSMENT IN ADVANCED DEMENTIA (PAINAD)
FACIALEXPRESSION: 0
NEGVOCALIZATION: 0
BODYLANGUAGE: 0
CONSOLABILITY: 0
CONSOLABILITY: 0
NEGVOCALIZATION: 0
BODYLANGUAGE: 0
TOTALSCORE: 0
TOTALSCORE: 0
BREATHING: 0
FACIALEXPRESSION: 0
BREATHING: 0

## 2021-09-06 ASSESSMENT — PAIN SCALES - GENERAL
PAINLEVEL_OUTOF10: 0
PAINLEVEL_OUTOF10: 0

## 2021-09-06 NOTE — PROGRESS NOTES
per 24 hour   Intake 630 ml   Output 400 ml   Net 230 ml     Last 3 weights: Wt Readings from Last 3 Encounters:   09/06/21 169 lb 14.4 oz (77.1 kg)   03/15/21 173 lb 3.2 oz (78.6 kg)   03/02/21 191 lb 12.8 oz (87 kg)         CBC:   Recent Labs     09/04/21 0523 09/05/21 0319   WBC 12.8* 9.8   HGB 12.9 12.1*    288     BMP:    Recent Labs     09/04/21 0523 09/05/21 0319   * 143   K 3.2* 2.9*    104   CO2 25 27   BUN 30* 37*   CREATININE 1.2 1.1   GLUCOSE 99 124*     Calcium:  Recent Labs     09/05/21 0319   CALCIUM 9.5     Magnesium:  Recent Labs     09/04/21 0523   MG 1.8                 Assessment:    Principal Problem:    Acute metabolic encephalopathy  Active Problems:    Parkinson disease ([address]    Urinary tract infection  Resolved Problems:    * No resolved hospital problems. *    Acute encephalopathy improved, possibly was due to infection. Plan:  Neurology will sign off.     Electronically signed by Cherylene Gondola, DO on 9/6/2021 at 3:00 PM    Neurology

## 2021-09-06 NOTE — PROGRESS NOTES
control-optimally controlled currently    UTI  -AM labs pending, afebrile  -Rocephin 1 gm every 24 hours continued pending culture results-Corynebacterium  Continue Rocephin until discharge  -Check chest x-ray and pro Abdiel -only minimally elevated, chest x-ray pending    Medication for other comorbidities continue as appropriate dose adjustment as necessary.     DVT prophylaxis  PT OT-very poor impact scores 6/24 then 7/24  Discharge planning- Pre cert pending.-Hopeful for discharge tomorrow 9/7/2021  Have been discussing with wife daily    LORIE Metz CNP  9:41 AM  9/6/2021     Above note edited to reflect my thoughts    I personally saw, examined and provided care for the patient. Radiographs, labs and medication list were reviewed by me independently. The case was discussed in detail and plans for care were established. Review of LORIE Metz CNP, documentation was conducted and revisions were made as appropriate directly by me. I agree with the above documented exam, problem list, and plan of care.      Ailyn Lainez MD  1:07 PM  9/6/2021

## 2021-09-07 LAB — METER GLUCOSE: 124 MG/DL (ref 74–99)

## 2021-09-07 PROCEDURE — 6370000000 HC RX 637 (ALT 250 FOR IP): Performed by: FAMILY MEDICINE

## 2021-09-07 PROCEDURE — 2700000000 HC OXYGEN THERAPY PER DAY

## 2021-09-07 PROCEDURE — 97530 THERAPEUTIC ACTIVITIES: CPT

## 2021-09-07 PROCEDURE — 2580000003 HC RX 258: Performed by: FAMILY MEDICINE

## 2021-09-07 PROCEDURE — 82962 GLUCOSE BLOOD TEST: CPT

## 2021-09-07 PROCEDURE — 6370000000 HC RX 637 (ALT 250 FOR IP): Performed by: NURSE PRACTITIONER

## 2021-09-07 PROCEDURE — 6370000000 HC RX 637 (ALT 250 FOR IP): Performed by: PSYCHIATRY & NEUROLOGY

## 2021-09-07 PROCEDURE — 6370000000 HC RX 637 (ALT 250 FOR IP): Performed by: INTERNAL MEDICINE

## 2021-09-07 PROCEDURE — 6360000002 HC RX W HCPCS: Performed by: INTERNAL MEDICINE

## 2021-09-07 PROCEDURE — 97535 SELF CARE MNGMENT TRAINING: CPT

## 2021-09-07 PROCEDURE — 2060000000 HC ICU INTERMEDIATE R&B

## 2021-09-07 PROCEDURE — 6360000002 HC RX W HCPCS: Performed by: FAMILY MEDICINE

## 2021-09-07 PROCEDURE — 2580000003 HC RX 258: Performed by: INTERNAL MEDICINE

## 2021-09-07 RX ORDER — AMLODIPINE BESYLATE 5 MG/1
5 TABLET ORAL DAILY
Qty: 30 TABLET | Refills: 3 | Status: SHIPPED | OUTPATIENT
Start: 2021-09-08

## 2021-09-07 RX ORDER — ZOLPIDEM TARTRATE 5 MG/1
2.5 TABLET ORAL NIGHTLY PRN
Qty: 5 TABLET | Refills: 0 | Status: SHIPPED | OUTPATIENT
Start: 2021-09-07 | End: 2021-09-17

## 2021-09-07 RX ORDER — CARBIDOPA/LEVODOPA 25MG-250MG
1 TABLET ORAL 4 TIMES DAILY
Qty: 90 TABLET | Refills: 3 | Status: SHIPPED | OUTPATIENT
Start: 2021-09-07

## 2021-09-07 RX ORDER — POTASSIUM CHLORIDE 20 MEQ/1
40 TABLET, EXTENDED RELEASE ORAL ONCE
Status: COMPLETED | OUTPATIENT
Start: 2021-09-07 | End: 2021-09-07

## 2021-09-07 RX ADMIN — CARBIDOPA AND LEVODOPA 1 TABLET: 25; 250 TABLET ORAL at 17:00

## 2021-09-07 RX ADMIN — CARBIDOPA AND LEVODOPA 1 TABLET: 25; 250 TABLET ORAL at 14:00

## 2021-09-07 RX ADMIN — FERROUS SULFATE TAB 325 MG (65 MG ELEMENTAL FE) 325 MG: 325 (65 FE) TAB at 09:55

## 2021-09-07 RX ADMIN — MONTELUKAST SODIUM 10 MG: 10 TABLET, FILM COATED ORAL at 20:25

## 2021-09-07 RX ADMIN — POTASSIUM CHLORIDE 40 MEQ: 1500 TABLET, EXTENDED RELEASE ORAL at 20:27

## 2021-09-07 RX ADMIN — DONEPEZIL HYDROCHLORIDE 10 MG: 10 TABLET, FILM COATED ORAL at 20:18

## 2021-09-07 RX ADMIN — CARBIDOPA AND LEVODOPA 1 TABLET: 25; 250 TABLET ORAL at 06:02

## 2021-09-07 RX ADMIN — BUMETANIDE 1 MG: 1 TABLET ORAL at 17:00

## 2021-09-07 RX ADMIN — AMLODIPINE BESYLATE 5 MG: 5 TABLET ORAL at 09:55

## 2021-09-07 RX ADMIN — CARBIDOPA AND LEVODOPA 1 TABLET: 25; 250 TABLET ORAL at 09:55

## 2021-09-07 RX ADMIN — TRAMADOL HYDROCHLORIDE 50 MG: 50 TABLET, FILM COATED ORAL at 20:25

## 2021-09-07 RX ADMIN — DOCUSATE SODIUM 200 MG: 100 CAPSULE ORAL at 09:55

## 2021-09-07 RX ADMIN — CALCIUM CARBONATE-VITAMIN D TAB 500 MG-200 UNIT 1 TABLET: 500-200 TAB at 09:55

## 2021-09-07 RX ADMIN — ENOXAPARIN SODIUM 40 MG: 40 INJECTION SUBCUTANEOUS at 09:56

## 2021-09-07 RX ADMIN — ASPIRIN 325 MG: 325 TABLET, COATED ORAL at 09:55

## 2021-09-07 RX ADMIN — Medication 10 ML: at 20:26

## 2021-09-07 RX ADMIN — WATER 1000 MG: 1 INJECTION INTRAMUSCULAR; INTRAVENOUS; SUBCUTANEOUS at 20:25

## 2021-09-07 RX ADMIN — Medication 10 ML: at 09:55

## 2021-09-07 RX ADMIN — LEVOTHYROXINE SODIUM 75 MCG: 75 TABLET ORAL at 06:02

## 2021-09-07 ASSESSMENT — PAIN SCALES - PAIN ASSESSMENT IN ADVANCED DEMENTIA (PAINAD)
BODYLANGUAGE: 0
FACIALEXPRESSION: 0
CONSOLABILITY: 0
BREATHING: 0
TOTALSCORE: 0
NEGVOCALIZATION: 0

## 2021-09-07 ASSESSMENT — PAIN SCALES - GENERAL
PAINLEVEL_OUTOF10: 0

## 2021-09-07 NOTE — CARE COORDINATION
P Quality Flow/Interdisciplinary Rounds Progress Note        Quality Flow Rounds held on September 7, 2021    Disciplines Attending:  Bedside Nurse, ,  and Nursing Unit Leadership    Flora Wilkins was admitted on 8/30/2021 12:06 PM    Anticipated Discharge Date:  Expected Discharge Date: 09/02/21    Disposition:    Sriram Score:  Sriram Scale Score: 10    Readmission Risk              Risk of Unplanned Readmission:  24           Discussed patient goal for the day, patient clinical progression, and barriers to discharge. The following Goal(s) of the Day/Commitment(s) have been identified:  Discharge pending pre-cert.       Jorene Merlin, RN  September 7, 2021

## 2021-09-07 NOTE — PROGRESS NOTES
Physical Therapy  Facility/Department: [facility] MED SURG  Daily Treatment Note  NAME: Russell Napier  : 1930  MRN: [id number]    Date of Service: 2021      Patient Diagnosis(es): The primary encounter diagnosis was Acute metabolic encephalopathy. Diagnoses of Hypothyroidism, unspecified type, Acute respiratory failure with hypoxia ([address] and Closed fracture of neck of right femur with routine healing, subsequent encounter were also pertinent to this visit. has a past medical history of High cholesterol, Hypertension, Hypothyroidism, Parkinson disease ([address] and RBBB. has a past surgical history that includes Foot surgery (); shoulder surgery (Right); hernia repair (Left); Tonsillectomy and Adenoidectomy; Foot surgery; hip surgery (Left, 2021); Hip fracture surgery (Right, 3/3/2021); and Insert Midline Catheter (3/18/2021). Evaluating Therapist: Dane Vaughn PT      Referring Provider:  LORIE Garcia - CNP     PT order : PT eval and treat      Room #:  607   DIAGNOSIS:  Acute resp failure. Additional Pertinent History: presented form NH with AMS  PRECAUTIONS:  Falls      Social:  Pt admitted from [facility]  Prior to [address]: pt unable to report       Initial Evaluation  Date: 2021  Treatment     21 Short Term/ Long Term   Goals   Was pt agreeable to Eval/treatment?  yes   yes     Does pt have pain?  none reported  None reported      Bed Mobility  Rolling: NT   Supine to sit:  Dep assist   Sit to supine:  NT   Scooting:  Dep assist  Rolling:  NT  Supine to sit:  Dep A   Sit to  supine:  Dep A  Scooting:  Dep A  mod assist    Transfers Sit to stand:  Max assist x 2   Stand to sit:  Max assist x2   Stand pivot:  Max assist x 2  NT.  See comments.   mod assist    Ambulation   NT, pt unable   pt unable  20  feet with  ww  with  Mod assist    LE ROM  AAROM WFL        LE strength  3-/ 5         AM- PAC RAW score                Pt is awake, agreed to rx, was able to follow instruction intermittently . O x 1   Balance: poor sitting EOB - required dep assist to maintain sitting      Pt performed therapeutic exercise of the following: NT     Patient education  Pt was educated on fall risk      Patient response to education:   Pt verbalized understanding Pt demonstrated skill Pt requires further education in this area   no no yes      ASSESSMENT:   Comments: Pt seen at request of case management. Pt found in bed, agreed to rx. Pt dependently assisted to the EOB, sat EOB for short period of time with dep A for balance. Noted intermittent body wide tremors throughout session.   Treatment: Mobility as above.     Pt was left in bed with call light in reach     Chair/bed alarm: bed alarm active     Time in 1323   Time out 1340    Total Treatment Time 15  minutes   CPT codes:      Therapeutic activities 06716 15 minutes   Therapeutic exercises 28321 0 minutes         Pt is making no  progress toward established Physical Therapy goals  Continue with physical therapy current plan of care.     Alessandra Quiles 41 Number: PT [phone]

## 2021-09-07 NOTE — CARE COORDINATION
Social work / Discharge Planning:       Social work spoke to liaison from [address] [place] [address] and precert has been submitted. Will need to wait for approval for discharge.    Electronically signed by GERMÁN Frank on 9/7/2021 at 2:13 PM

## 2021-09-07 NOTE — PROGRESS NOTES
Occupational Therapy  OT BEDSIDE TREATMENT NOTE      Date:2021  Patient Name: Yony Will  MRN: [id number]  : 1930  Room: [address] [place] [address]      Evaluating OT: Christian Thorne OTR/L   [id number]       Referring Provider:LORIE Carranza CNP    Specific Provider Orders/Date:OT eval and treat 2021       Diagnosis: Acute resp failure with hypoxia      Pertinent Medical History: Parkinson's disease, HTN, chronic back pain,      hip surgery 2021    Precautions:  Fall Risk, alarm, contact isolation      Assessment of current deficits    [x]? ? Functional mobility            [x]? ?ADLs           [x]? ? Strength                  [x]? ? Cognition    [x]? ? Functional transfers          [x]? ? IADLs         [x]? ? Safety Awareness   [x]? ?Endurance    []? ? Fine Coordination                         [x]? ? Balance      []? ? Vision/perception   []? ? Sensation      []? ?Gross Motor Coordination             []? ? ROM           []? ? Delirium                   []? ? Motor Control      OT PLAN OF CARE   OT POC based on physician orders, patient diagnosis and results of clinical assessment     Frequency/Duration  2-4 days/wk for 2 weeks PRN   Specific OT Treatment Interventions to include:   ADL retraining/adapted techniques and AE recommendations to increase functional independence within precautions                    Energy conservation techniques to improve tolerance for selfcare routine   Functional transfer/mobility training/DME recommendations for increased independence, safety and fall prevention         Patient/family education to increase safety and functional independence             Environmental modifications for safe mobility and completion of ADLs                             Therapeutic activity to improve functional performance during ADLs.                                          Therapeutic exercise to improve tolerance and functional strength for ADLs    Balance retraining/tolerance tasks for facilitation of postural control with dynamic challenges during ADLs .       Positioning to improve functional independence  Cognitive retraining ex's to improve problem solving skills & safe participation in ADLs/IADLs        Recommended Adaptive Equipment: TBD      SOCIAL: admitted from SNF.  PLOF? ??        Pain Level: no c/o pain   Cognition: A&O:1/4- to person only               Functional Assessment:  AM-PAC Daily Activity Raw Score: 11/24    Initial Eval Status  Date: 8/31/21 Treatment Status  Date:9/7/21 STGs = LTGs  Time frame: 10-14 days   Feeding Min A   Supervision    Grooming Mod A,seated   Decrease decrease standing balance   Min A    UB Dressing Mod A  Don/[facility] gown Max A  To manage gown  Min A   LB Dressing Max A/Dependent  Dependent  To don B socks  Mod A    Bathing Max A    Mod A    Toileting Dependent  Dependent  Bed level      Bed Mobility  Dependent   Supine <> sit  Supine<> sit: Dep A Mod a    Functional Transfers Max A x2  Sit-stand from bed   Posterior lean   Mod A    Functional Mobility Patient stood next to bed only   Decrease balance  Unable to initiate steps    Mod A  with good tolerance    Balance Sitting:     Static:  Esvin- EOB     Dynamic:Max A   Standing: Max A x2 Sitting: Pt unable to achieve full seated position due to strong posterior lean SBA-sitting   Mod A - standing    Activity Tolerance Fair - with light activity  Poor Fair+ with ADL activity    Visual/  Perceptual Glasses: reading                            Treatment: Upon arrival pt was supine in bed. Pt able to actively lift B LE towards EOB but when assisted with trunk flexion pt began to bring B LE back into bed and exhibit strong posterior lean. Pt confused during session with difficulty following commands. Pt incontinent of bowels requiring assistance with hygiene at bed level. At end of session pt was supine in bed with alarm on, all lines and tubes intact and call light within reach.      Education: Benefits of EOB activity, balance training and safety training all to improve independence with ADLs. · Pt has made good progress towards set goals.        Treatment Charges: Mins Units   Ther Ex  99118     Manual Therapy [address] [phone]   ADL/Home Mgt 06381 15 1   Neuro Re-ed 11510     Group Therapy      Orthotic manage/training  00449     Non-Billable Time       Time In: 9:07  Time Out: 9:31  Total Time: [address] [facility] JUÁREZ/L [id number]

## 2021-09-07 NOTE — CARE COORDINATION
Social work / Discharge Planning:       COVID NEGATIVE 8/30/21. Discharge plan is for [facility]. Facility is waiting for updated PT note for precert submission. N-48, [id number] and transport form completed.   Electronically signed by GERMÁN Bill on 9/7/2021 at 10:34 AM

## 2021-09-08 VITALS
RESPIRATION RATE: 18 BRPM | BODY MASS INDEX: 27.16 KG/M2 | HEIGHT: 66 IN | DIASTOLIC BLOOD PRESSURE: 57 MMHG | TEMPERATURE: 98.2 F | WEIGHT: 169 LBS | OXYGEN SATURATION: 92 % | SYSTOLIC BLOOD PRESSURE: 112 MMHG | HEART RATE: 61 BPM

## 2021-09-08 LAB
ANION GAP SERPL CALCULATED.3IONS-SCNC: 14 MMOL/L (ref 7–16)
BASOPHILS ABSOLUTE: 0.06 E9/L (ref 0–0.2)
BASOPHILS RELATIVE PERCENT: 0.5 % (ref 0–2)
BUN BLDV-MCNC: 24 MG/DL (ref 6–23)
CALCIUM SERPL-MCNC: 9 MG/DL (ref 8.6–10.2)
CHLORIDE BLD-SCNC: 99 MMOL/L (ref 98–107)
CO2: 25 MMOL/L (ref 22–29)
CREAT SERPL-MCNC: 0.9 MG/DL (ref 0.7–1.2)
EOSINOPHILS ABSOLUTE: 0.22 E9/L (ref 0.05–0.5)
EOSINOPHILS RELATIVE PERCENT: 1.7 % (ref 0–6)
GFR AFRICAN AMERICAN: >60
GFR NON-AFRICAN AMERICAN: >60 ML/MIN/1.73
GLUCOSE BLD-MCNC: 118 MG/DL (ref 74–99)
HCT VFR BLD CALC: 34.6 % (ref 37–54)
HEMOGLOBIN: 11.4 G/DL (ref 12.5–16.5)
IMMATURE GRANULOCYTES #: 0.07 E9/L
IMMATURE GRANULOCYTES %: 0.6 % (ref 0–5)
LYMPHOCYTES ABSOLUTE: 1.43 E9/L (ref 1.5–4)
LYMPHOCYTES RELATIVE PERCENT: 11.3 % (ref 20–42)
MCH RBC QN AUTO: 30.9 PG (ref 26–35)
MCHC RBC AUTO-ENTMCNC: 32.9 % (ref 32–34.5)
MCV RBC AUTO: 93.8 FL (ref 80–99.9)
MONOCYTES ABSOLUTE: 1.43 E9/L (ref 0.1–0.95)
MONOCYTES RELATIVE PERCENT: 11.3 % (ref 2–12)
NEUTROPHILS ABSOLUTE: 9.4 E9/L (ref 1.8–7.3)
NEUTROPHILS RELATIVE PERCENT: 74.6 % (ref 43–80)
PDW BLD-RTO: 12.7 FL (ref 11.5–15)
PLATELET # BLD: 294 E9/L (ref 130–450)
PMV BLD AUTO: 11.9 FL (ref 7–12)
POTASSIUM SERPL-SCNC: 3.2 MMOL/L (ref 3.5–5)
RBC # BLD: 3.69 E12/L (ref 3.8–5.8)
SODIUM BLD-SCNC: 138 MMOL/L (ref 132–146)
WBC # BLD: 12.6 E9/L (ref 4.5–11.5)

## 2021-09-08 PROCEDURE — 6370000000 HC RX 637 (ALT 250 FOR IP): Performed by: NURSE PRACTITIONER

## 2021-09-08 PROCEDURE — 2700000000 HC OXYGEN THERAPY PER DAY

## 2021-09-08 PROCEDURE — 6370000000 HC RX 637 (ALT 250 FOR IP): Performed by: PSYCHIATRY & NEUROLOGY

## 2021-09-08 PROCEDURE — 6370000000 HC RX 637 (ALT 250 FOR IP): Performed by: INTERNAL MEDICINE

## 2021-09-08 PROCEDURE — 6360000002 HC RX W HCPCS: Performed by: FAMILY MEDICINE

## 2021-09-08 PROCEDURE — 6370000000 HC RX 637 (ALT 250 FOR IP): Performed by: FAMILY MEDICINE

## 2021-09-08 PROCEDURE — 2580000003 HC RX 258: Performed by: FAMILY MEDICINE

## 2021-09-08 PROCEDURE — 36415 COLL VENOUS BLD VENIPUNCTURE: CPT

## 2021-09-08 PROCEDURE — 80048 BASIC METABOLIC PNL TOTAL CA: CPT

## 2021-09-08 PROCEDURE — 85025 COMPLETE CBC W/AUTO DIFF WBC: CPT

## 2021-09-08 RX ADMIN — CARBIDOPA AND LEVODOPA 1 TABLET: 25; 250 TABLET ORAL at 15:01

## 2021-09-08 RX ADMIN — CARBIDOPA AND LEVODOPA 1 TABLET: 25; 250 TABLET ORAL at 06:36

## 2021-09-08 RX ADMIN — DOCUSATE SODIUM 200 MG: 100 CAPSULE ORAL at 09:44

## 2021-09-08 RX ADMIN — ASPIRIN 325 MG: 325 TABLET, COATED ORAL at 09:51

## 2021-09-08 RX ADMIN — CALCIUM CARBONATE-VITAMIN D TAB 500 MG-200 UNIT 1 TABLET: 500-200 TAB at 09:44

## 2021-09-08 RX ADMIN — BUMETANIDE 1 MG: 1 TABLET ORAL at 09:44

## 2021-09-08 RX ADMIN — FERROUS SULFATE TAB 325 MG (65 MG ELEMENTAL FE) 325 MG: 325 (65 FE) TAB at 09:44

## 2021-09-08 RX ADMIN — ENOXAPARIN SODIUM 40 MG: 40 INJECTION SUBCUTANEOUS at 09:44

## 2021-09-08 RX ADMIN — DEXTROSE MONOHYDRATE: 50 INJECTION, SOLUTION INTRAVENOUS at 02:40

## 2021-09-08 RX ADMIN — AMLODIPINE BESYLATE 5 MG: 5 TABLET ORAL at 09:44

## 2021-09-08 RX ADMIN — CARBIDOPA AND LEVODOPA 1 TABLET: 25; 250 TABLET ORAL at 09:44

## 2021-09-08 RX ADMIN — LEVOTHYROXINE SODIUM 75 MCG: 75 TABLET ORAL at 06:37

## 2021-09-08 ASSESSMENT — PAIN SCALES - PAIN ASSESSMENT IN ADVANCED DEMENTIA (PAINAD)
FACIALEXPRESSION: 0
BREATHING: 0
CONSOLABILITY: 0
TOTALSCORE: 0
BODYLANGUAGE: 0
NEGVOCALIZATION: 0

## 2021-09-08 ASSESSMENT — PAIN SCALES - GENERAL
PAINLEVEL_OUTOF10: 0
PAINLEVEL_OUTOF10: 0

## 2021-09-08 NOTE — PROGRESS NOTES
PCP    Hypertension-  add low-dose Norvasc for better control-optimally controlled currently    UTI  -AM labs pending, afebrile  -Rocephin 1 gm every 24 hours continued pending culture results-Corynebacterium  Continue Rocephin until discharge  -Check chest x-ray and pro Abdiel -only minimally elevated, chest x-ray pending  Check am labs priro to dc     Medication for other comorbidities continue as appropriate dose adjustment as necessary.     DVT prophylaxis  PT OT-very poor impact scores 6/24 then 7/24  Discharge planning- Pre cert pending.-Hopeful for discharge tomorrow 4/1/1336 once precert obtained   Have been discussing with wife daily - she has multiple concerns daily , overall she admits he is much better     Zachary MD Kofi  8:24 PM  9/7/2021

## 2021-09-08 NOTE — CARE COORDINATION
Social work / Discharge Planning:         Liaison for [address] updated social work that they can accept patient today prior to [employer].   RN updated and set up transport for 3:30pm.     Social work updated facility liaison. RN will update wife at bedside.    Electronically signed by GERMÁN Chaudhari on 9/8/2021 at 11:07 AM

## 2021-09-08 NOTE — DISCHARGE SUMMARY
Physician Discharge Summary     Patient ID:  Quentin Strange  [id number]  29 y.o.  8/12/1930    Admit date: 8/30/2021    Discharge date and time: 9/8/2021    Admission Diagnoses:   Patient Active Problem List   Diagnosis    Hypertension    Parkinson disease ([address]    Hypothyroidism    Stage 3 chronic kidney disease    Venous stasis dermatitis of both lower extremities    Closed fracture of neck of right femur (HCC)    Urinary tract infection    Acute metabolic encephalopathy       Discharge Diagnoses: Seizure    Consults: neurology    Procedures: None    Hospital Course: The patient is a 80 y.o. male of [employer] who presents with seizure and altered mental status. Acute change in mental status /?  Seizure  -History of Parkinson disease  -Work-up essentially negative-head CT unremarkable, no evidence of metabolic derangements,/infectious etiology  -Patient's wife states she sees some improvement in his mental status however still not back to baseline  -Follows with neurology in [facility], family requesting neurology evaluation here  -Neurology consulted- EEG negative for seizure activity, +mild encephalopathy. - Does not want to increase his parkinson's medications at this time. Hypothyroidism  -TSH 8.29, previously 7.0 in January  -Increase Synthroid to 75 mcg daily-repeat TSH level in 6 weeks with PCP     Hypertension-  add low-dose Norvasc for better control-optimally controlled currently     UTI  -AM labs pending, afebrile  -Rocephin 1 gm every 24 hours continued pending culture results-Corynebacterium  Continue Rocephin until discharge  -Check chest x-ray and pro Abdiel -only minimally elevated, chest x-ray pending  Check am labs priro to dc     Patient was discharged to facility in stable condition with medications listed below. Patient was instructed to follow up with all consults and PCP.       Recent Labs     09/06/21  1605 09/08/21  0521   WBC 13.1* 12.6*   HGB 11.4* 11.4*   HCT 34.8* 34.6*    294       Recent Labs     09/06/21  1605 09/08/21  0521    138   K 3.3* 3.2*   CL 97* 99   CO2 28 25   BUN 28* 24*   CREATININE 0.8 0.9   CALCIUM 8.4* 9.0       CT Head WO Contrast    Result Date: 8/30/2021  EXAMINATION: CT OF THE HEAD WITHOUT CONTRAST  8/30/2021 1:30 pm TECHNIQUE: CT of the head was performed without the administration of intravenous contrast. Dose modulation, iterative reconstruction, and/or weight based adjustment of the mA/kV was utilized to reduce the radiation dose to as low as reasonably achievable. COMPARISON: CT head without contrast, 03/14/2021 HISTORY: ORDERING SYSTEM PROVIDED HISTORY: New onset seizure, altered mental status TECHNOLOGIST PROVIDED HISTORY: Reason for exam:->New onset seizure, altered mental status Has a \"code stroke\" or \"stroke alert\" been called? ->No Decision Support Exception - unselect if not a suspected or confirmed emergency medical condition->Emergency Medical Condition (MA) FINDINGS: BRAIN/VENTRICLES: No mass effect, edema or hemorrhage is seen. Mild-to-moderate volume1 loss is seen in the brain with mild chronic microvascular ischemic changes. No hydrocephalus or extra-axial fluid is seen. ORBITS: A prosthetic lens is seen in the left globe. The orbits are otherwise unremarkable. SINUSES: Minimal mucosal thickening of the base of the right maxillary sinus. The remainder of the visualized paranasal sinuses and the mastoids are grossly clear. SOFT TISSUES/SKULL:  No acute abnormality of the visualized skull or soft tissues. No acute intracranial abnormality.      XR CHEST PORTABLE    Result Date: 8/30/2021  EXAMINATION: ONE XRAY VIEW OF THE CHEST 8/30/2021 2:00 pm COMPARISON: 03/14/2021 HISTORY: ORDERING SYSTEM PROVIDED HISTORY: Altered mental status, seizure, concern for aspiration TECHNOLOGIST PROVIDED HISTORY: Reason for exam:->Altered mental status, seizure, concern for aspiration FINDINGS: EKG leads overlie the chest.  Cardiac silhouette is normal in ferrous sulfate 325 (65 Fe) MG tablet  Commonly known as: IRON 325  Take 1 tablet by mouth daily (with breakfast)     hydrOXYzine 25 MG capsule  Commonly known as: VISTARIL     midodrine 2.5 MG tablet  Commonly known as: PROAMATINE     montelukast 10 MG tablet  Commonly known as: SINGULAIR  Take 1 tablet by mouth nightly     polyethylene glycol 17 g Pack packet  Commonly known as: MIRALAX     potassium chloride 20 MEQ packet  Commonly known as: KLOR-CON     traMADol 50 MG tablet  Commonly known as: ULTRAM     traZODone 50 MG tablet  Commonly known as: DESYREL     vitamin D 1.25 MG (91540 UT) Caps capsule  Commonly known as: ERGOCALCIFEROL           * This list has 2 medication(s) that are the same as other medications prescribed for you. Read the directions carefully, and ask your doctor or other care provider to review them with you. STOP taking these medications      carbidopa-levodopa  MG per tablet  Commonly known as: SINEMET  Replaced by: carbidopa-levodopa  MG per tablet     ertapenem 1 GM injection  Commonly known as: INVanz     lidocaine 1 % injection               Where to Get Your Medications        You can get these medications from any pharmacy    Bring a paper prescription for each of these medications  amLODIPine 5 MG tablet  carbidopa-levodopa  MG per tablet  zolpidem 5 MG tablet       Activity: activity as tolerated  Diet: cardiac diet    Pt has been advised to: Follow-up with Yared Lagos DO in 1 week. Follow-up with consultants as recommended by them    Note that over 30 minutes was spent in preparing discharge papers, discussing discharge with patient, medication review, etc.    Signed:  LORIE Cleveland CNP  9/8/2021  12:14 PM  Above note edited to reflect my thoughts     I personally saw, examined and provided care for the patient. Radiographs, labs and medication list were reviewed by me independently.   The case was discussed in detail and plans for care were established. Review of [facility], documentation was conducted and revisions were made as appropriate directly by me. I agree with the above documented exam, problem list, and plan of care.      Deidre Lopez MD  9/8/2021

## 2021-10-04 PROBLEM — N39.0 URINARY TRACT INFECTION: Status: RESOLVED | Noted: 2021-03-14 | Resolved: 2021-10-04

## 2021-11-03 ENCOUNTER — OUTSIDE SERVICES (OUTPATIENT)
Dept: FAMILY MEDICINE CLINIC | Age: 86
End: 2021-11-03
Payer: MEDICARE

## 2021-11-03 DIAGNOSIS — J96.01 ACUTE RESPIRATORY FAILURE WITH HYPOXIA (HCC): ICD-10-CM

## 2021-11-03 DIAGNOSIS — I45.10 RBBB: ICD-10-CM

## 2021-11-03 DIAGNOSIS — G93.41 ACUTE METABOLIC ENCEPHALOPATHY: Primary | ICD-10-CM

## 2021-11-03 DIAGNOSIS — G20 PARKINSON DISEASE (HCC): ICD-10-CM

## 2021-11-03 DIAGNOSIS — I87.2 CHRONIC VENOUS INSUFFICIENCY: ICD-10-CM

## 2021-11-03 DIAGNOSIS — G20 DEMENTIA DUE TO PARKINSON'S DISEASE WITHOUT BEHAVIORAL DISTURBANCE (HCC): ICD-10-CM

## 2021-11-03 DIAGNOSIS — G40.909 NONINTRACTABLE EPILEPSY WITHOUT STATUS EPILEPTICUS, UNSPECIFIED EPILEPSY TYPE (HCC): ICD-10-CM

## 2021-11-03 DIAGNOSIS — F02.80 DEMENTIA DUE TO PARKINSON'S DISEASE WITHOUT BEHAVIORAL DISTURBANCE (HCC): ICD-10-CM

## 2021-11-03 DIAGNOSIS — I10 PRIMARY HYPERTENSION: ICD-10-CM

## 2021-11-03 DIAGNOSIS — N18.30 STAGE 3 CHRONIC KIDNEY DISEASE, UNSPECIFIED WHETHER STAGE 3A OR 3B CKD (HCC): ICD-10-CM

## 2021-11-03 DIAGNOSIS — R13.12 OROPHARYNGEAL DYSPHAGIA: ICD-10-CM

## 2021-11-03 DIAGNOSIS — K57.30 DIVERTICULOSIS OF COLON: ICD-10-CM

## 2021-11-03 DIAGNOSIS — R26.2 DIFFICULTY WALKING: ICD-10-CM

## 2021-11-03 DIAGNOSIS — K59.00 CONSTIPATION, UNSPECIFIED CONSTIPATION TYPE: ICD-10-CM

## 2021-11-03 DIAGNOSIS — E44.1 MILD PROTEIN-CALORIE MALNUTRITION (HCC): ICD-10-CM

## 2021-11-03 DIAGNOSIS — M62.59 MUSCLE WASTING AND ATROPHY, NOT ELSEWHERE CLASSIFIED, MULTIPLE SITES: ICD-10-CM

## 2021-11-03 DIAGNOSIS — D64.9 ANEMIA, UNSPECIFIED TYPE: ICD-10-CM

## 2021-11-03 DIAGNOSIS — E03.9 HYPOTHYROIDISM, UNSPECIFIED TYPE: ICD-10-CM

## 2021-11-03 DIAGNOSIS — E78.5 HYPERLIPIDEMIA, UNSPECIFIED HYPERLIPIDEMIA TYPE: ICD-10-CM

## 2021-11-03 NOTE — PROGRESS NOTES
11/3/2021    Lin Carrillo  8/12/1930    This resident is being seen today for return to facility and face to face for physical and occupational therapy evaluation visit. He is a resident who has long-term medical conditions including Parkinson's disease, protein calorie malnutrition, epilepsy, oropharyngeal dysphagia, difficulty walking, respiratory failure, Parkinson's disease, muscle wasting and atrophy, anemia, hypothyroidism, hyperlipidemia, primary hypertension, right bundle branch block, chronic venous insufficiency, diverticulosis of the colon, constipation, and stage III chronic kidney disease. Prisca Márquez is a 80 y.o. male resident who is being seen today for return to [address] [place] [address] and a face-to-face visit for his occupational and physical therapy. It is noted that the patient was hospitalized with metabolic encephalopathy acute respiratory failure with hypoxia was treated with antibiotics and IV fluids oxygen therapy he was then transferred to rehabilitation and then was transferred here to [address] [place] [address] for his assisted living home. He states that he is feeling much better although he continues to be weak from all of this illness. He does have difficulty with ambulation and does need assistance and a walker. He does need assistance with his ADLs and self-care and this is why we have physical and Occupational Therapy seeing him for ADL self-care upper extremity strength and then gait balance lower extremity strength and transfers. He is homebound due to his fall risk and need for assistive device. And diagnoses for home care treatment are G20 Parkinson's and M 62.58 muscle wasting and atrophy. .  Currently at this time he denies any complaints of chest pain or palpitations. Denies any headaches, any sore throat, coughing, or shortness of breath. No nausea, vomiting or diarrhea. He does state that he is moving his bowels but the stools are very hard. No pain in lower extremities. No fever, or chills. No recent falls or syncopal events. He does state that he does not remember his entire hospitalization. He does remember his time in rehabilitation however. Objective     Vital Signs: Stable    Physical examination:Skin is essentially warm and dry. HEENT unremarkable. Neck is supple. Heart regular rate and rhythm. No rubs, gallops or murmurs noted. Lungs are clear to auscultation. No evidence of rhonchi, rales, or wheezing. Abdomen is soft, supple and non-tender. Bowel sounds are noted x4 quadrants. No rigidity, guarding or rebound tenderness. Negative Alvarez's, negative McBurney's, negative Dorian's. Extremities; no true pitting edema. Pulses are adequate. No clubbing  or no cyanosis noted. Neurologically he  is alert and oriented x2. He does have some degree of rigidity with motion of his extremities. There is noted muscle atrophy and weakness upper and lower extremities. There is no paralysis or paresthesias. Diagnoses and all orders for this visit:    Acute metabolic encephalopathy    Parkinson disease ([address]    Mild protein-calorie malnutrition ([address]    Nonintractable epilepsy without status epilepticus, unspecified epilepsy type ([address]    Oropharyngeal dysphagia    Difficulty walking    Acute respiratory failure with hypoxia ([address]    Dementia due to Parkinson's disease without behavioral disturbance ([address]    Muscle wasting and atrophy, not elsewhere classified, multiple sites    Anemia, unspecified type    Hypothyroidism, unspecified type    Hyperlipidemia, unspecified hyperlipidemia type    Primary hypertension    RBBB    Chronic venous insufficiency    Diverticulosis of colon    Constipation, unspecified constipation type    Stage 3 chronic kidney disease, unspecified whether stage 3a or 3b CKD ([address]      I did discuss plan of care with the health care team.  We will be consulting physical and Occupational Therapy for their assistance in managing this case.   I will add senna 1 twice daily for his constipated stools. Encourage oral fluids. Continue with current medications see him on a regular basis thank you    Ximena Sequeira, APRN - CNP        *Note was creating using voice recognition software.   The document was reviewed however grammatical errors may exist.

## 2021-11-05 ENCOUNTER — NURSE ONLY (OUTPATIENT)
Dept: FAMILY MEDICINE CLINIC | Age: 86
End: 2021-11-05
Payer: MEDICARE

## 2021-11-05 DIAGNOSIS — Z23 INFLUENZA VACCINE ADMINISTERED: Primary | ICD-10-CM

## 2021-11-05 PROCEDURE — G0008 ADMIN INFLUENZA VIRUS VAC: HCPCS | Performed by: FAMILY MEDICINE

## 2021-11-05 PROCEDURE — 90694 VACC AIIV4 NO PRSRV 0.5ML IM: CPT | Performed by: FAMILY MEDICINE

## 2021-12-01 ENCOUNTER — OUTSIDE SERVICES (OUTPATIENT)
Dept: FAMILY MEDICINE CLINIC | Age: 86
End: 2021-12-01
Payer: MEDICARE

## 2021-12-01 DIAGNOSIS — D64.9 ANEMIA, UNSPECIFIED TYPE: ICD-10-CM

## 2021-12-01 DIAGNOSIS — I45.10 RBBB: ICD-10-CM

## 2021-12-01 DIAGNOSIS — E78.5 HYPERLIPIDEMIA, UNSPECIFIED HYPERLIPIDEMIA TYPE: ICD-10-CM

## 2021-12-01 DIAGNOSIS — G93.41 ACUTE METABOLIC ENCEPHALOPATHY: ICD-10-CM

## 2021-12-01 DIAGNOSIS — E03.9 HYPOTHYROIDISM, UNSPECIFIED TYPE: ICD-10-CM

## 2021-12-01 DIAGNOSIS — I87.2 CHRONIC VENOUS INSUFFICIENCY: ICD-10-CM

## 2021-12-01 DIAGNOSIS — G20 PARKINSON DISEASE (HCC): ICD-10-CM

## 2021-12-01 DIAGNOSIS — K59.00 CONSTIPATION, UNSPECIFIED CONSTIPATION TYPE: ICD-10-CM

## 2021-12-01 DIAGNOSIS — K57.30 DIVERTICULOSIS OF COLON: ICD-10-CM

## 2021-12-01 DIAGNOSIS — M54.50 CHRONIC BILATERAL LOW BACK PAIN WITHOUT SCIATICA: Primary | ICD-10-CM

## 2021-12-01 DIAGNOSIS — G40.909 NONINTRACTABLE EPILEPSY WITHOUT STATUS EPILEPTICUS, UNSPECIFIED EPILEPSY TYPE (HCC): ICD-10-CM

## 2021-12-01 DIAGNOSIS — N18.30 STAGE 3 CHRONIC KIDNEY DISEASE, UNSPECIFIED WHETHER STAGE 3A OR 3B CKD (HCC): ICD-10-CM

## 2021-12-01 DIAGNOSIS — E44.1 MILD PROTEIN-CALORIE MALNUTRITION (HCC): ICD-10-CM

## 2021-12-01 DIAGNOSIS — S72.001S CLOSED FRACTURE OF NECK OF RIGHT FEMUR, SEQUELA: ICD-10-CM

## 2021-12-01 DIAGNOSIS — M62.59 MUSCLE WASTING AND ATROPHY, NOT ELSEWHERE CLASSIFIED, MULTIPLE SITES: ICD-10-CM

## 2021-12-01 DIAGNOSIS — R13.12 OROPHARYNGEAL DYSPHAGIA: ICD-10-CM

## 2021-12-01 DIAGNOSIS — G89.29 CHRONIC BILATERAL LOW BACK PAIN WITHOUT SCIATICA: Primary | ICD-10-CM

## 2021-12-01 DIAGNOSIS — R26.2 DIFFICULTY WALKING: ICD-10-CM

## 2021-12-01 DIAGNOSIS — I10 PRIMARY HYPERTENSION: ICD-10-CM

## 2021-12-01 NOTE — PROGRESS NOTES
12/1/2021    Yvonneevelyn Oneil  8/12/1930    This resident is being seen today for routine evaluation visit. He is a resident who has long-term medical conditions including epilepsy, history of closed fracture of the neck of the right femur, Parkinson's disease, oropharyngeal dysphagia, diverticulosis of the colon, right bundle branch block, hyperlipidemia, anemia, muscle wasting, difficulty walking, constipation, history of acute metabolic encephalopathy, stage III chronic kidney disease, chronic venous insufficiency, primary hypertension, and hypothyroidism. Dantesheeba Corky He is a 80 y.o. male resident who is being seen today for review of chronic illnesses and complaints of low back pain. Patient does state that he often times wakes up during the morning with low back pain. He did have that this morning and took Tylenol for it. The Tylenol generally does take care of the pain. He states he thinks it is his mattress along with his Parkinson's. He generally does not have a problem with it later in the day. He denies any radiation of the pain into the lower extremities. He has no numbness or tingling to the lower extremities. .  Currently at this time he denies any complaints of chest pain or palpitations. Denies any headaches, any sore throat, coughing, or shortness of breath. No nausea, vomiting, constipation or diarrhea. No pain in lower extremities. No fever, or chills. No recent falls or syncopal events. He is generally dependent upon the wheelchair. Objective     Vital Signs: Stable    Physical examination:Skin is essentially warm and dry. HEENT unremarkable. Neck is supple. Heart regular rate and rhythm. No rubs, gallops or murmurs noted. Lungs are clear to auscultation. No evidence of rhonchi, rales, or wheezing. Abdomen is soft, supple and non-tender. Bowel sounds are noted x4 quadrants. No rigidity, guarding or rebound tenderness. Negative Alvarez's, negative McBurney's, negative Dorian's.   Extremities; no true pitting edema. Pulses are adequate. No clubbing  or no cyanosis noted. Neurologically he  is alert and oriented x3. Lower extremities are somewhat weak with some degree of muscle atrophy. Diagnoses and all orders for this visit:    Chronic bilateral low back pain without sciatica    Mild protein-calorie malnutrition ([address]    Nonintractable epilepsy without status epilepticus, unspecified epilepsy type ([address]    Closed fracture of neck of right femur, sequela    Parkinson disease ([address]    Oropharyngeal dysphagia    Diverticulosis of colon    RBBB    Hyperlipidemia, unspecified hyperlipidemia type    Anemia, unspecified type    Muscle wasting and atrophy, not elsewhere classified, multiple sites    Difficulty walking    Constipation, unspecified constipation type    Acute metabolic encephalopathy    Stage 3 chronic kidney disease, unspecified whether stage 3a or 3b CKD ([address]    Chronic venous insufficiency    Primary hypertension    Hypothyroidism, unspecified type    Patient's medications and labs are reviewed. Chronic conditions are overall stable. Continue using Tylenol as an analgesic for the back pain. Patient does request to start back on red yeast rice which he took at home for his cholesterol. He does state he had good success with that so we will restart that. We will continue to monitor his labs. We will see him on a routine basis. Ximena Howell, LORIE - CNP        *Note was creating using voice recognition software.   The document was reviewed however grammatical errors may exist.

## 2021-12-07 DIAGNOSIS — I10 PRIMARY HYPERTENSION: Primary | ICD-10-CM

## 2021-12-07 DIAGNOSIS — G20 PARKINSON DISEASE (HCC): ICD-10-CM

## 2021-12-07 DIAGNOSIS — D64.9 ANEMIA, UNSPECIFIED TYPE: ICD-10-CM

## 2021-12-07 DIAGNOSIS — E78.5 HYPERLIPIDEMIA, UNSPECIFIED HYPERLIPIDEMIA TYPE: ICD-10-CM

## 2021-12-07 DIAGNOSIS — N18.30 STAGE 3 CHRONIC KIDNEY DISEASE, UNSPECIFIED WHETHER STAGE 3A OR 3B CKD (HCC): ICD-10-CM

## 2021-12-07 DIAGNOSIS — E03.9 HYPOTHYROIDISM, UNSPECIFIED TYPE: ICD-10-CM

## 2022-01-01 ENCOUNTER — NURSE ONLY (OUTPATIENT)
Dept: FAMILY MEDICINE CLINIC | Age: 87
End: 2022-01-01
Payer: MEDICARE

## 2022-01-01 DIAGNOSIS — E78.5 HYPERLIPIDEMIA, UNSPECIFIED HYPERLIPIDEMIA TYPE: ICD-10-CM

## 2022-01-01 DIAGNOSIS — Z23 NEED FOR IMMUNIZATION AGAINST INFLUENZA: Primary | ICD-10-CM

## 2022-01-01 DIAGNOSIS — R36.9 DISCHARGE FROM PENIS: Primary | ICD-10-CM

## 2022-01-01 DIAGNOSIS — I10 PRIMARY HYPERTENSION: ICD-10-CM

## 2022-01-01 DIAGNOSIS — R30.0 DYSURIA: ICD-10-CM

## 2022-01-01 DIAGNOSIS — D64.9 ANEMIA, UNSPECIFIED TYPE: ICD-10-CM

## 2022-01-01 DIAGNOSIS — E55.9 VITAMIN D DEFICIENCY: ICD-10-CM

## 2022-01-01 DIAGNOSIS — R36.9 DISCHARGE FROM PENIS: ICD-10-CM

## 2022-01-01 DIAGNOSIS — I10 PRIMARY HYPERTENSION: Primary | ICD-10-CM

## 2022-01-01 DIAGNOSIS — N18.30 STAGE 3 CHRONIC KIDNEY DISEASE, UNSPECIFIED WHETHER STAGE 3A OR 3B CKD (HCC): ICD-10-CM

## 2022-01-01 DIAGNOSIS — E03.9 HYPOTHYROIDISM, UNSPECIFIED TYPE: ICD-10-CM

## 2022-01-01 DIAGNOSIS — R30.9 MICTURITION PAINFUL: ICD-10-CM

## 2022-01-01 DIAGNOSIS — R30.0 DYSURIA: Primary | ICD-10-CM

## 2022-01-01 LAB
ALBUMIN SERPL-MCNC: 3.7 G/DL (ref 3.5–5.2)
ALBUMIN SERPL-MCNC: 3.8 G/DL (ref 3.5–5.2)
ALBUMIN SERPL-MCNC: 4 G/DL (ref 3.5–5.2)
ALP BLD-CCNC: 123 U/L (ref 40–129)
ALP BLD-CCNC: 159 U/L (ref 40–129)
ALP BLD-CCNC: 185 U/L (ref 40–129)
ALT SERPL-CCNC: 10 U/L (ref 0–40)
ALT SERPL-CCNC: 12 U/L (ref 0–40)
ALT SERPL-CCNC: 19 U/L (ref 0–40)
ANION GAP SERPL CALCULATED.3IONS-SCNC: 14 MMOL/L (ref 7–16)
ANION GAP SERPL CALCULATED.3IONS-SCNC: 15 MMOL/L (ref 7–16)
ANION GAP SERPL CALCULATED.3IONS-SCNC: 17 MMOL/L (ref 7–16)
AST SERPL-CCNC: 20 U/L (ref 0–39)
AST SERPL-CCNC: 24 U/L (ref 0–39)
AST SERPL-CCNC: 31 U/L (ref 0–39)
BACTERIA: NORMAL /HPF
BASOPHILS ABSOLUTE: 0.06 E9/L (ref 0–0.2)
BASOPHILS ABSOLUTE: 0.08 E9/L (ref 0–0.2)
BASOPHILS RELATIVE PERCENT: 0.6 % (ref 0–2)
BASOPHILS RELATIVE PERCENT: 0.8 % (ref 0–2)
BILIRUB SERPL-MCNC: 0.7 MG/DL (ref 0–1.2)
BILIRUB SERPL-MCNC: 0.7 MG/DL (ref 0–1.2)
BILIRUB SERPL-MCNC: 0.9 MG/DL (ref 0–1.2)
BILIRUBIN URINE: NEGATIVE
BLOOD, URINE: NEGATIVE
BUN BLDV-MCNC: 23 MG/DL (ref 6–23)
BUN BLDV-MCNC: 24 MG/DL (ref 6–23)
BUN BLDV-MCNC: 29 MG/DL (ref 6–23)
CALCIUM SERPL-MCNC: 9.5 MG/DL (ref 8.6–10.2)
CALCIUM SERPL-MCNC: 9.5 MG/DL (ref 8.6–10.2)
CALCIUM SERPL-MCNC: 9.8 MG/DL (ref 8.6–10.2)
CHLORIDE BLD-SCNC: 101 MMOL/L (ref 98–107)
CHLORIDE BLD-SCNC: 101 MMOL/L (ref 98–107)
CHLORIDE BLD-SCNC: 102 MMOL/L (ref 98–107)
CHOLESTEROL, TOTAL: 148 MG/DL (ref 0–199)
CLARITY: CLEAR
CO2: 24 MMOL/L (ref 22–29)
CO2: 26 MMOL/L (ref 22–29)
CO2: 27 MMOL/L (ref 22–29)
COLOR: YELLOW
CREAT SERPL-MCNC: 1.2 MG/DL (ref 0.7–1.2)
CREAT SERPL-MCNC: 1.3 MG/DL (ref 0.7–1.2)
CREAT SERPL-MCNC: 1.4 MG/DL (ref 0.7–1.2)
EOSINOPHILS ABSOLUTE: 0.27 E9/L (ref 0.05–0.5)
EOSINOPHILS ABSOLUTE: 0.31 E9/L (ref 0.05–0.5)
EOSINOPHILS RELATIVE PERCENT: 2.9 % (ref 0–6)
EOSINOPHILS RELATIVE PERCENT: 3.3 % (ref 0–6)
GENITAL CULTURE, ROUTINE: NORMAL
GFR AFRICAN AMERICAN: 57
GFR AFRICAN AMERICAN: >60
GFR NON-AFRICAN AMERICAN: 47 ML/MIN/1.73
GFR NON-AFRICAN AMERICAN: 57 ML/MIN/1.73
GFR SERPL CREATININE-BSD FRML MDRD: 51 ML/MIN/1.73
GLUCOSE BLD-MCNC: 107 MG/DL (ref 74–99)
GLUCOSE BLD-MCNC: 78 MG/DL (ref 74–99)
GLUCOSE BLD-MCNC: 99 MG/DL (ref 74–99)
GLUCOSE URINE: NEGATIVE MG/DL
HCT VFR BLD CALC: 39.8 % (ref 37–54)
HCT VFR BLD CALC: 41.9 % (ref 37–54)
HDLC SERPL-MCNC: 65 MG/DL
HEMOGLOBIN: 12.5 G/DL (ref 12.5–16.5)
HEMOGLOBIN: 13.1 G/DL (ref 12.5–16.5)
IMMATURE GRANULOCYTES #: 0.03 E9/L
IMMATURE GRANULOCYTES #: 0.05 E9/L
IMMATURE GRANULOCYTES %: 0.3 % (ref 0–5)
IMMATURE GRANULOCYTES %: 0.5 % (ref 0–5)
KETONES, URINE: ABNORMAL MG/DL
LDL CHOLESTEROL CALCULATED: 72 MG/DL (ref 0–99)
LEUKOCYTE ESTERASE, URINE: ABNORMAL
LYMPHOCYTES ABSOLUTE: 0.78 E9/L (ref 1.5–4)
LYMPHOCYTES ABSOLUTE: 1.2 E9/L (ref 1.5–4)
LYMPHOCYTES RELATIVE PERCENT: 12.6 % (ref 20–42)
LYMPHOCYTES RELATIVE PERCENT: 8.4 % (ref 20–42)
MCH RBC QN AUTO: 32 PG (ref 26–35)
MCH RBC QN AUTO: 32.9 PG (ref 26–35)
MCHC RBC AUTO-ENTMCNC: 31.3 % (ref 32–34.5)
MCHC RBC AUTO-ENTMCNC: 31.4 % (ref 32–34.5)
MCV RBC AUTO: 102.4 FL (ref 80–99.9)
MCV RBC AUTO: 104.7 FL (ref 80–99.9)
MONOCYTES ABSOLUTE: 0.57 E9/L (ref 0.1–0.95)
MONOCYTES ABSOLUTE: 0.62 E9/L (ref 0.1–0.95)
MONOCYTES RELATIVE PERCENT: 6.1 % (ref 2–12)
MONOCYTES RELATIVE PERCENT: 6.5 % (ref 2–12)
NEUTROPHILS ABSOLUTE: 7.25 E9/L (ref 1.8–7.3)
NEUTROPHILS ABSOLUTE: 7.62 E9/L (ref 1.8–7.3)
NEUTROPHILS RELATIVE PERCENT: 76.3 % (ref 43–80)
NEUTROPHILS RELATIVE PERCENT: 81.7 % (ref 43–80)
NITRITE, URINE: NEGATIVE
PDW BLD-RTO: 16.4 FL (ref 11.5–15)
PDW BLD-RTO: 17.2 FL (ref 11.5–15)
PH UA: 6.5 (ref 5–9)
PLATELET # BLD: 182 E9/L (ref 130–450)
PLATELET # BLD: 243 E9/L (ref 130–450)
PMV BLD AUTO: 11.5 FL (ref 7–12)
PMV BLD AUTO: 11.8 FL (ref 7–12)
POTASSIUM SERPL-SCNC: 3.8 MMOL/L (ref 3.5–5)
POTASSIUM SERPL-SCNC: 4 MMOL/L (ref 3.5–5)
POTASSIUM SERPL-SCNC: 4.5 MMOL/L (ref 3.5–5)
PROTEIN UA: 30 MG/DL
RBC # BLD: 3.8 E12/L (ref 3.8–5.8)
RBC # BLD: 4.09 E12/L (ref 3.8–5.8)
RBC UA: NORMAL /HPF (ref 0–2)
SODIUM BLD-SCNC: 142 MMOL/L (ref 132–146)
SODIUM BLD-SCNC: 142 MMOL/L (ref 132–146)
SODIUM BLD-SCNC: 143 MMOL/L (ref 132–146)
SPECIFIC GRAVITY UA: 1.02 (ref 1–1.03)
T4 FREE: 1.45 NG/DL (ref 0.93–1.7)
T4 FREE: 1.48 NG/DL (ref 0.93–1.7)
T4 FREE: 1.64 NG/DL (ref 0.93–1.7)
TOTAL PROTEIN: 6.9 G/DL (ref 6.4–8.3)
TOTAL PROTEIN: 7.6 G/DL (ref 6.4–8.3)
TOTAL PROTEIN: 7.9 G/DL (ref 6.4–8.3)
TRIGL SERPL-MCNC: 55 MG/DL (ref 0–149)
TSH SERPL DL<=0.05 MIU/L-ACNC: 12.39 UIU/ML (ref 0.27–4.2)
TSH SERPL DL<=0.05 MIU/L-ACNC: 14.38 UIU/ML (ref 0.27–4.2)
TSH SERPL DL<=0.05 MIU/L-ACNC: 7.73 UIU/ML (ref 0.27–4.2)
URINE CULTURE, ROUTINE: NORMAL
UROBILINOGEN, URINE: 0.2 E.U./DL
VITAMIN D 25-HYDROXY: 69 NG/ML (ref 30–100)
VLDLC SERPL CALC-MCNC: 11 MG/DL
WBC # BLD: 9.3 E9/L (ref 4.5–11.5)
WBC # BLD: 9.5 E9/L (ref 4.5–11.5)
WBC UA: NORMAL /HPF (ref 0–5)

## 2022-01-01 PROCEDURE — 90694 VACC AIIV4 NO PRSRV 0.5ML IM: CPT | Performed by: FAMILY MEDICINE

## 2022-01-01 PROCEDURE — 99999 PR OFFICE/OUTPT VISIT,PROCEDURE ONLY: CPT | Performed by: FAMILY MEDICINE

## 2022-01-01 PROCEDURE — G0008 ADMIN INFLUENZA VIRUS VAC: HCPCS | Performed by: FAMILY MEDICINE

## 2022-01-01 RX ORDER — CEFDINIR 300 MG/1
300 CAPSULE ORAL 2 TIMES DAILY
Qty: 20 CAPSULE | Refills: 0 | Status: SHIPPED | OUTPATIENT
Start: 2022-01-01 | End: 2022-12-05

## 2022-01-01 RX ORDER — LEVOFLOXACIN 500 MG/1
500 TABLET, FILM COATED ORAL DAILY
Qty: 10 TABLET | Refills: 0 | Status: SHIPPED | OUTPATIENT
Start: 2022-01-01 | End: 2022-12-05

## 2022-01-01 RX ORDER — LEVOTHYROXINE SODIUM 0.15 MG/1
150 TABLET ORAL DAILY
Qty: 30 TABLET | Refills: 5 | Status: SHIPPED | OUTPATIENT
Start: 2022-01-01

## 2022-01-11 DIAGNOSIS — R30.0 DYSURIA: ICD-10-CM

## 2022-01-11 DIAGNOSIS — R30.9 MICTURITION PAINFUL: Primary | ICD-10-CM

## 2022-01-11 LAB
BACTERIA: ABNORMAL /HPF
BILIRUBIN URINE: NEGATIVE
BLOOD, URINE: NEGATIVE
CLARITY: CLEAR
COLOR: YELLOW
CRYSTALS, UA: ABNORMAL /HPF
GLUCOSE URINE: NEGATIVE MG/DL
KETONES, URINE: ABNORMAL MG/DL
LEUKOCYTE ESTERASE, URINE: ABNORMAL
NITRITE, URINE: NEGATIVE
PH UA: 5.5 (ref 5–9)
PROTEIN UA: ABNORMAL MG/DL
RBC UA: ABNORMAL /HPF (ref 0–2)
SPECIFIC GRAVITY UA: 1.02 (ref 1–1.03)
UROBILINOGEN, URINE: 0.2 E.U./DL
WBC UA: >20 /HPF (ref 0–5)

## 2022-01-14 LAB — URINE CULTURE, ROUTINE: NORMAL

## 2022-01-27 DIAGNOSIS — R30.9 MICTURITION PAINFUL: Primary | ICD-10-CM

## 2022-01-29 LAB — URINE CULTURE, ROUTINE: NORMAL

## 2022-02-09 ENCOUNTER — OUTSIDE SERVICES (OUTPATIENT)
Dept: FAMILY MEDICINE CLINIC | Age: 87
End: 2022-02-09
Payer: MEDICARE

## 2022-02-09 DIAGNOSIS — M62.59 MUSCLE WASTING AND ATROPHY, NOT ELSEWHERE CLASSIFIED, MULTIPLE SITES: ICD-10-CM

## 2022-02-09 DIAGNOSIS — K59.00 CONSTIPATION, UNSPECIFIED CONSTIPATION TYPE: ICD-10-CM

## 2022-02-09 DIAGNOSIS — L30.9 DERMATITIS: ICD-10-CM

## 2022-02-09 DIAGNOSIS — G20 PARKINSON DISEASE (HCC): ICD-10-CM

## 2022-02-09 DIAGNOSIS — E44.1 MILD PROTEIN-CALORIE MALNUTRITION (HCC): ICD-10-CM

## 2022-02-09 DIAGNOSIS — S72.001S CLOSED FRACTURE OF NECK OF RIGHT FEMUR, SEQUELA: ICD-10-CM

## 2022-02-09 DIAGNOSIS — R13.12 OROPHARYNGEAL DYSPHAGIA: ICD-10-CM

## 2022-02-09 DIAGNOSIS — I87.2 CHRONIC VENOUS INSUFFICIENCY: ICD-10-CM

## 2022-02-09 DIAGNOSIS — R26.2 DIFFICULTY WALKING: ICD-10-CM

## 2022-02-09 DIAGNOSIS — L98.9 SKIN LESION: Primary | ICD-10-CM

## 2022-02-09 DIAGNOSIS — I10 PRIMARY HYPERTENSION: ICD-10-CM

## 2022-02-09 DIAGNOSIS — N18.30 STAGE 3 CHRONIC KIDNEY DISEASE, UNSPECIFIED WHETHER STAGE 3A OR 3B CKD (HCC): ICD-10-CM

## 2022-02-09 DIAGNOSIS — I45.10 RBBB: ICD-10-CM

## 2022-02-09 DIAGNOSIS — E78.5 HYPERLIPIDEMIA, UNSPECIFIED HYPERLIPIDEMIA TYPE: ICD-10-CM

## 2022-02-09 DIAGNOSIS — T14.8XXA BLOOD BLISTER: ICD-10-CM

## 2022-02-09 DIAGNOSIS — D64.9 ANEMIA, UNSPECIFIED TYPE: ICD-10-CM

## 2022-02-09 DIAGNOSIS — G40.909 NONINTRACTABLE EPILEPSY WITHOUT STATUS EPILEPTICUS, UNSPECIFIED EPILEPSY TYPE (HCC): ICD-10-CM

## 2022-02-09 DIAGNOSIS — E03.9 HYPOTHYROIDISM, UNSPECIFIED TYPE: ICD-10-CM

## 2022-02-09 DIAGNOSIS — K57.30 DIVERTICULOSIS OF COLON: ICD-10-CM

## 2022-02-09 NOTE — PROGRESS NOTES
2/9/2022    Jw Esparza  8/12/1930    This resident is being seen today for acute evaluation visit. He is a resident who has long-term medical conditions including mild protein calorie malnutrition, non-intractable epilepsy, fracture of the neck of the right femur, Parkinson's disease, oropharyngeal dysphagia, difficulty walking, muscle wasting and atrophy, anemia, hyperlipidemia, right bundle branch block, diverticulosis of the colon, constipation, stage III chronic kidney disease, chronic venous insufficiency, primary hypertension, and hypothyroidism. Kevin Rebekahrobinson He is a 80 y.o. male resident who is being seen today for 3 main problems today. First problem is right foot toe has a blister which is a blood blister and darkened area on top of the toe. Patient states when they first put his shoe on it is a little bit tender but once the shoe is on it no longer hurts him. They have not been using any type of dressing on the area. He has not seen podiatry for this as of yet. There does not appear to be any redness or increased warmth around the area and there is no drainage noted. Second issue today patient has noticed a pinpoint red rash across the abdomen this is newly noted on his shower today it is itchy is not painful. There is no new products used in the facility. Third issue is during his shower it was also noted he has a large lesion to his back which part of it is now open and was bleeding a little bit. He states that it is a little bit tender. .  Patient does state that he notices he is more stiff and rigid and is having a harder time with moving about with his Parkinson's. He states it has been at least 2 years since he seen his neurologist Dr. Mehrdad Robbins. He does not want to return to see him however when I did gave him that option. Patient also states that he is at a point in his life where he does not want heroic measures should he continue to decline.   Brings this up to me that he does not want a ventilator or any type of resuscitation. We did discuss this and he is clear that he wants DNR comfort care. Currently at this time he denies any complaints of chest pain or palpitations. Denies any headaches, any sore throat, coughing, or shortness of breath. No nausea, vomiting, constipation or diarrhea. No fever, or chills. No recent falls or syncopal events. Objective     Vital Signs: Stable    Physical examination: Skin on the abdomen has a red pinpoint rash from the nipple line down to the pelvis. Somewhat excoriated from scratching. To the upper back there is an approximately 4 cm irregular cauliflower type lesion with approximately 1 cm open with scants amounts of bleeding. HEENT unremarkable. Neck is supple. Heart regular rate and rhythm. No rubs, gallops or murmurs noted. Lungs are clear to auscultation. No evidence of rhonchi, rales, or wheezing. Abdomen is soft, supple and non-tender. Bowel sounds are noted x4 quadrants. No rigidity, guarding or rebound tenderness. Negative Alvarez's, negative McBurney's, negative Dorian's. Extremities; no true pitting edema. Pulses are adequate. No clubbing  or no cyanosis noted. Neurologically he  is alert and oriented x3. Patient is somewhat rigid in his extremities. There is no pill-rolling noted. There is cogwheeling bilaterally. When he attempts to write there is some tremor noted. He is essentially bound to the wheelchair. Diagnoses and all orders for this visit:    Skin lesion  Comments:  Recommendation for dermatology consult was made. Patient is uncertain whether he wants to do this. He will discuss it with his wife and notify us. Dermatitis  Comments:  Nystatin triamcinolone cream will be added 3 times daily    Blood blister  Comments:  Betadine to area daily. Podiatry to see.     Mild protein-calorie malnutrition ([facility]  Comments:  Stable with good appetite    Nonintractable epilepsy without status epilepticus, unspecified epilepsy type [facility])  Comments:  No recent seizure activity    Closed fracture of neck of right femur, sequela    Parkinson disease ([facility]  Comments:  Patient feeling overall declining    Oropharyngeal dysphagia  Comments:  Currently stable    Difficulty walking    Muscle wasting and atrophy, not elsewhere classified, multiple sites    Anemia, unspecified type  Comments:  Stable    Hyperlipidemia, unspecified hyperlipidemia type    RBBB    Diverticulosis of colon    Constipation, unspecified constipation type    Stage 3 chronic kidney disease, unspecified whether stage 3a or 3b CKD ([facility]  Comments:  Stable    Chronic venous insufficiency  Comments:  Stable    Primary hypertension  Comments:  Stable    Hypothyroidism, unspecified type  Comments:  Stable    Patient's medications labs reviewed. I did discuss plan of care in case with the nursing staff. We will consult podiatry for his blister on his foot use Betadine daily until podiatry sees him. Will add nystatin triamcinolone 3 times daily for the rash to the abdomen. I did recommend a dermatology consultation for removal of the lesion to his back as I am concerned this may be a cancer. The patient is going to discuss this with his wife as he is unsure that he wants to do this at this time. The patient did express to me that he has given thought to his current condition with his Parkinson's and overall health decline. He does not want resuscitation in the event he declines further. He asked me to be sure this was written on his chart. A DNR CC order was placed. LORIE Gould - CNP        *Note was creating using voice recognition software.   The document was reviewed however grammatical errors may exist.

## 2022-03-03 ENCOUNTER — TELEPHONE (OUTPATIENT)
Dept: FAMILY MEDICINE CLINIC | Age: 87
End: 2022-03-03

## 2022-03-03 RX ORDER — RIVASTIGMINE 4.6 MG/24H
1 PATCH, EXTENDED RELEASE TRANSDERMAL DAILY
Qty: 30 PATCH | Refills: 3 | Status: SHIPPED | OUTPATIENT
Start: 2022-03-03

## 2022-03-03 NOTE — TELEPHONE ENCOUNTER
ingrid Buenoeceived a fax report Donepezil tab 10 mg not covered under pharmacy plan. Would you change to something else? Ximena saw patient 2/9/22.     [facility] is [facility]

## 2022-03-03 NOTE — TELEPHONE ENCOUNTER
I sent an order for Exelon patch 4.6 mg/24 HR change daily rotate sites. We will reassess him in a month to see about taking him to next dose up.

## 2022-03-10 ENCOUNTER — OUTSIDE SERVICES (OUTPATIENT)
Dept: FAMILY MEDICINE CLINIC | Age: 87
End: 2022-03-10
Payer: MEDICARE

## 2022-03-10 DIAGNOSIS — R26.2 DIFFICULTY WALKING: ICD-10-CM

## 2022-03-10 DIAGNOSIS — I10 PRIMARY HYPERTENSION: ICD-10-CM

## 2022-03-10 DIAGNOSIS — N18.30 STAGE 3 CHRONIC KIDNEY DISEASE, UNSPECIFIED WHETHER STAGE 3A OR 3B CKD (HCC): ICD-10-CM

## 2022-03-10 DIAGNOSIS — I45.10 RBBB: ICD-10-CM

## 2022-03-10 DIAGNOSIS — R13.12 OROPHARYNGEAL DYSPHAGIA: ICD-10-CM

## 2022-03-10 DIAGNOSIS — E44.1 MILD PROTEIN-CALORIE MALNUTRITION (HCC): ICD-10-CM

## 2022-03-10 DIAGNOSIS — D64.9 ANEMIA, UNSPECIFIED TYPE: ICD-10-CM

## 2022-03-10 DIAGNOSIS — M62.59 MUSCLE WASTING AND ATROPHY, NOT ELSEWHERE CLASSIFIED, MULTIPLE SITES: ICD-10-CM

## 2022-03-10 DIAGNOSIS — I87.2 CHRONIC VENOUS INSUFFICIENCY: ICD-10-CM

## 2022-03-10 DIAGNOSIS — K57.30 DIVERTICULOSIS OF COLON: ICD-10-CM

## 2022-03-10 DIAGNOSIS — E78.5 HYPERLIPIDEMIA, UNSPECIFIED HYPERLIPIDEMIA TYPE: ICD-10-CM

## 2022-03-10 DIAGNOSIS — G93.41 ACUTE METABOLIC ENCEPHALOPATHY: ICD-10-CM

## 2022-03-10 DIAGNOSIS — G20 PARKINSON DISEASE (HCC): ICD-10-CM

## 2022-03-10 DIAGNOSIS — L03.119 CELLULITIS OF LOWER EXTREMITY, UNSPECIFIED LATERALITY: Primary | ICD-10-CM

## 2022-03-10 DIAGNOSIS — E03.9 HYPOTHYROIDISM, UNSPECIFIED TYPE: ICD-10-CM

## 2022-03-10 DIAGNOSIS — G40.909 NONINTRACTABLE EPILEPSY WITHOUT STATUS EPILEPTICUS, UNSPECIFIED EPILEPSY TYPE (HCC): ICD-10-CM

## 2022-03-10 DIAGNOSIS — K59.00 CONSTIPATION, UNSPECIFIED CONSTIPATION TYPE: ICD-10-CM

## 2022-03-10 NOTE — PROGRESS NOTES
3/10/2022    [facility]  8/12/1930    This resident is being seen today for acute evaluation visit. He is a resident who has long-term medical conditions including epilepsy, Parkinson's disease, oropharyngeal dysphagia, difficulty walking, muscle wasting with atrophy, anemia, hyperlipidemia, right bundle branch block, diverticulosis, hypothyroidism, hypertension, chronic venous insufficiency, stage III chronic kidney disease, metabolic encephalopathy, constipation, and mild protein calorie malnutrition. John Records He is a 80 y.o. male resident who is being seen today for cellulitis to the lower extremities. Nursing staff did notify me of increased redness and warmth to the lower extremities. Patient does state that the legs are tender to touch. Has noticed increased swelling. .  Currently at this time he denies any complaints of chest pain or palpitations. Denies any headaches, any sore throat, coughing, or shortness of breath. No nausea, vomiting, constipation or diarrhea. .  No fever, or chills. No recent falls or syncopal events. Patient does have chronic issues with tremors and with rigid movements. He has difficulty with transfers and needs assistance with ADLs. He does utilize a wheelchair for mobility. Objective     Vital Signs: Stable    Physical examination:Skin is essentially warm and dry. HEENT unremarkable. Neck is supple. Heart regular rate and rhythm. No rubs, gallops or murmurs noted. Lungs are clear to auscultation. No evidence of rhonchi, rales, or wheezing. Abdomen is soft, supple and non-tender. Bowel sounds are noted x4 quadrants. No rigidity, guarding or rebound tenderness. Negative Alvarez's, negative McBurney's, negative Dorian's. Extremities have +1 edema with erythema distal to the knees. The lower extremities are warm to touch and tender when I do palpate them. Pulses are intact. Gilford Groton' sign is negative. There is chronic venous insufficiency noted.   Neurologically he  is alert and oriented x3. No evidence of paralysis or paresthesias noted. He does have a mild tremor noted to his hands. There is cogwheeling noted. No evidence of pill-rolling. Diagnoses and all orders for this visit:    Cellulitis of lower extremity, unspecified laterality    Nonintractable epilepsy without status epilepticus, unspecified epilepsy type ([address]    Parkinson disease ([address]    Oropharyngeal dysphagia    Difficulty walking    Muscle wasting and atrophy, not elsewhere classified, multiple sites    Anemia, unspecified type    Hyperlipidemia, unspecified hyperlipidemia type    RBBB    Diverticulosis of colon    Hypothyroidism, unspecified type    Primary hypertension    Chronic venous insufficiency    Stage 3 chronic kidney disease, unspecified whether stage 3a or 3b CKD ([address]    Acute metabolic encephalopathy    Constipation, unspecified constipation type    Mild protein-calorie malnutrition ([address]    Patient's medications and labs are reviewed. I did discuss his plan of care with the health care team.  We will be treating him with doxycycline as well as an increase in his Lasix for 3 days. He will elevate his legs when he is in his room. Ximena Ann, APRN - CNP        *Note was creating using voice recognition software.   The document was reviewed however grammatical errors may exist.

## 2022-05-11 ENCOUNTER — OUTSIDE SERVICES (OUTPATIENT)
Dept: FAMILY MEDICINE CLINIC | Age: 87
End: 2022-05-11
Payer: MEDICARE

## 2022-05-11 DIAGNOSIS — G89.29 CHRONIC BILATERAL LOW BACK PAIN WITHOUT SCIATICA: ICD-10-CM

## 2022-05-11 DIAGNOSIS — R26.2 DIFFICULTY WALKING: ICD-10-CM

## 2022-05-11 DIAGNOSIS — K57.30 DIVERTICULOSIS OF COLON: ICD-10-CM

## 2022-05-11 DIAGNOSIS — D64.9 ANEMIA, UNSPECIFIED TYPE: ICD-10-CM

## 2022-05-11 DIAGNOSIS — M25.561 CHRONIC PAIN OF BOTH KNEES: ICD-10-CM

## 2022-05-11 DIAGNOSIS — G89.29 CHRONIC PAIN OF BOTH KNEES: ICD-10-CM

## 2022-05-11 DIAGNOSIS — R13.12 OROPHARYNGEAL DYSPHAGIA: ICD-10-CM

## 2022-05-11 DIAGNOSIS — G20 PARKINSON DISEASE (HCC): ICD-10-CM

## 2022-05-11 DIAGNOSIS — G40.909 NONINTRACTABLE EPILEPSY WITHOUT STATUS EPILEPTICUS, UNSPECIFIED EPILEPSY TYPE (HCC): Primary | ICD-10-CM

## 2022-05-11 DIAGNOSIS — N18.30 STAGE 3 CHRONIC KIDNEY DISEASE, UNSPECIFIED WHETHER STAGE 3A OR 3B CKD (HCC): ICD-10-CM

## 2022-05-11 DIAGNOSIS — E78.5 HYPERLIPIDEMIA, UNSPECIFIED HYPERLIPIDEMIA TYPE: ICD-10-CM

## 2022-05-11 DIAGNOSIS — E03.9 HYPOTHYROIDISM, UNSPECIFIED TYPE: ICD-10-CM

## 2022-05-11 DIAGNOSIS — G20 DEMENTIA DUE TO PARKINSON'S DISEASE WITHOUT BEHAVIORAL DISTURBANCE (HCC): ICD-10-CM

## 2022-05-11 DIAGNOSIS — I87.2 CHRONIC VENOUS INSUFFICIENCY: ICD-10-CM

## 2022-05-11 DIAGNOSIS — I45.10 RBBB: ICD-10-CM

## 2022-05-11 DIAGNOSIS — E44.1 MILD PROTEIN-CALORIE MALNUTRITION (HCC): ICD-10-CM

## 2022-05-11 DIAGNOSIS — M54.50 CHRONIC BILATERAL LOW BACK PAIN WITHOUT SCIATICA: ICD-10-CM

## 2022-05-11 DIAGNOSIS — L98.9 SKIN LESION: ICD-10-CM

## 2022-05-11 DIAGNOSIS — F02.80 DEMENTIA DUE TO PARKINSON'S DISEASE WITHOUT BEHAVIORAL DISTURBANCE (HCC): ICD-10-CM

## 2022-05-11 DIAGNOSIS — K59.00 CONSTIPATION, UNSPECIFIED CONSTIPATION TYPE: ICD-10-CM

## 2022-05-11 DIAGNOSIS — M25.562 CHRONIC PAIN OF BOTH KNEES: ICD-10-CM

## 2022-05-11 DIAGNOSIS — I10 PRIMARY HYPERTENSION: ICD-10-CM

## 2022-05-11 DIAGNOSIS — M62.59 MUSCLE WASTING AND ATROPHY, NOT ELSEWHERE CLASSIFIED, MULTIPLE SITES: ICD-10-CM

## 2022-05-11 DIAGNOSIS — I87.2 VENOUS STASIS DERMATITIS OF BOTH LOWER EXTREMITIES: ICD-10-CM

## 2022-05-11 NOTE — PROGRESS NOTES
5/11/2022    Tina Elizabeth  8/12/1930    This resident is being seen today for monthly evaluation visit. He is a resident who has long-term medical conditions including epilepsy, Parkinson's disease, oropharyngeal dysphagia, difficulty walking, muscle wasting and atrophy, anemia, hyperlipidemia, right bundle branch block, diverticulosis, hypothyroidism, hypertension, chronic venous insufficiency, stage III chronic kidney disease, constipation, mild protein calorie malnutrition, skin lesion, chronic bilateral low back pain without sciatica, dementia due to Parkinson's disease, chronic pain to his knees, and venous stasis dermatitis. Leona Red He is a 80 y.o. male resident who is being seen today for neck conditions. Patient states he is feeling well at this time. Does have chronic tremors. He remains weak and has difficulty with transfers. .  Currently at this time he denies any complaints of chest pain or palpitations. Denies any headaches, any sore throat, coughing, or shortness of breath. No nausea, vomiting, constipation or diarrhea. He has chronic discomfort in his knees. No fever, or chills. No recent falls or syncopal events. Patient does state that he has 3 grown children which are getting  over the next several months. He sounds a little disappointed that he will not be able to go to 2 of those weddings for sure because they are in [place]. He knows that due to his health conditions that that is not really feasible. He will probably be able to go to the one that is closer to home. Objective     Vital Signs: Stable    Physical examination:Skin is essentially warm and dry. HEENT unremarkable. Neck is supple. Heart regular rate and rhythm. No rubs, gallops or murmurs noted. Lungs are clear to auscultation. No evidence of rhonchi, rales, or wheezing. Abdomen is soft, supple and non-tender. Bowel sounds are noted x4 quadrants. No rigidity, guarding or rebound tenderness.   Negative Alvarez's, negative McBurney's, negative Dorian's. Extremities; no true pitting edema. Pulses are adequate. No clubbing  or no cyanosis noted. Arthritic changes are noted to the lower extremities. Neurologically he  is alert and oriented x3. No evidence of paralysis or paresthesias noted. There is muscle atrophy noted to the lower extremities. Diagnoses and all orders for this visit:    Nonintractable epilepsy without status epilepticus, unspecified epilepsy type ([address]    Parkinson disease ([address]    Oropharyngeal dysphagia    Difficulty walking    Muscle wasting and atrophy, not elsewhere classified, multiple sites    Anemia, unspecified type    Hyperlipidemia, unspecified hyperlipidemia type    RBBB    Diverticulosis of colon    Hypothyroidism, unspecified type    Primary hypertension    Chronic venous insufficiency    Stage 3 chronic kidney disease, unspecified whether stage 3a or 3b CKD ([address]    Constipation, unspecified constipation type    Mild protein-calorie malnutrition ([address]    Skin lesion    Chronic bilateral low back pain without sciatica    Dementia due to Parkinson's disease without behavioral disturbance (HCC)    Chronic pain of both knees    Venous stasis dermatitis of both lower extremities    Patient's medications and labs are reviewed. I did discuss the plan of care with the healthcare team.  We will continue his current plan of care and further recommendations will be pending. Ximena Herndon, LORIE - CNP        *Note was creating using voice recognition software.   The document was reviewed however grammatical errors may exist.

## 2022-06-08 ENCOUNTER — OUTSIDE SERVICES (OUTPATIENT)
Dept: FAMILY MEDICINE CLINIC | Age: 87
End: 2022-06-08
Payer: MEDICARE

## 2022-06-08 DIAGNOSIS — F02.80 DEMENTIA DUE TO PARKINSON'S DISEASE WITHOUT BEHAVIORAL DISTURBANCE (HCC): ICD-10-CM

## 2022-06-08 DIAGNOSIS — I87.2 VENOUS STASIS DERMATITIS OF BOTH LOWER EXTREMITIES: ICD-10-CM

## 2022-06-08 DIAGNOSIS — K57.30 DIVERTICULOSIS OF COLON: ICD-10-CM

## 2022-06-08 DIAGNOSIS — M25.562 CHRONIC PAIN OF BOTH KNEES: ICD-10-CM

## 2022-06-08 DIAGNOSIS — G20 DEMENTIA DUE TO PARKINSON'S DISEASE WITHOUT BEHAVIORAL DISTURBANCE (HCC): ICD-10-CM

## 2022-06-08 DIAGNOSIS — D64.9 ANEMIA, UNSPECIFIED TYPE: ICD-10-CM

## 2022-06-08 DIAGNOSIS — R26.2 DIFFICULTY WALKING: ICD-10-CM

## 2022-06-08 DIAGNOSIS — N18.30 STAGE 3 CHRONIC KIDNEY DISEASE, UNSPECIFIED WHETHER STAGE 3A OR 3B CKD (HCC): ICD-10-CM

## 2022-06-08 DIAGNOSIS — I87.2 CHRONIC VENOUS INSUFFICIENCY: ICD-10-CM

## 2022-06-08 DIAGNOSIS — E44.1 MILD PROTEIN-CALORIE MALNUTRITION (HCC): ICD-10-CM

## 2022-06-08 DIAGNOSIS — G40.909 NONINTRACTABLE EPILEPSY WITHOUT STATUS EPILEPTICUS, UNSPECIFIED EPILEPSY TYPE (HCC): Primary | ICD-10-CM

## 2022-06-08 DIAGNOSIS — I45.10 RBBB: ICD-10-CM

## 2022-06-08 DIAGNOSIS — G20 PARKINSON DISEASE (HCC): ICD-10-CM

## 2022-06-08 DIAGNOSIS — M25.561 CHRONIC PAIN OF BOTH KNEES: ICD-10-CM

## 2022-06-08 DIAGNOSIS — M54.50 CHRONIC BILATERAL LOW BACK PAIN WITHOUT SCIATICA: ICD-10-CM

## 2022-06-08 DIAGNOSIS — E78.5 HYPERLIPIDEMIA, UNSPECIFIED HYPERLIPIDEMIA TYPE: ICD-10-CM

## 2022-06-08 DIAGNOSIS — K59.00 CONSTIPATION, UNSPECIFIED CONSTIPATION TYPE: ICD-10-CM

## 2022-06-08 DIAGNOSIS — I10 ESSENTIAL HYPERTENSION: ICD-10-CM

## 2022-06-08 DIAGNOSIS — G93.41 ACUTE METABOLIC ENCEPHALOPATHY: ICD-10-CM

## 2022-06-08 DIAGNOSIS — I10 PRIMARY HYPERTENSION: ICD-10-CM

## 2022-06-08 DIAGNOSIS — M62.59 MUSCLE WASTING AND ATROPHY, NOT ELSEWHERE CLASSIFIED, MULTIPLE SITES: ICD-10-CM

## 2022-06-08 DIAGNOSIS — L98.9 SKIN LESION: ICD-10-CM

## 2022-06-08 DIAGNOSIS — G89.29 CHRONIC BILATERAL LOW BACK PAIN WITHOUT SCIATICA: ICD-10-CM

## 2022-06-08 DIAGNOSIS — G89.29 CHRONIC PAIN OF BOTH KNEES: ICD-10-CM

## 2022-06-08 DIAGNOSIS — E03.9 HYPOTHYROIDISM, UNSPECIFIED TYPE: ICD-10-CM

## 2022-06-08 DIAGNOSIS — R13.12 OROPHARYNGEAL DYSPHAGIA: ICD-10-CM

## 2022-06-08 DIAGNOSIS — S72.001S CLOSED FRACTURE OF NECK OF RIGHT FEMUR, SEQUELA: ICD-10-CM

## 2022-06-08 NOTE — PROGRESS NOTES
6/8/2022    Rach Carrera  8/12/1930    This resident is being seen today for monthly evaluation visit. He is a resident who has long-term medical conditions including epilepsy, Parkinson's disease, oropharyngeal dysphagia, muscle wasting and atrophy, anemia, hyperlipidemia, right bundle branch block, diverticulosis of the colon, hypothyroidism, hypertension, chronic venous insufficiency, stage III chronic kidney disease, constipation, mild protein calorie malnutrition, skin lesion, chronic bilateral low back pain, dementia due to Parkinson's disease, chronic knee pain, venous stasis dermatitis, metabolic encephalopathy, history of closed fracture of the femur, and essential hypertension. Kong Howe He is a 80 y.o. male resident who is being seen today for chronic conditions. Patient states that this morning he has not urinated as much as he usually does. He does not necessarily feel that he has to void. He has not had much to drink this morning. There is been no increase in swelling in the extremities and there is no shortness of breath. .  Currently at this time he denies any complaints of chest pain or palpitations. Denies any headaches, any sore throat, coughing. No nausea, vomiting, constipation or diarrhea. No pain in lower extremities. No fever, or chills. No recent falls or syncopal events. Objective     Vital Signs: Stable    Physical examination:Skin is essentially warm and dry. HEENT unremarkable. Neck is supple. Heart regular rate and rhythm. No rubs, gallops or murmurs noted. Lungs are clear to auscultation. No evidence of rhonchi, rales, or wheezing. Abdomen is soft, supple and non-tender. Bowel sounds are noted x4 quadrants. No rigidity, guarding or rebound tenderness. Negative Alvarez's, negative McBurney's, negative Dorian's. Extremities; no true pitting edema. Pulses are adequate. No clubbing  or no cyanosis noted.   Extremities are generally weak he does have some tremors to the upper extremities bilaterally. There is positive cogwheeling. Negative for pill-rolling bilaterally. Neurologically he  is alert and oriented x3. No evidence of paralysis or paresthesias noted. Diagnoses and all orders for this visit:    Nonintractable epilepsy without status epilepticus, unspecified epilepsy type ([address]    Parkinson disease ([address]    Oropharyngeal dysphagia    Difficulty walking    Muscle wasting and atrophy, not elsewhere classified, multiple sites    Anemia, unspecified type    Hyperlipidemia, unspecified hyperlipidemia type    RBBB    Diverticulosis of colon    Hypothyroidism, unspecified type    Primary hypertension    Chronic venous insufficiency    Stage 3 chronic kidney disease, unspecified whether stage 3a or 3b CKD ([address]    Constipation, unspecified constipation type    Mild protein-calorie malnutrition ([address]    Skin lesion    Chronic bilateral low back pain without sciatica    Dementia due to Parkinson's disease without behavioral disturbance (HCC)    Chronic pain of both knees    Venous stasis dermatitis of both lower extremities    Acute metabolic encephalopathy    Closed fracture of neck of right femur, sequela    Essential hypertension      Patient's medications and previous labs are reviewed. Plan of care is discussed with healthcare team.  We will continue with his current plan of care. iXmena Balbuena, APRN - CNP        *Note was creating using voice recognition software.   The document was reviewed however grammatical errors may exist.

## 2022-06-22 DIAGNOSIS — E55.9 VITAMIN D DEFICIENCY: ICD-10-CM

## 2022-06-22 DIAGNOSIS — I10 PRIMARY HYPERTENSION: ICD-10-CM

## 2022-06-22 DIAGNOSIS — E03.9 HYPOTHYROIDISM, UNSPECIFIED TYPE: ICD-10-CM

## 2022-06-22 DIAGNOSIS — D64.9 ANEMIA, UNSPECIFIED TYPE: ICD-10-CM

## 2022-06-22 DIAGNOSIS — I10 PRIMARY HYPERTENSION: Primary | ICD-10-CM

## 2022-06-22 LAB
ALBUMIN SERPL-MCNC: 4.2 G/DL (ref 3.5–5.2)
ALP BLD-CCNC: 127 U/L (ref 40–129)
ALT SERPL-CCNC: 7 U/L (ref 0–40)
ANION GAP SERPL CALCULATED.3IONS-SCNC: 19 MMOL/L (ref 7–16)
AST SERPL-CCNC: 18 U/L (ref 0–39)
BASOPHILS ABSOLUTE: 0.11 E9/L (ref 0–0.2)
BASOPHILS RELATIVE PERCENT: 1.3 % (ref 0–2)
BILIRUB SERPL-MCNC: 0.9 MG/DL (ref 0–1.2)
BUN BLDV-MCNC: 25 MG/DL (ref 6–23)
CALCIUM SERPL-MCNC: 9.4 MG/DL (ref 8.6–10.2)
CHLORIDE BLD-SCNC: 104 MMOL/L (ref 98–107)
CO2: 22 MMOL/L (ref 22–29)
CREAT SERPL-MCNC: 1.5 MG/DL (ref 0.7–1.2)
EOSINOPHILS ABSOLUTE: 0.35 E9/L (ref 0.05–0.5)
EOSINOPHILS RELATIVE PERCENT: 4.2 % (ref 0–6)
FOLATE: 7.8 NG/ML (ref 4.8–24.2)
GFR AFRICAN AMERICAN: 53
GFR NON-AFRICAN AMERICAN: 44 ML/MIN/1.73
GLUCOSE BLD-MCNC: 89 MG/DL (ref 74–99)
HCT VFR BLD CALC: 39.5 % (ref 37–54)
HEMOGLOBIN: 11.9 G/DL (ref 12.5–16.5)
IMMATURE GRANULOCYTES #: 0.02 E9/L
IMMATURE GRANULOCYTES %: 0.2 % (ref 0–5)
LYMPHOCYTES ABSOLUTE: 1.19 E9/L (ref 1.5–4)
LYMPHOCYTES RELATIVE PERCENT: 14.1 % (ref 20–42)
MCH RBC QN AUTO: 32.1 PG (ref 26–35)
MCHC RBC AUTO-ENTMCNC: 30.1 % (ref 32–34.5)
MCV RBC AUTO: 106.5 FL (ref 80–99.9)
MONOCYTES ABSOLUTE: 0.72 E9/L (ref 0.1–0.95)
MONOCYTES RELATIVE PERCENT: 8.5 % (ref 2–12)
NEUTROPHILS ABSOLUTE: 6.04 E9/L (ref 1.8–7.3)
NEUTROPHILS RELATIVE PERCENT: 71.7 % (ref 43–80)
PDW BLD-RTO: 15.2 FL (ref 11.5–15)
PLATELET # BLD: 271 E9/L (ref 130–450)
PMV BLD AUTO: 11.2 FL (ref 7–12)
POTASSIUM SERPL-SCNC: 3.9 MMOL/L (ref 3.5–5)
RBC # BLD: 3.71 E12/L (ref 3.8–5.8)
SODIUM BLD-SCNC: 145 MMOL/L (ref 132–146)
T4 FREE: 1.4 NG/DL (ref 0.93–1.7)
TOTAL PROTEIN: 7.7 G/DL (ref 6.4–8.3)
TSH SERPL DL<=0.05 MIU/L-ACNC: 24.1 UIU/ML (ref 0.27–4.2)
VITAMIN B-12: 379 PG/ML (ref 211–946)
WBC # BLD: 8.4 E9/L (ref 4.5–11.5)

## 2022-06-23 LAB — VITAMIN D 25-HYDROXY: 82 NG/ML (ref 30–100)

## 2022-06-30 DIAGNOSIS — S41.001A WOUND OF RIGHT SHOULDER, INITIAL ENCOUNTER: ICD-10-CM

## 2022-06-30 DIAGNOSIS — S41.001A WOUND OF RIGHT SHOULDER, INITIAL ENCOUNTER: Primary | ICD-10-CM

## 2022-07-04 LAB
GRAM STAIN RESULT: ABNORMAL
ORGANISM: ABNORMAL
WOUND/ABSCESS: ABNORMAL

## 2022-07-06 ENCOUNTER — OUTSIDE SERVICES (OUTPATIENT)
Dept: FAMILY MEDICINE CLINIC | Age: 87
End: 2022-07-06
Payer: MEDICARE

## 2022-07-06 ENCOUNTER — TELEPHONE (OUTPATIENT)
Dept: FAMILY MEDICINE CLINIC | Age: 87
End: 2022-07-06

## 2022-07-06 DIAGNOSIS — L03.119 CELLULITIS OF LOWER EXTREMITY, UNSPECIFIED LATERALITY: Primary | ICD-10-CM

## 2022-07-06 DIAGNOSIS — M25.561 CHRONIC PAIN OF BOTH KNEES: ICD-10-CM

## 2022-07-06 DIAGNOSIS — I10 ESSENTIAL HYPERTENSION: ICD-10-CM

## 2022-07-06 DIAGNOSIS — R13.12 OROPHARYNGEAL DYSPHAGIA: ICD-10-CM

## 2022-07-06 DIAGNOSIS — S41.001A WOUND OF RIGHT SHOULDER, INITIAL ENCOUNTER: ICD-10-CM

## 2022-07-06 DIAGNOSIS — G89.29 CHRONIC PAIN OF BOTH KNEES: ICD-10-CM

## 2022-07-06 DIAGNOSIS — I10 PRIMARY HYPERTENSION: ICD-10-CM

## 2022-07-06 DIAGNOSIS — D64.9 ANEMIA, UNSPECIFIED TYPE: ICD-10-CM

## 2022-07-06 DIAGNOSIS — F02.80 DEMENTIA DUE TO PARKINSON'S DISEASE WITHOUT BEHAVIORAL DISTURBANCE (HCC): ICD-10-CM

## 2022-07-06 DIAGNOSIS — G40.909 NONINTRACTABLE EPILEPSY WITHOUT STATUS EPILEPTICUS, UNSPECIFIED EPILEPSY TYPE (HCC): ICD-10-CM

## 2022-07-06 DIAGNOSIS — E55.9 VITAMIN D DEFICIENCY: ICD-10-CM

## 2022-07-06 DIAGNOSIS — E03.9 HYPOTHYROIDISM, UNSPECIFIED TYPE: ICD-10-CM

## 2022-07-06 DIAGNOSIS — R26.2 DIFFICULTY WALKING: ICD-10-CM

## 2022-07-06 DIAGNOSIS — E44.1 MILD PROTEIN-CALORIE MALNUTRITION (HCC): ICD-10-CM

## 2022-07-06 DIAGNOSIS — K57.30 DIVERTICULOSIS OF COLON: ICD-10-CM

## 2022-07-06 DIAGNOSIS — E78.5 HYPERLIPIDEMIA, UNSPECIFIED HYPERLIPIDEMIA TYPE: ICD-10-CM

## 2022-07-06 DIAGNOSIS — G89.29 CHRONIC BILATERAL LOW BACK PAIN WITHOUT SCIATICA: ICD-10-CM

## 2022-07-06 DIAGNOSIS — M25.562 CHRONIC PAIN OF BOTH KNEES: ICD-10-CM

## 2022-07-06 DIAGNOSIS — N18.30 STAGE 3 CHRONIC KIDNEY DISEASE, UNSPECIFIED WHETHER STAGE 3A OR 3B CKD (HCC): ICD-10-CM

## 2022-07-06 DIAGNOSIS — M54.50 CHRONIC BILATERAL LOW BACK PAIN WITHOUT SCIATICA: ICD-10-CM

## 2022-07-06 DIAGNOSIS — I87.2 CHRONIC VENOUS INSUFFICIENCY: ICD-10-CM

## 2022-07-06 DIAGNOSIS — G20 DEMENTIA DUE TO PARKINSON'S DISEASE WITHOUT BEHAVIORAL DISTURBANCE (HCC): ICD-10-CM

## 2022-07-06 DIAGNOSIS — I87.2 VENOUS STASIS DERMATITIS OF BOTH LOWER EXTREMITIES: ICD-10-CM

## 2022-07-06 DIAGNOSIS — M62.59 MUSCLE WASTING AND ATROPHY, NOT ELSEWHERE CLASSIFIED, MULTIPLE SITES: ICD-10-CM

## 2022-07-06 DIAGNOSIS — K59.00 CONSTIPATION, UNSPECIFIED CONSTIPATION TYPE: ICD-10-CM

## 2022-07-06 DIAGNOSIS — I45.10 RBBB: ICD-10-CM

## 2022-07-06 DIAGNOSIS — G20 PARKINSON DISEASE (HCC): ICD-10-CM

## 2022-07-06 NOTE — PROGRESS NOTES
7/6/2022    Dipti Aly  8/12/1930    This resident is being seen today for cute evaluation visit. He is a resident who has long-term medical conditions including primary hypertension, hypothyroidism, anemia, vitamin D deficiency, epilepsy, Parkinson's disease, oropharyngeal dysphagia, difficulty walking, muscle wasting and atrophy, hyperlipidemia, right bundle branch block, diverticulosis of the colon, chronic venous insufficiency, chronic kidney disease, constipation, mild protein calorie malnutrition, chronic bilateral lower back pain with out sciatica, dementia due to Parkinson's disease, chronic knee pain, venous stasis dermatitis to the lower extremities, and essential hypertension. Magdalena Aguilar He is a 80 y.o. male resident who is being seen today for lower extremity edema with seeping and right shoulder wound. It is noted that patient did have a wound culture showing staph to the right shoulder. Patient is also complaining that starting yesterday he was having seeping to the lower legs worse when he is having the legs dependent. The legs have been red and scaly. He does report that they are sore as well as itchy. .  Currently at this time he denies any complaints of chest pain or palpitations. Denies any headaches, any sore throat, coughing, or shortness of breath. No nausea, vomiting,  or diarrhea. He does get constipation and is on chronic therapies for this. No fever, or chills. No recent falls or syncopal events. Does have chronic low back pain and knee pain Tylenol does help this. Does complain of chronic tremors with his Parkinson's. He does have difficulty with transfers into the wheelchair. Having more difficulty with rigidity. Objective     Vital Signs: Stable    Physical examination:Skin is essentially warm and dry with the exception of the lower extremities which are showing venous insufficiency as well as inflammation and redness to the knees distally.   He does have scaliness and seeping of serous fluid noted peripheral pulses are +1. The left shoulder does have a raised red wound which is no longer draining. HEENT unremarkable. Neck is supple. Heart regular rate and rhythm. No rubs, gallops or murmurs noted. Lungs are clear to auscultation. No evidence of rhonchi, rales, or wheezing. Abdomen is soft, supple and non-tender. Bowel sounds are noted x4 quadrants. No rigidity, guarding or rebound tenderness. Negative Alvarez's, negative McBurney's, negative Dorian's. Extremities as above. Pulses diminished. No clubbing  or no cyanosis noted. Neurologically he  is alert and oriented x3. No evidence of paralysis or paresthesias noted. He is generally weak. There is some degree of muscle atrophy. He does have cogwheeling. There is no pill-rolling. Diagnoses and all orders for this visit:    Cellulitis of lower extremity, unspecified laterality    Wound of right shoulder, initial encounter    Primary hypertension    Hypothyroidism, unspecified type    Anemia, unspecified type    Vitamin D deficiency    Nonintractable epilepsy without status epilepticus, unspecified epilepsy type ([address]    Parkinson disease ([address]    Oropharyngeal dysphagia    Difficulty walking    Muscle wasting and atrophy, not elsewhere classified, multiple sites    Hyperlipidemia, unspecified hyperlipidemia type    RBBB    Diverticulosis of colon    Chronic venous insufficiency    Stage 3 chronic kidney disease, unspecified whether stage 3a or 3b CKD (HCC)    Constipation, unspecified constipation type    Mild protein-calorie malnutrition ([address]    Chronic bilateral low back pain without sciatica    Dementia due to Parkinson's disease without behavioral disturbance (HCC)    Chronic pain of both knees    Venous stasis dermatitis of both lower extremities    Essential hypertension    Patient's medications and labs are reviewed lab work is stable.   We will start doxycycline 100 mg twice daily x10 days for the leg treatments will apply Bactroban ointment to the legs daily apply Kerlix and Coban wraps on in the morning and off at at bedtime we will hold the ammonium Lac for now to his legs. We will be monitoring the wound care closely. We will see him again next week to reevaluate. Ximena Lira, APRN - CNP        *Note was creating using voice recognition software.   The document was reviewed however grammatical errors may exist.

## 2022-07-06 NOTE — TELEPHONE ENCOUNTER
Ronel from  [facility] asking if we could send over wound culture result. Our fax is not working so we did e-mail it to her . Ximena saw pt. Today and put him on Doxycycline.

## 2022-07-21 DIAGNOSIS — I10 PRIMARY HYPERTENSION: ICD-10-CM

## 2022-07-21 DIAGNOSIS — I10 PRIMARY HYPERTENSION: Primary | ICD-10-CM

## 2022-07-21 DIAGNOSIS — E03.9 HYPOTHYROIDISM, UNSPECIFIED TYPE: ICD-10-CM

## 2022-07-21 LAB
T4 FREE: 1.13 NG/DL (ref 0.93–1.7)
TSH SERPL DL<=0.05 MIU/L-ACNC: 14.04 UIU/ML (ref 0.27–4.2)

## 2022-08-29 DIAGNOSIS — I10 PRIMARY HYPERTENSION: Primary | ICD-10-CM

## 2022-08-29 DIAGNOSIS — N18.30 STAGE 3 CHRONIC KIDNEY DISEASE, UNSPECIFIED WHETHER STAGE 3A OR 3B CKD (HCC): ICD-10-CM

## 2022-08-29 DIAGNOSIS — E03.9 HYPOTHYROIDISM, UNSPECIFIED TYPE: ICD-10-CM

## 2022-08-30 DIAGNOSIS — E03.9 HYPOTHYROIDISM, UNSPECIFIED TYPE: Primary | ICD-10-CM

## 2022-08-30 RX ORDER — LEVOTHYROXINE SODIUM 0.1 MG/1
100 TABLET ORAL DAILY
Qty: 30 TABLET | Refills: 5 | Status: SHIPPED
Start: 2022-08-30 | End: 2022-10-05 | Stop reason: ALTCHOICE

## 2022-09-07 ENCOUNTER — OUTSIDE SERVICES (OUTPATIENT)
Dept: FAMILY MEDICINE CLINIC | Age: 87
End: 2022-09-07
Payer: MEDICARE

## 2022-09-07 DIAGNOSIS — F02.80 DEMENTIA DUE TO PARKINSON'S DISEASE WITHOUT BEHAVIORAL DISTURBANCE (HCC): ICD-10-CM

## 2022-09-07 DIAGNOSIS — M25.561 CHRONIC PAIN OF BOTH KNEES: ICD-10-CM

## 2022-09-07 DIAGNOSIS — I87.2 VENOUS STASIS DERMATITIS OF BOTH LOWER EXTREMITIES: ICD-10-CM

## 2022-09-07 DIAGNOSIS — I87.2 CHRONIC VENOUS INSUFFICIENCY: ICD-10-CM

## 2022-09-07 DIAGNOSIS — K57.30 DIVERTICULOSIS OF COLON: ICD-10-CM

## 2022-09-07 DIAGNOSIS — R26.2 DIFFICULTY WALKING: ICD-10-CM

## 2022-09-07 DIAGNOSIS — E44.1 MILD PROTEIN-CALORIE MALNUTRITION (HCC): ICD-10-CM

## 2022-09-07 DIAGNOSIS — I10 PRIMARY HYPERTENSION: ICD-10-CM

## 2022-09-07 DIAGNOSIS — M62.59 MUSCLE WASTING AND ATROPHY, NOT ELSEWHERE CLASSIFIED, MULTIPLE SITES: ICD-10-CM

## 2022-09-07 DIAGNOSIS — G40.909 NONINTRACTABLE EPILEPSY WITHOUT STATUS EPILEPTICUS, UNSPECIFIED EPILEPSY TYPE (HCC): ICD-10-CM

## 2022-09-07 DIAGNOSIS — K59.00 CONSTIPATION, UNSPECIFIED CONSTIPATION TYPE: ICD-10-CM

## 2022-09-07 DIAGNOSIS — M25.562 CHRONIC PAIN OF BOTH KNEES: ICD-10-CM

## 2022-09-07 DIAGNOSIS — N18.30 STAGE 3 CHRONIC KIDNEY DISEASE, UNSPECIFIED WHETHER STAGE 3A OR 3B CKD (HCC): ICD-10-CM

## 2022-09-07 DIAGNOSIS — M54.50 CHRONIC BILATERAL LOW BACK PAIN WITHOUT SCIATICA: ICD-10-CM

## 2022-09-07 DIAGNOSIS — R13.12 OROPHARYNGEAL DYSPHAGIA: ICD-10-CM

## 2022-09-07 DIAGNOSIS — G89.29 CHRONIC PAIN OF BOTH KNEES: ICD-10-CM

## 2022-09-07 DIAGNOSIS — E55.9 VITAMIN D DEFICIENCY: ICD-10-CM

## 2022-09-07 DIAGNOSIS — I45.10 RBBB: ICD-10-CM

## 2022-09-07 DIAGNOSIS — E03.9 HYPOTHYROIDISM, UNSPECIFIED TYPE: Primary | ICD-10-CM

## 2022-09-07 DIAGNOSIS — D64.9 ANEMIA, UNSPECIFIED TYPE: ICD-10-CM

## 2022-09-07 DIAGNOSIS — G20 DEMENTIA DUE TO PARKINSON'S DISEASE WITHOUT BEHAVIORAL DISTURBANCE (HCC): ICD-10-CM

## 2022-09-07 DIAGNOSIS — G20 PARKINSON DISEASE (HCC): ICD-10-CM

## 2022-09-07 DIAGNOSIS — G89.29 CHRONIC BILATERAL LOW BACK PAIN WITHOUT SCIATICA: ICD-10-CM

## 2022-09-07 DIAGNOSIS — E78.5 HYPERLIPIDEMIA, UNSPECIFIED HYPERLIPIDEMIA TYPE: ICD-10-CM

## 2022-09-07 NOTE — PROGRESS NOTES
9/7/2022    Emi Rvai  8/12/1930    This resident is being seen today for monthly evaluation visit. He is a resident who has long-term medical conditions including hypothyroidism, hypertension, chronic kidney disease stage III, anemia, vitamin D deficiency, non-intractable epilepsy, Parkinson's disease, oropharyngeal dysphagia, difficulty walking, muscle wasting and atrophy, hyperlipidemia, right bundle branch block, diverticulosis of the colon, chronic venous insufficiency, constipation, mild protein calorie malnutrition, chronic back pain, dementia from Parkinson's disease, chronic knee pain, and venous stasis dermatitis to the lower extremities. Laura Bergeron He is a 80 y.o. male resident who is being seen today for chronic conditions. Patient states that he is feeling well. Denies any new complaints. .  Currently at this time he denies any complaints of chest pain or palpitations. Denies any headaches, any sore throat, coughing, or shortness of breath. No nausea, vomiting, constipation or diarrhea. No pain in lower extremities. No fever, or chills. No recent falls or syncopal events. Objective     Vital Signs: Stable    Physical examination:Skin is essentially warm and dry. HEENT unremarkable. Neck is supple. Heart regular rate and rhythm. No rubs, gallops or murmurs noted. Lungs are clear to auscultation. No evidence of rhonchi, rales, or wheezing. Abdomen is soft, supple and non-tender. Bowel sounds are noted x4 quadrants. No rigidity, guarding or rebound tenderness. Negative Alvarez's, negative McBurney's, negative Dorian's. Extremities; no true pitting edema. Pulses are adequate. No clubbing  or no cyanosis noted. Chronic venous insufficiency is noted to the lower extremities. Neurologically he  is alert and oriented x2 there is cogwheeling noted there is no pill-rolling noted.     Diagnoses and all orders for this visit:    Hypothyroidism, unspecified type    Primary hypertension    Stage 3 chronic kidney disease, unspecified whether stage 3a or 3b CKD ([facility]    Anemia, unspecified type    Vitamin D deficiency    Nonintractable epilepsy without status epilepticus, unspecified epilepsy type ([facility]    Parkinson disease ([facility] [address] [facility]    Oropharyngeal dysphagia    Difficulty walking    Muscle wasting and atrophy, not elsewhere classified, multiple sites    Hyperlipidemia, unspecified hyperlipidemia type    RBBB    Diverticulosis of colon    Chronic venous insufficiency    Constipation, unspecified constipation type    Mild protein-calorie malnutrition ([facility] [address]    Chronic bilateral low back pain without sciatica    Dementia due to Parkinson's disease without behavioral disturbance (HCC)    Chronic pain of both knees    Venous stasis dermatitis of both lower extremities    I did review recent lab work including a TSH of 14.38 we will increase levothyroxine to 100 mcg daily alkaline phosphatase was also increased to 185 his other liver enzymes were all within normal limits. Patient is not interested in further evaluation of LFT function such as ultrasound at this point. We will retest these enzymes at a later date. Ximena Torre, APRN - CNP        *Note was creating using voice recognition software.   The document was reviewed however grammatical errors may exist.

## 2022-10-03 DIAGNOSIS — E03.9 HYPOTHYROIDISM, UNSPECIFIED TYPE: Primary | ICD-10-CM

## 2022-10-05 ENCOUNTER — OUTSIDE SERVICES (OUTPATIENT)
Dept: FAMILY MEDICINE CLINIC | Age: 87
End: 2022-10-05
Payer: MEDICARE

## 2022-10-05 ENCOUNTER — TELEPHONE (OUTPATIENT)
Dept: FAMILY MEDICINE CLINIC | Age: 87
End: 2022-10-05

## 2022-10-05 DIAGNOSIS — E03.9 HYPOTHYROIDISM, UNSPECIFIED TYPE: ICD-10-CM

## 2022-10-05 DIAGNOSIS — I10 PRIMARY HYPERTENSION: ICD-10-CM

## 2022-10-05 DIAGNOSIS — E44.1 MILD PROTEIN-CALORIE MALNUTRITION (HCC): ICD-10-CM

## 2022-10-05 DIAGNOSIS — N18.30 STAGE 3 CHRONIC KIDNEY DISEASE, UNSPECIFIED WHETHER STAGE 3A OR 3B CKD (HCC): ICD-10-CM

## 2022-10-05 DIAGNOSIS — K57.30 DIVERTICULOSIS OF COLON: ICD-10-CM

## 2022-10-05 DIAGNOSIS — R26.2 DIFFICULTY WALKING: ICD-10-CM

## 2022-10-05 DIAGNOSIS — G20 PARKINSON DISEASE (HCC): Primary | ICD-10-CM

## 2022-10-05 DIAGNOSIS — F02.80 DEMENTIA DUE TO PARKINSON'S DISEASE WITHOUT BEHAVIORAL DISTURBANCE (HCC): ICD-10-CM

## 2022-10-05 DIAGNOSIS — G20 DEMENTIA DUE TO PARKINSON'S DISEASE WITHOUT BEHAVIORAL DISTURBANCE (HCC): ICD-10-CM

## 2022-10-05 DIAGNOSIS — D64.9 ANEMIA, UNSPECIFIED TYPE: ICD-10-CM

## 2022-10-05 DIAGNOSIS — R13.12 OROPHARYNGEAL DYSPHAGIA: ICD-10-CM

## 2022-10-05 DIAGNOSIS — E55.9 VITAMIN D DEFICIENCY: ICD-10-CM

## 2022-10-05 DIAGNOSIS — M62.59 MUSCLE WASTING AND ATROPHY, NOT ELSEWHERE CLASSIFIED, MULTIPLE SITES: ICD-10-CM

## 2022-10-05 DIAGNOSIS — I87.2 CHRONIC VENOUS INSUFFICIENCY: ICD-10-CM

## 2022-10-05 DIAGNOSIS — E78.5 HYPERLIPIDEMIA, UNSPECIFIED HYPERLIPIDEMIA TYPE: ICD-10-CM

## 2022-10-05 DIAGNOSIS — G40.909 NONINTRACTABLE EPILEPSY WITHOUT STATUS EPILEPTICUS, UNSPECIFIED EPILEPSY TYPE (HCC): ICD-10-CM

## 2022-10-05 DIAGNOSIS — K59.00 CONSTIPATION, UNSPECIFIED CONSTIPATION TYPE: ICD-10-CM

## 2022-10-05 RX ORDER — LEVOTHYROXINE SODIUM 0.12 MG/1
125 TABLET ORAL DAILY
Qty: 30 TABLET | Refills: 5 | Status: SHIPPED | OUTPATIENT
Start: 2022-10-05

## 2022-10-05 NOTE — PROGRESS NOTES
10/5/2022    Rachel Melvin  8/12/1930    This resident is being seen today for monthly evaluation visit. He is a resident who has long-term medical conditions including Parkinson's disease, hypothyroidism, oropharyngeal dysphagia, hypertension, stage III chronic kidney disease, difficulty walking, muscle wasting and atrophy, anemia, vitamin D deficiency, hyperlipidemia, epilepsy, diverticulosis of the colon, chronic venous insufficiency, constipation, mild protein calorie malnutrition, and dementia. Author Brody He is a 80 y.o. male resident who is being seen today for parkinsonism and chronic conditions. Patient is complaining that he is having increasing shakiness especially in the mornings. He is having a difficult time with ADLs including feeding himself as well as needing more assistance with dressing himself and washing. He states that he does have some improvement in this as the day goes on. He did have a virtual visit with his neurologist yesterday and I did review the neuro consult today. The recommendation is to add an to capstone 20 mg twice daily. Family is going to look into this since the co-pay for them will be $100 per month. Patient states that otherwise he is feeling well. .  Currently at this time he denies any complaints of chest pain or palpitations. Denies any headaches, any sore throat, coughing, or shortness of breath. No nausea, vomiting, constipation or diarrhea. No pain in lower extremities. No fever, or chills. No recent falls or syncopal events. Objective     Vital Signs: Stable    Physical examination:Skin is essentially warm and dry. Does have dry scaly skin with venous insufficiency to the lower extremities. HEENT unremarkable. Neck is supple. Heart regular rate and rhythm. No rubs, gallops or murmurs noted. Lungs are clear to auscultation. No evidence of rhonchi, rales, or wheezing. Abdomen is soft, supple and non-tender. Bowel sounds are noted x4 quadrants.  No rigidity, guarding or rebound tenderness. Negative Alvarez's, negative McBurney's, negative Dorian's. Extremities; no true pitting edema. Pulses are adequate. No clubbing  or no cyanosis noted. Neurologically he  is alert and oriented x3' he is aware today is his 65th wedding anniversary. There is noted trembling of his hands with cogwheeling. There is no pill-rolling noted. Does have muscle atrophy to the lower extremities. Diagnoses and all orders for this visit:    Parkinson disease ([facility] [address]    Hypothyroidism, unspecified type    Oropharyngeal dysphagia    Primary hypertension    Stage 3 chronic kidney disease, unspecified whether stage 3a or 3b CKD ([facility] [address]    Difficulty walking    Muscle wasting and atrophy, not elsewhere classified, multiple sites    Anemia, unspecified type    Vitamin D deficiency    Hyperlipidemia, unspecified hyperlipidemia type    Nonintractable epilepsy without status epilepticus, unspecified epilepsy type ([facility] [address]    Diverticulosis of colon    Chronic venous insufficiency    Constipation, unspecified constipation type    Mild protein-calorie malnutrition ([facility] [address] [facility]    Dementia due to Parkinson's disease without behavioral disturbance [facility])    Neurological consultation is reviewed. Family is reviewing the new medication recommendation and will be started pending their approval.  We will continue with his plan of care we did recommend adding weighted silverware to assist him in feeding. Ximena Black, APRN - CNP        *Note was creating using voice recognition software.   The document was reviewed however grammatical errors may exist.

## 2022-10-05 NOTE — TELEPHONE ENCOUNTER
Ximena asking us to fax recent lab to [facility]. Done      AND to advise Ximena did increase Adan's Levothyroxine to 125 mcg daily. This note faxed as well.

## 2022-10-12 ENCOUNTER — OUTSIDE SERVICES (OUTPATIENT)
Dept: FAMILY MEDICINE CLINIC | Age: 87
End: 2022-10-12
Payer: MEDICARE

## 2022-10-12 DIAGNOSIS — G89.29 CHRONIC PAIN OF BOTH KNEES: ICD-10-CM

## 2022-10-12 DIAGNOSIS — E03.9 HYPOTHYROIDISM, UNSPECIFIED TYPE: ICD-10-CM

## 2022-10-12 DIAGNOSIS — G20 DEMENTIA DUE TO PARKINSON'S DISEASE WITHOUT BEHAVIORAL DISTURBANCE (HCC): ICD-10-CM

## 2022-10-12 DIAGNOSIS — G20 PARKINSON DISEASE (HCC): ICD-10-CM

## 2022-10-12 DIAGNOSIS — R13.12 OROPHARYNGEAL DYSPHAGIA: ICD-10-CM

## 2022-10-12 DIAGNOSIS — E78.5 HYPERLIPIDEMIA, UNSPECIFIED HYPERLIPIDEMIA TYPE: ICD-10-CM

## 2022-10-12 DIAGNOSIS — M54.50 CHRONIC BILATERAL LOW BACK PAIN WITHOUT SCIATICA: ICD-10-CM

## 2022-10-12 DIAGNOSIS — K59.00 CONSTIPATION, UNSPECIFIED CONSTIPATION TYPE: ICD-10-CM

## 2022-10-12 DIAGNOSIS — L98.9 SKIN LESION: ICD-10-CM

## 2022-10-12 DIAGNOSIS — G89.29 CHRONIC BILATERAL LOW BACK PAIN WITHOUT SCIATICA: ICD-10-CM

## 2022-10-12 DIAGNOSIS — K57.30 DIVERTICULOSIS OF COLON: ICD-10-CM

## 2022-10-12 DIAGNOSIS — M25.561 CHRONIC PAIN OF BOTH KNEES: ICD-10-CM

## 2022-10-12 DIAGNOSIS — M25.562 CHRONIC PAIN OF BOTH KNEES: ICD-10-CM

## 2022-10-12 DIAGNOSIS — J18.9 PNEUMONIA OF RIGHT LOWER LOBE DUE TO INFECTIOUS ORGANISM: Primary | ICD-10-CM

## 2022-10-12 DIAGNOSIS — I45.10 RBBB: ICD-10-CM

## 2022-10-12 DIAGNOSIS — R26.2 DIFFICULTY WALKING: ICD-10-CM

## 2022-10-12 DIAGNOSIS — D64.9 ANEMIA, UNSPECIFIED TYPE: ICD-10-CM

## 2022-10-12 DIAGNOSIS — F02.80 DEMENTIA DUE TO PARKINSON'S DISEASE WITHOUT BEHAVIORAL DISTURBANCE (HCC): ICD-10-CM

## 2022-10-12 DIAGNOSIS — M62.59 MUSCLE WASTING AND ATROPHY, NOT ELSEWHERE CLASSIFIED, MULTIPLE SITES: ICD-10-CM

## 2022-10-12 DIAGNOSIS — I10 PRIMARY HYPERTENSION: ICD-10-CM

## 2022-10-12 DIAGNOSIS — G40.909 NONINTRACTABLE EPILEPSY WITHOUT STATUS EPILEPTICUS, UNSPECIFIED EPILEPSY TYPE (HCC): ICD-10-CM

## 2022-10-12 DIAGNOSIS — I87.2 CHRONIC VENOUS INSUFFICIENCY: ICD-10-CM

## 2022-10-12 DIAGNOSIS — N18.30 STAGE 3 CHRONIC KIDNEY DISEASE, UNSPECIFIED WHETHER STAGE 3A OR 3B CKD (HCC): ICD-10-CM

## 2022-10-12 DIAGNOSIS — E55.9 VITAMIN D DEFICIENCY: ICD-10-CM

## 2022-10-12 DIAGNOSIS — E44.1 MILD PROTEIN-CALORIE MALNUTRITION (HCC): ICD-10-CM

## 2022-10-12 NOTE — PROGRESS NOTES
10/12/2022    [facility]  8/12/1930    This resident is being seen today for acute evaluation visit. He is a resident who has long-term medical conditions including Parkinson's, hypothyroidism, dysphagia, hypertension, CKD, anemia, hyperlipidemia, vitamin D deficiency, epilepsy, diverticulosis, constipation, dementia, RBBB, chronic back pain, . He is a 80 y.o. male resident who is being seen today for pneumonia, increased confusion, hallucinations, increased tremors. Per nursing, the patient was having significantly increased tremors over the weekend with hallucinations and increased confusion. It is noted that he is on Levaquin and Albuterol nebulizers at that time. He was changed from the Albuterol to Xopenex nebulizers and he is improving. His breathing is better. SpO2 is up to 91 percent on room air. He has recently been started on Entacapone by neurology. The patient was afraid that the this was causing his increased tremor and hallucination so he hasn't taken it the last few days. I did explain the Albuterol is a common medication to cause tremors and to try the Entacapone again. He will do so. Patient does complain of having some difficulty getting his words formed. Currently at this time he denies any complaints of chest pain or palpitations. Denies any headaches, any sore throat. His breathing is improving. Less short of breath. Some occasional cough with mucus. Patient unsure of color. .  No nausea, vomiting, constipation or diarrhea. Chronic low back and knee pain. No fever, or chills. No recent falls or syncopal events. Objective     Vital Signs: stable, SpO2 improved    Physical examination:Skin is essentially warm and dry. HEENT hard of hearing. Neck is supple. Heart regular rate and rhythm. Soft systolic murmur. Lungs have crackles in right base. . Abdomen is soft, supple and non-tender. Bowel sounds are noted x4 quadrants. No rigidity, guarding or rebound tenderness.   Negative Alvarez's, negative McBurney's, negative Dorian's. Extremities; no true pitting edema. Pulses are adequate. No clubbing  or no cyanosis noted. Chronic venous insufficiency noted. Neurologically he  is alert and oriented x2  Tremors of upper extremities and jaw are noted. Does have some difficulty with forming words. Very weak. Diagnoses and all orders for this visit:    Pneumonia of right lower lobe due to infectious organism    Hypothyroidism, unspecified type    Parkinson disease ([address]    Oropharyngeal dysphagia    Primary hypertension    Stage 3 chronic kidney disease, unspecified whether stage 3a or 3b CKD ([address]    Difficulty walking    Muscle wasting and atrophy, not elsewhere classified, multiple sites    Anemia, unspecified type    Vitamin D deficiency    Hyperlipidemia, unspecified hyperlipidemia type    Nonintractable epilepsy without status epilepticus, unspecified epilepsy type ([address]    Diverticulosis of colon    Chronic venous insufficiency    Constipation, unspecified constipation type    Mild protein-calorie malnutrition ([address]    Dementia due to Parkinson's disease without behavioral disturbance ([address]    RBBB    Chronic bilateral low back pain without sciatica    Chronic pain of both knees    Skin lesion  Long conversation teaching about disease process in pneumonia and Parkinson's with patient and his wife. Emotional reassurance given. Will get CBC and CMP in the morning. Continue Levaquin and Xopenex. Resume Entacapone. Monitor closely. Ximena Black, APRN - CNP        *Note was creating using voice recognition software.   The document was reviewed however grammatical errors may exist.

## 2022-10-13 DIAGNOSIS — E78.5 HYPERLIPIDEMIA, UNSPECIFIED HYPERLIPIDEMIA TYPE: ICD-10-CM

## 2022-10-13 DIAGNOSIS — I10 PRIMARY HYPERTENSION: Primary | ICD-10-CM

## 2022-10-13 DIAGNOSIS — D64.9 ANEMIA, UNSPECIFIED TYPE: ICD-10-CM

## 2022-10-16 ENCOUNTER — APPOINTMENT (OUTPATIENT)
Dept: GENERAL RADIOLOGY | Age: 87
DRG: 195 | End: 2022-10-16
Payer: MEDICARE

## 2022-10-16 ENCOUNTER — HOSPITAL ENCOUNTER (INPATIENT)
Age: 87
LOS: 3 days | Discharge: NURSING, REHAB OR LONG-TERM CARE | DRG: 195 | End: 2022-10-19
Attending: EMERGENCY MEDICINE | Admitting: INTERNAL MEDICINE
Payer: MEDICARE

## 2022-10-16 DIAGNOSIS — R06.02 SHORTNESS OF BREATH: ICD-10-CM

## 2022-10-16 DIAGNOSIS — J18.9 PNEUMONIA OF RIGHT LUNG DUE TO INFECTIOUS ORGANISM, UNSPECIFIED PART OF LUNG: Primary | ICD-10-CM

## 2022-10-16 LAB
ALBUMIN SERPL-MCNC: 3.8 G/DL (ref 3.5–5.2)
ALP BLD-CCNC: 128 U/L (ref 40–129)
ALT SERPL-CCNC: <5 U/L (ref 0–40)
ANION GAP SERPL CALCULATED.3IONS-SCNC: 9 MMOL/L (ref 7–16)
AST SERPL-CCNC: 18 U/L (ref 0–39)
BASOPHILS ABSOLUTE: 0.06 E9/L (ref 0–0.2)
BASOPHILS RELATIVE PERCENT: 0.7 % (ref 0–2)
BILIRUB SERPL-MCNC: 0.9 MG/DL (ref 0–1.2)
BUN BLDV-MCNC: 23 MG/DL (ref 6–23)
CALCIUM SERPL-MCNC: 9.4 MG/DL (ref 8.6–10.2)
CHLORIDE BLD-SCNC: 106 MMOL/L (ref 98–107)
CO2: 29 MMOL/L (ref 22–29)
CREAT SERPL-MCNC: 1.2 MG/DL (ref 0.7–1.2)
EKG ATRIAL RATE: 66 BPM
EKG P AXIS: -19 DEGREES
EKG P-R INTERVAL: 280 MS
EKG Q-T INTERVAL: 466 MS
EKG QRS DURATION: 156 MS
EKG QTC CALCULATION (BAZETT): 488 MS
EKG R AXIS: -102 DEGREES
EKG T AXIS: 97 DEGREES
EKG VENTRICULAR RATE: 66 BPM
EOSINOPHILS ABSOLUTE: 0.14 E9/L (ref 0.05–0.5)
EOSINOPHILS RELATIVE PERCENT: 1.5 % (ref 0–6)
GFR AFRICAN AMERICAN: >60
GFR NON-AFRICAN AMERICAN: 57 ML/MIN/1.73
GLUCOSE BLD-MCNC: 89 MG/DL (ref 74–99)
HCT VFR BLD CALC: 36.9 % (ref 37–54)
HEMOGLOBIN: 12 G/DL (ref 12.5–16.5)
IMMATURE GRANULOCYTES #: 0.06 E9/L
IMMATURE GRANULOCYTES %: 0.7 % (ref 0–5)
LACTIC ACID: 1.4 MMOL/L (ref 0.5–2.2)
LYMPHOCYTES ABSOLUTE: 0.84 E9/L (ref 1.5–4)
LYMPHOCYTES RELATIVE PERCENT: 9.1 % (ref 20–42)
MCH RBC QN AUTO: 32.5 PG (ref 26–35)
MCHC RBC AUTO-ENTMCNC: 32.5 % (ref 32–34.5)
MCV RBC AUTO: 100 FL (ref 80–99.9)
MONOCYTES ABSOLUTE: 0.82 E9/L (ref 0.1–0.95)
MONOCYTES RELATIVE PERCENT: 8.9 % (ref 2–12)
NEUTROPHILS ABSOLUTE: 7.31 E9/L (ref 1.8–7.3)
NEUTROPHILS RELATIVE PERCENT: 79.1 % (ref 43–80)
PDW BLD-RTO: 17.4 FL (ref 11.5–15)
PLATELET # BLD: 209 E9/L (ref 130–450)
PMV BLD AUTO: 10.2 FL (ref 7–12)
POTASSIUM REFLEX MAGNESIUM: 4 MMOL/L (ref 3.5–5)
PRO-BNP: 4450 PG/ML (ref 0–450)
RBC # BLD: 3.69 E12/L (ref 3.8–5.8)
REASON FOR REJECTION: NORMAL
REJECTED TEST: NORMAL
SODIUM BLD-SCNC: 144 MMOL/L (ref 132–146)
TOTAL PROTEIN: 7.2 G/DL (ref 6.4–8.3)
TROPONIN, HIGH SENSITIVITY: 53 NG/L (ref 0–11)
WBC # BLD: 9.2 E9/L (ref 4.5–11.5)

## 2022-10-16 PROCEDURE — 2580000003 HC RX 258

## 2022-10-16 PROCEDURE — 93005 ELECTROCARDIOGRAM TRACING: CPT

## 2022-10-16 PROCEDURE — 84484 ASSAY OF TROPONIN QUANT: CPT

## 2022-10-16 PROCEDURE — 85025 COMPLETE CBC W/AUTO DIFF WBC: CPT

## 2022-10-16 PROCEDURE — 83605 ASSAY OF LACTIC ACID: CPT

## 2022-10-16 PROCEDURE — 6370000000 HC RX 637 (ALT 250 FOR IP): Performed by: EMERGENCY MEDICINE

## 2022-10-16 PROCEDURE — 2060000000 HC ICU INTERMEDIATE R&B

## 2022-10-16 PROCEDURE — 99285 EMERGENCY DEPT VISIT HI MDM: CPT

## 2022-10-16 PROCEDURE — 81001 URINALYSIS AUTO W/SCOPE: CPT

## 2022-10-16 PROCEDURE — 80053 COMPREHEN METABOLIC PANEL: CPT

## 2022-10-16 PROCEDURE — 6370000000 HC RX 637 (ALT 250 FOR IP)

## 2022-10-16 PROCEDURE — 83880 ASSAY OF NATRIURETIC PEPTIDE: CPT

## 2022-10-16 PROCEDURE — 71045 X-RAY EXAM CHEST 1 VIEW: CPT

## 2022-10-16 PROCEDURE — 6360000002 HC RX W HCPCS

## 2022-10-16 PROCEDURE — 2500000003 HC RX 250 WO HCPCS

## 2022-10-16 PROCEDURE — 87449 NOS EACH ORGANISM AG IA: CPT

## 2022-10-16 RX ORDER — ACETAMINOPHEN 650 MG/1
650 SUPPOSITORY RECTAL EVERY 6 HOURS PRN
Status: DISCONTINUED | OUTPATIENT
Start: 2022-10-16 | End: 2022-10-19 | Stop reason: HOSPADM

## 2022-10-16 RX ORDER — MONTELUKAST SODIUM 10 MG/1
10 TABLET ORAL NIGHTLY
Status: DISCONTINUED | OUTPATIENT
Start: 2022-10-16 | End: 2022-10-19 | Stop reason: HOSPADM

## 2022-10-16 RX ORDER — BUMETANIDE 1 MG/1
1 TABLET ORAL DAILY
Status: DISCONTINUED | OUTPATIENT
Start: 2022-10-17 | End: 2022-10-19 | Stop reason: HOSPADM

## 2022-10-16 RX ORDER — AMLODIPINE BESYLATE 5 MG/1
5 TABLET ORAL DAILY
Status: DISCONTINUED | OUTPATIENT
Start: 2022-10-17 | End: 2022-10-19 | Stop reason: HOSPADM

## 2022-10-16 RX ORDER — ACETAMINOPHEN 325 MG/1
650 TABLET ORAL EVERY 6 HOURS PRN
Status: DISCONTINUED | OUTPATIENT
Start: 2022-10-16 | End: 2022-10-19 | Stop reason: HOSPADM

## 2022-10-16 RX ORDER — FERROUS SULFATE 325(65) MG
325 TABLET ORAL
Status: DISCONTINUED | OUTPATIENT
Start: 2022-10-17 | End: 2022-10-19 | Stop reason: HOSPADM

## 2022-10-16 RX ORDER — DONEPEZIL HYDROCHLORIDE 10 MG/1
10 TABLET, FILM COATED ORAL NIGHTLY
Status: DISCONTINUED | OUTPATIENT
Start: 2022-10-16 | End: 2022-10-19 | Stop reason: HOSPADM

## 2022-10-16 RX ORDER — LEVOTHYROXINE SODIUM 0.12 MG/1
125 TABLET ORAL DAILY
Status: DISCONTINUED | OUTPATIENT
Start: 2022-10-17 | End: 2022-10-19 | Stop reason: HOSPADM

## 2022-10-16 RX ORDER — TRAZODONE HYDROCHLORIDE 50 MG/1
50 TABLET ORAL NIGHTLY
Status: DISCONTINUED | OUTPATIENT
Start: 2022-10-16 | End: 2022-10-19 | Stop reason: HOSPADM

## 2022-10-16 RX ORDER — RIVASTIGMINE 4.6 MG/24H
1 PATCH, EXTENDED RELEASE TRANSDERMAL DAILY
Status: DISCONTINUED | OUTPATIENT
Start: 2022-10-17 | End: 2022-10-19 | Stop reason: HOSPADM

## 2022-10-16 RX ORDER — DOCUSATE SODIUM 100 MG/1
200 CAPSULE, LIQUID FILLED ORAL DAILY
Status: DISCONTINUED | OUTPATIENT
Start: 2022-10-17 | End: 2022-10-19 | Stop reason: HOSPADM

## 2022-10-16 RX ORDER — ENOXAPARIN SODIUM 100 MG/ML
40 INJECTION SUBCUTANEOUS DAILY
Status: DISCONTINUED | OUTPATIENT
Start: 2022-10-17 | End: 2022-10-19 | Stop reason: HOSPADM

## 2022-10-16 RX ORDER — CARBIDOPA/LEVODOPA 25MG-250MG
1 TABLET ORAL 4 TIMES DAILY
Status: DISCONTINUED | OUTPATIENT
Start: 2022-10-16 | End: 2022-10-19 | Stop reason: HOSPADM

## 2022-10-16 RX ORDER — SODIUM CHLORIDE 0.9 % (FLUSH) 0.9 %
10 SYRINGE (ML) INJECTION PRN
Status: DISCONTINUED | OUTPATIENT
Start: 2022-10-16 | End: 2022-10-19 | Stop reason: HOSPADM

## 2022-10-16 RX ORDER — POLYETHYLENE GLYCOL 3350 17 G/17G
17 POWDER, FOR SOLUTION ORAL NIGHTLY
Status: DISCONTINUED | OUTPATIENT
Start: 2022-10-16 | End: 2022-10-19 | Stop reason: HOSPADM

## 2022-10-16 RX ORDER — CARBIDOPA/LEVODOPA 25MG-250MG
1 TABLET ORAL ONCE
Status: COMPLETED | OUTPATIENT
Start: 2022-10-16 | End: 2022-10-16

## 2022-10-16 RX ORDER — SODIUM CHLORIDE 0.9 % (FLUSH) 0.9 %
5-40 SYRINGE (ML) INJECTION EVERY 12 HOURS SCHEDULED
Status: DISCONTINUED | OUTPATIENT
Start: 2022-10-16 | End: 2022-10-19 | Stop reason: HOSPADM

## 2022-10-16 RX ORDER — HYDROXYZINE PAMOATE 25 MG/1
25 CAPSULE ORAL NIGHTLY PRN
Status: DISCONTINUED | OUTPATIENT
Start: 2022-10-17 | End: 2022-10-19 | Stop reason: HOSPADM

## 2022-10-16 RX ORDER — SODIUM CHLORIDE 9 MG/ML
INJECTION, SOLUTION INTRAVENOUS PRN
Status: DISCONTINUED | OUTPATIENT
Start: 2022-10-16 | End: 2022-10-19 | Stop reason: HOSPADM

## 2022-10-16 RX ORDER — ERGOCALCIFEROL 1.25 MG/1
50000 CAPSULE ORAL WEEKLY
Status: DISCONTINUED | OUTPATIENT
Start: 2022-10-17 | End: 2022-10-19 | Stop reason: HOSPADM

## 2022-10-16 RX ORDER — TRAMADOL HYDROCHLORIDE 50 MG/1
100 TABLET ORAL NIGHTLY PRN
Status: DISCONTINUED | OUTPATIENT
Start: 2022-10-17 | End: 2022-10-19 | Stop reason: HOSPADM

## 2022-10-16 RX ADMIN — TRAZODONE HYDROCHLORIDE 50 MG: 50 TABLET ORAL at 23:11

## 2022-10-16 RX ADMIN — DOXYCYCLINE 100 MG: 100 INJECTION, POWDER, LYOPHILIZED, FOR SOLUTION INTRAVENOUS at 23:05

## 2022-10-16 RX ADMIN — MONTELUKAST SODIUM 10 MG: 10 TABLET ORAL at 23:10

## 2022-10-16 RX ADMIN — PIPERACILLIN AND TAZOBACTAM 4500 MG: 4; .5 INJECTION, POWDER, FOR SOLUTION INTRAVENOUS at 23:14

## 2022-10-16 RX ADMIN — POLYETHYLENE GLYCOL 3350 17 G: 17 POWDER, FOR SOLUTION ORAL at 23:10

## 2022-10-16 RX ADMIN — DONEPEZIL HYDROCHLORIDE 10 MG: 10 TABLET, FILM COATED ORAL at 23:11

## 2022-10-16 RX ADMIN — CARBIDOPA AND LEVODOPA 1 TABLET: 25; 250 TABLET ORAL at 21:56

## 2022-10-16 RX ADMIN — SODIUM CHLORIDE, PRESERVATIVE FREE 10 ML: 5 INJECTION INTRAVENOUS at 23:11

## 2022-10-16 ASSESSMENT — PAIN - FUNCTIONAL ASSESSMENT: PAIN_FUNCTIONAL_ASSESSMENT: 0-10

## 2022-10-16 ASSESSMENT — PAIN SCALES - GENERAL
PAINLEVEL_OUTOF10: 0
PAINLEVEL_OUTOF10: 0

## 2022-10-16 ASSESSMENT — ENCOUNTER SYMPTOMS
COUGH: 1
SHORTNESS OF BREATH: 1

## 2022-10-16 NOTE — ED PROVIDER NOTES
Janelle Guillaume is a 80 y.o. male with hx of Parkinson dx, HTN, hypothyroidism, HLD, RBBB       Patient is a 80 y.o. male presents with a chief complaint of PNA without improvement  This has been occurring for the past 10 days. History limited 2/2 pt dementia. Review of Systems   Reason unable to perform ROS: 2/2 dementia. Respiratory:  Positive for cough and shortness of breath. Cardiovascular:  Negative for chest pain. Physical Exam  Constitutional:       General: He is not in acute distress. Appearance: Normal appearance. HENT:      Head: Normocephalic and atraumatic. Mouth/Throat:      Mouth: Mucous membranes are moist.      Pharynx: Oropharynx is clear. Eyes:      Extraocular Movements: Extraocular movements intact. Conjunctiva/sclera: Conjunctivae normal.      Pupils: Pupils are equal, round, and reactive to light. Cardiovascular:      Rate and Rhythm: Normal rate and regular rhythm. Pulses: Normal pulses. Heart sounds: Normal heart sounds. Pulmonary:      Effort: Pulmonary effort is normal.      Breath sounds: Wheezing present. Abdominal:      General: Abdomen is flat. Palpations: Abdomen is soft. Musculoskeletal:         General: No swelling. Normal range of motion. Cervical back: Normal range of motion and neck supple. Skin:     General: Skin is warm and dry. Neurological:      General: No focal deficit present. Mental Status: He is alert. Mental status is at baseline. Psychiatric:         Mood and Affect: Mood normal.         Behavior: Behavior normal.        Procedures     MDM  Number of Diagnoses or Management Options  Diagnosis management comments: Janelle Guillaume is a 70-year-old male presenting to the ED with complaints of shortness of breath. CBC relatively unremarkable. CMP and lactic acid within normal limits. Initial troponin of 53, repeat pending at time of transfer to floor. BNP elevated at 4450.   Chest x-ray does reveal right perihilar and right lower lobe airspace disease as well as small right pleural effusion, representing pneumonia. Plan to admit patient for IV antibiotics discussed with patient and wife, they are agreeable to plan. Spoke with May Devlin for Dr. Cassandra Cavazos who agreed to admit patient to medicine.                  --------------------------------------------- PAST HISTORY ---------------------------------------------  Past Medical History:  has a past medical history of High cholesterol, Hypertension, Hypothyroidism, Parkinson disease ([address] [address] [address] and RBBB. Past Surgical History:  has a past surgical history that includes Foot surgery (1935); shoulder surgery (Right); hernia repair (Left); Tonsillectomy and Adenoidectomy; Foot surgery; hip surgery (Left, 1/29/2021); Hip fracture surgery (Right, 3/3/2021); and Insert Midline Catheter (3/18/2021). Social History:  reports that he quit smoking about 51 years ago. He started smoking about 62 years ago. He has never used smokeless tobacco. He reports that he does not drink alcohol and does not use drugs. Family History: family history includes Breast Cancer in his sister; Cancer in his father; Heart Disease in his mother. The patients home medications have been reviewed.     Allergies: Albuterol and Sulfa antibiotics    -------------------------------------------------- RESULTS -------------------------------------------------    LABS:  Results for orders placed or performed during the hospital encounter of 10/16/22   CBC with Auto Differential   Result Value Ref Range    WBC 9.2 4.5 - 11.5 E9/L    RBC 3.69 (L) 3.80 - 5.80 E12/L    Hemoglobin 12.0 (L) 12.5 - 16.5 g/dL    Hematocrit 36.9 (L) 37.0 - 54.0 %    .0 (H) 80.0 - 99.9 fL    MCH 32.5 26.0 - 35.0 pg    MCHC 32.5 32.0 - 34.5 %    RDW 17.4 (H) 11.5 - 15.0 fL    Platelets 751 706 - 253 E9/L    MPV 10.2 7.0 - 12.0 fL    Neutrophils % 79.1 43.0 - 80.0 %    Immature Granulocytes % 0.7 0.0 - 5.0 %    Lymphocytes % 9.1 (L) 20.0 - 42.0 %    Monocytes % 8.9 2.0 - 12.0 %    Eosinophils % 1.5 0.0 - 6.0 %    Basophils % 0.7 0.0 - 2.0 %    Neutrophils Absolute 7.31 (H) 1.80 - 7.30 E9/L    Immature Granulocytes # 0.06 E9/L    Lymphocytes Absolute 0.84 (L) 1.50 - 4.00 E9/L    Monocytes Absolute 0.82 0.10 - 0.95 E9/L    Eosinophils Absolute 0.14 0.05 - 0.50 E9/L    Basophils Absolute 0.06 0.00 - 0.20 E9/L   Comprehensive Metabolic Panel w/ Reflex to MG   Result Value Ref Range    Sodium 144 132 - 146 mmol/L    Potassium reflex Magnesium 4.0 3.5 - 5.0 mmol/L    Chloride 106 98 - 107 mmol/L    CO2 29 22 - 29 mmol/L    Anion Gap 9 7 - 16 mmol/L    Glucose 89 74 - 99 mg/dL    BUN 23 6 - 23 mg/dL    Creatinine 1.2 0.7 - 1.2 mg/dL    GFR Non-African American 57 >=60 mL/min/1.73    GFR African American >60     Calcium 9.4 8.6 - 10.2 mg/dL    Total Protein 7.2 6.4 - 8.3 g/dL    Albumin 3.8 3.5 - 5.2 g/dL    Total Bilirubin 0.9 0.0 - 1.2 mg/dL    Alkaline Phosphatase 128 40 - 129 U/L    ALT <5 0 - 40 U/L    AST 18 0 - 39 U/L   Troponin   Result Value Ref Range    Troponin, High Sensitivity 53 (H) 0 - 11 ng/L   Brain Natriuretic Peptide   Result Value Ref Range    Pro-BNP 4,450 (H) 0 - 450 pg/mL   Lactic Acid   Result Value Ref Range    Lactic Acid 1.4 0.5 - 2.2 mmol/L   SPECIMEN REJECTION   Result Value Ref Range    Rejected Test TROP     Reason for Rejection see below    EKG 12 Lead   Result Value Ref Range    Ventricular Rate 66 BPM    Atrial Rate 66 BPM    P-R Interval 280 ms    QRS Duration 156 ms    Q-T Interval 466 ms    QTc Calculation (Bazett) 488 ms    P Axis -19 degrees    R Axis -102 degrees    T Axis 97 degrees       RADIOLOGY:  XR CHEST PORTABLE   Final Result   1. Right perihilar and right lower lobe airspace disease as well as a small   right pleural effusion. These findings could represent pneumonia in the   proper clinical setting.   Dedicated follow-up examination is recommended in   2-3 weeks to confirm resolution and exclude underlying pathology. EKG:  This EKG is signed and interpreted by me. Rate: 66  Rhythm: Sinus  Interpretation: non-specific EKG and 1st degree AV block  Comparison: stable as compared to patient's most recent EKG      ------------------------- NURSING NOTES AND VITALS REVIEWED ---------------------------  Date / Time Roomed:  10/16/2022  2:20 PM  ED Bed Assignment:  [id number]    The nursing notes within the ED encounter and vital signs as below have been reviewed. Patient Vitals for the past 24 hrs:   BP Temp Temp src Pulse Resp SpO2 Height Weight   10/16/22 2014 -- -- -- -- -- -- 5' 6\" (1.676 m) 169 lb (76.7 kg)   10/16/22 2012 119/65 98.2 °F (36.8 °C) -- 69 18 97 % -- --   10/16/22 1700 132/74 -- -- 77 18 98 % -- --   10/16/22 1426 135/77 98 °F (36.7 °C) Oral 76 18 97 % -- --       Oxygen Saturation Interpretation: Normal    ------------------------------------------ PROGRESS NOTES ------------------------------------------  Re-evaluation(s):  Time: 1847  Patients symptoms show no change  Repeat physical examination is not changed    Counseling:  I have spoken with the patient and wife  and discussed todays results, in addition to providing specific details for the plan of care and counseling regarding the diagnosis and prognosis. Their questions are answered at this time and they are agreeable with the plan of admission.    --------------------------------- ADDITIONAL PROVIDER NOTES ---------------------------------  Consultations:  Time: 1858. Spoke with Dr. Keke Daily. Discussed case. They will admit the patient. This patient's ED course included: a personal history and physicial examination, multiple bedside re-evaluations, IV medications, cardiac monitoring, and continuous pulse oximetry    This patient has remained hemodynamically stable during their ED course. Diagnosis:  1. Pneumonia of right lung due to infectious organism, unspecified part of lung    2.  Shortness of breath Disposition:  Patient's disposition: Admit to telemetry  Patient's condition is stable. Patient was seen and evaluated by myself and my attending Radha Urena DO. Assessment and Plan discussed with attending provider, please see attestation for final plan of care. This note was done using dictation software and there may be some grammatical errors associated with this.     DO Aliyah Last DO  Resident  10/16/22 6352

## 2022-10-17 LAB
ANION GAP SERPL CALCULATED.3IONS-SCNC: 12 MMOL/L (ref 7–16)
BACTERIA: ABNORMAL /HPF
BASOPHILS ABSOLUTE: 0.06 E9/L (ref 0–0.2)
BASOPHILS RELATIVE PERCENT: 0.7 % (ref 0–2)
BILIRUBIN URINE: NEGATIVE
BLOOD, URINE: NEGATIVE
BUN BLDV-MCNC: 24 MG/DL (ref 6–23)
CALCIUM SERPL-MCNC: 9.2 MG/DL (ref 8.6–10.2)
CHLORIDE BLD-SCNC: 104 MMOL/L (ref 98–107)
CLARITY: CLEAR
CO2: 28 MMOL/L (ref 22–29)
COLOR: YELLOW
CREAT SERPL-MCNC: 1.4 MG/DL (ref 0.7–1.2)
EOSINOPHILS ABSOLUTE: 0.25 E9/L (ref 0.05–0.5)
EOSINOPHILS RELATIVE PERCENT: 2.7 % (ref 0–6)
GFR AFRICAN AMERICAN: 57
GFR NON-AFRICAN AMERICAN: 47 ML/MIN/1.73
GLUCOSE BLD-MCNC: 82 MG/DL (ref 74–99)
GLUCOSE URINE: NEGATIVE MG/DL
HCT VFR BLD CALC: 36.9 % (ref 37–54)
HEMOGLOBIN: 11.7 G/DL (ref 12.5–16.5)
IMMATURE GRANULOCYTES #: 0.03 E9/L
IMMATURE GRANULOCYTES %: 0.3 % (ref 0–5)
KETONES, URINE: NEGATIVE MG/DL
L. PNEUMOPHILA SEROGP 1 UR AG: NORMAL
LEUKOCYTE ESTERASE, URINE: ABNORMAL
LYMPHOCYTES ABSOLUTE: 0.95 E9/L (ref 1.5–4)
LYMPHOCYTES RELATIVE PERCENT: 10.4 % (ref 20–42)
MCH RBC QN AUTO: 32.2 PG (ref 26–35)
MCHC RBC AUTO-ENTMCNC: 31.7 % (ref 32–34.5)
MCV RBC AUTO: 101.7 FL (ref 80–99.9)
MONOCYTES ABSOLUTE: 0.88 E9/L (ref 0.1–0.95)
MONOCYTES RELATIVE PERCENT: 9.6 % (ref 2–12)
NEUTROPHILS ABSOLUTE: 6.98 E9/L (ref 1.8–7.3)
NEUTROPHILS RELATIVE PERCENT: 76.3 % (ref 43–80)
NITRITE, URINE: NEGATIVE
PDW BLD-RTO: 17.5 FL (ref 11.5–15)
PH UA: 6 (ref 5–9)
PLATELET # BLD: 201 E9/L (ref 130–450)
PMV BLD AUTO: 10.7 FL (ref 7–12)
POTASSIUM REFLEX MAGNESIUM: 3.7 MMOL/L (ref 3.5–5)
PROCALCITONIN: 0.08 NG/ML (ref 0–0.08)
PROTEIN UA: ABNORMAL MG/DL
RBC # BLD: 3.63 E12/L (ref 3.8–5.8)
RBC UA: ABNORMAL /HPF (ref 0–2)
SODIUM BLD-SCNC: 144 MMOL/L (ref 132–146)
SPECIFIC GRAVITY UA: 1.02 (ref 1–1.03)
TROPONIN, HIGH SENSITIVITY: 58 NG/L (ref 0–11)
UROBILINOGEN, URINE: 0.2 E.U./DL
WBC # BLD: 9.2 E9/L (ref 4.5–11.5)
WBC UA: ABNORMAL /HPF (ref 0–5)

## 2022-10-17 PROCEDURE — 97530 THERAPEUTIC ACTIVITIES: CPT

## 2022-10-17 PROCEDURE — 6370000000 HC RX 637 (ALT 250 FOR IP)

## 2022-10-17 PROCEDURE — 84484 ASSAY OF TROPONIN QUANT: CPT

## 2022-10-17 PROCEDURE — 80048 BASIC METABOLIC PNL TOTAL CA: CPT

## 2022-10-17 PROCEDURE — 2580000003 HC RX 258

## 2022-10-17 PROCEDURE — 97161 PT EVAL LOW COMPLEX 20 MIN: CPT

## 2022-10-17 PROCEDURE — 36415 COLL VENOUS BLD VENIPUNCTURE: CPT

## 2022-10-17 PROCEDURE — 2700000000 HC OXYGEN THERAPY PER DAY

## 2022-10-17 PROCEDURE — 6360000002 HC RX W HCPCS

## 2022-10-17 PROCEDURE — 2060000000 HC ICU INTERMEDIATE R&B

## 2022-10-17 PROCEDURE — 84145 PROCALCITONIN (PCT): CPT

## 2022-10-17 PROCEDURE — 97165 OT EVAL LOW COMPLEX 30 MIN: CPT

## 2022-10-17 PROCEDURE — 85025 COMPLETE CBC W/AUTO DIFF WBC: CPT

## 2022-10-17 PROCEDURE — 2500000003 HC RX 250 WO HCPCS

## 2022-10-17 RX ADMIN — PIPERACILLIN AND TAZOBACTAM 4500 MG: 4; .5 INJECTION, POWDER, FOR SOLUTION INTRAVENOUS at 23:26

## 2022-10-17 RX ADMIN — SODIUM CHLORIDE, PRESERVATIVE FREE 10 ML: 5 INJECTION INTRAVENOUS at 20:21

## 2022-10-17 RX ADMIN — TRAZODONE HYDROCHLORIDE 50 MG: 50 TABLET ORAL at 20:20

## 2022-10-17 RX ADMIN — DOXYCYCLINE 100 MG: 100 INJECTION, POWDER, LYOPHILIZED, FOR SOLUTION INTRAVENOUS at 20:37

## 2022-10-17 RX ADMIN — PIPERACILLIN AND TAZOBACTAM 4500 MG: 4; .5 INJECTION, POWDER, FOR SOLUTION INTRAVENOUS at 16:09

## 2022-10-17 RX ADMIN — DOXYCYCLINE 100 MG: 100 INJECTION, POWDER, LYOPHILIZED, FOR SOLUTION INTRAVENOUS at 14:57

## 2022-10-17 RX ADMIN — DOCUSATE SODIUM 200 MG: 100 CAPSULE, LIQUID FILLED ORAL at 08:17

## 2022-10-17 RX ADMIN — TRAMADOL HYDROCHLORIDE 100 MG: 50 TABLET, COATED ORAL at 20:20

## 2022-10-17 RX ADMIN — SODIUM CHLORIDE, PRESERVATIVE FREE 10 ML: 5 INJECTION INTRAVENOUS at 08:18

## 2022-10-17 RX ADMIN — CARBIDOPA AND LEVODOPA 1 TABLET: 25; 250 TABLET ORAL at 16:09

## 2022-10-17 RX ADMIN — ENOXAPARIN SODIUM 40 MG: 100 INJECTION SUBCUTANEOUS at 08:17

## 2022-10-17 RX ADMIN — CALCIUM CARBONATE-VITAMIN D TAB 500 MG-200 UNIT 1 TABLET: 500-200 TAB at 08:17

## 2022-10-17 RX ADMIN — LEVOTHYROXINE SODIUM 125 MCG: 0.12 TABLET ORAL at 06:23

## 2022-10-17 RX ADMIN — CARBIDOPA AND LEVODOPA 1 TABLET: 25; 250 TABLET ORAL at 14:57

## 2022-10-17 RX ADMIN — POTASSIUM BICARBONATE 40 MEQ: 782 TABLET, EFFERVESCENT ORAL at 08:10

## 2022-10-17 RX ADMIN — CARBIDOPA AND LEVODOPA 1 TABLET: 25; 250 TABLET ORAL at 08:17

## 2022-10-17 RX ADMIN — DONEPEZIL HYDROCHLORIDE 10 MG: 10 TABLET, FILM COATED ORAL at 20:20

## 2022-10-17 RX ADMIN — PIPERACILLIN AND TAZOBACTAM 4500 MG: 4; .5 INJECTION, POWDER, FOR SOLUTION INTRAVENOUS at 06:22

## 2022-10-17 RX ADMIN — BUMETANIDE 1 MG: 1 TABLET ORAL at 08:11

## 2022-10-17 RX ADMIN — ASPIRIN 325 MG: 325 TABLET, COATED ORAL at 08:18

## 2022-10-17 RX ADMIN — AMLODIPINE BESYLATE 5 MG: 5 TABLET ORAL at 08:18

## 2022-10-17 RX ADMIN — FERROUS SULFATE TAB 325 MG (65 MG ELEMENTAL FE) 325 MG: 325 (65 FE) TAB at 08:18

## 2022-10-17 RX ADMIN — CARBIDOPA AND LEVODOPA 1 TABLET: 25; 250 TABLET ORAL at 20:20

## 2022-10-17 RX ADMIN — MONTELUKAST SODIUM 10 MG: 10 TABLET ORAL at 20:20

## 2022-10-17 RX ADMIN — POLYETHYLENE GLYCOL 3350 17 G: 17 POWDER, FOR SOLUTION ORAL at 21:40

## 2022-10-17 RX ADMIN — ERGOCALCIFEROL 50000 UNITS: 1.25 CAPSULE ORAL at 08:18

## 2022-10-17 ASSESSMENT — PAIN SCALES - GENERAL
PAINLEVEL_OUTOF10: 0
PAINLEVEL_OUTOF10: 0

## 2022-10-17 NOTE — H&P
Hospital Medicine History & Physical      PCP: Panda Milligan DO    Date of Admission: 10/16/2022    Date of Service: . OCT 17, 2022    Chief Complaint:  SOB      History Of Present Illness:     80 y.o. male presented with SOB. NO FAMILY PRESENT, AND HE HAS DEMENTIA. HE WAS FOUND TO HAVE PNEUMONIA. Past Medical History:          Diagnosis Date    High cholesterol     Hypertension     Hypothyroidism     Parkinson disease ([address]     Diagnosed in 2008 by Dr. 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  Authorizing Provider   levothyroxine (SYNTHROID) 125 MCG tablet Take 1 tablet by mouth daily 10/5/22   LORIE Daily CNP   rivastigmine (EXELON) 4.6 MG/24HR Place 1 patch onto the skin daily 3/3/22   LORIE Daily CNP   Handicap Placard MISC Permanent disability  5 years 1/21/22   Panda Milligan DO   carbidopa-levodopa (SINEMET)  MG per tablet Take 1 tablet by mouth 4 times daily 9/7/21   LORIE Sloan CNP   amLODIPine (NORVASC) 5 MG tablet Take 1 tablet by mouth daily 9/8/21   LORIE Sloan CNP   donepezil (ARICEPT) 10 MG tablet Take 10 mg by mouth nightly    Historical Provider, MD   traZODone (DESYREL) 50 MG Cancer Father         lung    Breast Cancer Sister        REVIEW OF SYSTEMS:   Pertinent positives as noted in the HPI. All other systems reviewed and negative. PHYSICAL EXAM:    BP (!) 165/76   Pulse 83   Temp 97.5 °F (36.4 °C) (Oral)   Resp 18   Ht 5' 6\" (1.676 m)   Wt 169 lb (76.7 kg)   SpO2 95%   BMI 27.28 kg/m²     General appearance:  No apparent distress, appears stated age. HEENT:  Normal cephalic, atraumatic without obvious deformity. Pupils equal, round, and reactive to light. Extra ocular muscles intact. Conjunctivae/corneas clear. Neck: Supple, with full range of motion. No jugular venous distention. Trachea midline. Respiratory:  Normal respiratory effort. DIMINISHED BILATERALLY  Cardiovascular:  RRR  Abdomen: Soft, non-tender, non-distended with normal bowel sounds. UMBILICAL HERNIA  Musculoskeletal:  No clubbing, cyanosis POS  edema bilaterally. UP TO THIGHS,    Skin: smooth and dry   Neurologic:   Cranial nerves: II-XII intact,   Psychiatric:  Alert and oriented  PERSON        Labs:     Recent Labs     10/16/22  1506 10/17/22  0400   WBC 9.2 9.2   HGB 12.0* 11.7*   HCT 36.9* 36.9*    201     Recent Labs     10/16/22  1506 10/17/22  0400    144   K 4.0 3.7    104   CO2 29 28   BUN 23 24*   CREATININE 1.2 1.4*   CALCIUM 9.4 9.2     Recent Labs     10/16/22  1506   AST 18   ALT <5   BILITOT 0.9   ALKPHOS 128     No results for input(s): INR in the last 72 hours. No results for input(s): Bridgette Smack in the last 72 hours. Urinalysis:      Lab Results   Component Value Date/Time    NITRU Negative 10/16/2022 11:30 PM    WBCUA 0-1 10/16/2022 11:30 PM    BACTERIA NONE SEEN 10/16/2022 11:30 PM    RBCUA 0-1 10/16/2022 11:30 PM    BLOODU Negative 10/16/2022 11:30 PM    SPECGRAV 1.025 10/16/2022 11:30 PM    GLUCOSEU Negative 10/16/2022 11:30 PM       Radiology:           XR CHEST PORTABLE   Final Result   1.  Right perihilar and right lower lobe airspace disease as well as a small   right pleural effusion. These findings could represent pneumonia in the   proper clinical setting. Dedicated follow-up examination is recommended in   2-3 weeks to confirm resolution and exclude underlying pathology. ASSESSMENT:    Active Hospital Problems    Diagnosis Date Noted    Pneumonia due to infectious organism [J18.9] 10/16/2022     Priority: Medium   DEMENTIA   ELEVATED TT  HYPOTHYROID  HTN  PARKINSONS  PLAN:  ZOSYN  ARICEPT  SINEMET  [address]  SYNTHROID    DVT Prophylaxis: LOVENOX  Diet: ADULT DIET; Regular; 3 carb choices (45 gm/meal)  Code Status: Full Code    PT/OT Eval Status: ORDERED  Dispo - JOSÉ MIGUEL    Electronically signed by Dede Amaral DO on 10/17/2022 at 7:21 PM FACOI       Thank you Marcia Taveras DO for the opportunity to be involved in this patient's care.  If you have any questions or concerns please feel free to contact me at [phone]

## 2022-10-17 NOTE — PROGRESS NOTES
Occupational Therapy    OCCUPATIONAL THERAPY INITIAL EVALUATION     Annie Stallworth [place], [place]         OQHJ:                                                  Patient Name: Arlette Taveras    MRN: [id number]    : 1930    Room: [address] [place] [address]      Evaluating OT: Marzena Rivera OTR/L   [id number]      Referring LORIE Beverly CNP    Specific Provider Orders/Date:OT eval and treat 10/16/2022      Diagnosis:  Pneumonia due to infectious organism [J18.9]     Pertinent Medical History: Parkinson's disease,  R shoulder surgery     Precautions:  Fall Risk, alarm , O2     Assessment of current deficits    [x] Functional mobility  [x]ADLs  [x] Strength               []Cognition    [x] Functional transfers   [x] IADLs         [x] Safety Awareness   [x]Endurance     []Fine Coordination              [x] Balance      [] Vision/perception   [x]Sensation     []Gross Motor Coordination  [] ROM  [] Delirium                   [] Motor Control     OT PLAN OF CARE   OT POC based on physician orders, patient diagnosis and results of clinical assessment    Frequency/Duration  1-3 days/wk for 2 weeks PRN   Specific OT Treatment Interventions to include:   ADL retraining/adapted techniques and AE recommendations to increase functional independence within precautions                    Energy conservation techniques to improve tolerance for selfcare routine   Functional transfer/mobility training/DME recommendations for increased independence, safety and fall prevention         Patient/family education to increase safety and functional independence             Environmental modifications for safe mobility and completion of ADLs                             Therapeutic activity to improve functional performance during ADLs.                                          Therapeutic exercise to improve tolerance and functional strength for ADLs    Balance retraining/tolerance tasks for facilitation of postural control with dynamic challenges during ADLs . Positioning to improve functional independence    Recommended Adaptive Equipment: TBD     Home Living: Pt lives at [facility]. Assist with ADLs, xfers, and mobility      Pain Level: no pain this session ;   Cognition: A&O: pleasant, conversing, and following commands   Memory:  fair    Sequencing:  fair    Problem solving:  fair    Judgement/safety:  fair      Functional Assessment:  AM-PAC Daily Activity Raw Score: 10/24   Initial Eval Status  Date: 10/17/22 Treatment Status  Date: STGs = LTGs  Time frame: 10-14 days   Feeding Min A  SBA   Grooming Max A ,seated   Mod A    UB Dressing Max A  Mod A    LB Dressing Max  A/dependent   Max A   Bathing Max A     Toileting Dependent   Max A    Bed Mobility  Max A/dependent - rolling and repositioning in bed   Max  A   Functional Transfers NT   Patient soiled - assisted with hygiene and clean linens   Max A    Functional Mobility NT   PTA:  ???  Max A with fair +  tolerance    Balance Sitting:     Static:  patient attempting to assist with long sit  Standing: NT   Min/CGA- sittiing   Max/Mod A - standing    Activity Tolerance No SOB   Fair+ with ADL activity    Visual/  Perceptual Glasses: yes                 Hand Dominance right    AROM (PROM) Strength Additional Info:    RUE  R shoulder limited - past surgery   Distally WFLS    Stiffness  Fair + Fair+  and wfl FMC/dexterity noted during ADL tasks       LUE WFL Fair+ Fair+ and wfl FMC/dexterity noted during ADL tasks       Hearing: WFL   Sensation:  No c/o numbness or tingling   Tone: WFL   Edema: none observed     Comments: Upon arrival patient lying in bed . At end of session, patient remained in bed  with call light and phone within reach, all lines and tubes intact. *ALARM ON     Overall patient demonstrated  decreased independence and safety during completion of ADL/functional transfer/mobility tasks.   Pt would benefit from continued skilled OT to increase safety and independence with completion of ADL/IADL tasks for functional independence and quality of life. Rehab Potential: fair for established goals     Patient / Family Goal: none stated       Patient and/or family were instructed on functional diagnosis, prognosis/goals and OT plan of care. Demonstrated fair  understanding. Eval Complexity: Low    Time In: 1440  Time Out: 1453      Min Units   OT Eval Low 97165 x  1   OT Eval Medium 25469      OT Eval High 81051      OT Re-Eval [id number]       Therapeutic Ex 91185      Therapeutic Activities 11815       ADL/Self Care 48716       Orthotic Management 53498       Manual 80858     Neuro Re-Ed 24530       Non-Billable Time         Evaluation Time additionally includes thorough review of current medical information, gathering information on past medical history/social history and prior level of function, interpretation of standardized testing/informal observation of tasks, assessment of data and development of plan of care and goals.             Raleigh Joy  OTR/L  OT [id number]

## 2022-10-17 NOTE — PROGRESS NOTES
Physical Therapy  Facility/Department: [facility]  Physical Therapy Initial Assessment    Name: Adela Smith  : 1930  MRN: [id number]  Date of Service: 10/17/2022      Patient Diagnosis(es): The primary encounter diagnosis was Pneumonia of right lung due to infectious organism, unspecified part of lung. A diagnosis of Shortness of breath was also pertinent to this visit. Past Medical History:  has a past medical history of High cholesterol, Hypertension, Hypothyroidism, Parkinson disease ([facility] [address] and RBBB. Past Surgical History:  has a past surgical history that includes Foot surgery (); shoulder surgery (Right); hernia repair (Left); Tonsillectomy and Adenoidectomy; Foot surgery; hip surgery (Left, 2021); Hip fracture surgery (Right, 3/3/2021); and Insert Midline Catheter (3/18/2021). Requires PT Follow-Up: Yes     Evaluating Therapist: Jessie Hayes PT      Referring Provider:  LORIE Du - CNP     PT order : PT eval and treat      Room #:  098   DIAGNOSIS:  The primary encounter diagnosis was Pneumonia of right lung due to infectious organism, unspecified part of lung. A diagnosis of Shortness of breath was also pertinent to this visit. PRECAUTIONS:  Falls , contact isolation O2@ 2 LNC      Social:  Pt admitted from SNF   Prior to admission : pt reports he walks - questionable historian        Initial Evaluation  Date: 10/17/2022  Treatment      Short Term/ Long Term   Goals   Was pt agreeable to Eval/treatment?   yes        Does pt have pain?  none reported        Bed Mobility  Rolling: mod assist    Supine to sit:  max assist   Sit to supine:  max/dep assist   Scooting:  mod assist in sit    Min/ mod assist    Transfers Sit to stand:  unable      mod assist    Ambulation   NT, pt unable     20  feet with  ww  with  Mod assist    LE ROM  AAROM WFL        LE strength  3-/ 5         AM- PAC RAW score                   Pt is alert and Oriented x  3, grossly Balance:  Min assist sitting EOB, unable to stand . High fall risk due to weakness, poor balance, and extensive assist required with mobility      Endurance: decreased   Bed/Chair alarm: Yes      ASSESSMENT    Pt displays functional ability as noted in the objective portion of this evaluation. Conditions Requiring Skilled Therapeutic Intervention:     [x]Decreased strength                                      [x]Decreased ROM  []Decreased functional mobility  [x]Decreased balance   [x]Decreased endurance   [x]Decreased posture  []Decreased sensation  [x]Decreased coordination   []Decreased vision  [x]Decreased safety awareness   []Increased pain             Treatment/Education:    Pt in bed upon arrival; agreeable to PT. Mobility as above. Pt required cues for technique with all mobility, as well as assist for all mobility. Needed physical assist to place LEs on floor for transfer. Pt threw LE and trunk into extension with attempts to stand; unable to achieve stand. Pt performed seated hip flex and LAQs in sit. Cues needed for proper breathing technique throughout session. O2@ 2 LNC. SpO2 92% at rest, 96% with minimal mobility, 95% after activity. Pt educated on fall risk,  Safety with mobility         Patient response to education:   Pt verbalized understanding Pt demonstrated skill Pt requires further education in this area   yes no  yes          Comments:  Pt left in bed  after session, with call light in reach. Rehab potential is Good for reaching above PT goals. Pts/ family goals     1. None stated      Patient and or family understand(s) diagnosis, prognosis, and plan of care. - no     PLAN    PT care will be provided in accordance with the objectives noted above. Whenever appropriate, clear delegation orders will be provided for nursing staff. Exercises and functional mobility practice will be used as well as appropriate assistive devices or modalities to obtain goals. Patient and family education will also be administered as needed. PLAN OF CARE:     Current Treatment Recommendations      [x] Strengthening to improve independence with functional mobility   [] ROM to improve independence with functional mobility   [x] Balance Training to improve static/dynamic balance and to reduce fall risk  [x] Endurance Training to improve activity tolerance during functional mobility   [x] Transfer Training to improve safety and independence with all functional transfers   [x] Gait Training to improve gait mechanics, endurance and assess need for appropriate assistive device  [] Stair Training in preparation for safe discharge home and/or into the community   [x] Positioning to prevent skin breakdown and contractures  [x] Safety and Education Training   [x] Patient/Caregiver Education   [] HEP  [] Other      Frequency of treatments will be 2-5x/week x  7-14 days. Time in: 0913  Time out: 0937        Evaluation Time includes thorough review of current medical information, gathering information on past medical history/social history and prior level of function, completion of standardized testing/informal observation of tasks, assessment of data and education on plan of care and goals.      CPT codes:  [x] Low Complexity PT evaluation 15812  [] Moderate Complexity PT evaluation 64875  [] High Complexity PT evaluation 92808  [] PT Re-evaluation 98524  [] Gait training 53100  minutes  [x] Therapeutic activities 67585  10 minutes  [] Therapeutic exercises 94449  minutes  [] Neuromuscular reeducation 74586  minutes         Isa Sr number:  PT 3684

## 2022-10-18 VITALS
HEIGHT: 66 IN | SYSTOLIC BLOOD PRESSURE: 131 MMHG | WEIGHT: 192.6 LBS | DIASTOLIC BLOOD PRESSURE: 71 MMHG | RESPIRATION RATE: 16 BRPM | TEMPERATURE: 98.1 F | BODY MASS INDEX: 30.95 KG/M2 | OXYGEN SATURATION: 98 % | HEART RATE: 95 BPM

## 2022-10-18 LAB
ANION GAP SERPL CALCULATED.3IONS-SCNC: 11 MMOL/L (ref 7–16)
BUN BLDV-MCNC: 27 MG/DL (ref 6–23)
CALCIUM SERPL-MCNC: 9.2 MG/DL (ref 8.6–10.2)
CHLORIDE BLD-SCNC: 104 MMOL/L (ref 98–107)
CO2: 28 MMOL/L (ref 22–29)
CREAT SERPL-MCNC: 1.4 MG/DL (ref 0.7–1.2)
GFR SERPL CREATININE-BSD FRML MDRD: 47 ML/MIN/1.73
GLUCOSE BLD-MCNC: 85 MG/DL (ref 74–99)
POTASSIUM SERPL-SCNC: 3.8 MMOL/L (ref 3.5–5)
SODIUM BLD-SCNC: 143 MMOL/L (ref 132–146)

## 2022-10-18 PROCEDURE — 2580000003 HC RX 258

## 2022-10-18 PROCEDURE — 36415 COLL VENOUS BLD VENIPUNCTURE: CPT

## 2022-10-18 PROCEDURE — 6360000002 HC RX W HCPCS

## 2022-10-18 PROCEDURE — 2500000003 HC RX 250 WO HCPCS

## 2022-10-18 PROCEDURE — 2700000000 HC OXYGEN THERAPY PER DAY

## 2022-10-18 PROCEDURE — 97530 THERAPEUTIC ACTIVITIES: CPT

## 2022-10-18 PROCEDURE — 2060000000 HC ICU INTERMEDIATE R&B

## 2022-10-18 PROCEDURE — 6370000000 HC RX 637 (ALT 250 FOR IP)

## 2022-10-18 PROCEDURE — 97535 SELF CARE MNGMENT TRAINING: CPT

## 2022-10-18 PROCEDURE — 6370000000 HC RX 637 (ALT 250 FOR IP): Performed by: INTERNAL MEDICINE

## 2022-10-18 PROCEDURE — 80048 BASIC METABOLIC PNL TOTAL CA: CPT

## 2022-10-18 RX ORDER — DOXYCYCLINE HYCLATE 100 MG
100 TABLET ORAL 2 TIMES DAILY
Qty: 14 TABLET | Refills: 0 | DISCHARGE
Start: 2022-10-18 | End: 2022-10-25

## 2022-10-18 RX ADMIN — SODIUM CHLORIDE, PRESERVATIVE FREE 10 ML: 5 INJECTION INTRAVENOUS at 09:01

## 2022-10-18 RX ADMIN — PIPERACILLIN AND TAZOBACTAM 4500 MG: 4; .5 INJECTION, POWDER, FOR SOLUTION INTRAVENOUS at 15:11

## 2022-10-18 RX ADMIN — MONTELUKAST SODIUM 10 MG: 10 TABLET ORAL at 22:05

## 2022-10-18 RX ADMIN — ASPIRIN 325 MG: 325 TABLET, COATED ORAL at 08:59

## 2022-10-18 RX ADMIN — PETROLATUM: 420 OINTMENT TOPICAL at 09:17

## 2022-10-18 RX ADMIN — PETROLATUM: 420 OINTMENT TOPICAL at 22:06

## 2022-10-18 RX ADMIN — CALCIUM CARBONATE-VITAMIN D TAB 500 MG-200 UNIT 1 TABLET: 500-200 TAB at 09:00

## 2022-10-18 RX ADMIN — TRAZODONE HYDROCHLORIDE 50 MG: 50 TABLET ORAL at 22:05

## 2022-10-18 RX ADMIN — CARBIDOPA AND LEVODOPA 1 TABLET: 25; 250 TABLET ORAL at 09:00

## 2022-10-18 RX ADMIN — CARBIDOPA AND LEVODOPA 1 TABLET: 25; 250 TABLET ORAL at 16:27

## 2022-10-18 RX ADMIN — FERROUS SULFATE TAB 325 MG (65 MG ELEMENTAL FE) 325 MG: 325 (65 FE) TAB at 09:00

## 2022-10-18 RX ADMIN — DOXYCYCLINE 100 MG: 100 INJECTION, POWDER, LYOPHILIZED, FOR SOLUTION INTRAVENOUS at 10:56

## 2022-10-18 RX ADMIN — POTASSIUM BICARBONATE 40 MEQ: 782 TABLET, EFFERVESCENT ORAL at 09:00

## 2022-10-18 RX ADMIN — DONEPEZIL HYDROCHLORIDE 10 MG: 10 TABLET, FILM COATED ORAL at 22:05

## 2022-10-18 RX ADMIN — POLYETHYLENE GLYCOL 3350 17 G: 17 POWDER, FOR SOLUTION ORAL at 22:05

## 2022-10-18 RX ADMIN — AMLODIPINE BESYLATE 5 MG: 5 TABLET ORAL at 09:00

## 2022-10-18 RX ADMIN — BUMETANIDE 1 MG: 1 TABLET ORAL at 08:59

## 2022-10-18 RX ADMIN — LEVOTHYROXINE SODIUM 125 MCG: 0.12 TABLET ORAL at 06:24

## 2022-10-18 RX ADMIN — PIPERACILLIN AND TAZOBACTAM 4500 MG: 4; .5 INJECTION, POWDER, FOR SOLUTION INTRAVENOUS at 06:26

## 2022-10-18 RX ADMIN — SODIUM CHLORIDE, PRESERVATIVE FREE 10 ML: 5 INJECTION INTRAVENOUS at 22:05

## 2022-10-18 RX ADMIN — CARBIDOPA AND LEVODOPA 1 TABLET: 25; 250 TABLET ORAL at 22:54

## 2022-10-18 RX ADMIN — DOCUSATE SODIUM 200 MG: 100 CAPSULE, LIQUID FILLED ORAL at 09:00

## 2022-10-18 RX ADMIN — ENOXAPARIN SODIUM 40 MG: 100 INJECTION SUBCUTANEOUS at 09:01

## 2022-10-18 RX ADMIN — CARBIDOPA AND LEVODOPA 1 TABLET: 25; 250 TABLET ORAL at 13:25

## 2022-10-18 ASSESSMENT — PAIN SCALES - GENERAL: PAINLEVEL_OUTOF10: 0

## 2022-10-18 NOTE — CARE COORDINATION
Discharge order noted to return to [facility] today. Ambulance transport set up w/ [facility]-  time 10pm. O2 order/testing noted-[facility] to deliver O2 equipment to [facility]. Alexandria Chanel @ [facility] [phone], nursing, and wife Maryan Castañeda [phone] notified of discharge destination and time- wife is aware ambulance transport may not be covered by insurance. [facility] transport form on chart.   Vicente Mcdonough, PETE case manager

## 2022-10-18 NOTE — PROGRESS NOTES
Occupational Therapy  OT BEDSIDE TREATMENT NOTE      Date:10/18/2022  Patient Name: Yuliya Flores  MRN: [id number]  : 1930  Room: [address] [place], [place] [address]A     Evaluating OT: Graciella Moritz OTR/L   [id number]       Referring LORIE Mercado CNP    Specific Provider Orders/Date:OT eval and treat 10/16/2022       Diagnosis:  Pneumonia due to infectious organism [J18.9]     Pertinent Medical History: Parkinson's disease,  R shoulder surgery     Precautions:  Fall Risk,  O2      Assessment of current deficits    [x] Functional mobility            [x]ADLs           [x] Strength                  []Cognition    [x] Functional transfers          [x] IADLs         [x] Safety Awareness   [x]Endurance     []Fine Coordination                         [x] Balance      [] Vision/perception   [x]Sensation      []Gross Motor Coordination             [] ROM           [] Delirium                   [] Motor Control      OT PLAN OF CARE   OT POC based on physician orders, patient diagnosis and results of clinical assessment     Frequency/Duration  1-3 days/wk for 2 weeks PRN   Specific OT Treatment Interventions to include:   ADL retraining/adapted techniques and AE recommendations to increase functional independence within precautions                    Energy conservation techniques to improve tolerance for selfcare routine   Functional transfer/mobility training/DME recommendations for increased independence, safety and fall prevention         Patient/family education to increase safety and functional independence             Environmental modifications for safe mobility and completion of ADLs                             Therapeutic activity to improve functional performance during ADLs. Therapeutic exercise to improve tolerance and functional strength for ADLs    Balance retraining/tolerance tasks for facilitation of postural control with dynamic challenges during ADLs .       Positioning to improve functional independence     Recommended Adaptive Equipment: TBD      Home Living: Pt lives at [address] [facility]. Assist with ADLs, xfers, and mobility       Pain Level: Pt did not report pain. Cognition: Awake and alert. Cues for safety and direction following. Functional Assessment:  AM-PAC Daily Activity Raw Score: 12/24    Initial Eval Status  Date: 10/17/22 Treatment Status  Date:10/18/22  STGs = LTGs  Time frame: 10-14 days   Feeding Min A   SBA   Grooming Max A ,seated  Min A to wash face while seated on the EOB. Mod A    UB Dressing Max A  max A to change gown. Mod A    LB Dressing Max  A/dependent   max A  Max A   Bathing Max A       Toileting Dependent    Max A    Bed Mobility  Max A/dependent - rolling and repositioning in bed  Max A supine to sit   Max A sit to supine . Mod A rolling side to side for positioning and ADL. Max  A   Functional Transfers NT   Patient soiled - assisted with hygiene and clean linens  Unable to safely stand from the side of the bed. Max A    Functional Mobility NT   PTA:  ???   Max A with fair +  tolerance    Balance Sitting:     Static:  patient attempting to assist with long sit  Standing: NT  Min -mod A sit balance   Increased assist with pt fatigue. Min/CGA- sittiing   Max/Mod A - standing    Activity Tolerance No SOB  Limited. Fatigue with sitting 10 minutes. Fair+ with ADL activity      Comments:  pt agreeable to therapy. Completed AROM while seated on the side of the bed within limited range. Fatigue noted. Trunk challenges to facilitate strength, endurance, and increase upright sitting posture. He was assisted back to bed due to pt complaint of fatigue and wanting to return to the bed. Linens changed and positioning completed. Education/treatment:  ADL retraining with facilitation of movement to increase self care skills. Therapeutic activity to address balance and endurance for ADL and transfers. Pt education of daily orientation. Pt has made  progress towards set goals.        Time In: 9:30   Time Out: 10:00      Min Units   Therapeutic Ex 11761     Therapeutic Activities 14352 20 1   ADL/Self Care 94535 10 1   Orthotic Management 98166     Neuro Re-Ed 94221     Non-Billable Time     TOTAL TIMED TREATMENT [facility] BETHANY/L [address]

## 2022-10-18 NOTE — PROGRESS NOTES
Wife Darren Mario called in to receive guidance for her husbands care, notified  who will call with update

## 2022-10-18 NOTE — PROGRESS NOTES
Pulse ox was 91% on room air at rest.  Oxygen saturation was 85% on room air while rolling side to side in bed. Oxygen applied. Recovery pulse ox was 93% on 2 liters of oxygen while rolling side to side.

## 2022-10-18 NOTE — PROGRESS NOTES
Hospitalist Progress Note      PCP: Xiomy Skinner DO    Date of Admission: 10/16/2022        Hospital Course:    80 y.o. male presented with SOB. NO FAMILY PRESENT, AND HE HAS DEMENTIA. HE WAS FOUND TO HAVE PNEUMONIA      Subjective:    FEELING BETTER, WANTS TO GO HOME        Medications:  Reviewed    Infusion Medications    sodium chloride       Scheduled Medications    white petrolatum   Topical BID    doxycycline (VIBRAMYCIN) IV  100 mg IntraVENous BID    amLODIPine  5 mg Oral Daily    aspirin  325 mg Oral Daily    bumetanide  1 mg Oral Daily    oyster shell calcium w/D  1 tablet Oral Daily    carbidopa-levodopa  1 tablet Oral 4x Daily    docusate sodium  200 mg Oral Daily    donepezil  10 mg Oral Nightly    ferrous sulfate  325 mg Oral Daily with breakfast    levothyroxine  125 mcg Oral Daily    montelukast  10 mg Oral Nightly    polyethylene glycol  17 g Oral Nightly    potassium bicarb-citric acid  40 mEq Oral Daily    rivastigmine  1 patch TransDERmal Daily    traZODone  50 mg Oral Nightly    vitamin D  50,000 Units Oral Weekly    sodium chloride flush  5-40 mL IntraVENous 2 times per day    enoxaparin  40 mg SubCUTAneous Daily    piperacillin-tazobactam  4,500 mg IntraVENous Q8H     PRN Meds: hydrOXYzine pamoate, traMADol, sodium chloride flush, sodium chloride, acetaminophen **OR** acetaminophen, trimethobenzamide      Intake/Output Summary (Last 24 hours) at 10/18/2022 1441  Last data filed at 10/18/2022 0458  Gross per 24 hour   Intake --   Output 500 ml   Net -500 ml       Exam:    /77   Pulse 60   Temp 97.5 °F (36.4 °C) (Axillary)   Resp 16   Ht 5' 6\" (1.676 m)   Wt 192 lb 9.6 oz (87.4 kg)   SpO2 95%   BMI 31.09 kg/m²       General appearance:  No apparent distress, appears stated age. HEENT:  Normal cephalic, atraumatic without obvious deformity. Pupils equal, round, and reactive to light. Extra ocular muscles intact. Conjunctivae/corneas clear.   Neck: Supple, with full range of motion. No jugular venous distention. Trachea midline. Respiratory:  Normal respiratory effort. DIMINISHED BILATERALLY  Cardiovascular:  RRR  Abdomen: Soft, non-tender, non-distended with normal bowel sounds. UMBILICAL HERNIA  Musculoskeletal:  No clubbing, cyanosis POS  edema bilaterally. UP TO THIGHS,    Skin: smooth and dry   Neurologic:   Cranial nerves: II-XII intact,   Psychiatric:  Alert and oriented  PERSON               Labs:   Recent Labs     10/16/22  1506 10/17/22  0400   WBC 9.2 9.2   HGB 12.0* 11.7*   HCT 36.9* 36.9*    201     Recent Labs     10/16/22  1506 10/17/22  0400 10/18/22  0500    144 143   K 4.0 3.7 3.8    104 104   CO2 29 28 28   BUN 23 24* 27*   CREATININE 1.2 1.4* 1.4*   CALCIUM 9.4 9.2 9.2     Recent Labs     10/16/22  1506   AST 18   ALT <5   BILITOT 0.9   ALKPHOS 128     No results for input(s): INR in the last 72 hours. No results for input(s): Othelia Nutley in the last 72 hours. Recent Labs     10/16/22  1506   AST 18   ALT <5   BILITOT 0.9   ALKPHOS 128     Recent Labs     10/16/22  1506   LACTA 1.4     Lab Results   Component Value Date    LABURIC 9.1 (H) 02/19/2020     Lab Results   Component Value Date    AMMONIA 23.0 09/04/2021       Assessment:    Active Hospital Problems    Diagnosis Date Noted    Pneumonia due to infectious organism [J18.9] 10/16/2022     Priority: Medium   DEMENTIA   ELEVATED TT  HYPOTHYROID  HTN  PARKINSONS  PLAN:  ZOSYN  ARICEPT  SINEMET  [address]  SYNTHROID     DVT Prophylaxis: LOVENOX  Diet: ADULT DIET;  Regular; 3 carb choices (45 gm/meal)  Code Status: Full Code     PT/OT Eval Status: ORDERED  Dispo - JOSÉ MIGUEL         Electronically signed by Surinder Caraballo DO on 10/18/2022 at 2:41 PM Maribell Villeda Acuity of illness

## 2022-10-18 NOTE — DISCHARGE INSTR - COC
Continuity of Care Form    Patient Name: Bogdan Squires   :  1930  MRN:  [id number]    Admit date:  10/16/2022  Discharge date:  ***    Code Status Order: Full Code   Advance Directives:     Admitting Physician:  No admitting provider for patient encounter. PCP: Flako Villeda DO    Discharging Nurse: [facility] Unit/Room#: 5230/7381-H  Discharging Unit Phone Number: ***    Emergency Contact:   Extended Emergency Contact Information  Primary Emergency Contact: Liss Luevano  Address: [address] [place] [address] Phone: [phone]  Mobile Phone: [phone]  Relation: Spouse   needed? No  Secondary Emergency Contact: [address]  [place] Phone: [phone]  Mobile Phone: [phone]  Relation: Grandchild  Preferred language: English   needed?  No    Past Surgical History:  Past Surgical History:   Procedure Laterality Date    FOOT SURGERY      FOOT SURGERY      club foot repair-age 6     HERNIA REPAIR Left     groin repair     HIP FRACTURE SURGERY Right 3/3/2021    INTRAMEDULLARY NAIL HIP FIXATION RIGHT HIP   +++SYNTHES+++ performed by Gabrielle Jensen MD at [place] [address] Left 2021    LEFT HIP HEMIARTHROPLASTY performed by Darylene Sparks, MD at [address] [place] [address]  3/18/2021         SHOULDER SURGERY Right     5-10 years ago    TONSILLECTOMY AND ADENOIDECTOMY         Immunization History:   Immunization History   Administered Date(s) Administered    COVID-19, MODERNA BLUE border, Primary or Immunocompromised, (age 12y+), IM, 100 mcg/0.5mL 2021, 2021    Influenza Virus Vaccine 10/19/2010, 2011, 10/29/2012, 10/17/2013, 2014, 10/09/2015, 2016    Influenza, FLUAD, (age 72 y+), Adjuvanted, 0.5mL 2020, 2021    Influenza, FLUARIX, FLULAVAL, FLUZONE (age 10 mo+) AND AFLURIA, (age 1 y+), PF, 0.5mL 10/24/2018, 2020    Influenza, FLUCELVAX, (age 10 mo+), MDCK, PF, 0.5mL 09/20/2017    Influenza, Triv, inactivated, subunit, adjuvanted, IM (Fluad 65 yrs and older) 09/30/2019    Pneumococcal Conjugate 13-valent (Xxsupgn32) 06/19/2015    Pneumococcal Polysaccharide (Arhicrxtq01) 10/29/2012    Zoster Live (Zostavax) 11/14/2014    Zoster Recombinant (Shingrix) 08/15/2019, 02/06/2020, 02/06/2020       Active Problems:  Patient Active Problem List   Diagnosis Code    Hypertension I10    Parkinson disease ([facility] [address] G20    Hypothyroidism E03.9    Stage 3 chronic kidney disease N18.30    Chronic venous insufficiency I87.2    Closed fracture of neck of right femur ([facility]) I03.268E    Acute metabolic encephalopathy V33.94    Mild protein-calorie malnutrition (HCC) E44.1    Nonintractable epilepsy without status epilepticus ([facility] [address] G40.909    Oropharyngeal dysphagia R13.12    Difficulty walking R26.2    Acute respiratory failure with hypoxia ([facility]) J96.01    Muscle wasting and atrophy, not elsewhere classified, multiple sites M62.59    Anemia D64.9    Hyperlipidemia E78.5    RBBB I45.10    Diverticulosis of colon K57.30    Constipation K59.00    Pneumonia due to infectious organism J18.9       Isolation/Infection:   Isolation            Contact          Patient Infection Status       Infection Onset Added Last Indicated Last Indicated By Review Planned Expiration Resolved Resolved By    ESBL (Extended Spectrum Beta Lactamase)  08/31/21 08/31/21 Osman Phillips RN        Klebsiella pneumoniae urine 8/3/2021    MDRO (multi-drug resistant organism) 08/03/21 08/05/21 08/03/21 Culture, Urine        Klebsiella pneumoniae urine 8/3/2021    VRE  03/16/21 03/16/21 Osman Phillips RN        +MDRO Enterococcus durans urine 3/14/2021    Resolved    COVID-19 (Rule Out) 03/03/21 03/03/21 03/03/21 Respiratory Panel, Molecular, with COVID-19 (Restricted: peds pts or suitable admitted adults) (Ordered)   03/03/21 Rule-Out Test Resulted    COVID-19 (Rule Out) 01/27/21 01/27/21 01/27/21 COVID-19 (Ordered) 01/27/21 Rule-Out Test Resulted            Nurse Assessment:  Last Vital Signs: /77   Pulse 60   Temp 97.5 °F (36.4 °C) (Axillary)   Resp 16   Ht 5' 6\" (1.676 m)   Wt 192 lb 9.6 oz (87.4 kg)   SpO2 95%   BMI 31.09 kg/m²     Last documented pain score (0-10 scale): Pain Level: 0  Last Weight:   Wt Readings from Last 1 Encounters:   10/18/22 192 lb 9.6 oz (87.4 kg)     Mental Status:  {IP PT MENTAL STATUS:20030}    IV Access:  { JOSEFINA IV ACCESS:772842322}    Nursing Mobility/ADLs:  Walking   {CHP DME AHRP:447936076}  Transfer  {CHP DME ABSW:973522343}  Bathing  {CHP DME WXVT:936334121}  Dressing  {CHP DME JFES:188772960}  Toileting  {CHP DME IJCZ:067237377}  Feeding  {CHP DME XLCY:882054106}  Med Admin  {CHP DME NNAY:570506726}  Med Delivery   { JOSEFINA MED Delivery:19343}    Wound Care Documentation and Therapy:  Wound 08/30/21 Back Mid (Active)   Number of days: 413       Wound 08/30/21 Other (Comment) Right;Dorsal (Active)   Number of days: 413       Wound 08/30/21 Other (Comment) Right;Dorsal THIS IS A DUPLICATE WOUND!!!!!!  SEE ABOVE (Active)   Number of days: 413       Wound 10/18/22 Back Medial circular wound (Active)   Dressing/Treatment Open to air 10/18/22 1008   Wound Length (cm) 1 cm 10/18/22 1008   Wound Width (cm) 1.8 cm 10/18/22 1008   Wound Surface Area (cm^2) 1.8 cm^2 10/18/22 1008   Drainage Amount None 10/18/22 1008   Odor None 10/18/22 1008   Number of days: 0       Incision 01/29/21 Hip Left;Lateral (Active)   Number of days: 627       Incision 03/03/21 Hip Right;Lateral;Outer (Active)   Number of days: 593        Elimination:  Continence:    Bowel: {YES / CX:05676}  Bladder: {YES / KV:94029}  Urinary Catheter: {Urinary Catheter:280075486}   Colostomy/Ileostomy/Ileal Conduit: {YES / XP:67352}       Date of Last BM: ***    Intake/Output Summary (Last 24 hours) at 10/18/2022 1444  Last data filed at 10/18/2022 0458  Gross per 24 hour   Intake --   Output 500 ml   Net -500 ml     I/O last 3 completed shifts:  In: -   Out: 500 [Urine:500]    Safety Concerns:     [address] Safety Concerns:303495306}    Impairments/Disabilities:      [address] Impairments/Disabilities:706188519}    Nutrition Therapy:  Current Nutrition Therapy:   [address] Diet List:559682010}    Routes of Feeding: {CHP DME Other Feedings:587560293}  Liquids: {Slp liquid thickness:54666}  Daily Fluid Restriction: {CHP DME Yes amt example:668303093}  Last Modified Barium Swallow with Video (Video Swallowing Test): {Done Not Done YNRX:322826246}    Treatments at the Time of Hospital Discharge:   Respiratory Treatments: ***  Oxygen Therapy:  {Therapy; copd oxygen:66342}  Ventilator:    { CC Vent PDO}    Rehab Therapies: {THERAPEUTIC INTERVENTION:2368714834}  Weight Bearing Status/Restrictions: Paula Qureshi  Weight Bearin}  Other Medical Equipment (for information only, NOT a DME order):  {EQUIPMENT:034619546}  Other Treatments: ***    Patient's personal belongings (please select all that are sent with patient):  {CHP DME Belongings:278628850}    RN SIGNATURE:  {Esignature:617416948}    CASE MANAGEMENT/SOCIAL WORK SECTION    Inpatient Status Date: ***    Readmission Risk Assessment Score:  Readmission Risk              Risk of Unplanned Readmission:  17           Discharging to Facility/ Agency   Name:   Address:  Phone:  Fax:    Dialysis Facility (if applicable)   Name:  Address:  Dialysis Schedule:  Phone:  Fax:    / signature: {Esignature:310197164}    PHYSICIAN SECTION    Prognosis: {Prognosis:4867274711}    Condition at Discharge: [address] Qureshi Patient Condition:970929587}    Rehab Potential (if transferring to Rehab): {Prognosis:9883296640}    Recommended Labs or Other Treatments After Discharge: ***    Physician Certification: I certify the above information and transfer of Ted Mini  is necessary for the continuing treatment of the diagnosis listed and that he requires {Admit to Appropriate Level of Care:85636} for {GREATER/LESS:589450198} 30 days.      Update Admission H&P: {CHP DME Changes in TQJK:061277175}    PHYSICIAN SIGNATURE:  Electronically signed by Charo Can DO on 10/18/22 at 2:44 PM EDT

## 2022-10-18 NOTE — CARE COORDINATION
Phone conversation w/ wife [phone]. Explained role of  and plan of care. Patient is from [facility] [address] [place] [address] in [facility]- has been there approx 1 year. [facility]. PCP is Dr. Antonia De La Fuente and pharmacy is [facility]. Wife states up until a week ago her  was able to be lifted out of bed and the nurse would follow behind him walking with a walker. PT am-pac 8- non ambulatory at this time. Per wife, she plans on leaving for [place] on Thurs. for her granddaughter's wedding and return on Saturday- she wants her  to discharge tomorrow back to [facility] [address] [place] [address] prior to her leaving. Currently on iv abxs. Requiring O2 2lNC which is new for patient. VM left w/ [facility] [address] [place] [address] [phone] to discuss patient's level of care and if he can return when discharged. Wife states [facility] [address] [place] [address] will provide transportation vs possible need for ambulance transport. Fernando Santiago transport form on chart. Will follow Denis Woodward RN case manager      [phone]: Spoke w/ Ronel DEL TORO @ [address] [place] [address] [phone]. Confirmed that they will accept pt back on discharge even if non-ambulatory. They have 24/7 aide coverage at the facility and Nurse coverage from 7am-7 pm and then on call if needed. They can accept pt back on O2- they utilize [facility]. They can accept pt back on iv abxs if needed between 7am-7pm- prefer pt to return on po abx. Will need to check with Johnny GUILLERMO if iv abxs are needed on discharge. Facility DOES NOT provide transport- will need ambulance transport vs ambulette pending progress to return. Norris and lette transport forms on chart.  Will follow Denis Woodward RN case manager

## 2022-10-18 NOTE — PLAN OF CARE
Problem: Discharge Planning  Goal: Discharge to home or other facility with appropriate resources  10/18/2022 1032 by Funmilayo Cordova RN  Outcome: Progressing  10/18/2022 1031 by Funmilayo Cordova RN  Outcome: Progressing     Problem: Pain  Goal: Verbalizes/displays adequate comfort level or baseline comfort level  10/18/2022 1032 by Funmilayo Cordova RN  Outcome: Progressing  10/18/2022 1031 by Funmilayo Cordova RN  Outcome: Progressing     Problem: Skin/Tissue Integrity  Goal: Absence of new skin breakdown  Description: 1. Monitor for areas of redness and/or skin breakdown  2. Assess vascular access sites hourly  3. Every 4-6 hours minimum:  Change oxygen saturation probe site  4. Every 4-6 hours:  If on nasal continuous positive airway pressure, respiratory therapy assess nares and determine need for appliance change or resting period.   10/18/2022 1032 by Funmilayo Cordova RN  Outcome: Progressing  10/18/2022 1031 by Funmilayo Cordova RN  Outcome: Progressing     Problem: Safety - Adult  Goal: Free from fall injury  10/18/2022 1032 by Funmilayo Cordova RN  Outcome: Progressing  10/18/2022 1031 by Funmilayo Cordova RN  Outcome: Progressing     Problem: ABCDS Injury Assessment  Goal: Absence of physical injury  10/18/2022 1032 by Funmilayo Cordova RN  Outcome: Progressing  10/18/2022 1031 by Funmilayo Cordova RN  Outcome: Progressing

## 2022-10-18 NOTE — CARE COORDINATION
Social work / Discharge Planning:         Social work called referral to [facility]) and faxed clinical information for home oxygen to be delivered to [address] per family and facility request.   Electronically signed by GERMÁN Diaz on 10/18/2022 at 3:21 PM

## 2022-10-22 NOTE — DISCHARGE SUMMARY
Hospitalist Discharge Summary    Patient ID: Chilango Mendoza   Patient : [id number]  Patient's PCP: Herbert Severino DO    Admit Date: 10/16/2022   Admitting Physician: Ale Kraus DO    Discharge Date:  10/22/2022   Discharge Physician: Ale Kraus DO   Discharge Condition: Stable  Discharge Disposition: Skilled Facility      Discharge Diagnoses: Active Hospital Problems    Diagnosis Date Noted    Pneumonia due to infectious organism [J18.9] 10/16/2022     Priority: Medium     DEMENTIA   ELEVATED TT  HYPOTHYROID  HTN  [address] course in brief:    80 y.o. male presented with SOB. NO FAMILY PRESENT, AND HE HAS DEMENTIA. HE WAS FOUND TO HAVE PNEUMONIA       PHYSICAL EXAM:    /71   Pulse 95   Temp 98.1 °F (36.7 °C) (Oral)   Resp 16   Ht 5' 6\" (1.676 m)   Wt 192 lb 9.6 oz (87.4 kg)   SpO2 98%   BMI 31.09 kg/m²     General appearance:  No apparent distress, appears stated age. HEENT:  Normal cephalic, atraumatic without obvious deformity. Pupils equal, round, and reactive to light. Extra ocular muscles intact. Conjunctivae/corneas clear. Neck: Supple, with full range of motion. No jugular venous distention. Trachea midline. Respiratory:  Normal respiratory effort. DIMINISHED BILATERALLY  Cardiovascular:  RRR  Abdomen: Soft, non-tender, non-distended with normal bowel sounds. UMBILICAL HERNIA  Musculoskeletal:  No clubbing, cyanosis POS  edema bilaterally. UP TO THIGHS,    Skin: smooth and dry   Neurologic:   Cranial nerves: II-XII intact,   Psychiatric:  Alert and oriented  PERSON             Prior to Admission medications    Medication Sig Start Date End Date Taking?  Authorizing Provider   doxycycline hyclate (VIBRA-TABS) 100 MG tablet Take 1 tablet by mouth 2 times daily for 7 days 10/18/22 10/25/22 Yes Ale Kraus DO   levothyroxine (SYNTHROID) 125 MCG tablet Take 1 tablet by mouth daily 10/5/22   LORIE Faustin - CNP   rivastigmine (EXELON) 4.6 MG/24HR Place 1 patch onto the skin daily 3/3/22   Maxx Mcallister, APRN - CNP   Handicap Placard MISC Permanent disability  5 years 1/21/22   Yue Moura DO   carbidopa-levodopa (SINEMET)  MG per tablet Take 1 tablet by mouth 4 times daily 9/7/21   LORIE Suarez - CNP   amLODIPine (NORVASC) 5 MG tablet Take 1 tablet by mouth daily 9/8/21   Blas Moralez APRN - CNP   donepezil (ARICEPT) 10 MG tablet Take 10 mg by mouth nightly    Historical Provider, MD   traZODone (DESYREL) 50 MG tablet Take 50 mg by mouth nightly     Historical Provider, MD   polyethylene glycol (MIRALAX) 17 g PACK packet Take 17 g by mouth nightly    Historical Provider, MD   hydrOXYzine (VISTARIL) 25 MG capsule Take 25 mg by mouth nightly    Historical Provider, MD   acetaminophen (TYLENOL) 500 MG tablet Take 1,000 mg by mouth nightly *SEE OTHER ORDER*    Historical Provider, MD   vitamin D (ERGOCALCIFEROL) 1.25 MG (95547 UT) CAPS capsule Take 50,000 Units by mouth once a week *MONDAYS*    Historical Provider, MD   traMADol (ULTRAM) 50 MG tablet Take 100 mg by mouth nightly.     Historical Provider, MD   docusate sodium (COLACE) 100 MG capsule Take 200 mg by mouth daily    Historical Provider, MD   midodrine (PROAMATINE) 2.5 MG tablet Take 2.5 mg by mouth every 8 hours as needed (HTN)    Historical Provider, MD   calcium carbonate-vitamin D (CALTRATE) 600-400 MG-UNIT TABS per tab Take 1 tablet by mouth daily    Historical Provider, MD   potassium chloride (KLOR-CON) 20 MEQ packet Take 40 mEq by mouth daily    Historical Provider, MD   aspirin 325 MG EC tablet Take 1 tablet by mouth daily 2/1/21   Hunter Quan DO   ferrous sulfate (IRON 325) 325 (65 Fe) MG tablet Take 1 tablet by mouth daily (with breakfast) 2/1/21   Hunter Quan DO   montelukast (SINGULAIR) 10 MG tablet Take 1 tablet by mouth nightly 2/1/21   Hunter Quan DO   bumetanide (BUMEX) 1 MG tablet Take 1 mg by mouth daily    Historical Provider, MD       Consults: IP CONSULT TO INTERNAL MEDICINE  IP CONSULT TO IV TEAM            Discharge Instructions / Follow up:    No future appointments. Continued appropriate risk factor modification of blood pressure, diabetes and serum lipids will remain essential to reducing risk of future atherosclerotic development    Activity: activity as tolerated    Significant labs:  CBC:   No results for input(s): WBC, RBC, HGB, HCT, MCV, RDW, PLT in the last 72 hours. BMP: No results for input(s): NA, K, CL, CO2, BUN, CREATININE, CA, MG, PHOS in the last 72 hours. LFT:  No results for input(s): PROT, ALB, ALKPHOS, ALT, AST, BILITOT, AMYLASE, LIPASE in the last 72 hours. PT/INR: No results for input(s): INR, APTT in the last 72 hours. BNP: No results for input(s): BNP in the last 72 hours. Hgb A1C: No results found for: LABA1C  Folate and B12:   Lab Results   Component Value Date    SOMNZMJZ48 379 06/22/2022   ,   Lab Results   Component Value Date    FOLATE 7.8 06/22/2022     Thyroid Studies:   Lab Results   Component Value Date    TSH 12.390 (H) 10/03/2022    H9SSGFC 7.2 09/24/2020       Urinalysis:    Lab Results   Component Value Date/Time    NITRU Negative 10/16/2022 11:30 PM    WBCUA 0-1 10/16/2022 11:30 PM    BACTERIA NONE SEEN 10/16/2022 11:30 PM    RBCUA 0-1 10/16/2022 11:30 PM    BLOODU Negative 10/16/2022 11:30 PM    SPECGRAV 1.025 10/16/2022 11:30 PM    GLUCOSEU Negative 10/16/2022 11:30 PM       Imaging:  XR CHEST PORTABLE    Result Date: 10/16/2022  EXAMINATION: ONE XRAY VIEW OF THE CHEST 10/16/2022 2:58 pm COMPARISON: 09/05/2021 HISTORY: ORDERING SYSTEM PROVIDED HISTORY: Concern for PNA TECHNOLOGIST PROVIDED HISTORY: Reason for exam:->Concern for PNA FINDINGS: There is a small right pleural effusion. There is right perihilar and right lower lobe airspace disease. The left lung is clear. 1. Right perihilar and right lower lobe airspace disease as well as a small right pleural effusion.   These findings could represent pneumonia in the proper clinical setting. Dedicated follow-up examination is recommended in 2-3 weeks to confirm resolution and exclude underlying pathology. Discharge Medications:      Medication List        START taking these medications      doxycycline hyclate 100 MG tablet  Commonly known as: VIBRA-TABS  Take 1 tablet by mouth 2 times daily for 7 days            CHANGE how you take these medications      acetaminophen 500 MG tablet  Commonly known as: TYLENOL  What changed: Another medication with the same name was removed. Continue taking this medication, and follow the directions you see here.             CONTINUE taking these medications      amLODIPine 5 MG tablet  Commonly known as: NORVASC  Take 1 tablet by mouth daily     aspirin 325 MG EC tablet  Take 1 tablet by mouth daily     bumetanide 1 MG tablet  Commonly known as: BUMEX     calcium carbonate-vitamin D 600-400 MG-UNIT Tabs per tab  Commonly known as: CALTRATE     carbidopa-levodopa  MG per tablet  Commonly known as: SINEMET  Take 1 tablet by mouth 4 times daily     docusate sodium 100 MG capsule  Commonly known as: COLACE     donepezil 10 MG tablet  Commonly known as: ARICEPT     ferrous sulfate 325 (65 Fe) MG tablet  Commonly known as: IRON 325  Take 1 tablet by mouth daily (with breakfast)     Handicap Placard Misc  Permanent disability  5 years     hydrOXYzine pamoate 25 MG capsule  Commonly known as: VISTARIL     levothyroxine 125 MCG tablet  Commonly known as: SYNTHROID  Take 1 tablet by mouth daily     midodrine 2.5 MG tablet  Commonly known as: PROAMATINE     montelukast 10 MG tablet  Commonly known as: SINGULAIR  Take 1 tablet by mouth nightly     polyethylene glycol 17 g Pack packet  Commonly known as: MIRALAX     potassium chloride 20 MEQ packet  Commonly known as: KLOR-CON     traMADol 50 MG tablet  Commonly known as: ULTRAM     traZODone 50 MG tablet  Commonly known as: DESYREL     vitamin D 1.25 MG (22044 UT) Caps capsule  Commonly known as: ERGOCALCIFEROL            ASK your doctor about these medications      rivastigmine 4.6 MG/24HR  Commonly known as: EXELON  Place 1 patch onto the skin daily               Where to Get Your Medications        Information about where to get these medications is not yet available    Ask your nurse or doctor about these medications  doxycycline hyclate 100 MG tablet         Time Spent on discharge is 45 minutes in the review of medications and discharge plan  and exam.    +++++++++++++++++++++++++++++++++++++++++++++++++  Bam Molina, DO  [address], [place]  +++++++++++++++++++++++++++++++++++++++++++++++++  NOTE: This report was transcribed using voice recognition software. Every effort was made to ensure accuracy; however, inadvertent computerized transcription errors may be present.

## 2022-12-01 ENCOUNTER — TELEPHONE (OUTPATIENT)
Dept: FAMILY MEDICINE CLINIC | Age: 87
End: 2022-12-01

## (undated) DEVICE — TRAY LAMBOTS REUSABLE

## (undated) DEVICE — GLOVE SURG SZ 8 CRM LTX FREE POLYISOPRENE POLYMER BEAD ANTI

## (undated) DEVICE — NEEDLE SYR 18GA L1.5IN RED PLAS HUB S STL BLNT FILL W/O

## (undated) DEVICE — SET ORTHO STD STORTSTD1

## (undated) DEVICE — STRIP,CLOSURE,WOUND,MEDI-STRIP,1/2X4: Brand: MEDLINE

## (undated) DEVICE — DRAPE C ARM W41XL74IN UNIV MOB W RUBBERBAND CLP

## (undated) DEVICE — CHLORAPREP 26ML ORANGE

## (undated) DEVICE — INSTRUMENT CURRETTES REUSABLE

## (undated) DEVICE — SUTURE STRATAFIX SYMMETRIC SZ 1 L18IN ABSRB VLT CT1 L36CM SXPP1A404

## (undated) DEVICE — PILLOW POS W15XH6XL22IN RASPBERRY FOAM ABD W/ STRP DISP FOR

## (undated) DEVICE — DOUBLE BASIN SET: Brand: MEDLINE INDUSTRIES, INC.

## (undated) DEVICE — TRAY SYSTEM 7 DRILL REUSABLE

## (undated) DEVICE — 3M™ IOBAN™ 2 ANTIMICROBIAL INCISE DRAPE 6650EZ: Brand: IOBAN™ 2

## (undated) DEVICE — 4-PORT MANIFOLD: Brand: NEPTUNE 2

## (undated) DEVICE — INSTRUMENT SYSTEM 7 BATTERY REUSABLE

## (undated) DEVICE — BIT DRL L5IN DIA2.4MM STD ST S STL TWST BUSA

## (undated) DEVICE — GLOVE SURG SZ 8 L12IN FNGR THK94MIL TRNSLUC YEL LTX HYDRGEL

## (undated) DEVICE — BIT DRL L L266MM DIA16MM FEM FLX CANN QUIK CPL

## (undated) DEVICE — TUBE IRRIG HNDPC HI FLO TP INTRPULS W/SUCTION TUBE

## (undated) DEVICE — TRAY HIP EXTRAS REUSABLE

## (undated) DEVICE — ELECTRODE PT RET AD L9FT HI MOIST COND ADH HYDRGEL CORDED

## (undated) DEVICE — GUIDEWIRE ORTH L400MM DIA3.2MM FOR TFN

## (undated) DEVICE — BASIC SINGLE BASIN 1-LF: Brand: MEDLINE INDUSTRIES, INC.

## (undated) DEVICE — PAD PERINL POST FOAM DISPOSABLE FOR AMSCO SURG TBL

## (undated) DEVICE — DRAPE,TOP,102X53,STERILE: Brand: MEDLINE

## (undated) DEVICE — ROD RMR L950MM DIA2.5MM W/ EXTN BALL TIP

## (undated) DEVICE — SOLUTION IV IRRIG POUR BRL 0.9% SODIUM CHL 2F7124

## (undated) DEVICE — GLOVE SURG SZ 85 L12IN FNGR THK13MIL WHT ISOLEX POLYISOPRENE

## (undated) DEVICE — BANDAGE,GAUZE,BULKEE II,4.5"X4.1YD,STRL: Brand: MEDLINE

## (undated) DEVICE — SYRINGE MED 50ML LUERLOCK TIP

## (undated) DEVICE — 3M™ TEGADERM™ TRANSPARENT FILM DRESSING FRAME STYLE, 1626W, 4 IN X 4-3/4 IN (10 CM X 12 CM), 50/CT 4CT/CASE: Brand: 3M™ TEGADERM™

## (undated) DEVICE — Device

## (undated) DEVICE — BIT DRL L500MM DIA6X9MM CANN STP L QUIK CPL FOR DH DC TFN

## (undated) DEVICE — TOWEL,OR,DSP,ST,BLUE,STD,6/PK,12PK/CS: Brand: MEDLINE

## (undated) DEVICE — BIT DRL L330MM DIA4.2MM CALIB L100MM ST 3 FLUT QUIK CPL FOR

## (undated) DEVICE — SPONGE LAP W18XL18IN WHT COT 4 PLY FLD STRUNG RADPQ DISP ST

## (undated) DEVICE — Z DISCONTINUED PER MEDLINE USE 2741943 DRESSING AQUACEL 10 IN ALG W9XL25CM SIL CVR WTRPRF VIR BACT BARR ANTIMIC

## (undated) DEVICE — NEEDLE HYPO 22GA L1.5IN BLK POLYPR HUB S STL REG BVL STR

## (undated) DEVICE — CONVERTORS STOCKINETTE: Brand: CONVERTORS

## (undated) DEVICE — CONTROL SYRINGE LUER-LOCK TIP: Brand: MONOJECT

## (undated) DEVICE — TUBING, SUCTION, 9/32" X 10', STRAIGHT: Brand: MEDLINE

## (undated) DEVICE — 3M™ COBAN™ NL STERILE NON-LATEX SELF-ADHERENT WRAP, 2084S, 4 IN X 5 YD (10 CM X 4,5 M), 18 ROLLS/CASE: Brand: 3M™ COBAN™

## (undated) DEVICE — INSTRUMENT CLAMP TOWEL LARGE REUSABLE

## (undated) DEVICE — DRESSING,GAUZE,XEROFORM,CURAD,1"X8",ST: Brand: CURAD

## (undated) DEVICE — GLOVE ORANGE PI 7 1/2   MSG9075

## (undated) DEVICE — GOWN,SIRUS,POLYRNF,BRTHSLV,XL,30/CS: Brand: MEDLINE

## (undated) DEVICE — TAPE ADH W3INXL10YD WHT COT WVN BK POWERFUL RUB BASE HIGHLY

## (undated) DEVICE — DRAINBAG,ANTI-REFLUX TOWER,L/F,2000ML,LL: Brand: MEDLINE

## (undated) DEVICE — 2108 SERIES SAGITTAL BLADE, OFFSET (20.0 X 0.89 X 80.0MM)

## (undated) DEVICE — DRAPE ISOLATN PT ST W/POCKET

## (undated) DEVICE — Z INACTIVE USE 2660664 SOLUTION IRRIG 3000ML 0.9% SOD CHL USP UROMATIC PLAS CONT

## (undated) DEVICE — GLOVE ORANGE PI 8 1/2   MSG9085

## (undated) DEVICE — GOWN,SIRUS,POLYRNF,SETINSLV,XL,20/CS: Brand: MEDLINE

## (undated) DEVICE — PACK PROCEDURE SURG GEN CUST

## (undated) DEVICE — CANNULA INJ 17GA CLR S STL NDLLSS LUERLOCK CONN 2 ACCS

## (undated) DEVICE — CLAMP TONSIL

## (undated) DEVICE — HEWSON SUTURE RETRIEVER: Brand: HEWSON SUTURE RETRIEVER

## (undated) DEVICE — SOLUTION IV IRRIG WATER 1000ML POUR BRL 2F7114

## (undated) DEVICE — COVER HNDL LT DISP

## (undated) DEVICE — PADDING CAST W6INXL4YD COT LO LINTING WYTEX

## (undated) DEVICE — GAUZE,SPONGE,4"X4",8PLY,STRL,LF,10/TRAY: Brand: MEDLINE

## (undated) DEVICE — TOTAL HIP PK

## (undated) DEVICE — 3M™ STERI-DRAPE™ U-DRAPE 1015: Brand: STERI-DRAPE™

## (undated) DEVICE — DRAPE,REIN 53X77,STERILE: Brand: MEDLINE